# Patient Record
Sex: MALE | Race: WHITE | Employment: OTHER | URBAN - METROPOLITAN AREA
[De-identification: names, ages, dates, MRNs, and addresses within clinical notes are randomized per-mention and may not be internally consistent; named-entity substitution may affect disease eponyms.]

---

## 2017-03-31 ENCOUNTER — TRANSCRIBE ORDERS (OUTPATIENT)
Dept: LAB | Facility: CLINIC | Age: 77
End: 2017-03-31

## 2017-03-31 ENCOUNTER — ALLSCRIPTS OFFICE VISIT (OUTPATIENT)
Dept: OTHER | Facility: OTHER | Age: 77
End: 2017-03-31

## 2017-03-31 ENCOUNTER — APPOINTMENT (OUTPATIENT)
Dept: LAB | Facility: CLINIC | Age: 77
End: 2017-03-31
Payer: MEDICARE

## 2017-03-31 ENCOUNTER — GENERIC CONVERSION - ENCOUNTER (OUTPATIENT)
Dept: OTHER | Facility: OTHER | Age: 77
End: 2017-03-31

## 2017-03-31 DIAGNOSIS — I10 ESSENTIAL (PRIMARY) HYPERTENSION: ICD-10-CM

## 2017-03-31 DIAGNOSIS — E11.9 TYPE 2 DIABETES MELLITUS WITHOUT COMPLICATIONS (HCC): ICD-10-CM

## 2017-03-31 DIAGNOSIS — R91.8 OTHER NONSPECIFIC ABNORMAL FINDING OF LUNG FIELD: ICD-10-CM

## 2017-03-31 DIAGNOSIS — M25.562 PAIN IN LEFT KNEE: ICD-10-CM

## 2017-03-31 LAB
ALBUMIN SERPL BCP-MCNC: 3.7 G/DL (ref 3.5–5)
ALP SERPL-CCNC: 82 U/L (ref 46–116)
ALT SERPL W P-5'-P-CCNC: 38 U/L (ref 12–78)
ANION GAP SERPL CALCULATED.3IONS-SCNC: 5 MMOL/L (ref 4–13)
AST SERPL W P-5'-P-CCNC: 17 U/L (ref 5–45)
BASOPHILS # BLD AUTO: 0.01 THOUSANDS/ΜL (ref 0–0.1)
BASOPHILS NFR BLD AUTO: 0 % (ref 0–1)
BILIRUB SERPL-MCNC: 0.54 MG/DL (ref 0.2–1)
BUN SERPL-MCNC: 19 MG/DL (ref 5–25)
CALCIUM SERPL-MCNC: 8.8 MG/DL (ref 8.3–10.1)
CHLORIDE SERPL-SCNC: 104 MMOL/L (ref 100–108)
CHOLEST SERPL-MCNC: 156 MG/DL (ref 50–200)
CO2 SERPL-SCNC: 28 MMOL/L (ref 21–32)
CREAT SERPL-MCNC: 0.92 MG/DL (ref 0.6–1.3)
CREAT UR-MCNC: 131 MG/DL
EOSINOPHIL # BLD AUTO: 0.22 THOUSAND/ΜL (ref 0–0.61)
EOSINOPHIL NFR BLD AUTO: 5 % (ref 0–6)
ERYTHROCYTE [DISTWIDTH] IN BLOOD BY AUTOMATED COUNT: 13.8 % (ref 11.6–15.1)
EST. AVERAGE GLUCOSE BLD GHB EST-MCNC: 151 MG/DL
GFR SERPL CREATININE-BSD FRML MDRD: >60 ML/MIN/1.73SQ M
GLUCOSE P FAST SERPL-MCNC: 131 MG/DL (ref 65–99)
HBA1C MFR BLD: 6.9 % (ref 4.2–6.3)
HCT VFR BLD AUTO: 41.8 % (ref 36.5–49.3)
HDLC SERPL-MCNC: 54 MG/DL (ref 40–60)
HGB BLD-MCNC: 14.3 G/DL (ref 12–17)
LDLC SERPL CALC-MCNC: 90 MG/DL (ref 0–100)
LYMPHOCYTES # BLD AUTO: 1.42 THOUSANDS/ΜL (ref 0.6–4.47)
LYMPHOCYTES NFR BLD AUTO: 29 % (ref 14–44)
MCH RBC QN AUTO: 29.1 PG (ref 26.8–34.3)
MCHC RBC AUTO-ENTMCNC: 34.2 G/DL (ref 31.4–37.4)
MCV RBC AUTO: 85 FL (ref 82–98)
MICROALBUMIN UR-MCNC: 6.8 MG/L (ref 0–20)
MICROALBUMIN/CREAT 24H UR: 5 MG/G CREATININE (ref 0–30)
MONOCYTES # BLD AUTO: 0.35 THOUSAND/ΜL (ref 0.17–1.22)
MONOCYTES NFR BLD AUTO: 7 % (ref 4–12)
NEUTROPHILS # BLD AUTO: 2.91 THOUSANDS/ΜL (ref 1.85–7.62)
NEUTS SEG NFR BLD AUTO: 59 % (ref 43–75)
NRBC BLD AUTO-RTO: 0 /100 WBCS
PLATELET # BLD AUTO: 158 THOUSANDS/UL (ref 149–390)
PMV BLD AUTO: 11.5 FL (ref 8.9–12.7)
POTASSIUM SERPL-SCNC: 4.1 MMOL/L (ref 3.5–5.3)
PROT SERPL-MCNC: 7.3 G/DL (ref 6.4–8.2)
RBC # BLD AUTO: 4.92 MILLION/UL (ref 3.88–5.62)
SODIUM SERPL-SCNC: 137 MMOL/L (ref 136–145)
TRIGL SERPL-MCNC: 59 MG/DL
TSH SERPL DL<=0.05 MIU/L-ACNC: 2.21 UIU/ML (ref 0.36–3.74)
WBC # BLD AUTO: 4.92 THOUSAND/UL (ref 4.31–10.16)

## 2017-03-31 PROCEDURE — 80061 LIPID PANEL: CPT

## 2017-03-31 PROCEDURE — 82570 ASSAY OF URINE CREATININE: CPT

## 2017-03-31 PROCEDURE — 83036 HEMOGLOBIN GLYCOSYLATED A1C: CPT

## 2017-03-31 PROCEDURE — 80053 COMPREHEN METABOLIC PANEL: CPT

## 2017-03-31 PROCEDURE — 85025 COMPLETE CBC W/AUTO DIFF WBC: CPT

## 2017-03-31 PROCEDURE — 84443 ASSAY THYROID STIM HORMONE: CPT

## 2017-03-31 PROCEDURE — 36415 COLL VENOUS BLD VENIPUNCTURE: CPT

## 2017-03-31 PROCEDURE — 82043 UR ALBUMIN QUANTITATIVE: CPT

## 2017-04-01 ENCOUNTER — OFFICE VISIT (OUTPATIENT)
Dept: URGENT CARE | Facility: MEDICAL CENTER | Age: 77
End: 2017-04-01
Payer: MEDICARE

## 2017-04-01 PROCEDURE — 99212 OFFICE O/P EST SF 10 MIN: CPT

## 2017-04-01 PROCEDURE — G0463 HOSPITAL OUTPT CLINIC VISIT: HCPCS

## 2017-04-04 ENCOUNTER — GENERIC CONVERSION - ENCOUNTER (OUTPATIENT)
Dept: OTHER | Facility: OTHER | Age: 77
End: 2017-04-04

## 2017-04-04 ENCOUNTER — TRANSCRIBE ORDERS (OUTPATIENT)
Dept: ADMINISTRATIVE | Facility: HOSPITAL | Age: 77
End: 2017-04-04

## 2017-04-04 DIAGNOSIS — M25.562 ACUTE PAIN OF LEFT KNEE: Primary | ICD-10-CM

## 2017-04-06 ENCOUNTER — HOSPITAL ENCOUNTER (OUTPATIENT)
Dept: CT IMAGING | Facility: HOSPITAL | Age: 77
Discharge: HOME | End: 2017-04-06
Payer: MEDICARE

## 2017-04-06 DIAGNOSIS — R91.8 OTHER NONSPECIFIC ABNORMAL FINDING OF LUNG FIELD: ICD-10-CM

## 2017-04-06 PROCEDURE — 71250 CT THORAX DX C-: CPT

## 2017-04-11 ENCOUNTER — GENERIC CONVERSION - ENCOUNTER (OUTPATIENT)
Dept: OTHER | Facility: OTHER | Age: 77
End: 2017-04-11

## 2017-04-17 ENCOUNTER — HOSPITAL ENCOUNTER (OUTPATIENT)
Dept: MRI IMAGING | Facility: HOSPITAL | Age: 77
Discharge: HOME | End: 2017-04-17
Payer: MEDICARE

## 2017-04-17 DIAGNOSIS — M25.562 PAIN IN LEFT KNEE: ICD-10-CM

## 2017-04-17 PROCEDURE — 73721 MRI JNT OF LWR EXTRE W/O DYE: CPT

## 2017-04-18 ENCOUNTER — GENERIC CONVERSION - ENCOUNTER (OUTPATIENT)
Dept: OTHER | Facility: OTHER | Age: 77
End: 2017-04-18

## 2017-05-02 ENCOUNTER — HOSPITAL ENCOUNTER (OUTPATIENT)
Dept: RADIOLOGY | Facility: CLINIC | Age: 77
Discharge: HOME | End: 2017-05-02
Payer: MEDICARE

## 2017-05-02 ENCOUNTER — ALLSCRIPTS OFFICE VISIT (OUTPATIENT)
Dept: OTHER | Facility: OTHER | Age: 77
End: 2017-05-02

## 2017-05-02 DIAGNOSIS — M25.561 PAIN IN RIGHT KNEE: ICD-10-CM

## 2017-05-02 DIAGNOSIS — M25.562 PAIN IN LEFT KNEE: ICD-10-CM

## 2017-05-02 DIAGNOSIS — N39.0 URINARY TRACT INFECTION: ICD-10-CM

## 2017-05-02 DIAGNOSIS — M17.12 PRIMARY OSTEOARTHRITIS OF LEFT KNEE: ICD-10-CM

## 2017-05-02 DIAGNOSIS — S83.207A TEAR OF LEFT MENISCUS AS CURRENT INJURY: ICD-10-CM

## 2017-05-02 DIAGNOSIS — R31.9 HEMATURIA: ICD-10-CM

## 2017-05-02 PROCEDURE — 73564 X-RAY EXAM KNEE 4 OR MORE: CPT

## 2017-05-02 PROCEDURE — 73560 X-RAY EXAM OF KNEE 1 OR 2: CPT

## 2017-05-08 ENCOUNTER — APPOINTMENT (OUTPATIENT)
Dept: PHYSICAL THERAPY | Facility: MEDICAL CENTER | Age: 77
End: 2017-05-08
Payer: MEDICARE

## 2017-05-08 ENCOUNTER — GENERIC CONVERSION - ENCOUNTER (OUTPATIENT)
Dept: OTHER | Facility: OTHER | Age: 77
End: 2017-05-08

## 2017-05-08 PROCEDURE — G8991 OTHER PT/OT GOAL STATUS: HCPCS

## 2017-05-08 PROCEDURE — G8990 OTHER PT/OT CURRENT STATUS: HCPCS

## 2017-05-08 PROCEDURE — 97161 PT EVAL LOW COMPLEX 20 MIN: CPT

## 2017-05-12 ENCOUNTER — APPOINTMENT (OUTPATIENT)
Dept: PHYSICAL THERAPY | Facility: MEDICAL CENTER | Age: 77
End: 2017-05-12
Payer: MEDICARE

## 2017-05-12 PROCEDURE — 97110 THERAPEUTIC EXERCISES: CPT

## 2017-05-12 PROCEDURE — 97140 MANUAL THERAPY 1/> REGIONS: CPT

## 2017-05-15 ENCOUNTER — APPOINTMENT (OUTPATIENT)
Dept: PHYSICAL THERAPY | Facility: MEDICAL CENTER | Age: 77
End: 2017-05-15
Payer: MEDICARE

## 2017-05-15 PROCEDURE — 97110 THERAPEUTIC EXERCISES: CPT

## 2017-05-19 ENCOUNTER — APPOINTMENT (OUTPATIENT)
Dept: PHYSICAL THERAPY | Facility: MEDICAL CENTER | Age: 77
End: 2017-05-19
Payer: MEDICARE

## 2017-05-19 PROCEDURE — 97110 THERAPEUTIC EXERCISES: CPT

## 2017-05-22 ENCOUNTER — APPOINTMENT (OUTPATIENT)
Dept: PHYSICAL THERAPY | Facility: MEDICAL CENTER | Age: 77
End: 2017-05-22
Payer: MEDICARE

## 2017-05-22 PROCEDURE — 97110 THERAPEUTIC EXERCISES: CPT

## 2017-05-26 ENCOUNTER — APPOINTMENT (OUTPATIENT)
Dept: PHYSICAL THERAPY | Facility: MEDICAL CENTER | Age: 77
End: 2017-05-26
Payer: MEDICARE

## 2017-05-26 PROCEDURE — 97110 THERAPEUTIC EXERCISES: CPT

## 2017-05-30 ENCOUNTER — LAB REQUISITION (OUTPATIENT)
Dept: LAB | Facility: HOSPITAL | Age: 77
End: 2017-05-30
Payer: MEDICARE

## 2017-05-30 ENCOUNTER — ALLSCRIPTS OFFICE VISIT (OUTPATIENT)
Dept: OTHER | Facility: OTHER | Age: 77
End: 2017-05-30

## 2017-05-30 DIAGNOSIS — R31.9 HEMATURIA: ICD-10-CM

## 2017-05-30 DIAGNOSIS — N39.0 URINARY TRACT INFECTION: ICD-10-CM

## 2017-05-30 LAB
BILIRUB UR QL STRIP: NORMAL
CLARITY UR: NORMAL
COLOR UR: NORMAL
GLUCOSE (HISTORICAL): NEGATIVE
HGB UR QL STRIP.AUTO: NORMAL
KETONES UR STRIP-MCNC: NORMAL MG/DL
LEUKOCYTE ESTERASE UR QL STRIP: NORMAL
NITRITE UR QL STRIP: POSITIVE
PH UR STRIP.AUTO: 5 [PH]
PROT UR STRIP-MCNC: NORMAL MG/DL
SP GR UR STRIP.AUTO: 1.02
UROBILINOGEN UR QL STRIP.AUTO: 0.2

## 2017-05-30 PROCEDURE — 87086 URINE CULTURE/COLONY COUNT: CPT | Performed by: PHYSICIAN ASSISTANT

## 2017-05-30 PROCEDURE — 87077 CULTURE AEROBIC IDENTIFY: CPT | Performed by: PHYSICIAN ASSISTANT

## 2017-05-30 PROCEDURE — 87186 SC STD MICRODIL/AGAR DIL: CPT | Performed by: PHYSICIAN ASSISTANT

## 2017-06-01 ENCOUNTER — GENERIC CONVERSION - ENCOUNTER (OUTPATIENT)
Dept: OTHER | Facility: OTHER | Age: 77
End: 2017-06-01

## 2017-06-01 LAB
BACTERIA UR CULT: NORMAL
BACTERIA UR CULT: NORMAL

## 2017-06-02 ENCOUNTER — HOSPITAL ENCOUNTER (OUTPATIENT)
Dept: ULTRASOUND IMAGING | Facility: HOSPITAL | Age: 77
Discharge: HOME | End: 2017-06-02
Payer: MEDICARE

## 2017-06-02 DIAGNOSIS — N39.0 URINARY TRACT INFECTION: ICD-10-CM

## 2017-06-02 DIAGNOSIS — R31.9 HEMATURIA: ICD-10-CM

## 2017-06-02 PROCEDURE — 76770 US EXAM ABDO BACK WALL COMP: CPT

## 2017-06-05 ENCOUNTER — GENERIC CONVERSION - ENCOUNTER (OUTPATIENT)
Dept: OTHER | Facility: OTHER | Age: 77
End: 2017-06-05

## 2017-06-06 DIAGNOSIS — N28.89 OTHER SPECIFIED DISORDERS OF KIDNEY AND URETER: ICD-10-CM

## 2017-06-06 DIAGNOSIS — R31.9 HEMATURIA: ICD-10-CM

## 2017-06-06 DIAGNOSIS — N28.1 ACQUIRED CYST OF KIDNEY: ICD-10-CM

## 2017-06-06 DIAGNOSIS — N39.0 URINARY TRACT INFECTION: ICD-10-CM

## 2017-06-13 ENCOUNTER — ALLSCRIPTS OFFICE VISIT (OUTPATIENT)
Dept: OTHER | Facility: OTHER | Age: 77
End: 2017-06-13

## 2017-06-13 ENCOUNTER — APPOINTMENT (OUTPATIENT)
Dept: LAB | Facility: CLINIC | Age: 77
End: 2017-06-13
Payer: MEDICARE

## 2017-06-13 ENCOUNTER — TRANSCRIBE ORDERS (OUTPATIENT)
Dept: LAB | Facility: CLINIC | Age: 77
End: 2017-06-13

## 2017-06-13 ENCOUNTER — GENERIC CONVERSION - ENCOUNTER (OUTPATIENT)
Dept: OTHER | Facility: OTHER | Age: 77
End: 2017-06-13

## 2017-06-13 DIAGNOSIS — R31.9 HEMATURIA: ICD-10-CM

## 2017-06-13 DIAGNOSIS — N39.0 URINARY TRACT INFECTION: ICD-10-CM

## 2017-06-13 LAB
BILIRUB UR QL STRIP: NEGATIVE
CLARITY UR: CLEAR
COLOR UR: YELLOW
GLUCOSE UR STRIP-MCNC: NEGATIVE MG/DL
HGB UR QL STRIP.AUTO: NEGATIVE
KETONES UR STRIP-MCNC: NEGATIVE MG/DL
LEUKOCYTE ESTERASE UR QL STRIP: NEGATIVE
NITRITE UR QL STRIP: NEGATIVE
PH UR STRIP.AUTO: 5.5 [PH] (ref 4.5–8)
PROT UR STRIP-MCNC: NEGATIVE MG/DL
SP GR UR STRIP.AUTO: 1.02 (ref 1–1.03)
UROBILINOGEN UR QL STRIP.AUTO: 0.2 E.U./DL

## 2017-06-13 PROCEDURE — 81003 URINALYSIS AUTO W/O SCOPE: CPT

## 2017-06-15 ENCOUNTER — HOSPITAL ENCOUNTER (OUTPATIENT)
Dept: CT IMAGING | Facility: HOSPITAL | Age: 77
Discharge: HOME | End: 2017-06-15
Payer: MEDICARE

## 2017-06-15 DIAGNOSIS — N28.1 ACQUIRED CYST OF KIDNEY: ICD-10-CM

## 2017-06-15 DIAGNOSIS — R31.9 HEMATURIA: ICD-10-CM

## 2017-06-15 DIAGNOSIS — N39.0 URINARY TRACT INFECTION: ICD-10-CM

## 2017-06-15 PROCEDURE — 74178 CT ABD&PLV WO CNTR FLWD CNTR: CPT

## 2017-06-15 RX ADMIN — IOHEXOL 100 ML: 350 INJECTION, SOLUTION INTRAVENOUS at 17:53

## 2017-06-19 ENCOUNTER — GENERIC CONVERSION - ENCOUNTER (OUTPATIENT)
Dept: OTHER | Facility: OTHER | Age: 77
End: 2017-06-19

## 2017-06-22 ENCOUNTER — GENERIC CONVERSION - ENCOUNTER (OUTPATIENT)
Dept: OTHER | Facility: OTHER | Age: 77
End: 2017-06-22

## 2017-06-28 ENCOUNTER — TRANSCRIBE ORDERS (OUTPATIENT)
Dept: ADMINISTRATIVE | Facility: HOSPITAL | Age: 77
End: 2017-06-28

## 2017-06-28 ENCOUNTER — APPOINTMENT (OUTPATIENT)
Dept: LAB | Facility: CLINIC | Age: 77
End: 2017-06-28
Payer: MEDICARE

## 2017-06-28 ENCOUNTER — ALLSCRIPTS OFFICE VISIT (OUTPATIENT)
Dept: OTHER | Facility: OTHER | Age: 77
End: 2017-06-28

## 2017-06-28 DIAGNOSIS — N28.89 OTHER SPECIFIED DISORDERS OF KIDNEY AND URETER: ICD-10-CM

## 2017-06-28 DIAGNOSIS — N32.89 NONTRAUMATIC RUPTURE OF BLADDER: Primary | ICD-10-CM

## 2017-06-28 DIAGNOSIS — N28.89 VASCULAR DISORDERS OF KIDNEY: Primary | ICD-10-CM

## 2017-06-28 LAB
ANION GAP SERPL CALCULATED.3IONS-SCNC: 6 MMOL/L (ref 4–13)
BUN SERPL-MCNC: 19 MG/DL (ref 5–25)
CALCIUM SERPL-MCNC: 8.9 MG/DL (ref 8.3–10.1)
CHLORIDE SERPL-SCNC: 106 MMOL/L (ref 100–108)
CLARITY UR: NORMAL
CO2 SERPL-SCNC: 28 MMOL/L (ref 21–32)
COLOR UR: YELLOW
CREAT SERPL-MCNC: 0.93 MG/DL (ref 0.6–1.3)
GFR SERPL CREATININE-BSD FRML MDRD: >60 ML/MIN/1.73SQ M
GLUCOSE (HISTORICAL): 250
GLUCOSE P FAST SERPL-MCNC: 224 MG/DL (ref 65–99)
HGB UR QL STRIP.AUTO: NORMAL
KETONES UR STRIP-MCNC: NORMAL MG/DL
LEUKOCYTE ESTERASE UR QL STRIP: NORMAL
NITRITE UR QL STRIP: NORMAL
PH UR STRIP.AUTO: 5 [PH]
POTASSIUM SERPL-SCNC: 3.9 MMOL/L (ref 3.5–5.3)
PROT UR STRIP-MCNC: NORMAL MG/DL
SODIUM SERPL-SCNC: 140 MMOL/L (ref 136–145)
SP GR UR STRIP.AUTO: 1.03

## 2017-06-28 PROCEDURE — 88112 CYTOPATH CELL ENHANCE TECH: CPT | Performed by: UROLOGY

## 2017-06-28 PROCEDURE — 80048 BASIC METABOLIC PNL TOTAL CA: CPT

## 2017-06-28 PROCEDURE — 36415 COLL VENOUS BLD VENIPUNCTURE: CPT

## 2017-07-07 ENCOUNTER — HOSPITAL ENCOUNTER (OUTPATIENT)
Dept: MRI IMAGING | Facility: HOSPITAL | Age: 77
Discharge: HOME | End: 2017-07-07
Attending: UROLOGY
Payer: MEDICARE

## 2017-07-07 ENCOUNTER — HOSPITAL ENCOUNTER (OUTPATIENT)
Dept: ULTRASOUND IMAGING | Facility: HOSPITAL | Age: 77
Discharge: HOME | End: 2017-07-07
Attending: UROLOGY
Payer: MEDICARE

## 2017-07-07 DIAGNOSIS — N28.89 OTHER SPECIFIED DISORDERS OF KIDNEY AND URETER: ICD-10-CM

## 2017-07-07 DIAGNOSIS — N28.89 VASCULAR DISORDERS OF KIDNEY: ICD-10-CM

## 2017-07-07 DIAGNOSIS — R31.9 HEMATURIA: ICD-10-CM

## 2017-07-07 PROCEDURE — 76770 US EXAM ABDO BACK WALL COMP: CPT

## 2017-07-07 PROCEDURE — 74183 MRI ABD W/O CNTR FLWD CNTR: CPT

## 2017-07-07 PROCEDURE — A9585 GADOBUTROL INJECTION: HCPCS | Performed by: UROLOGY

## 2017-07-07 RX ADMIN — GADOBUTROL 10 ML: 604.72 INJECTION INTRAVENOUS at 20:10

## 2017-07-13 ENCOUNTER — GENERIC CONVERSION - ENCOUNTER (OUTPATIENT)
Dept: OTHER | Facility: OTHER | Age: 77
End: 2017-07-13

## 2017-07-21 ENCOUNTER — ALLSCRIPTS OFFICE VISIT (OUTPATIENT)
Dept: OTHER | Facility: OTHER | Age: 77
End: 2017-07-21

## 2017-07-21 LAB
BILIRUB UR QL STRIP: NORMAL
CLARITY UR: NORMAL
COLOR UR: YELLOW
GLUCOSE (HISTORICAL): NORMAL
HGB UR QL STRIP.AUTO: NORMAL
KETONES UR STRIP-MCNC: NORMAL MG/DL
LEUKOCYTE ESTERASE UR QL STRIP: NORMAL
NITRITE UR QL STRIP: NORMAL
PH UR STRIP.AUTO: 5 [PH]
PROT UR STRIP-MCNC: NORMAL MG/DL
SP GR UR STRIP.AUTO: 1.01
UROBILINOGEN UR QL STRIP.AUTO: NORMAL

## 2017-08-08 ENCOUNTER — ALLSCRIPTS OFFICE VISIT (OUTPATIENT)
Dept: OTHER | Facility: OTHER | Age: 77
End: 2017-08-08

## 2017-08-29 ENCOUNTER — ALLSCRIPTS OFFICE VISIT (OUTPATIENT)
Dept: OTHER | Facility: OTHER | Age: 77
End: 2017-08-29

## 2017-08-29 DIAGNOSIS — I10 ESSENTIAL (PRIMARY) HYPERTENSION: ICD-10-CM

## 2017-08-29 DIAGNOSIS — R94.31 ABNORMAL ELECTROCARDIOGRAM: ICD-10-CM

## 2017-08-31 ENCOUNTER — HOSPITAL ENCOUNTER (OUTPATIENT)
Dept: NON INVASIVE DIAGNOSTICS | Facility: CLINIC | Age: 77
Discharge: HOME | End: 2017-08-31
Payer: MEDICARE

## 2017-08-31 ENCOUNTER — GENERIC CONVERSION - ENCOUNTER (OUTPATIENT)
Dept: OTHER | Facility: OTHER | Age: 77
End: 2017-08-31

## 2017-08-31 ENCOUNTER — ANESTHESIA EVENT (OUTPATIENT)
Dept: PERIOP | Facility: HOSPITAL | Age: 77
DRG: 661 | End: 2017-08-31
Payer: MEDICARE

## 2017-08-31 DIAGNOSIS — I10 ESSENTIAL (PRIMARY) HYPERTENSION: ICD-10-CM

## 2017-08-31 DIAGNOSIS — R94.31 ABNORMAL ELECTROCARDIOGRAM: ICD-10-CM

## 2017-08-31 LAB
ARRHY DURING EX: NORMAL
CHEST PAIN STATEMENT: NORMAL
MAX DIASTOLIC BP: 60 MMHG
MAX HEART RATE: 97 BPM
MAX PREDICTED HEART RATE: 145 BPM
MAX. SYSTOLIC BP: 144 MMHG
PROTOCOL NAME: NORMAL
REASON FOR TERMINATION: NORMAL
TARGET HR FORMULA: NORMAL
TEST INDICATION: NORMAL
TIME IN EXERCISE PHASE: 180 S

## 2017-08-31 PROCEDURE — A9502 TC99M TETROFOSMIN: HCPCS

## 2017-08-31 PROCEDURE — 78452 HT MUSCLE IMAGE SPECT MULT: CPT

## 2017-08-31 PROCEDURE — 93017 CV STRESS TEST TRACING ONLY: CPT

## 2017-08-31 RX ORDER — SODIUM CHLORIDE 9 MG/ML
125 INJECTION, SOLUTION INTRAVENOUS CONTINUOUS
Status: CANCELLED | OUTPATIENT
Start: 2017-09-14

## 2017-08-31 RX ADMIN — REGADENOSON 0.4 MG: 0.08 INJECTION, SOLUTION INTRAVENOUS at 09:36

## 2017-09-01 ENCOUNTER — APPOINTMENT (OUTPATIENT)
Dept: LAB | Facility: HOSPITAL | Age: 77
End: 2017-09-01
Attending: UROLOGY
Payer: MEDICARE

## 2017-09-01 ENCOUNTER — TRANSCRIBE ORDERS (OUTPATIENT)
Dept: ADMINISTRATIVE | Facility: HOSPITAL | Age: 77
End: 2017-09-01

## 2017-09-01 ENCOUNTER — APPOINTMENT (OUTPATIENT)
Dept: PREADMISSION TESTING | Facility: HOSPITAL | Age: 77
End: 2017-09-01
Payer: MEDICARE

## 2017-09-01 ENCOUNTER — HOSPITAL ENCOUNTER (OUTPATIENT)
Dept: RADIOLOGY | Facility: HOSPITAL | Age: 77
Discharge: HOME | End: 2017-09-01
Attending: UROLOGY
Payer: MEDICARE

## 2017-09-01 ENCOUNTER — HOSPITAL ENCOUNTER (OUTPATIENT)
Dept: NON INVASIVE DIAGNOSTICS | Facility: HOSPITAL | Age: 77
Discharge: HOME | End: 2017-09-01
Attending: UROLOGY
Payer: MEDICARE

## 2017-09-01 VITALS
BODY MASS INDEX: 35.1 KG/M2 | RESPIRATION RATE: 18 BRPM | TEMPERATURE: 96.9 F | SYSTOLIC BLOOD PRESSURE: 131 MMHG | HEIGHT: 69 IN | DIASTOLIC BLOOD PRESSURE: 70 MMHG | HEART RATE: 57 BPM | WEIGHT: 237 LBS

## 2017-09-01 DIAGNOSIS — C64.1 MALIGNANT NEOPLASM OF RIGHT KIDNEY, EXCEPT RENAL PELVIS (HCC): ICD-10-CM

## 2017-09-01 DIAGNOSIS — Z01.818 PREOP EXAMINATION: ICD-10-CM

## 2017-09-01 DIAGNOSIS — Z01.818 PREOP EXAMINATION: Primary | ICD-10-CM

## 2017-09-01 LAB
ABO GROUP BLD: NORMAL
ANION GAP SERPL CALCULATED.3IONS-SCNC: 10 MMOL/L (ref 4–13)
APTT PPP: 20 SECONDS (ref 23–35)
BASOPHILS # BLD AUTO: 0.02 THOUSANDS/ΜL (ref 0–0.1)
BASOPHILS NFR BLD AUTO: 0 % (ref 0–1)
BLD GP AB SCN SERPL QL: NEGATIVE
BUN SERPL-MCNC: 18 MG/DL (ref 5–25)
CALCIUM SERPL-MCNC: 9.1 MG/DL (ref 8.3–10.1)
CHLORIDE SERPL-SCNC: 102 MMOL/L (ref 100–108)
CO2 SERPL-SCNC: 26 MMOL/L (ref 21–32)
CREAT SERPL-MCNC: 0.81 MG/DL (ref 0.6–1.3)
EOSINOPHIL # BLD AUTO: 0.16 THOUSAND/ΜL (ref 0–0.61)
EOSINOPHIL NFR BLD AUTO: 3 % (ref 0–6)
ERYTHROCYTE [DISTWIDTH] IN BLOOD BY AUTOMATED COUNT: 13.9 % (ref 11.6–15.1)
GFR SERPL CREATININE-BSD FRML MDRD: 87 ML/MIN/1.73SQ M
GLUCOSE SERPL-MCNC: 141 MG/DL (ref 65–140)
HCT VFR BLD AUTO: 40.1 % (ref 36.5–49.3)
HGB BLD-MCNC: 14.2 G/DL (ref 12–17)
INR PPP: 0.96 (ref 0.86–1.16)
LYMPHOCYTES # BLD AUTO: 1.34 THOUSANDS/ΜL (ref 0.6–4.47)
LYMPHOCYTES NFR BLD AUTO: 26 % (ref 14–44)
MCH RBC QN AUTO: 29.6 PG (ref 26.8–34.3)
MCHC RBC AUTO-ENTMCNC: 35.4 G/DL (ref 31.4–37.4)
MCV RBC AUTO: 84 FL (ref 82–98)
MONOCYTES # BLD AUTO: 0.39 THOUSAND/ΜL (ref 0.17–1.22)
MONOCYTES NFR BLD AUTO: 8 % (ref 4–12)
NEUTROPHILS # BLD AUTO: 3.16 THOUSANDS/ΜL (ref 1.85–7.62)
NEUTS SEG NFR BLD AUTO: 63 % (ref 43–75)
NRBC BLD AUTO-RTO: 0 /100 WBCS
PLATELET # BLD AUTO: 156 THOUSANDS/UL (ref 149–390)
PMV BLD AUTO: 11.2 FL (ref 8.9–12.7)
POTASSIUM SERPL-SCNC: 3.6 MMOL/L (ref 3.5–5.3)
PROTHROMBIN TIME: 12.8 SECONDS (ref 12.1–14.4)
RBC # BLD AUTO: 4.79 MILLION/UL (ref 3.88–5.62)
RH BLD: POSITIVE
SODIUM SERPL-SCNC: 138 MMOL/L (ref 136–145)
SPECIMEN EXPIRATION DATE: NORMAL
WBC # BLD AUTO: 5.07 THOUSAND/UL (ref 4.31–10.16)

## 2017-09-01 PROCEDURE — 85610 PROTHROMBIN TIME: CPT

## 2017-09-01 PROCEDURE — 85730 THROMBOPLASTIN TIME PARTIAL: CPT

## 2017-09-01 PROCEDURE — 80048 BASIC METABOLIC PNL TOTAL CA: CPT

## 2017-09-01 PROCEDURE — 71020 HB CHEST X-RAY 2VW FRONTAL&LATL: CPT

## 2017-09-01 PROCEDURE — 86901 BLOOD TYPING SEROLOGIC RH(D): CPT

## 2017-09-01 PROCEDURE — 36415 COLL VENOUS BLD VENIPUNCTURE: CPT

## 2017-09-01 PROCEDURE — 85025 COMPLETE CBC W/AUTO DIFF WBC: CPT

## 2017-09-01 PROCEDURE — 86900 BLOOD TYPING SEROLOGIC ABO: CPT

## 2017-09-01 PROCEDURE — 86850 RBC ANTIBODY SCREEN: CPT

## 2017-09-01 RX ORDER — VALSARTAN AND HYDROCHLOROTHIAZIDE 320; 25 MG/1; MG/1
1 TABLET, FILM COATED ORAL DAILY
COMMUNITY
End: 2018-05-08 | Stop reason: SDUPTHER

## 2017-09-01 RX ORDER — ATORVASTATIN CALCIUM 20 MG/1
20 TABLET, FILM COATED ORAL DAILY
Status: ON HOLD | COMMUNITY
End: 2017-09-14

## 2017-09-05 ENCOUNTER — ALLSCRIPTS OFFICE VISIT (OUTPATIENT)
Dept: OTHER | Facility: OTHER | Age: 77
End: 2017-09-05

## 2017-09-05 LAB — HBA1C MFR BLD HPLC: 6.9 %

## 2017-09-06 ENCOUNTER — ALLSCRIPTS OFFICE VISIT (OUTPATIENT)
Dept: OTHER | Facility: OTHER | Age: 77
End: 2017-09-06

## 2017-09-14 ENCOUNTER — ANESTHESIA (OUTPATIENT)
Dept: PERIOP | Facility: HOSPITAL | Age: 77
DRG: 661 | End: 2017-09-14
Payer: MEDICARE

## 2017-09-14 ENCOUNTER — HOSPITAL ENCOUNTER (INPATIENT)
Facility: HOSPITAL | Age: 77
LOS: 1 days | Discharge: HOME | DRG: 661 | End: 2017-09-15
Attending: UROLOGY | Admitting: UROLOGY
Payer: MEDICARE

## 2017-09-14 DIAGNOSIS — C64.1 MALIGNANT NEOPLASM OF RIGHT KIDNEY, EXCEPT RENAL PELVIS (HCC): ICD-10-CM

## 2017-09-14 PROBLEM — N28.89 RENAL MASS: Status: ACTIVE | Noted: 2017-09-14

## 2017-09-14 LAB
ANION GAP SERPL CALCULATED.3IONS-SCNC: 9 MMOL/L (ref 4–13)
BUN SERPL-MCNC: 17 MG/DL (ref 5–25)
CALCIUM SERPL-MCNC: 7.7 MG/DL (ref 8.3–10.1)
CHLORIDE SERPL-SCNC: 109 MMOL/L (ref 100–108)
CO2 SERPL-SCNC: 23 MMOL/L (ref 21–32)
CREAT SERPL-MCNC: 0.92 MG/DL (ref 0.6–1.3)
ERYTHROCYTE [DISTWIDTH] IN BLOOD BY AUTOMATED COUNT: 14 % (ref 11.6–15.1)
GFR SERPL CREATININE-BSD FRML MDRD: 81 ML/MIN/1.73SQ M
GLUCOSE SERPL-MCNC: 142 MG/DL (ref 65–140)
GLUCOSE SERPL-MCNC: 202 MG/DL (ref 65–140)
GLUCOSE SERPL-MCNC: 206 MG/DL (ref 65–140)
HCT VFR BLD AUTO: 37 % (ref 36.5–49.3)
HGB BLD-MCNC: 13 G/DL (ref 12–17)
MCH RBC QN AUTO: 29.5 PG (ref 26.8–34.3)
MCHC RBC AUTO-ENTMCNC: 35.1 G/DL (ref 31.4–37.4)
MCV RBC AUTO: 84 FL (ref 82–98)
PLATELET # BLD AUTO: 141 THOUSANDS/UL (ref 149–390)
PMV BLD AUTO: 10.3 FL (ref 8.9–12.7)
POTASSIUM SERPL-SCNC: 3.6 MMOL/L (ref 3.5–5.3)
RBC # BLD AUTO: 4.41 MILLION/UL (ref 3.88–5.62)
SODIUM SERPL-SCNC: 141 MMOL/L (ref 136–145)
WBC # BLD AUTO: 8.58 THOUSAND/UL (ref 4.31–10.16)

## 2017-09-14 PROCEDURE — 8E0W4CZ ROBOTIC ASSISTED PROCEDURE OF TRUNK REGION, PERCUTANEOUS ENDOSCOPIC APPROACH: ICD-10-PCS | Performed by: UROLOGY

## 2017-09-14 PROCEDURE — 88307 TISSUE EXAM BY PATHOLOGIST: CPT | Performed by: UROLOGY

## 2017-09-14 PROCEDURE — 85027 COMPLETE CBC AUTOMATED: CPT | Performed by: UROLOGY

## 2017-09-14 PROCEDURE — 82948 REAGENT STRIP/BLOOD GLUCOSE: CPT

## 2017-09-14 PROCEDURE — 86920 COMPATIBILITY TEST SPIN: CPT

## 2017-09-14 PROCEDURE — 0TB04ZZ EXCISION OF RIGHT KIDNEY, PERCUTANEOUS ENDOSCOPIC APPROACH: ICD-10-PCS | Performed by: UROLOGY

## 2017-09-14 PROCEDURE — 88341 IMHCHEM/IMCYTCHM EA ADD ANTB: CPT | Performed by: UROLOGY

## 2017-09-14 PROCEDURE — 88342 IMHCHEM/IMCYTCHM 1ST ANTB: CPT | Performed by: UROLOGY

## 2017-09-14 PROCEDURE — 80048 BASIC METABOLIC PNL TOTAL CA: CPT | Performed by: UROLOGY

## 2017-09-14 RX ORDER — SODIUM CHLORIDE 9 MG/ML
125 INJECTION, SOLUTION INTRAVENOUS CONTINUOUS
Status: DISCONTINUED | OUTPATIENT
Start: 2017-09-14 | End: 2017-09-15 | Stop reason: HOSPADM

## 2017-09-14 RX ORDER — ATORVASTATIN CALCIUM 20 MG/1
TABLET, FILM COATED ORAL
COMMUNITY
Start: 2011-12-05 | End: 2018-04-05 | Stop reason: SDUPTHER

## 2017-09-14 RX ORDER — GLYCOPYRROLATE 0.2 MG/ML
INJECTION INTRAMUSCULAR; INTRAVENOUS AS NEEDED
Status: DISCONTINUED | OUTPATIENT
Start: 2017-09-14 | End: 2017-09-14 | Stop reason: SURG

## 2017-09-14 RX ORDER — MIDAZOLAM HYDROCHLORIDE 1 MG/ML
INJECTION INTRAMUSCULAR; INTRAVENOUS AS NEEDED
Status: DISCONTINUED | OUTPATIENT
Start: 2017-09-14 | End: 2017-09-14 | Stop reason: SURG

## 2017-09-14 RX ORDER — MORPHINE SULFATE 4 MG/ML
3 INJECTION, SOLUTION INTRAMUSCULAR; INTRAVENOUS
Status: DISCONTINUED | OUTPATIENT
Start: 2017-09-14 | End: 2017-09-15 | Stop reason: HOSPADM

## 2017-09-14 RX ORDER — ONDANSETRON 2 MG/ML
4 INJECTION INTRAMUSCULAR; INTRAVENOUS EVERY 6 HOURS PRN
Status: DISCONTINUED | OUTPATIENT
Start: 2017-09-14 | End: 2017-09-15 | Stop reason: HOSPADM

## 2017-09-14 RX ORDER — HYDRALAZINE HYDROCHLORIDE 20 MG/ML
INJECTION INTRAMUSCULAR; INTRAVENOUS AS NEEDED
Status: DISCONTINUED | OUTPATIENT
Start: 2017-09-14 | End: 2017-09-14 | Stop reason: SURG

## 2017-09-14 RX ORDER — ONDANSETRON 2 MG/ML
4 INJECTION INTRAMUSCULAR; INTRAVENOUS EVERY 6 HOURS PRN
Status: DISCONTINUED | OUTPATIENT
Start: 2017-09-14 | End: 2017-09-14 | Stop reason: HOSPADM

## 2017-09-14 RX ORDER — DOCUSATE SODIUM 100 MG/1
100 CAPSULE, LIQUID FILLED ORAL 2 TIMES DAILY
Status: DISCONTINUED | OUTPATIENT
Start: 2017-09-14 | End: 2017-09-15 | Stop reason: HOSPADM

## 2017-09-14 RX ORDER — FENTANYL CITRATE 50 UG/ML
INJECTION, SOLUTION INTRAMUSCULAR; INTRAVENOUS AS NEEDED
Status: DISCONTINUED | OUTPATIENT
Start: 2017-09-14 | End: 2017-09-14 | Stop reason: SURG

## 2017-09-14 RX ORDER — DEXTROSE, SODIUM CHLORIDE, SODIUM LACTATE, POTASSIUM CHLORIDE, AND CALCIUM CHLORIDE 5; .6; .31; .03; .02 G/100ML; G/100ML; G/100ML; G/100ML; G/100ML
125 INJECTION, SOLUTION INTRAVENOUS CONTINUOUS
Status: DISCONTINUED | OUTPATIENT
Start: 2017-09-14 | End: 2017-09-15 | Stop reason: HOSPADM

## 2017-09-14 RX ORDER — MAGNESIUM HYDROXIDE 1200 MG/15ML
LIQUID ORAL AS NEEDED
Status: DISCONTINUED | OUTPATIENT
Start: 2017-09-14 | End: 2017-09-14 | Stop reason: HOSPADM

## 2017-09-14 RX ORDER — PROPOFOL 10 MG/ML
INJECTION, EMULSION INTRAVENOUS AS NEEDED
Status: DISCONTINUED | OUTPATIENT
Start: 2017-09-14 | End: 2017-09-14 | Stop reason: SURG

## 2017-09-14 RX ORDER — ROCURONIUM BROMIDE 10 MG/ML
INJECTION, SOLUTION INTRAVENOUS AS NEEDED
Status: DISCONTINUED | OUTPATIENT
Start: 2017-09-14 | End: 2017-09-14 | Stop reason: SURG

## 2017-09-14 RX ORDER — SODIUM CHLORIDE 9 MG/ML
INJECTION, SOLUTION INTRAVENOUS AS NEEDED
Status: DISCONTINUED | OUTPATIENT
Start: 2017-09-14 | End: 2017-09-14 | Stop reason: HOSPADM

## 2017-09-14 RX ORDER — HYDROCODONE BITARTRATE AND ACETAMINOPHEN 5; 325 MG/1; MG/1
2 TABLET ORAL EVERY 4 HOURS PRN
Status: DISCONTINUED | OUTPATIENT
Start: 2017-09-14 | End: 2017-09-15 | Stop reason: HOSPADM

## 2017-09-14 RX ORDER — FENTANYL CITRATE 50 UG/ML
25 INJECTION, SOLUTION INTRAMUSCULAR; INTRAVENOUS
Status: DISCONTINUED | OUTPATIENT
Start: 2017-09-14 | End: 2017-09-14 | Stop reason: HOSPADM

## 2017-09-14 RX ORDER — EPHEDRINE SULFATE 50 MG/ML
INJECTION, SOLUTION INTRAVENOUS AS NEEDED
Status: DISCONTINUED | OUTPATIENT
Start: 2017-09-14 | End: 2017-09-14 | Stop reason: SURG

## 2017-09-14 RX ORDER — SODIUM CHLORIDE 9 MG/ML
INJECTION, SOLUTION INTRAVENOUS CONTINUOUS PRN
Status: DISCONTINUED | OUTPATIENT
Start: 2017-09-14 | End: 2017-09-14 | Stop reason: SURG

## 2017-09-14 RX ORDER — METOCLOPRAMIDE HYDROCHLORIDE 5 MG/ML
10 INJECTION INTRAMUSCULAR; INTRAVENOUS EVERY 6 HOURS PRN
Status: DISCONTINUED | OUTPATIENT
Start: 2017-09-14 | End: 2017-09-15 | Stop reason: HOSPADM

## 2017-09-14 RX ORDER — ONDANSETRON 2 MG/ML
INJECTION INTRAMUSCULAR; INTRAVENOUS AS NEEDED
Status: DISCONTINUED | OUTPATIENT
Start: 2017-09-14 | End: 2017-09-14 | Stop reason: SURG

## 2017-09-14 RX ORDER — BACITRACIN, NEOMYCIN, POLYMYXIN B 400; 3.5; 5 [USP'U]/G; MG/G; [USP'U]/G
1 OINTMENT TOPICAL 2 TIMES DAILY
Status: DISCONTINUED | OUTPATIENT
Start: 2017-09-14 | End: 2017-09-15 | Stop reason: HOSPADM

## 2017-09-14 RX ORDER — MAGNESIUM HYDROXIDE/ALUMINUM HYDROXICE/SIMETHICONE 120; 1200; 1200 MG/30ML; MG/30ML; MG/30ML
30 SUSPENSION ORAL EVERY 6 HOURS PRN
Status: DISCONTINUED | OUTPATIENT
Start: 2017-09-14 | End: 2017-09-15 | Stop reason: HOSPADM

## 2017-09-14 RX ORDER — HYDROMORPHONE HYDROCHLORIDE 2 MG/ML
INJECTION, SOLUTION INTRAMUSCULAR; INTRAVENOUS; SUBCUTANEOUS AS NEEDED
Status: DISCONTINUED | OUTPATIENT
Start: 2017-09-14 | End: 2017-09-14 | Stop reason: SURG

## 2017-09-14 RX ADMIN — CEFAZOLIN SODIUM 1000 MG: 1 SOLUTION INTRAVENOUS at 15:33

## 2017-09-14 RX ADMIN — CEFAZOLIN SODIUM 2000 MG: 2 SOLUTION INTRAVENOUS at 07:39

## 2017-09-14 RX ADMIN — FENTANYL CITRATE 25 MCG: 50 INJECTION INTRAMUSCULAR; INTRAVENOUS at 12:43

## 2017-09-14 RX ADMIN — ENOXAPARIN SODIUM 40 MG: 40 INJECTION SUBCUTANEOUS at 17:45

## 2017-09-14 RX ADMIN — ROCURONIUM BROMIDE 20 MG: 10 INJECTION, SOLUTION INTRAVENOUS at 08:17

## 2017-09-14 RX ADMIN — DEXTROSE, SODIUM CHLORIDE, SODIUM LACTATE, POTASSIUM CHLORIDE, AND CALCIUM CHLORIDE 125 ML/HR: 5; .6; .31; .03; .02 INJECTION, SOLUTION INTRAVENOUS at 21:37

## 2017-09-14 RX ADMIN — SODIUM CHLORIDE: 0.9 INJECTION, SOLUTION INTRAVENOUS at 09:01

## 2017-09-14 RX ADMIN — HYDROMORPHONE HYDROCHLORIDE 0.2 MG: 2 INJECTION, SOLUTION INTRAMUSCULAR; INTRAVENOUS; SUBCUTANEOUS at 11:15

## 2017-09-14 RX ADMIN — HYDRALAZINE HYDROCHLORIDE 2 MG: 20 INJECTION INTRAMUSCULAR; INTRAVENOUS at 10:08

## 2017-09-14 RX ADMIN — FENTANYL CITRATE 100 MCG: 50 INJECTION, SOLUTION INTRAMUSCULAR; INTRAVENOUS at 08:37

## 2017-09-14 RX ADMIN — ONDANSETRON 4 MG: 2 INJECTION INTRAMUSCULAR; INTRAVENOUS at 17:41

## 2017-09-14 RX ADMIN — METOCLOPRAMIDE 10 MG: 5 INJECTION, SOLUTION INTRAMUSCULAR; INTRAVENOUS at 21:36

## 2017-09-14 RX ADMIN — MIDAZOLAM HYDROCHLORIDE 2 MG: 1 INJECTION, SOLUTION INTRAMUSCULAR; INTRAVENOUS at 07:35

## 2017-09-14 RX ADMIN — HYDRALAZINE HYDROCHLORIDE 4 MG: 20 INJECTION INTRAMUSCULAR; INTRAVENOUS at 08:40

## 2017-09-14 RX ADMIN — ROCURONIUM BROMIDE 30 MG: 10 INJECTION, SOLUTION INTRAVENOUS at 08:57

## 2017-09-14 RX ADMIN — ROCURONIUM BROMIDE 50 MG: 10 INJECTION, SOLUTION INTRAVENOUS at 07:50

## 2017-09-14 RX ADMIN — SODIUM CHLORIDE: 0.9 INJECTION, SOLUTION INTRAVENOUS at 07:57

## 2017-09-14 RX ADMIN — FENTANYL CITRATE 100 MCG: 50 INJECTION, SOLUTION INTRAMUSCULAR; INTRAVENOUS at 07:50

## 2017-09-14 RX ADMIN — HYDROCODONE BITARTRATE AND ACETAMINOPHEN 2 TABLET: 5; 325 TABLET ORAL at 15:33

## 2017-09-14 RX ADMIN — HYDROMORPHONE HYDROCHLORIDE 0.4 MG: 2 INJECTION, SOLUTION INTRAMUSCULAR; INTRAVENOUS; SUBCUTANEOUS at 10:10

## 2017-09-14 RX ADMIN — ONDANSETRON HYDROCHLORIDE 4 MG: 2 INJECTION, SOLUTION INTRAVENOUS at 10:56

## 2017-09-14 RX ADMIN — PROPOFOL 250 MG: 10 INJECTION, EMULSION INTRAVENOUS at 07:50

## 2017-09-14 RX ADMIN — FENTANYL CITRATE 25 MCG: 50 INJECTION INTRAMUSCULAR; INTRAVENOUS at 12:53

## 2017-09-14 RX ADMIN — SODIUM CHLORIDE: 0.9 INJECTION, SOLUTION INTRAVENOUS at 10:27

## 2017-09-14 RX ADMIN — EPHEDRINE SULFATE 10 MG: 50 INJECTION, SOLUTION INTRAMUSCULAR; INTRAVENOUS; SUBCUTANEOUS at 08:19

## 2017-09-14 RX ADMIN — DEXTROSE, SODIUM CHLORIDE, SODIUM LACTATE, POTASSIUM CHLORIDE, AND CALCIUM CHLORIDE 125 ML/HR: 5; .6; .31; .03; .02 INJECTION, SOLUTION INTRAVENOUS at 12:57

## 2017-09-14 RX ADMIN — HYDROMORPHONE HYDROCHLORIDE 0.4 MG: 2 INJECTION, SOLUTION INTRAMUSCULAR; INTRAVENOUS; SUBCUTANEOUS at 11:24

## 2017-09-14 RX ADMIN — BACITRACIN ZINC, NEOMYCIN SULFATE, POLYMYXIN B SULFATE 1 SMALL APPLICATION: 3.5; 5000; 4 OINTMENT TOPICAL at 17:45

## 2017-09-14 RX ADMIN — NEOSTIGMINE METHYLSULFATE 5 MG: 1 INJECTION, SOLUTION INTRAMUSCULAR; INTRAVENOUS; SUBCUTANEOUS at 11:03

## 2017-09-14 RX ADMIN — MORPHINE SULFATE 3 MG: 4 INJECTION INTRAVENOUS at 21:34

## 2017-09-14 RX ADMIN — CEFAZOLIN SODIUM 1000 MG: 1 SOLUTION INTRAVENOUS at 23:47

## 2017-09-14 RX ADMIN — SODIUM CHLORIDE 125 ML/HR: 0.9 INJECTION, SOLUTION INTRAVENOUS at 06:10

## 2017-09-14 RX ADMIN — LIDOCAINE HYDROCHLORIDE 100 MG: 20 INJECTION, SOLUTION INTRAVENOUS at 07:50

## 2017-09-14 RX ADMIN — GLYCOPYRROLATE 0.8 MG: 0.2 INJECTION, SOLUTION INTRAMUSCULAR; INTRAVENOUS at 11:03

## 2017-09-14 RX ADMIN — DOCUSATE SODIUM 100 MG: 100 CAPSULE, LIQUID FILLED ORAL at 17:45

## 2017-09-15 VITALS
DIASTOLIC BLOOD PRESSURE: 65 MMHG | TEMPERATURE: 97.7 F | HEIGHT: 70 IN | HEART RATE: 57 BPM | BODY MASS INDEX: 34.24 KG/M2 | RESPIRATION RATE: 20 BRPM | OXYGEN SATURATION: 94 % | WEIGHT: 239.2 LBS | SYSTOLIC BLOOD PRESSURE: 136 MMHG

## 2017-09-15 LAB
ANION GAP SERPL CALCULATED.3IONS-SCNC: 8 MMOL/L (ref 4–13)
BUN SERPL-MCNC: 13 MG/DL (ref 5–25)
CALCIUM SERPL-MCNC: 8 MG/DL (ref 8.3–10.1)
CHLORIDE SERPL-SCNC: 108 MMOL/L (ref 100–108)
CO2 SERPL-SCNC: 26 MMOL/L (ref 21–32)
CREAT FLD-MCNC: 0.87 MG/DL
CREAT SERPL-MCNC: 1.04 MG/DL (ref 0.6–1.3)
ERYTHROCYTE [DISTWIDTH] IN BLOOD BY AUTOMATED COUNT: 14.1 % (ref 11.6–15.1)
GFR SERPL CREATININE-BSD FRML MDRD: 70 ML/MIN/1.73SQ M
GLUCOSE SERPL-MCNC: 168 MG/DL (ref 65–140)
HCT VFR BLD AUTO: 35.7 % (ref 36.5–49.3)
HGB BLD-MCNC: 12.5 G/DL (ref 12–17)
MCH RBC QN AUTO: 29.6 PG (ref 26.8–34.3)
MCHC RBC AUTO-ENTMCNC: 35 G/DL (ref 31.4–37.4)
MCV RBC AUTO: 85 FL (ref 82–98)
PLATELET # BLD AUTO: 132 THOUSANDS/UL (ref 149–390)
PMV BLD AUTO: 11.2 FL (ref 8.9–12.7)
POTASSIUM SERPL-SCNC: 3.3 MMOL/L (ref 3.5–5.3)
RBC # BLD AUTO: 4.22 MILLION/UL (ref 3.88–5.62)
SODIUM SERPL-SCNC: 142 MMOL/L (ref 136–145)
WBC # BLD AUTO: 5.81 THOUSAND/UL (ref 4.31–10.16)

## 2017-09-15 PROCEDURE — 82570 ASSAY OF URINE CREATININE: CPT | Performed by: UROLOGY

## 2017-09-15 PROCEDURE — 80048 BASIC METABOLIC PNL TOTAL CA: CPT | Performed by: UROLOGY

## 2017-09-15 PROCEDURE — 85027 COMPLETE CBC AUTOMATED: CPT | Performed by: UROLOGY

## 2017-09-15 RX ORDER — DOCUSATE SODIUM 100 MG/1
100 CAPSULE, LIQUID FILLED ORAL 2 TIMES DAILY
Qty: 10 CAPSULE | Refills: 0 | Status: SHIPPED | OUTPATIENT
Start: 2017-09-15 | End: 2018-03-28 | Stop reason: ALTCHOICE

## 2017-09-15 RX ORDER — HYDROCODONE BITARTRATE AND ACETAMINOPHEN 5; 325 MG/1; MG/1
2 TABLET ORAL EVERY 6 HOURS PRN
Qty: 20 TABLET | Refills: 0 | Status: SHIPPED | OUTPATIENT
Start: 2017-09-15 | End: 2017-09-25

## 2017-09-15 RX ORDER — DEXTROSE, SODIUM CHLORIDE, SODIUM LACTATE, POTASSIUM CHLORIDE, AND CALCIUM CHLORIDE 5; .6; .31; .03; .02 G/100ML; G/100ML; G/100ML; G/100ML; G/100ML
125 INJECTION, SOLUTION INTRAVENOUS CONTINUOUS
Qty: 1000 ML | Refills: 0 | Status: SHIPPED | OUTPATIENT
Start: 2017-09-15 | End: 2017-09-15 | Stop reason: HOSPADM

## 2017-09-15 RX ORDER — MAGNESIUM HYDROXIDE/ALUMINUM HYDROXICE/SIMETHICONE 120; 1200; 1200 MG/30ML; MG/30ML; MG/30ML
30 SUSPENSION ORAL EVERY 6 HOURS PRN
Qty: 355 ML | Refills: 0 | Status: SHIPPED | OUTPATIENT
Start: 2017-09-15 | End: 2018-03-28 | Stop reason: ALTCHOICE

## 2017-09-15 RX ADMIN — DOCUSATE SODIUM 100 MG: 100 CAPSULE, LIQUID FILLED ORAL at 08:28

## 2017-09-15 RX ADMIN — ENOXAPARIN SODIUM 40 MG: 40 INJECTION SUBCUTANEOUS at 08:28

## 2017-09-15 RX ADMIN — DEXTROSE, SODIUM CHLORIDE, SODIUM LACTATE, POTASSIUM CHLORIDE, AND CALCIUM CHLORIDE 125 ML/HR: 5; .6; .31; .03; .02 INJECTION, SOLUTION INTRAVENOUS at 05:30

## 2017-09-15 RX ADMIN — MORPHINE SULFATE 3 MG: 4 INJECTION INTRAVENOUS at 05:38

## 2017-09-16 LAB
ABO GROUP BLD BPU: NORMAL
ABO GROUP BLD BPU: NORMAL
BPU ID: NORMAL
BPU ID: NORMAL
CROSSMATCH: NORMAL
CROSSMATCH: NORMAL
UNIT DISPENSE STATUS: NORMAL
UNIT DISPENSE STATUS: NORMAL
UNIT PRODUCT CODE: NORMAL
UNIT PRODUCT CODE: NORMAL
UNIT RH: NORMAL
UNIT RH: NORMAL

## 2017-10-06 ENCOUNTER — GENERIC CONVERSION - ENCOUNTER (OUTPATIENT)
Dept: OTHER | Facility: OTHER | Age: 77
End: 2017-10-06

## 2017-10-06 ENCOUNTER — TRANSCRIBE ORDERS (OUTPATIENT)
Dept: ADMINISTRATIVE | Facility: HOSPITAL | Age: 77
End: 2017-10-06

## 2017-10-06 DIAGNOSIS — N08 MYELOMA KIDNEY (HCC): Primary | ICD-10-CM

## 2017-10-06 DIAGNOSIS — C90.00 MYELOMA KIDNEY (HCC): Primary | ICD-10-CM

## 2017-11-17 ENCOUNTER — GENERIC CONVERSION - ENCOUNTER (OUTPATIENT)
Dept: OTHER | Facility: OTHER | Age: 77
End: 2017-11-17

## 2018-01-09 NOTE — RESULT NOTES
Verified Results  CT RENAL PROTOCOL [id number] 05:34PM Tanika SWEENEY Order Number: [id number]    - Patient Instructions: To schedule this appointment, please contact Central Scheduling at [phone]  Test Name Result Flag Reference   CT RENAL PROTOCOL (Report)     CT ABDOMEN AND PELVIS WITH AND WITHOUT IV CONTRAST     INDICATION: 44-year-old male, hematuria, UTI     COMPARISON: 6/2/2017 ultrasound     TECHNIQUE: CT of the kidneys was performed without intravenous contrast  Dynamic postcontrast CT evaluation of the abdomen and pelvis was performed in both nephrographic and delayed phases after the administration of intravenous contrast  Reformatted    images were created in axial, sagittal, and coronal planes  Radiation dose length product (DLP) for this visit: 2634 mGy-cm   This examination, like all CT scans performed in the [facility], was performed utilizing techniques to minimize radiation dose exposure, including the use of iterative    reconstruction and automated exposure control  IV Contrast: 100 mL of iohexol (OMNIPAQUE)      Enteric Contrast: Not utilized     FINDINGS:     ABDOMEN     RIGHT KIDNEY AND URETER:   Lobulated hypodensity is present at the lower pole of the right kidney, measuring a conglomerate 3 cm x 3 cm x 6 cm  In the central portion of the mass is an irregular region of enhancement which appears to roughly measure approximately 1 5 cm, although   precise dimensions are difficult to determine  This combination is consistent with a Bosniak 3 lesion  There is a high likelihood of renal cell malignancy  This corresponds with ultrasound  No detectable ureteral mass  No hydronephrosis or hydroureter  No urinary tract calculi  No perinephric collection  Small upper pole cyst, not visible by ultrasound     LEFT KIDNEY AND URETER:   No solid renal mass  No detectable ureteral mass  No hydronephrosis or hydroureter  No urinary tract calculi   No perinephric collection  2 5 cm midpole left renal cyst   Several adjacent cysts involve the lower pole, consistent with ultrasound     URINARY BLADDER:   No bladder wall mass  No calculi  Mild bladder wall thickening less conspicuous by ultrasound, possibly due to under distention     LUNG BASES: Unremarkable  LIVER/BILIARY TREE: Unremarkable  GALLBLADDER: No calcified gallstones  No pericholecystic inflammatory change  SPLEEN: Unremarkable  PANCREAS: Unremarkable  ADRENAL GLANDS: Unremarkable  STOMACH, BOWEL AND APPENDIX: Unremarkable  ABDOMINOPELVIC CAVITY: No ascites  No free intraperitoneal air  No lymphadenopathy  VESSELS: Unremarkable for patient's age  PELVIS     REPRODUCTIVE ORGANS: Unremarkable for patient's age  ABDOMINAL WALL/INGUINAL REGIONS: Unremarkable  OSSEOUS STRUCTURES: No acute fracture or destructive osseous lesion  Chronic Schmorl's defect superior endplate L4  Minimal grade 1 anterolisthesis L4-5  IMPRESSION:   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

## 2018-01-10 NOTE — RESULT NOTES
Verified Results  (1) URINE CULTURE [id number] 04:18PM Ginny Foote    Order Number: [id number]     Test Name Result Flag Reference   CLINICAL REPORT (Report)     Test:        Urine culture  Specimen Source:  Urine, Unspecified Source  Specimen Type:   Urine  Specimen Date:   5/30/2017 4:18 PM  Result Date:    6/1/2017 4:36 PM  Result Status:   Final result  Resulting Lab:   [place] [address]            Tel: [phone]      CULTURE                                       ------------------                                   70,000-79,000 cfu/ml Escherichia coli    70,000-79,000 cfu/ml Citrobacter koseri      SUSCEPTIBILITY                                   ------------------                                                       Escherichia coli  METHOD                 JEB  -------------------------------------  -------------------------  AMOXICILLIN + CLAVULANATE                 AMPICILLIN ($$)             <=8 00 ug/ml Susceptible  AMPICILLIN + SULBACTAM ($)                AZTREONAM ($$$)             <=8 ug/ml   Susceptible  CEFAZOLIN ($)              <=8 00 ug/ml Susceptible  CIPROFLOXACIN ($)            <=1 00 ug/ml Susceptible  ERTAPENEM ($$$)                      GENTAMICIN ($$)             <=4 ug/ml   Susceptible  IMIPENEM                         LEVOFLOXACIN ($)            <=2 00 ug/ml Susceptible  MEROPENEM ($$)                      NITROFURANTOIN             <=32 ug/ml  Susceptible  PIPERACILLIN + TAZOBACTAM ($$$)     <=16 ug/ml  Susceptible  TETRACYCLINE              <=4 ug/ml   Susceptible  TOBRAMYCIN ($)             <=4 ug/ml   Susceptible  TRIMETHOPRIM + SULFAMETHOXAZOLE ($$$)  <=2/38 ug/ml Susceptible                        Citrobacter koseri  METHOD                 JEB  -------------------------------------  -------------------------  AMOXICILLIN + CLAVULANATE        <=8/4 ug/ml  Susceptible  AMPICILLIN ($$) >16 00 ug/ml Resistant  AMPICILLIN + SULBACTAM ($)       <=8/4 ug/ml  Susceptible  AZTREONAM ($$$)             <=8 ug/ml   Susceptible  CEFAZOLIN ($)              <=8 00 ug/ml Susceptible  CIPROFLOXACIN ($)            <=1 00 ug/ml Susceptible  ERTAPENEM ($$$)             <=2 0 ug/ml  Susceptible  GENTAMICIN ($$)             <=4 ug/ml   Susceptible  IMIPENEM                <=4 ug/ml   Susceptible  LEVOFLOXACIN ($)            <=2 00 ug/ml Susceptible  MEROPENEM ($$)             <=4 00 ug/ml Susceptible  NITROFURANTOIN             <=32 ug/ml  Susceptible  PIPERACILLIN + TAZOBACTAM ($$$)     <=16 ug/ml  Susceptible  TETRACYCLINE              <=4 ug/ml   Susceptible  TOBRAMYCIN ($)             <=4 ug/ml   Susceptible  TRIMETHOPRIM + SULFAMETHOXAZOLE ($$$)  <=2/38 ug/ml Susceptible

## 2018-01-11 NOTE — RESULT NOTES
Verified Results  * CT CHEST WO CONTRAST [id number] 02:25PM Evie Dumont Order Number: [id number]    - Patient Instructions: To schedule this appointment, please contact Central Scheduling at [phone]  Test Name Result Flag Reference   CT CHEST WO CONTRAST (Report)     CT CHEST WITHOUT IV CONTRAST     INDICATION: Follow-up pulmonary nodule, thoracic aortic aneurysm  COMPARISON: CT chest 8/5/2015     TECHNIQUE: CT examination of the chest was performed without intravenous contrast  Reformatted images were created in axial, sagittal, and coronal planes  Radiation dose length product (DLP) for this visit: 259 41 mGy-cm   This examination, like all CT scans performed in the [facility], was performed utilizing techniques to minimize radiation dose exposure, including the use of iterative   reconstruction and automated exposure control  FINDINGS:     LUNGS: Stable 2 mm right upper lobe nodule (series 3 image 26)  Stable 4 mm right upper lobe nodule (series 3 image 28)  There are calcified granulomata present  No new suspicious nodule or pulmonary mass  [ There is no tracheal or endobronchial    lesion  PLEURA: Unremarkable  HEART/GREAT VESSELS: Stable enlargement of the ascending aorta measuring 4 3 cm  The heart is normal in size without pericardial mass or effusion  There are atherosclerotic changes of the coronary arteries  MEDIASTINUM AND KAVON: Unremarkable  CHEST WALL AND LOWER NECK: Unremarkable  VISUALIZED STRUCTURES IN THE UPPER ABDOMEN: Unremarkable  Stable 2 1 cm cyst at the upper pole left kidney  OSSEOUS STRUCTURES: No acute fracture  No destructive osseous lesion  IMPRESSION:   1  Stable pulmonary nodules measuring up to 4 mm unchanged from 2013 and presumably benign  2  Stable 4 3 cm ascending aortic aneurysm         Workstation performed: [id number]     Signed by:   Maureen Marshall MD   4/10/17

## 2018-01-11 NOTE — RESULT NOTES
Verified Results  (1) URINALYSIS w URINE C/S REFLEX (will reflex a microscopy if leukocytes, occult blood, or nitrites are not within normal limits) [id number] 07:49AM Flory Cost    Order Number: [id number]     Test Name Result Flag Reference   COLOR Yellow     CLARITY Clear     PH UA 5 5  4 5-8 0   LEUKOCYTE ESTERASE UA Negative  Negative   NITRITE UA Negative  Negative   PROTEIN UA Negative mg/dl  Negative   GLUCOSE UA Negative mg/dl  Negative   KETONES UA Negative mg/dl  Negative   UROBILINOGEN UA 0 2 E U /dl  0 2, 1 0 E U /dl   BILIRUBIN UA Negative  Negative   BLOOD UA Negative  Negative   SPECIFIC GRAVITY UA 1 025  1 003-1 030

## 2018-01-11 NOTE — RESULT NOTES
Verified Results  (1) HEMOGLOBIN A1C [id number] 07:52AM Scott Welch Order Number: [id number]      5 7-6 4% impaired fasting glucose  >=6 5% diagnosis of diabetes    Falsely low levels are seen in conditions linked to short RBC life span-  hemolytic anemia, and splenomegaly  Falsely elevated levels are seen in situations where there is an increased production of RBC- receipt of erythropoietin or blood transfusions  Adopted from ADA-Clinical Practice Recommendations     Test Name Result Flag Reference   HEMOGLOBIN A1C 6 9 % H 4 0-5 6   EST  AVG   GLUCOSE 151 mg/dl

## 2018-01-11 NOTE — CONSULTS
Chief Complaint  Pre-op clearance for right open partial nephrectomy on 09/14/2017 by Dr Kendell Plummer  History of Present Illness  Pre-Op Visit (Brief): The patient is being seen for a preoperative visit  The procedure is a(n) right open partial nephrectomy, possible radical nephrectomy, possible right flank approach scheduled for 9/14/17 with Dr Kendell Plummer  The indication for surgery is renal cell CA of the right kidney  Surgical Risk Assessment:   Prior Anesthesia: He had prior anesthesia and no prior adverse reaction to general anesthesia  Pertinent Past Medical History: diabetes, but no angina, no arrhythmia, no pulmonary embolism, no DVT, no thyroid disease, no seizure disorder and no CVA  Exercise Capacity: able to walk four blocks without symptoms and able to walk two flights of stairs without symptoms  Lifestyle Factors: denies alcohol use, denies tobacco use and denies illegal drug use  Symptoms: no easy bleeding, no easy bruising, no frequent nosebleeds, no chest pain, no cough, no dyspnea, no edema, no palpitations and no wheezing  Pertinent Family History: no family history of an adverse reaction to anesthesia, no aneurysm, no bleeding problems, no sudden early deaths, no ischemic heart disease and no stroke  Living Situation: home is secure and supportive and no post-op concerns with his living situation  Review of Systems    Constitutional: no fever and no chills  Cardiovascular: no chest pain and no palpitations  Respiratory: no shortness of breath, no cough and no wheezing  Gastrointestinal: no nausea, no vomiting and no diarrhea  Genitourinary: no dysuria  Integumentary: no rashes  Neurological: no headache, no dizziness and no fainting  Hematologic/Lymphatic: no tendency for easy bleeding and no tendency for easy bruising  Active Problems    1  Abnormal EKG (794 31) (R94 31)   2  Acute meniscal tear of left knee (836 2) (S83 207A)   3   Back pain, thoracic (724 1) (M54 6)   4  Bladder mass (596 89) (N32 89)   5  Chronic lumbar radiculopathy (724 4) (M54 16)   6  Chronic pain syndrome (338 4) (G89 4)   7  Diabetes mellitus, type 2 (250 00) (E11 9)   8  Encounter for preventive health examination (V70 0) (Z00 00)   9  Enlarged thoracic aorta (447 8) (I77 89)   10  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) (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 21)   11  Exposure to asbestos (V15 84) (Z77 090)   12  Flu vaccine need (V04 81) (Z23)   13  Glaucoma (365 9) (H40 9)   14  Hematuria (599 70) (R31 9)   15  Hypertension (401 9) (I10)   16  Knee osteoarthritis (715 36) (M17 10)   17  Left knee pain (719 46) (M25 562)   18  Lumbar canal stenosis (724 02) (M48 06)   19  Lumbar spondylosis (721 3) (M47 816)   20  Lung nodules (793 19) (R91 8)   21  Mass of right kidney (593 9) (N28 89)   22  Medicare annual wellness visit, subsequent (V70 0) (Z00 00)   23  Neck pain (723 1) (M54 2)   24  Osteopenia (733 90) (M85 80)   25  Primary localized osteoarthritis of left knee (715 16) (M17 12)   26  Renal cell carcinoma of right kidney (189 0) (C64 1)   27  Renal cyst (753 10) (N28 1)   28  Right cataract (366 9) (H26 9)   29  Right knee pain (719 46) (M25 561)   30  Special screening examination for neoplasm of prostate (V76 44) (Z12 5)   31   Torn meniscus (836 2) (K71 190Y)    Past Medical History    · History of Accidental fall (E888 9) (T20 MAUU)   · History of Acute upper respiratory infection (465 9) (J06 9)   · History of 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) (F52 21)   · History of cardiac aneurysm (V12 59) (Z86 79)   · History of diabetes mellitus (V12 29) (Z86 39)   · History of hypercholesterolemia (V12 29) (Z86 39)   · History of hyperlipidemia (V12 29) (Z86 39)   · History of sprain of ankle (V13 59) (S99 450)   · History of Influenza vaccination administered during current admission (V04 81) (Z23)   · History of Lateral epicondylitis of right elbow (726 32) (M77 11)   · History of Left knee pain (719 46) (M25 562)   · History of Limb pain (729 5) (M79 609)   · History of Low back pain (724 2) (M54 5)   · History of Myofascial muscle pain (729 1) (M79 1)   · History of Need for pneumococcal vaccination (V03 82) (Z23)   · History of Post-nasal drainage (473 9) (R09 82)   · History of Right knee pain (719 46) (M25 561)   · History of Throat irritation (478 29) (J39 2)   · History of Trouble swallowing (787 20) (R13 10)   · History of Urinary tract infection with hematuria (599 0,599 70) (N39 0,R31  9)    The active problems and past medical history were reviewed and updated today  Surgical History    · History of Cataract Surgery   · History of Denial Of Any Significant Medical History   · History of Diagnostic Cystoscopy   · History of Nerve Block Transforaminal Epidural Lumbar   · History of Oral Surgery Tooth Extraction [place] Tooth    The surgical history was reviewed and updated today  Family History    · Family history of Malaria    · Family history of Father  At Age 43    · Family history of Diabetes Mellitus (V18 0)   · Family history of Hypertension (V17 49)    · Family history of Mother  At Age 21    The family history was reviewed and updated today  Social History    · Former smoker (I73 43) (U85 216)   · quit in 1980s - prior had smoked 1 ppd for about 10 years   · Never Drank Alcohol  The social history was reviewed and updated today  Current Meds   1  Accu-Chek Crista Plus In Vitro Strip; USE TO TEST ONCE DAILY; Therapy: 79Gqh9730 to (Last Rx:46Icf4511)  Requested for: 2017 Ordered   2  Accu-Chek Crista Plus w/Device Kit; use as directed; Therapy: 66ZRB9791 to (Phillips Pleasure)  Requested for: 24Axo6648; Last   Rx:40Oof8468 Ordered   3  Accu-Chek FastClix Lancets Miscellaneous; use twice a day; Therapy: 45Eyn0549 to (Last Rx:55Fwf7890)  Requested for: 04Ggl6967 Ordered   4  Atorvastatin Calcium 20 MG Oral Tablet; Take 1 tablet daily;    Therapy: 00QZS3082 to (Last Rx:73Hpy5290)  Requested for: 30Ase3182 Ordered   5  Cialis 20 MG Oral Tablet; take as directed; Therapy: 95WIX9284 to (Last Rx:35Fjc2336)  Requested for: 68ENE3281 Ordered   6  Januvia 100 MG Oral Tablet; Take 1 tablet by mouth  daily; Therapy: 97FJJ7087 to (Last Rx:15Cgl6078)  Requested for: 36Djf4918 Ordered   7  MetFORMIN HCl - 500 MG Oral Tablet; TAKE 2 TABLETS IN THE  MORNING AND ONE IN   THE  EVENING; Therapy: 80DBZ2381 to (Last Kedar Jehovah's witness)  Requested for: 81Fze3984 Ordered   8  Valsartan-Hydrochlorothiazide 320-25 MG Oral Tablet; Take 1 tablet daily; Therapy: 94JZV2194 to (Last Rx:11Aug2017)  Requested for: 11Aug2017 Ordered    The medication list was reviewed and updated today  Allergies    1  No Known Drug Allergies    Vitals  Signs    Temperature: 97 1 F  Heart Rate: 68  Systolic: 375  Diastolic: 70  Height: 5 ft 9 in  Weight: 238 lb 4 oz  BMI Calculated: 35 18  BSA Calculated: 2 23    Physical Exam    Constitutional   General appearance: No acute distress, well appearing and well nourished  Head and Face   Head and face: Normal     Eyes   Conjunctiva and lids: No erythema, swelling or discharge  Pupils and irises: Equal, round, reactive to light  Ears, Nose, Mouth, and Throat   External inspection of ears and nose: Normal     Hearing: Normal     Nasal mucosa, septum, and turbinates: Normal without edema or erythema  Lips, teeth, and gums: Normal, good dentition  Oropharynx: Normal with no erythema, edema, exudate or lesions  Neck   Neck: Supple, symmetric, trachea midline, no masses  Thyroid: Normal, no thyromegaly  Pulmonary   Respiratory effort: No increased work of breathing or signs of respiratory distress  Auscultation of lungs: Clear to auscultation  Cardiovascular   Auscultation of heart: Normal rate and rhythm, normal S1 and S2, no murmurs  Peripheral vascular exam: Normal   Carotid: no bruit on the right and no bruit on the left  Radial: right 2+ and left 2+  Posterior tibialis: right 2+ and left 2+  Examination of extremities for edema and/or varicosities: Normal     Abdomen   Abdomen: Non-tender, no masses  Liver and spleen: No hepatomegaly or splenomegaly  Lymphatic   Palpation of lymph nodes in neck: No lymphadenopathy  Musculoskeletal   Gait and station: Normal     Muscle strength/tone: Normal   Motor Strength Findings: normal upper extremity strength and normal lower extremity strength  Neurologic   Sensation: No sensory loss  Sensory exam: intact to light touch  Psychiatric   Mood and affect: Normal        Results/Data  Hemoglobin A1c- POC 17Vze7185 04:04PM Aakash Pollard     Test Name Result Flag Reference   HEMOGLOBIN A1C 6 9       *VB - Foot Exam 34GPA4610 12:00AM Aakash Pollard     Test Name Result Flag Reference   FOOT EXAM [id number]       Summary / No summary entered :      No summary entered  Documents attached :      Gregoria Brizuela; Enc: [id number] - Image Encounter - Antonio Brizuela -      [facility]) (Result Document)    Assessment    1  Pre-op evaluation (V72 84) (Z01 818)   2  Renal cell carcinoma of right kidney (189 0) (C64 1)   3  Diabetes mellitus, type 2 (250 00) (E11 9)   4  Hypertension (401 9) (I10)   5  Former smoker (V15 82) (P52 971)   · quit in 1980s - prior had smoked 1 ppd for about 10 years    Plan  Diabetes mellitus, type 2    · *VB - Foot Exam; Status:Complete;   Done: 87XDF7558 12:00AM   · Hemoglobin A1c- POC; Status:Complete;   Done: 46BMQ0640 04:04PM    Discussion/Summary  Surgical Clearance: He is at a LOW risk from a cardiovascular standpoint at this time without any additional cardiac testing  Reevaluation needed, if he should present with symptoms prior to surgery/procedure  Comments:  This 30-year-old male appears to be at acceptable risk for his proposed procedure  He has well-controlled hypertension and diabetes   He recently was also cleared by cardiology for this surgery and had a normal nuclear stress test  His other preadmission testing was unremarkable as well (labs, chest x-ray)  Surgical clearance faxed to Dr Isabell Haque   End of Encounter Meds    1  Accu-Chek Crista Plus In Vitro Strip; USE TO TEST ONCE DAILY; Therapy: 64Jlv1465 to (Last Rx:11Uwc1368)  Requested for: 81Zgm0923 Ordered   2  Accu-Chek Crista Plus w/Device Kit; use as directed; Therapy: 22YOT9728 to (Shanika Jordan)  Requested for: 04Zta5526; Last   Rx:81Dgs2375 Ordered   3  Accu-Chek FastClix Lancets Miscellaneous; use twice a day; Therapy: 23Lor4195 to (Last Rx:96Ybf2055)  Requested for: 45Jht6880 Ordered   4  Januvia 100 MG Oral Tablet; Take 1 tablet by mouth  daily; Therapy: 50VHD0543 to (Last Rx:02Ump9912)  Requested for: 43Rnv4789 Ordered   5  MetFORMIN HCl - 500 MG Oral Tablet; TAKE 2 TABLETS IN THE  MORNING AND ONE IN   THE  EVENING; Therapy: 25TTQ5734 to (Last Elizabeth Goo)  Requested for: 72Zlg2426 Ordered    6  Cialis 20 MG Oral Tablet; take as directed; Therapy: 73ZTB6934 to (Last Rx:10Xci6084)  Requested for: 65DNV6937 Ordered    7  Atorvastatin Calcium 20 MG Oral Tablet (Lipitor); Take 1 tablet daily; Therapy: 67DKM7973 to (Last Rx:98Usg0831)  Requested for: 10Zeo7085 Ordered   8  Valsartan-Hydrochlorothiazide 320-25 MG Oral Tablet; Take 1 tablet daily;    Therapy: 14YPJ5223 to (Last Rx:23Kli3622)  Requested for: 70Odm2762 Ordered    Signatures   Electronically signed by : Lisa Massey [facility]; Sep  5 2017  7:04PM EST                       (Author)    Electronically signed by : RONEY Barber ; Sep  6 2017  3:12PM EST

## 2018-01-11 NOTE — RESULT NOTES
Verified Results  (1) HEMOGLOBIN A1C 13NQX5934 11:04AM Randa Morales Order Number: [id number]  TW Order Number: [id number]     Test Name Result Flag Reference   HEMOGLOBIN A1C 7 0 % H 4 2-6 3   EST  AVG   GLUCOSE 154 mg/dl

## 2018-01-12 VITALS
WEIGHT: 243 LBS | HEIGHT: 69 IN | HEART RATE: 56 BPM | BODY MASS INDEX: 35.99 KG/M2 | RESPIRATION RATE: 16 BRPM | SYSTOLIC BLOOD PRESSURE: 128 MMHG | DIASTOLIC BLOOD PRESSURE: 70 MMHG

## 2018-01-12 VITALS
BODY MASS INDEX: 36.18 KG/M2 | DIASTOLIC BLOOD PRESSURE: 64 MMHG | WEIGHT: 244.25 LBS | SYSTOLIC BLOOD PRESSURE: 105 MMHG | HEART RATE: 79 BPM | HEIGHT: 69 IN

## 2018-01-12 NOTE — MISCELLANEOUS
Message   Recorded as Task   Date: 02/05/2016 09:04 AM, Created By: [facility]   Task Name: Med Renewal Request   Assigned To: [id number] [address] clinical,Team   Regarding Patient: Chrissy Calderon, Status: Active   Comment:    Daisha Vogel - 05 Feb 2016 9:04 AM     TASK CREATED  Caller: Mrs Ramona Guzman; Renew Medication    Mrs Eden Saeed called, states pt is out of his Gabapentin and he cannot refill until 2/10/16  States he is taking 300 mg 2 caps TID  I advised that Dr Julisa Thompson has been prescribing  Advised that Dr Tracy Ramsey is not in the office today, advised her to call Dr Jean Little office, and if there is an issue, she should CB   **********        Active Problems    1  Ankle sprain (845 00) (S93 409A)   2  Back pain, thoracic (724 1) (M54 6)   3  Chronic lumbar radiculopathy (724 4) (M54 16)   4  Chronic pain syndrome (338 4) (G89 4)   5  Diabetes mellitus, type 2 (250 00) (E11 9)   6  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) (F52 21)   7  Exposure to asbestos (V15 84) (Z77 090)   8  Glaucoma (365 9) (H40 9)   9  Hypertension (401 9) (I10)   10  Knee osteoarthritis (715 36) (M17 9)   11  Limb pain (729 5) (M79 609)   12  Low back pain (724 2) (M54 5)   13  Lumbar canal stenosis (724 02) (M48 06)   14  Lumbar spondylosis (721 3) (M47 816)   15  Lung nodules (793 19) (R91 8)   16  Medicare annual wellness visit, subsequent (V70 0) (Z00 00)   17  Myofascial muscle pain (729 1) (M79 7)   18  Osteopenia (733 90) (M85 80)   19  Special screening examination for neoplasm of prostate (V76 44) (Z12 5)    Current Meds   1  Accu-Chek Crista Plus In Vitro Strip; TEST ONCE DAILY; Therapy: 91Xet6965 to (Evaluate:25Qwi8321)  Requested for: 77Isw2764; Last   Rx:98Ykq6338 Ordered   2  Accu-Chek Crista Plus w/Device Kit; use as directed; Therapy: 22UKE5707 to (Lexie Lowe)  Requested for: 26Aug2014; Last   Rx:26Aug2014 Ordered   3  Accu-Chek FastClix Lancets Miscellaneous; use once daily;    Therapy: 30XBB1163 to (Evaluate:13Xsj8836)  Requested for: 57Yxd3016; Last   Rx:01Ssc6359 Ordered   4  Aspirin 81 MG Oral Tablet; TAKE 1 TABLET DAILY; Therapy: 70ZVF1525 to (Last Rx:15Jun2012) Ordered   5  Atorvastatin Calcium 20 MG Oral Tablet (Lipitor); TAKE 1 TABLET DAILY; Therapy: 00JJO2992 to 21 216 )  Requested for: 14IUQ7513; Last   Rx:39Akk4698 Ordered   6  Cialis 20 MG Oral Tablet; TAKE AS DIRECTED; Therapy: 42THD3725 to (Evaluate:97Tqh3519)  Requested for: 72OAH9326; Last   Rx:94Nqj4281 Ordered   7  Gabapentin 300 MG Oral Capsule; TAKE 2 CAPSULES 3 TIMES DAILY; Therapy: 37JRJ6795 to (Evaluate:30Rhz0183)  Requested for: 18KZN4422; Last   Rx:44Inn4840 Ordered   8  Hydrocodone-Acetaminophen 7 5-325 MG Oral Tablet; TAKE 1 TABLET Twice daily PRN; Therapy: 25SOL4343 to (Last Rx:27Jan2016) Ordered   9  Januvia 100 MG Oral Tablet; TAKE 1 TABLET DAILY; Therapy: 85CKP4116 to (Evaluate:06Jan2017)  Requested for: 12Jan2016; Last   Rx:12Jan2016 Ordered   10  MetFORMIN HCl - 500 MG Oral Tablet; Take 2 tablets in the morning and one in the    evening; Therapy: 57CGU6101 to (Last Rx:76Rny7405)  Requested for: 73Kbb7862 Ordered   11  Valsartan-Hydrochlorothiazide 320-12 5 MG Oral Tablet (Diovan HCT); take 1 pill daily    every morning; Therapy: 42EWT2622 to 21 216 )  Requested for: 98DBK9119; Last    Rx:39Geb2802 Ordered   12  Vitamin D 1000 UNIT Oral Tablet; Therapy: (Recorded:20Jan2015) to Recorded   13  Xalatan 0 005 % Ophthalmic Solution (Latanoprost); Therapy: (Recorded:48Kzf8283) to Recorded    Allergies    1  No Known Drug Allergies    Signatures   Electronically signed by :  Amy Boswell, ; Feb 5 2016  2:29PM EST                       (Author)

## 2018-01-13 VITALS
BODY MASS INDEX: 36.03 KG/M2 | HEART RATE: 82 BPM | SYSTOLIC BLOOD PRESSURE: 142 MMHG | HEIGHT: 69 IN | WEIGHT: 243.25 LBS | DIASTOLIC BLOOD PRESSURE: 78 MMHG

## 2018-01-13 VITALS
HEART RATE: 72 BPM | DIASTOLIC BLOOD PRESSURE: 70 MMHG | HEIGHT: 69 IN | SYSTOLIC BLOOD PRESSURE: 118 MMHG | WEIGHT: 241 LBS | BODY MASS INDEX: 35.7 KG/M2

## 2018-01-13 NOTE — MISCELLANEOUS
Active Problems    1  Back pain, thoracic (724 1) (M54 6)   2  Chronic lumbar radiculopathy (724 4) (M54 16)   3  Chronic pain syndrome (338 4) (G89 4)   4  Diabetes mellitus, type 2 (250 00) (E11 9)   5  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) (F52 21)   6  Exposure to asbestos (V15 84) (Z77 090)   7  Glaucoma (365 9) (H40 9)   8  Hypertension (401 9) (I10)   9  Knee osteoarthritis (715 36) (M17 9)   10  Low back pain (724 2) (M54 5)   11  Lumbar canal stenosis (724 02) (M48 06)   12  Lumbar spondylosis (721 3) (M47 816)   13  Lung nodules (793 19) (R91 8)   14  Medicare annual wellness visit, subsequent (V70 0) (Z00 00)   15  Myofascial muscle pain (729 1) (M79 7)   16  Osteopenia (733 90) (M85 80)   17  Special screening examination for neoplasm of prostate (V76 44) (Z12 5)    Current Meds   1  Accu-Chek Crista Plus In Vitro Strip; TEST ONCE DAILY; Therapy: 88Pvs7647 to (Evaluate:80Vzj6365)  Requested for: 69Xnw6239; Last   Rx:86Fsm1349 Ordered   2  Accu-Chek Crista Plus w/Device Kit; use as directed; Therapy: 14LXZ7627 to (Mone Cruz)  Requested for: 18Aqf3163; Last   Rx:26Qqq0458 Ordered   3  Accu-Chek FastClix Lancets Miscellaneous; use once daily; Therapy: 16Itb1726 to (Evaluate:75Snk9668)  Requested for: 54Iud8371; Last   Rx:14Ddj6230 Ordered   4  Aspirin 81 MG Oral Tablet; TAKE 1 TABLET DAILY; Therapy: 67JHL6506 to (Last Rx:15Jun2012) Ordered   5  Atorvastatin Calcium 20 MG Oral Tablet (Lipitor); TAKE 1 TABLET DAILY; Therapy: 69GEM3856 to 61 23 68)  Requested for: 50QSY9753; Last   Rx:05Xsj2186 Ordered   6  Cialis 20 MG Oral Tablet; TAKE AS DIRECTED; Therapy: 82LCA7758 to (Evaluate:23Jul2016)  Requested for: 29ENB4377; Last   Rx:29Jul2015 Ordered   7  Gabapentin 300 MG Oral Capsule; Take 1 capsule by mouth 3  times daily; Therapy: 21UGM0638 to (Evaluate:23Jul2016)  Requested for: 88RGU4314; Last   Rx:29Jul2015 Ordered   8   Januvia 100 MG Oral Tablet; TAKE 1 TABLET DAILY; Therapy: 45JLL4392 to (Evaluate:26Jan2016)  Requested for: 50FZM9589; Last   Rx:07Vrs1596 Ordered   9  MetFORMIN HCl - 500 MG Oral Tablet; Take 2 tablets in the morning and one in the   evening; Therapy: 16DYY2591 to (Last Rx:23Cxq8577)  Requested for: 93Wvr2101 Ordered   10  Valsartan-Hydrochlorothiazide 320-12 5 MG Oral Tablet (Diovan HCT); take 1 pill daily    every morning; Therapy: 99OJS9723 to 21 )  Requested for: 36VAM6893; Last    Rx:87Bdm7031 Ordered   11  Vitamin D 1000 UNIT Oral Tablet; Therapy: (Recorded:20Jan2015) to Recorded   12  Xalatan 0 005 % Ophthalmic Solution (Latanoprost); Therapy: (Recorded:80Kjp5964) to Recorded    Allergies    1   No Known Drug Allergies    Signatures   Electronically signed by : Manny Hernandez, ; Jan 12 2016 11:59AM EST                       (Author)

## 2018-01-13 NOTE — RESULT NOTES
Verified Results  * MRI KNEE LEFT  WO CONTRAST 95Rrx2648 12:34PM Kristal Graham Order Number: [id number]    - Patient Instructions: To schedule this appointment, please contact Central Scheduling at [phone]  Test Name Result Flag Reference   MRI KNEE LEFT  WO CONTRAST (Report)     This is a summary report  The complete report is available in the patient's medical record  If you cannot access the medical record, please contact the sending organization for a detailed fax or copy  MRI LEFT KNEE     INDICATION: [id number]: Pain in left knee  History taken directly from the electronic ordering system  Generalized left knee pain  Instability  COMPARISON: None  TECHNIQUE:  MR sequences were obtained of the left knee including: Localizer, axial T2 fat sat, coronal T1/T2 fat sat, sagittal PD/T2 fat sat  Images were acquired on a 1 5 Fadumo unit  Gadolinium was not used  FINDINGS:     SUBCUTANEOUS TISSUES: Normal     JOINT EFFUSION: Small effusion  BAKER'S CYST: None  MENISCI: Tear identified through the posterior root lateral meniscus best seen on 4/11 without significant extrusion or flipped fragment  The medial meniscus remains intact  CRUCIATE LIGAMENTS: Intact  EXTENSOR APPARATUS: Intact  COLLATERAL LIGAMENTS: Intact  ARTICULAR SURFACES: Mild degenerative changes noted without evidence of full-thickness cartilage loss grade 2 cartilage fissuring at the weightbearing aspect medial femoral condyle noted  BONE MARROW: Normal      MUSCULATURE: Intact  IMPRESSION:   1  Tear through the posterior root lateral meniscus without extrusion or flipped fragment  2  Mild degenerative change without full-thickness cartilage loss as above  Associated small joint effusion         Workstation performed: [id number]     Signed by:   Asiya Slater MD   4/18/17

## 2018-01-13 NOTE — MISCELLANEOUS
Message   Recorded as Task   Date: 01/08/2016 03:52 PM, Created By: Rosemary Ramirez   Task Name: Care Coordination   Assigned To: Rosemary Ramirez   Regarding Patient: Morteza Pineda, Status: In Progress   Comment:    Maureen Marshall - 08 Jan 2016 3:52 PM     TASK CREATED  Pt to be an S[facility] [place] trial with Dr Ag Brito  Pt received DVD and brochure  Pt given script for thoracic MRI  Pt given psych eval and list of providers  Email sent to Robby Dhillon from [place] to educate  ***Pt is on Aspirin, a fax request to hold Aspirin has been faxed to Dr Quiana Burnett  ***  I received a request from Andre Morales to fax script for psych eval, demongraphics and clinical info on pt [facility]  All requested info has been faxed  Maureen Marshall - 08 Jan 2016 3:52 PM     TASK IN PROGRESS   Kevin Kohler - 11 Jan 2016 8:43 AM     TASK REPLIED TO: Previously Assigned To SPA stim,Team  Please contact patient having second thoughts about STIM  Patient scheduled for MRI TS this weekend  [phone]    Thanks! Maureen Marshall - 12 Jan 2016 11:58 AM     TASK EDITED  Per a seperate task pt is no longer interested in SCS trial         Active Problems    1  Back pain, thoracic (724 1) (M54 6)   2  Chronic lumbar radiculopathy (724 4) (M54 16)   3  Chronic pain syndrome (338 4) (G89 4)   4  Diabetes mellitus, type 2 (250 00) (E11 9)   5  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) (F52 21)   6  Exposure to asbestos (V15 84) (Z77 090)   7  Glaucoma (365 9) (H40 9)   8  Hypertension (401 9) (I10)   9  Knee osteoarthritis (715 36) (M17 9)   10  Low back pain (724 2) (M54 5)   11  Lumbar canal stenosis (724 02) (M48 06)   12  Lumbar spondylosis (721 3) (M47 816)   13  Lung nodules (793 19) (R91 8)   14  Medicare annual wellness visit, subsequent (V70 0) (Z00 00)   15  Myofascial muscle pain (729 1) (M79 7)   16   Osteopenia (733 90) (H21 95)   17  Special screening examination for neoplasm of prostate (V76 44) (Z12 5)    Current Meds   1  Accu-Chek Crista Plus In Vitro Strip; TEST ONCE DAILY; Therapy: 22Hfq7811 to (Evaluate:61Sbg5573)  Requested for: 60Onk1410; Last   Rx:72Ujk5556 Ordered   2  Accu-Chek Crista Plus w/Device Kit; use as directed; Therapy: 95PFZ0240 to (Kianna Blayne)  Requested for: 85Lew8005; Last   Rx:57Gmc5785 Ordered   3  Accu-Chek FastClix Lancets Miscellaneous; use once daily; Therapy: 38Yhf4642 to (Evaluate:14Tqt4187)  Requested for: 32Gja9938; Last   Rx:91Hok4635 Ordered   4  Aspirin 81 MG Oral Tablet; TAKE 1 TABLET DAILY; Therapy: 49MNR4108 to (Last Rx:15Jun2012) Ordered   5  Atorvastatin Calcium 20 MG Oral Tablet (Lipitor); TAKE 1 TABLET DAILY; Therapy: 52ZCF2763 to 61 23 68)  Requested for: 77KDD8562; Last   Rx:20Gum9304 Ordered   6  Cialis 20 MG Oral Tablet; TAKE AS DIRECTED; Therapy: 45CCL5822 to (Evaluate:71Huw3718)  Requested for: 47DUO6475; Last   Rx:07Fct6123 Ordered   7  Gabapentin 300 MG Oral Capsule; Take 1 capsule by mouth 3  times daily; Therapy: 82ZSV2701 to (Evaluate:02Oak1691)  Requested for: 38MMG6801; Last   Rx:32Idi4095 Ordered   8  Januvia 100 MG Oral Tablet; TAKE 1 TABLET DAILY; Therapy: 76AYY2183 to (Evaluate:26Jan2016)  Requested for: 78PSE4010; Last   Rx:35Rnq0452 Ordered   9  MetFORMIN HCl - 500 MG Oral Tablet; Take 2 tablets in the morning and one in the   evening; Therapy: 46PCC1441 to (Last Rx:35Nui6950)  Requested for: 09Bfx9331 Ordered   10  Valsartan-Hydrochlorothiazide 320-12 5 MG Oral Tablet (Diovan HCT); take 1 pill daily    every morning; Therapy: 05HQW0485 to 61 23 68)  Requested for: 23CIV4716; Last    Rx:04Sng0073 Ordered   11  Vitamin D 1000 UNIT Oral Tablet; Therapy: (Recorded:87Yga6787) to Recorded   12  Xalatan 0 005 % Ophthalmic Solution (Latanoprost); Therapy: (Recorded:83Prg3450) to Recorded    Allergies    1   No Known Drug Allergies    Signatures   Electronically signed by : Lorena Leblanc, ; Jan 12 2016 11:58AM EST                       (Author)

## 2018-01-14 VITALS
HEART RATE: 78 BPM | BODY MASS INDEX: 35.55 KG/M2 | DIASTOLIC BLOOD PRESSURE: 72 MMHG | SYSTOLIC BLOOD PRESSURE: 130 MMHG | WEIGHT: 240 LBS | HEIGHT: 69 IN

## 2018-01-14 VITALS
RESPIRATION RATE: 15 BRPM | BODY MASS INDEX: 35.31 KG/M2 | WEIGHT: 239.13 LBS | HEART RATE: 66 BPM | SYSTOLIC BLOOD PRESSURE: 126 MMHG | DIASTOLIC BLOOD PRESSURE: 70 MMHG

## 2018-01-14 VITALS
DIASTOLIC BLOOD PRESSURE: 74 MMHG | HEIGHT: 69 IN | WEIGHT: 241 LBS | BODY MASS INDEX: 35.7 KG/M2 | SYSTOLIC BLOOD PRESSURE: 120 MMHG

## 2018-01-14 VITALS
DIASTOLIC BLOOD PRESSURE: 70 MMHG | BODY MASS INDEX: 35.62 KG/M2 | WEIGHT: 240.5 LBS | HEART RATE: 96 BPM | TEMPERATURE: 98.6 F | HEIGHT: 69 IN | SYSTOLIC BLOOD PRESSURE: 132 MMHG

## 2018-01-14 VITALS
HEIGHT: 69 IN | BODY MASS INDEX: 21.98 KG/M2 | SYSTOLIC BLOOD PRESSURE: 126 MMHG | DIASTOLIC BLOOD PRESSURE: 74 MMHG | WEIGHT: 148.38 LBS | HEART RATE: 73 BPM

## 2018-01-14 VITALS
HEIGHT: 69 IN | HEART RATE: 68 BPM | WEIGHT: 238.25 LBS | TEMPERATURE: 97.1 F | DIASTOLIC BLOOD PRESSURE: 70 MMHG | BODY MASS INDEX: 35.29 KG/M2 | SYSTOLIC BLOOD PRESSURE: 128 MMHG

## 2018-01-14 NOTE — RESULT NOTES
Verified Results  US KIDNEY AND BLADDER 02Jun2017 09:13AM Luna Running   [employer] Order Number: [id number]    - Patient Instructions: To schedule this appointment, please contact Central Scheduling at [phone]  Test Name Result Flag Reference   US KIDNEY AND BLADDER (Report)     RENAL ULTRASOUND     INDICATION: Episode of gross hematuria for 2 days  COMPARISON: None  TECHNIQUE:  Ultrasound of the retroperitoneum was performed with a curvilinear transducer utilizing volumetric sweeps and still imaging techniques  FINDINGS:     KIDNEYS:   Symmetric and normal size  Right kidney: 13 6 x 6 3 cm  Normal echogenicity and contour  No suspicious masses detected  3 4 x 3 2 x 3 0 cm simple exophytic cyst in the lower pole  No hydronephrosis  No shadowing calculi  No perinephric fluid collections  Left kidney: 13 5 x 6 7 cm  Normal echogenicity and contour  No suspicious masses detected  2 8 x 2 4 x 2 5 cm simple cortical cyst in the midpole  2 9 x 2 3 x 2 5 cm complex cystic mass in the lower pole representing either a multiseptated cyst versus a cluster of multiple smaller cysts  No hydronephrosis  No shadowing calculi  No perinephric fluid collections  URETERS:   Nonvisualized  BLADDER:    Normally distended  No focal thickening or mass lesions  Bilateral ureteral jets detected  IMPRESSION:   Complex cystic lesion lower pole left kidney  Recommend follow-up CT scan or MRI of the kidneys utilizing renal mass protocol, given the history of hematuria         ##sigslh##sigslh         ##fuslh01##fuslh01       Workstation performed: [id number]     Signed by:   Linda Michaels DO   6/5/17

## 2018-01-15 NOTE — RESULT NOTES
Verified Results  (1) COMPREHENSIVE METABOLIC PANEL 25OZG6949 20:79BD [email or web address] Order Number: [id number]      National Kidney Disease Education Program recommendations are as follows:  GFR calculation is accurate only with a steady state creatinine  Chronic Kidney disease less than 60 ml/min/1 73 sq  meters  Kidney failure less than 15 ml/min/1 73 sq  meters  Test Name Result Flag Reference   GLUCOSE,RANDM 146 mg/dL H    SODIUM 138 mmol/L  136-145   POTASSIUM 4 2 mmol/L  3 5-5 3   CHLORIDE 104 mmol/L  100-108   CARBON DIOXIDE 27 mmol/L  21-32   ANION GAP (CALC) 7 mmol/L  4-13   BLOOD UREA NITROGEN 22 mg/dL  5-25   CREATININE 1 03 mg/dL  0 60-1 30   Standardized to IDMS reference method   CALCIUM 8 4 mg/dL  8 3-10 1   BILI, TOTAL 0 60 mg/dL  0 20-1 00   ALK PHOSPHATAS 85 U/L     ALT (SGPT) 33 U/L  12-78   AST(SGOT) 16 U/L  5-45   ALBUMIN 3 7 g/dL  3 5-5 0   TOTAL PROTEIN 6 5 g/dL  6 4-8 2   eGFR Non-African American      >60 0 ml/min/1 73sq m     (1) MICROALBUMIN CREATININE RATIO, RANDOM URINE 12Stf5984 07:52AM [email or web address] Order Number: [id number]     Test Name Result Flag Reference   MICROALBUMIN/ CREAT R 5 mg/g creatinine  0-30   MICROALBUMIN,URINE 5 4 mg/L  0 0-20 0   CREATININE URINE 119 0 mg/dL       (1) LIPID PANEL, FASTING 50DYM5684 07:52AM [email or web address] Order Number: [id number]      Triglyceride:         Normal              <150 mg/dl       Borderline High    150-199 mg/dl       High               200-499 mg/dl       Very High          >499 mg/dl  Cholesterol:         Desirable        <200 mg/dl      Borderline High  200-239 mg/dl      High             >239 mg/dl  HDL Cholesterol:        High    >59 mg/dL      Low     <41 mg/dL  LDL CALCULATED:    This screening LDL is a calculated result  It does not have the accuracy of the Direct Measured LDL in the monitoring of patients with hyperlipidemia and/or statin therapy     Direct Measure LDL (NJO518) must be ordered separately in these patients       Test Name Result Flag Reference   CHOLESTEROL 163 mg/dL     HDL,DIRECT 56 mg/dL  40-60   LDL CHOLESTEROL CALCULATED 91 mg/dL  0-100   TRIGLYCERIDES 80 mg/dL  <=150

## 2018-01-15 NOTE — RESULT NOTES
Verified Results  (1) COMPREHENSIVE METABOLIC PANEL 49DKT7215 91:96[facility] Number: [id number]     Test Name Result Flag Reference   SODIUM 137 mmol/L  136-145   POTASSIUM 4 1 mmol/L  3 5-5 3   CHLORIDE 104 mmol/L  100-108   CARBON DIOXIDE 28 mmol/L  21-32   ANION GAP (CALC) 5 mmol/L  4-13   BLOOD UREA NITROGEN 19 mg/dL  5-25   CREATININE 0 92 mg/dL  0 60-1 30   Standardized to IDMS reference method   CALCIUM 8 8 mg/dL  8 3-10 1   BILI, TOTAL 0 54 mg/dL  0 20-1 00   ALK PHOSPHATAS 82 U/L     ALT (SGPT) 38 U/L  12-78   AST(SGOT) 17 U/L  5-45   ALBUMIN 3 7 g/dL  3 5-5 0   TOTAL PROTEIN 7 3 g/dL  6 4-8 2   eGFR Non-African American      >60 0 ml/min/1 73sq m   - Patient Instructions: This is a fasting blood test  Water,black tea or black  coffee only after 9:00pm the night before test Drink 2 glasses of water the morning of test - Patient Instructions: This bloodwork is non-fasting  Please drink two glasses of   water morning of bloodwork  National Kidney Disease Education Program recommendations are as follows:  GFR calculation is accurate only with a steady state creatinine  Chronic Kidney disease less than 60 ml/min/1 73 sq  meters  Kidney failure less than 15 ml/min/1 73 sq  meters  GLUCOSE FASTING 131 mg/dL H 65-99     (1) MICROALBUMIN CREATININE RATIO, RANDOM URINE 31Mar2017 10:06AM Rodena Creeks Order Number: [id number]     Test Name Result Flag Reference   MICROALBUMIN/ CREAT R 5 mg/g creatinine  0-30   MICROALBUMIN,URINE 6 8 mg/L  0 0-20 0   CREATININE URINE 131 0 mg/dL       (1) HEMOGLOBIN A1C 65AKF2928 10:06AM Rodena Creeks Order Number: [id number]     Test Name Result Flag Reference   HEMOGLOBIN A1C 6 9 % H 4 2-6 3   EST  AVG   GLUCOSE 151 mg/dl       (1) TSH 03IBQ7597 10:06AM Rodena Creeks Order Number: [id number]     Test Name Result Flag Reference   TSH 2 210 uIU/mL  0 358-3 740   - Patient Instructions: This bloodwork is non-fasting  Please drink two glasses of water morning of bloodwork  - Patient Instructions: This is a fasting blood test  Water,black tea or black  coffee only after 9:00pm the night before test Drink 2 glasses of water the morning of test - Patient Instructions: This bloodwork is non-fasting  Please drink two glasses of   water morning of bloodwork  Patients undergoing fluorescein dye angiography may retain small amounts of fluorescein in the body for 48-72 hours post procedure  Samples containing fluorescein can produce falsely depressed TSH values  If the patient had this procedure,a specimen should be resubmitted post fluorescein clearance  (1) LIPID PANEL, FASTING 13CVP8046 10:06AM Jose Dallaslulu Order Number: [id number]     Test Name Result Flag Reference   CHOLESTEROL 156 mg/dL     HDL,DIRECT 54 mg/dL  40-60   Specimen collection should occur prior to Metamizole administration due to the potential for falsely depressed results  LDL CHOLESTEROL CALCULATED 90 mg/dL  0-100   - Patient Instructions: This is a fasting blood test  Water,black tea or black  coffee only after 9:00pm the night before test   Drink 2 glasses of water the morning of test     - Patient Instructions: This is a fasting blood test  Water,black tea or black  coffee only after 9:00pm the night before test Drink 2 glasses of water the morning of test - Patient Instructions: This bloodwork is non-fasting  Please drink two glasses of   water morning of bloodwork  Triglyceride:         Normal              <150 mg/dl       Borderline High    150-199 mg/dl       High               200-499 mg/dl       Very High          >499 mg/dl  Cholesterol:         Desirable        <200 mg/dl      Borderline High  200-239 mg/dl      High             >239 mg/dl  HDL Cholesterol:        High    >59 mg/dL      Low     <41 mg/dL  LDL CALCULATED:    This screening LDL is a calculated result    It does not have the accuracy of the Direct Measured LDL in the monitoring of patients with hyperlipidemia and/or statin therapy  Direct Measure LDL (MTK867) must be ordered separately in these patients  TRIGLYCERIDES 59 mg/dL  <=150   Specimen collection should occur prior to N-Acetylcysteine or Metamizole administration due to the potential for falsely depressed results  (1) CBC/PLT/DIFF 66GSR7930 10:06AM Micheal Bennett Order Number: [id number]     Test Name Result Flag Reference   WBC COUNT 4 92 Thousand/uL  4 31-10 16   RBC COUNT 4 92 Million/uL  3 88-5 62   HEMOGLOBIN 14 3 g/dL  12 0-17 0   HEMATOCRIT 41 8 %  36 5-49 3   MCV 85 fL  82-98   MCH 29 1 pg  26 8-34 3   MCHC 34 2 g/dL  31 4-37 4   RDW 13 8 %  11 6-15 1   MPV 11 5 fL  8 9-12 7   PLATELET COUNT 799 Thousands/uL  149-390   nRBC AUTOMATED 0 /100 WBCs     NEUTROPHILS RELATIVE PERCENT 59 %  43-75   LYMPHOCYTES RELATIVE PERCENT 29 %  14-44   MONOCYTES RELATIVE PERCENT 7 %  4-12   EOSINOPHILS RELATIVE PERCENT 5 %  0-6   BASOPHILS RELATIVE PERCENT 0 %  0-1   NEUTROPHILS ABSOLUTE COUNT 2 91 Thousands/? ??L  1 85-7 62   LYMPHOCYTES ABSOLUTE COUNT 1 42 Thousands/? ??L  0 60-4 47   MONOCYTES ABSOLUTE COUNT 0 35 Thousand/? ??L  0 17-1 22   EOSINOPHILS ABSOLUTE COUNT 0 22 Thousand/? ??L  0 00-0 61   BASOPHILS ABSOLUTE COUNT 0 01 Thousands/? ??L  0 00-0 10   - Patient Instructions: This bloodwork is non-fasting  Please drink two glasses of water morning of bloodwork  - Patient Instructions: This bloodwork is non-fasting  Please drink two glasses of water morning of bloodwork

## 2018-01-15 NOTE — RESULT NOTES
Verified Results  (1) HEMOGLOBIN A1C 15Zsv3910 09:10AM Scott Patterson Order Number: [id number]     Test Name Result Flag Reference   HEMOGLOBIN A1C 7 8 % H 4 2-6 3   EST  AVG  GLUCOSE 177 mg/dl       (1) BASIC METABOLIC PROFILE 54JTY4360 09:10AM Scott Patterson Order Number: [id number]     Test Name Result Flag Reference   GLUCOSE,RANDM 150 mg/dL H    If the patient is fasting, the ADA then defines impaired fasting glucose as > 100 mg/dL and diabetes as > or equal to 123 mg/dL  SODIUM 139 mmol/L  136-145   POTASSIUM 4 4 mmol/L  3 5-5 3   CHLORIDE 105 mmol/L  100-108   CARBON DIOXIDE 27 mmol/L  21-32   ANION GAP (CALC) 7 mmol/L  4-13   BLOOD UREA NITROGEN 19 mg/dL  5-25   CREATININE 1 00 mg/dL  0 60-1 30   Standardized to IDMS reference method   CALCIUM 8 8 mg/dL  8 3-10 1   eGFR Non-African American      >60 0 ml/min/1 73sq [facility] [employer] Disease Education Program recommendations are as follows:  GFR calculation is accurate only with a steady state creatinine  Chronic Kidney disease less than 60 ml/min/1 73 sq  meters  Kidney failure less than 15 ml/min/1 73 sq  meters

## 2018-01-15 NOTE — RESULT NOTES
Verified Results  CT RENAL PROTOCOL [id number] 05:34PM Demetra SWEENEY Order Number: [id number]    - Patient Instructions: To schedule this appointment, please contact Central Scheduling at [phone]  Test Name Result Flag Reference   CT RENAL PROTOCOL (Report)     CT ABDOMEN AND PELVIS WITH AND WITHOUT IV CONTRAST     INDICATION: 73-year-old male, hematuria, UTI     COMPARISON: 6/2/2017 ultrasound     TECHNIQUE: CT of the kidneys was performed without intravenous contrast  Dynamic postcontrast CT evaluation of the abdomen and pelvis was performed in both nephrographic and delayed phases after the administration of intravenous contrast  Reformatted    images were created in axial, sagittal, and coronal planes  Radiation dose length product (DLP) for this visit: 2634 mGy-cm   This examination, like all CT scans performed in the [facility], was performed utilizing techniques to minimize radiation dose exposure, including the use of iterative    reconstruction and automated exposure control  IV Contrast: 100 mL of iohexol (OMNIPAQUE)      Enteric Contrast: Not utilized     FINDINGS:     ABDOMEN     RIGHT KIDNEY AND URETER:   Lobulated hypodensity is present at the lower pole of the right kidney, measuring a conglomerate 3 cm x 3 cm x 6 cm  In the central portion of the mass is an irregular region of enhancement which appears to roughly measure approximately 1 5 cm, although   precise dimensions are difficult to determine  This combination is consistent with a Bosniak 3 lesion  There is a high likelihood of renal cell malignancy  This corresponds with ultrasound  No detectable ureteral mass  No hydronephrosis or hydroureter  No urinary tract calculi  No perinephric collection  Small upper pole cyst, not visible by ultrasound     LEFT KIDNEY AND URETER:   No solid renal mass  No detectable ureteral mass  No hydronephrosis or hydroureter  No urinary tract calculi   No perinephric collection  2 5 cm midpole left renal cyst   Several adjacent cysts involve the lower pole, consistent with ultrasound     URINARY BLADDER:   No bladder wall mass  No calculi  Mild bladder wall thickening less conspicuous by ultrasound, possibly due to under distention     LUNG BASES: Unremarkable  LIVER/BILIARY TREE: Unremarkable  GALLBLADDER: No calcified gallstones  No pericholecystic inflammatory change  SPLEEN: Unremarkable  PANCREAS: Unremarkable  ADRENAL GLANDS: Unremarkable  STOMACH, BOWEL AND APPENDIX: Unremarkable  ABDOMINOPELVIC CAVITY: No ascites  No free intraperitoneal air  No lymphadenopathy  VESSELS: Unremarkable for patient's age  PELVIS     REPRODUCTIVE ORGANS: Unremarkable for patient's age  ABDOMINAL WALL/INGUINAL REGIONS: Unremarkable  OSSEOUS STRUCTURES: No acute fracture or destructive osseous lesion  Chronic Schmorl's defect superior endplate L4  Minimal grade 1 anterolisthesis L4-5  IMPRESSION:   Bosniak 3 type lesion lower pole right kidney which has high likelihood of malignancy   Urologic consultation recommended     Simple bilateral renal cysts       ##sigslh##sigslh       Workstation performed: [id number]     Signed by:   Praveen Christina MD   6/19/17

## 2018-01-18 NOTE — RESULT NOTES
Verified Results  US KIDNEY AND BLADDER 02Jun2017 09:13AM Mack Perez   TW Order Number: [id number]    - Patient Instructions: To schedule this appointment, please contact Central Scheduling at [phone]  Test Name Result Flag Reference   US KIDNEY AND BLADDER (Report)     ADDENDUM     There is a voice recognition software related error in the body and impression of the report  A phrase which reads 2 9 x 2 3 x 2 5 cm complex cystic mass in the lower pole of the left kidney representing either a multiseptated cyst versus a cluster of multiple smaller cysts      should read, 2 9 x 2 3 x 2 5 cm complex cystic mass in the lower pole of the RIGHT kidney representing either a multiseptated cyst versus a cluster of multiple smaller cysts   An immediate reading was not called, given the patient has had a subsequent CT renal study, renal ultrasound and MRI of the kidneys although which described a complex cystic mass in the lower pole of the right kidney  RENAL ULTRASOUND     INDICATION: Episode of gross hematuria for 2 days  COMPARISON: None  TECHNIQUE:  Ultrasound of the retroperitoneum was performed with a curvilinear transducer utilizing volumetric sweeps and still imaging techniques  FINDINGS:     KIDNEYS:   Symmetric and normal size  Right kidney: 13 6 x 6 3 cm  Normal echogenicity and contour  No suspicious masses detected  3 4 x 3 2 x 3 0 cm simple exophytic cyst in the lower pole  No hydronephrosis  No shadowing calculi  No perinephric fluid collections  Left kidney: 13 5 x 6 7 cm  Normal echogenicity and contour  No suspicious masses detected  2 8 x 2 4 x 2 5 cm simple cortical cyst in the midpole  2 9 x 2 3 x 2 5 cm complex cystic mass in the lower pole representing either a multiseptated cyst versus a cluster of multiple smaller cysts  No hydronephrosis  No shadowing calculi  No perinephric fluid collections       URETERS: Nonvisualized  BLADDER:    Normally distended  No focal thickening or mass lesions  Bilateral ureteral jets detected  IMPRESSION:   Complex cystic lesion lower pole left kidney  Recommend follow-up CT scan or MRI of the kidneys utilizing renal mass protocol, given the history of hematuria         ##sigslh##sigslh         ##[id number]##[id number]       Workstation performed: [id number]     Signed by:   Medina Brandon,    6/5/17

## 2018-01-22 VITALS
WEIGHT: 240 LBS | DIASTOLIC BLOOD PRESSURE: 74 MMHG | BODY MASS INDEX: 35.55 KG/M2 | HEIGHT: 69 IN | HEART RATE: 65 BPM | SYSTOLIC BLOOD PRESSURE: 145 MMHG

## 2018-01-22 VITALS
HEART RATE: 72 BPM | BODY MASS INDEX: 35.4 KG/M2 | DIASTOLIC BLOOD PRESSURE: 82 MMHG | SYSTOLIC BLOOD PRESSURE: 130 MMHG | WEIGHT: 239 LBS | HEIGHT: 69 IN

## 2018-02-07 DIAGNOSIS — E11.9 TYPE 2 DIABETES MELLITUS WITHOUT COMPLICATION, WITHOUT LONG-TERM CURRENT USE OF INSULIN (HCC): Primary | ICD-10-CM

## 2018-02-07 RX ORDER — SITAGLIPTIN 100 MG/1
TABLET, FILM COATED ORAL
Qty: 90 TABLET | Refills: 3 | Status: SHIPPED | OUTPATIENT
Start: 2018-02-07 | End: 2019-02-04 | Stop reason: SDUPTHER

## 2018-03-05 RX ORDER — TADALAFIL 20 MG/1
TABLET ORAL
COMMUNITY
Start: 2012-06-15 | End: 2018-10-05

## 2018-03-05 RX ORDER — LANCETS
EACH MISCELLANEOUS 2 TIMES DAILY
COMMUNITY
Start: 2014-08-26 | End: 2018-10-02 | Stop reason: SDUPTHER

## 2018-03-05 RX ORDER — BLOOD-GLUCOSE METER
EACH MISCELLANEOUS
COMMUNITY
Start: 2013-10-02

## 2018-03-12 ENCOUNTER — OFFICE VISIT (OUTPATIENT)
Dept: CARDIOLOGY CLINIC | Facility: CLINIC | Age: 78
End: 2018-03-12
Payer: MEDICARE

## 2018-03-12 VITALS
BODY MASS INDEX: 35.55 KG/M2 | HEIGHT: 69 IN | WEIGHT: 240 LBS | DIASTOLIC BLOOD PRESSURE: 80 MMHG | HEART RATE: 60 BPM | SYSTOLIC BLOOD PRESSURE: 142 MMHG

## 2018-03-12 DIAGNOSIS — I10 HTN (HYPERTENSION), BENIGN: Primary | ICD-10-CM

## 2018-03-12 PROBLEM — I77.810 THORACIC AORTIC ECTASIA (HCC): Status: ACTIVE | Noted: 2018-03-12

## 2018-03-12 PROCEDURE — 99214 OFFICE O/P EST MOD 30 MIN: CPT | Performed by: INTERNAL MEDICINE

## 2018-03-12 NOTE — PROGRESS NOTES
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  # HTN- controlled- a little high today but did not take meds  # DM- metformin  # ED- Cialis prn    TODAY'S PLAN:  Keep track of BP- goal < 130/80 mmHg  Did not take meds today  Has labs coming up  HPI:   71 yo male who present for pre-op clearance for right nephrectomy for renal mass for RCC  No cardiac history  He had DM, hyperlipidemia, former smoker over 30 years ago  He had stress in past for screening not for CP  He does bowling but no true exercise  No chest pain or SOB  No edema  Interval History:   Stress test in August 2017 negative  Past Medical History:   Diagnosis Date    Arthritis     Cancer [facility])     right kidney    Cardiac aneurysm     Chronic pain disorder     Diabetes mellitus ([address] )     NIDDM    Erectile dysfunction of non-organic origin     Glaucoma     Hx of bleeding disorder     in urine    Hyperlipidemia     Hypertension     Meniscus tear     Left knee    Urinary tract infection with hematuria     last assessed 05/30/2017    Wears glasses     Wears partial dentures     upper partial       Review of Systems - Negative except no chest pain or sob  No Known Allergies        Current Outpatient Prescriptions:     ACCU-CHEK FASTCLIX LANCETS MISC, by Does not apply route 2 (two) times a day, Disp: , Rfl:     atorvastatin (LIPITOR) 20 mg tablet, Take by mouth, Disp: , Rfl:     Blood Glucose Monitoring Suppl (ACCU-CHEK OMERO PLUS) w/Device KIT, by Does not apply route, Disp: , Rfl:    Glucose Blood (ACCU-CHEK OMERO PLUS VI), by In Vitro route daily, Disp: , Rfl:     JANUVIA 100 MG tablet, TAKE 1 TABLET BY MOUTH  DAILY, Disp: 90 tablet, Rfl: 3    metFORMIN (GLUCOPHAGE) 1000 MG tablet, Take 1,000 mg by mouth daily with breakfast, Disp: , Rfl:     metFORMIN (GLUCOPHAGE) 500 mg tablet, Take 500 mg by mouth every evening, Disp: , Rfl:     tadalafil (CIALIS) 20 MG tablet, Take by mouth, Disp: , Rfl:     valsartan-hydrochlorothiazide (DIOVAN-HCT) 320-25 MG per tablet, Take 1 tablet by mouth daily, Disp: , Rfl:     aluminum-magnesium hydroxide-simethicone (MYLANTA) 200-200-20 mg/5 mL suspension, Take 30 mL by mouth every 6 (six) hours as needed for indigestion or heartburn, Disp: 355 mL, Rfl: 0    docusate sodium (COLACE) 100 mg capsule, Take 1 capsule by mouth 2 (two) times a day, Disp: 10 capsule, Rfl: 0    Social History     Social History    Marital status: /Civil Union     Spouse name: N/A    Number of children: N/A    Years of education: N/A     Occupational History    Not on file  Social History Main Topics    Smoking status: Former Smoker     Quit date: 9/1/1985    Smokeless tobacco: Never Used    Alcohol use Yes      Comment: rarely    Drug use: No    Sexual activity: Not on file     Other Topics Concern    Not on file     Social History Narrative    Never drank alcohol per [employer]       Family History   Problem Relation Age of Onset    Other Mother      malaria    Diabetes Sister     Hypertension Sister        Physical Exam:    Vitals: Blood pressure 142/80, pulse 60, height 5' 9" (1 753 m), weight 109 kg (240 lb)  , Body mass index is 35 44 kg/m² ,   Wt Readings from Last 3 Encounters:   03/12/18 109 kg (240 lb)   11/17/17 109 kg (240 lb)   10/06/17 108 kg (239 lb)         Physical Exam:    GEN: Jody Garcia appears well, alert and oriented x 3, pleasant and cooperative   HEENT: pupils equal, round, and reactive to light; extraocular muscles intact  NECK: supple, no carotid bruits   HEART: regular rhythm, normal S1 and S2, no murmurs, clicks, gallops or rubs, JVP is    LUNGS: clear to auscultation bilaterally; no wheezes, rales, or rhonchi   ABDOMEN: normal bowel sounds, soft, no tenderness, no distention  EXTREMITIES: peripheral pulses normal; no clubbing, cyanosis, or edema  NEURO: no focal findings   SKIN: normal without suspicious lesions on exposed skin    Labs & Results:    Lab Results   Component Value Date    GLUCOSE 168 (H) 09/15/2017    CALCIUM 8 0 (L) 09/15/2017     09/15/2017    K 3 3 (L) 09/15/2017    CO2 26 09/15/2017     09/15/2017    BUN 13 09/15/2017    CREATININE 1 04 09/15/2017     Lab Results   Component Value Date    WBC 5 81 09/15/2017    HGB 12 5 09/15/2017    HCT 35 7 (L) 09/15/2017    MCV 85 09/15/2017     (L) 09/15/2017     No results found for: BNP   Lab Results   Component Value Date    CHOL 156 03/31/2017    CHOL 163 02/17/2016    CHOL 173 01/20/2014     Lab Results   Component Value Date    HDL 54 03/31/2017    HDL 56 02/17/2016    HDL 51 01/20/2014     Lab Results   Component Value Date    LDLCALC 90 03/31/2017    LDLCALC 91 02/17/2016    LDLCALC 106 01/20/2014     Lab Results   Component Value Date    TRIG 59 03/31/2017    TRIG 80 02/17/2016    TRIG 79 01/20/2014     No components found for: CHOLHDL     EKG personally reviewed by Chloe Echols DO  Counseling / Coordination of Care  Total floor / unit time spent today 25 minutes  Greater than 50% of total time was spent with the patient and / or family counseling and / or coordination of care  A description of the counseling / coordination of care: 15  Thank you for the opportunity to participate in the care of this patient    WINSOME PRESTON [address]ine[address] Hernandez

## 2018-03-14 ENCOUNTER — TRANSCRIBE ORDERS (OUTPATIENT)
Dept: LAB | Facility: CLINIC | Age: 78
End: 2018-03-14

## 2018-03-28 ENCOUNTER — OFFICE VISIT (OUTPATIENT)
Dept: FAMILY MEDICINE CLINIC | Facility: CLINIC | Age: 78
End: 2018-03-28
Payer: MEDICARE

## 2018-03-28 VITALS
WEIGHT: 236 LBS | RESPIRATION RATE: 18 BRPM | HEART RATE: 60 BPM | HEIGHT: 69 IN | BODY MASS INDEX: 34.96 KG/M2 | OXYGEN SATURATION: 96 % | DIASTOLIC BLOOD PRESSURE: 70 MMHG | SYSTOLIC BLOOD PRESSURE: 138 MMHG

## 2018-03-28 DIAGNOSIS — Z11.59 NEED FOR HEPATITIS C SCREENING TEST: ICD-10-CM

## 2018-03-28 DIAGNOSIS — I10 ESSENTIAL HYPERTENSION: ICD-10-CM

## 2018-03-28 DIAGNOSIS — E11.8 TYPE 2 DIABETES MELLITUS WITH COMPLICATION, UNSPECIFIED LONG TERM INSULIN USE STATUS: Primary | ICD-10-CM

## 2018-03-28 DIAGNOSIS — E11.42 TYPE 2 DIABETES MELLITUS WITH DIABETIC POLYNEUROPATHY, WITHOUT LONG-TERM CURRENT USE OF INSULIN (HCC): ICD-10-CM

## 2018-03-28 DIAGNOSIS — I71.2 THORACIC AORTIC ANEURYSM WITHOUT RUPTURE (HCC): ICD-10-CM

## 2018-03-28 PROBLEM — I71.20 THORACIC AORTIC ANEURYSM WITHOUT RUPTURE: Status: ACTIVE | Noted: 2018-03-12

## 2018-03-28 PROBLEM — R94.31 ABNORMAL EKG: Status: ACTIVE | Noted: 2017-08-29

## 2018-03-28 PROBLEM — C64.1 RENAL CELL CARCINOMA OF RIGHT KIDNEY (HCC): Status: ACTIVE | Noted: 2017-07-21

## 2018-03-28 PROBLEM — M17.12 PRIMARY LOCALIZED OSTEOARTHRITIS OF LEFT KNEE: Status: ACTIVE | Noted: 2017-05-02

## 2018-03-28 PROBLEM — I77.89 ENLARGED THORACIC AORTA (HCC): Status: ACTIVE | Noted: 2018-03-12

## 2018-03-28 PROBLEM — S83.207A ACUTE MENISCAL TEAR OF LEFT KNEE: Status: ACTIVE | Noted: 2017-05-02

## 2018-03-28 PROBLEM — N32.89 BLADDER MASS: Status: ACTIVE | Noted: 2017-06-28

## 2018-03-28 LAB — SL AMB POCT HEMOGLOBIN AIC: NORMAL

## 2018-03-28 PROCEDURE — 83036 HEMOGLOBIN GLYCOSYLATED A1C: CPT | Performed by: FAMILY MEDICINE

## 2018-03-28 PROCEDURE — 99214 OFFICE O/P EST MOD 30 MIN: CPT | Performed by: FAMILY MEDICINE

## 2018-03-28 NOTE — ASSESSMENT & PLAN NOTE
Repeat CT chest   He will be having a CT abdomen and pelvis and I will see if we can do this all the same time

## 2018-03-28 NOTE — PROGRESS NOTES
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)  Continue close follow-up with Dr Olga Anguiano  Type 2 diabetes mellitus with diabetic polyneuropathy (HCC)  A1c is 6 8 today  Continue Januvia  Repeat labs prior to follow-up  He has some mild diabetic peripheral neuropathy which has been quite stable  Essential hypertension  Well controlled  Continue current meds  Thoracic aortic aneurysm without rupture ([facility] [address] )  Repeat CT chest   He will be having a CT abdomen and pelvis and I will see if we can do this all the same time  Diagnoses and all orders for this visit:    Type 2 diabetes mellitus with complication, unspecified long term insulin use status (HCC)  -     POCT hemoglobin A1c  -     Lipid Panel with Direct LDL reflex; Future    Essential hypertension  -     Lipid Panel with Direct LDL reflex; Future  -     CBC and differential; Future    Type 2 diabetes mellitus with diabetic polyneuropathy, without long-term current use of insulin (HCC)  -     Lipid Panel with Direct LDL reflex; Future  -     Microalbumin / creatinine urine ratio; Future    Thoracic aortic aneurysm without rupture ([facility] [address] )  -     CT chest wo contrast; Future  -     CBC and differential; Future    Need for hepatitis C screening test  -     Hepatitis C antibody; Future    Other orders  -     Polyethyl Glycol-Propyl Glycol (SYSTANE OP); Apply to eye          Subjective:      Patient ID: Beti Hermosillo is a 70 y o  male  HPI  The patient presents today for a hypertension follow-up  Patient remains compliant with medications and denies any chest pain, shortness of breath, palpitations, lightheadedness or diaphoresis  The patient presents today for follow-up for diabetes  Fortunately, the A1c is well controlled  There have been no significant episodes of hypo or hyperglycemia  The patient is not experiencing any blurry vision, polyuria, polydipsia, but he does have some mild peripheral neuropathy symptoms    Patient remains compliant with medications and routine follow-up  He has a recent history of renal cell cancer status post robotic partial nephrectomy  He is having no persistent hematuria or flank pain  He has a history of a thoracic aortic aneurysm and denies any current chest pain or upper back pain    The following portions of the patient's history were reviewed and updated as appropriate: allergies, current medications, past family history, past medical history, past social history, past surgical history and problem list     Review of Systems   Constitutional: Negative for appetite change, chills, fatigue, fever and unexpected weight change  HENT: Negative for trouble swallowing  Eyes: Negative for visual disturbance  Respiratory: Negative for cough, chest tightness, shortness of breath and wheezing  Cardiovascular: Negative for chest pain  Gastrointestinal: Negative for abdominal distention, abdominal pain, blood in stool, constipation and diarrhea  Endocrine: Negative for polyuria  Genitourinary: Negative for difficulty urinating and flank pain  Musculoskeletal: Positive for arthralgias (Left knee pain on occasion)  Negative for myalgias  Skin: Negative for rash  Neurological: Negative for dizziness and light-headedness  Hematological: Negative for adenopathy  Does not bruise/bleed easily  Psychiatric/Behavioral: Negative for sleep disturbance  Objective:      /70   Pulse 60   Resp 18   Ht 5' 9" (1 753 m)   Wt 107 kg (236 lb)   SpO2 96%   BMI 34 85 kg/m²          Physical Exam   Constitutional: He is oriented to person, place, and time  He appears well-developed and well-nourished  No distress  HENT:   Head: Normocephalic  Eyes: Pupils are equal, round, and reactive to light  Neck: No tracheal deviation present  No thyromegaly present  Cardiovascular: Normal rate, regular rhythm and normal heart sounds  No murmur heard    Pulmonary/Chest: Effort normal  No respiratory distress  He has no wheezes  He has no rales  Abdominal: Soft  He exhibits no distension  There is no tenderness  Musculoskeletal: Normal range of motion  Neurological: He is alert and oriented to person, place, and time  No cranial nerve deficit  Skin: Skin is warm  He is not diaphoretic  Psychiatric: He has a normal mood and affect   Judgment and thought content normal

## 2018-03-28 NOTE — ASSESSMENT & PLAN NOTE
A1c is 6 8 today  Continue Januvia  Repeat labs prior to follow-up  He has some mild diabetic peripheral neuropathy which has been quite stable

## 2018-04-02 ENCOUNTER — HOSPITAL ENCOUNTER (OUTPATIENT)
Dept: CT IMAGING | Facility: HOSPITAL | Age: 78
Discharge: HOME | End: 2018-04-02
Attending: UROLOGY
Payer: MEDICARE

## 2018-04-02 ENCOUNTER — APPOINTMENT (OUTPATIENT)
Dept: LAB | Facility: HOSPITAL | Age: 78
End: 2018-04-02
Attending: UROLOGY
Payer: MEDICARE

## 2018-04-02 DIAGNOSIS — Z11.59 NEED FOR HEPATITIS C SCREENING TEST: ICD-10-CM

## 2018-04-02 DIAGNOSIS — E11.42 TYPE 2 DIABETES MELLITUS WITH DIABETIC POLYNEUROPATHY, WITHOUT LONG-TERM CURRENT USE OF INSULIN (HCC): ICD-10-CM

## 2018-04-02 DIAGNOSIS — N08 MYELOMA KIDNEY (HCC): ICD-10-CM

## 2018-04-02 DIAGNOSIS — C64.1 MALIGNANT NEOPLASM OF RIGHT KIDNEY, EXCEPT RENAL PELVIS (HCC): ICD-10-CM

## 2018-04-02 DIAGNOSIS — E11.8 TYPE 2 DIABETES MELLITUS WITH COMPLICATION, UNSPECIFIED LONG TERM INSULIN USE STATUS: ICD-10-CM

## 2018-04-02 DIAGNOSIS — N28.89 OTHER SPECIFIED DISORDERS OF KIDNEY AND URETER: ICD-10-CM

## 2018-04-02 DIAGNOSIS — C90.00 MYELOMA KIDNEY (HCC): ICD-10-CM

## 2018-04-02 DIAGNOSIS — I71.2 THORACIC AORTIC ANEURYSM WITHOUT RUPTURE (HCC): ICD-10-CM

## 2018-04-02 DIAGNOSIS — I10 ESSENTIAL HYPERTENSION: ICD-10-CM

## 2018-04-02 LAB
ANION GAP SERPL CALCULATED.3IONS-SCNC: 13 MMOL/L (ref 4–13)
BASOPHILS # BLD AUTO: 0.02 THOUSANDS/ΜL (ref 0–0.1)
BASOPHILS NFR BLD AUTO: 0 % (ref 0–1)
BUN SERPL-MCNC: 18 MG/DL (ref 5–25)
CALCIUM SERPL-MCNC: 9 MG/DL (ref 8.3–10.1)
CHLORIDE SERPL-SCNC: 105 MMOL/L (ref 100–108)
CHOLEST SERPL-MCNC: 178 MG/DL (ref 50–200)
CO2 SERPL-SCNC: 25 MMOL/L (ref 21–32)
CREAT SERPL-MCNC: 1.13 MG/DL (ref 0.6–1.3)
CREAT UR-MCNC: 113 MG/DL
EOSINOPHIL # BLD AUTO: 0.13 THOUSAND/ΜL (ref 0–0.61)
EOSINOPHIL NFR BLD AUTO: 3 % (ref 0–6)
ERYTHROCYTE [DISTWIDTH] IN BLOOD BY AUTOMATED COUNT: 13.9 % (ref 11.6–15.1)
GFR SERPL CREATININE-BSD FRML MDRD: 63 ML/MIN/1.73SQ M
GLUCOSE P FAST SERPL-MCNC: 173 MG/DL (ref 65–99)
HCT VFR BLD AUTO: 41.2 % (ref 36.5–49.3)
HDLC SERPL-MCNC: 56 MG/DL (ref 40–60)
HGB BLD-MCNC: 14.5 G/DL (ref 12–17)
LDLC SERPL CALC-MCNC: 110 MG/DL (ref 0–100)
LYMPHOCYTES # BLD AUTO: 1.36 THOUSANDS/ΜL (ref 0.6–4.47)
LYMPHOCYTES NFR BLD AUTO: 27 % (ref 14–44)
MCH RBC QN AUTO: 29.6 PG (ref 26.8–34.3)
MCHC RBC AUTO-ENTMCNC: 35.2 G/DL (ref 31.4–37.4)
MCV RBC AUTO: 84 FL (ref 82–98)
MICROALBUMIN UR-MCNC: 9.8 MG/L (ref 0–20)
MICROALBUMIN/CREAT 24H UR: 9 MG/G CREATININE (ref 0–30)
MONOCYTES # BLD AUTO: 0.33 THOUSAND/ΜL (ref 0.17–1.22)
MONOCYTES NFR BLD AUTO: 6 % (ref 4–12)
NEUTROPHILS # BLD AUTO: 3.28 THOUSANDS/ΜL (ref 1.85–7.62)
NEUTS SEG NFR BLD AUTO: 64 % (ref 43–75)
NRBC BLD AUTO-RTO: 0 /100 WBCS
PLATELET # BLD AUTO: 141 THOUSANDS/UL (ref 149–390)
PMV BLD AUTO: 10.9 FL (ref 8.9–12.7)
POTASSIUM SERPL-SCNC: 4.1 MMOL/L (ref 3.5–5.3)
RBC # BLD AUTO: 4.9 MILLION/UL (ref 3.88–5.62)
SODIUM SERPL-SCNC: 143 MMOL/L (ref 136–145)
TRIGL SERPL-MCNC: 61 MG/DL
WBC # BLD AUTO: 5.12 THOUSAND/UL (ref 4.31–10.16)

## 2018-04-02 PROCEDURE — 80061 LIPID PANEL: CPT

## 2018-04-02 PROCEDURE — 82043 UR ALBUMIN QUANTITATIVE: CPT

## 2018-04-02 PROCEDURE — 85025 COMPLETE CBC W/AUTO DIFF WBC: CPT

## 2018-04-02 PROCEDURE — 71250 CT THORAX DX C-: CPT

## 2018-04-02 PROCEDURE — 36415 COLL VENOUS BLD VENIPUNCTURE: CPT

## 2018-04-02 PROCEDURE — 74178 CT ABD&PLV WO CNTR FLWD CNTR: CPT

## 2018-04-02 PROCEDURE — 80048 BASIC METABOLIC PNL TOTAL CA: CPT

## 2018-04-02 PROCEDURE — 82570 ASSAY OF URINE CREATININE: CPT

## 2018-04-02 PROCEDURE — 86803 HEPATITIS C AB TEST: CPT

## 2018-04-02 RX ADMIN — IOHEXOL 100 ML: 350 INJECTION, SOLUTION INTRAVENOUS at 09:04

## 2018-04-03 LAB — HCV AB SER QL: NORMAL

## 2018-04-05 DIAGNOSIS — E78.00 PURE HYPERCHOLESTEROLEMIA: Primary | ICD-10-CM

## 2018-04-05 RX ORDER — ATORVASTATIN CALCIUM 20 MG/1
TABLET, FILM COATED ORAL
Qty: 90 TABLET | Refills: 3 | Status: SHIPPED | OUTPATIENT
Start: 2018-04-05 | End: 2019-04-01 | Stop reason: SDUPTHER

## 2018-04-06 ENCOUNTER — HOSPITAL ENCOUNTER (OUTPATIENT)
Dept: RADIOLOGY | Facility: HOSPITAL | Age: 78
Discharge: HOME | End: 2018-04-06
Attending: UROLOGY
Payer: MEDICARE

## 2018-04-06 DIAGNOSIS — N28.89 OTHER SPECIFIED DISORDERS OF KIDNEY AND URETER: ICD-10-CM

## 2018-04-06 DIAGNOSIS — C64.1 MALIGNANT NEOPLASM OF RIGHT KIDNEY, EXCEPT RENAL PELVIS (HCC): ICD-10-CM

## 2018-04-06 PROCEDURE — 71046 X-RAY EXAM CHEST 2 VIEWS: CPT

## 2018-04-10 DIAGNOSIS — I71.9 ANEURYSM OF DESCENDING AORTA (HCC): Primary | ICD-10-CM

## 2018-04-26 NOTE — PROGRESS NOTES
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  - CT chest/abdomen/pelvis and chest xray obtained and stable with no  concern, reviewed all no  findings as well  - will FU in 6 months with another CT abdomen and pelvis, BMP, and chest x-ray    2  Thoracic aortic aneurysm without rupture   - managed actively by PCP and pending vascular appointment      History of Present Illness  Dania Hernandez is a 70 y o  male here for follow up evaluation of a right renal mass s/p partial nephrectomy on (9/14/2017)  Pathology reveals a 7 cm clear cell papillary renal cell carcinoma  All surgical margins negative  Final T stage T1b  CT chest/abdomen/pelvis and chest xray obtained and stable with no  concern  Does show an increase in the patients aortic aneurysm from 43 to 45mm and stable pulmonary nodules and calcified granulomas  Cr 1 13  Review of Systems   Constitutional: Negative for activity change, chills and fever  Gastrointestinal: Negative for abdominal distention and abdominal pain  Musculoskeletal: Negative for back pain and gait problem  Psychiatric/Behavioral: Negative for behavioral problems and confusion  Urinary Incontinence Screening      Most Recent Value   Urinary Incontinence   Urinary Incontinence? No   Incomplete emptying? No   Urinary frequency? No   Urinary urgency? No   Urinary hesitancy? No   Dysuria (painful difficult urination)? No   Nocturia (waking up to use the bathroom)? No   Straining (having to push to go)? No   Weak stream?  No   Intermittent stream?  No   Post void dribbling?   No          Past Medical History  Past Medical History:   Diagnosis Date    Arthritis     Cancer [facility])     right kidney    Cardiac aneurysm     Chronic pain disorder     Diabetes mellitus (HCC)     NIDDM    Erectile dysfunction of non-organic origin     Glaucoma     Hx of bleeding disorder     in urine    Hyperlipidemia     Hypertension     Meniscus tear     Left knee    Urinary tract infection with hematuria     last assessed 05/30/2017    Wears glasses     Wears partial dentures     upper partial       Past Social History  Past Surgical History:   Procedure Laterality Date    CATARACT EXTRACTION      COLONOSCOPY      CYSTOSCOPY      onset 07/21/2017    DENTAL SURGERY      EYE SURGERY Bilateral     stents    EYE SURGERY Bilateral     stents in both eyes     NEPHRECTOMY LAPAROSCOPIC Right 9/14/2017    Procedure: NEPHRECTOMY PARTIAL LAPAROSCOPIC W ROBOTICS ,;  Surgeon: Flavio Tee MD;  Location: [facility];  Service: Urology    NERVE BLOCK      transforaminal epidural lumbar    WISDOM TOOTH EXTRACTION      as well as broken neighboring tooth     History   Smoking Status    Former Smoker    Quit date: 9/1/1985   Smokeless Tobacco    Never Used       Past Family History  Family History   Problem Relation Age of Onset    Other Mother      malaria    Diabetes Sister     Hypertension Sister        Past Social history  Social History     Social History    Marital status: /Civil Union     Spouse name: N/A    Number of children: N/A    Years of education: N/A     Occupational History     in [employer]      Social History Main Topics    Smoking status: Former Smoker     Quit date: 9/1/1985    Smokeless tobacco: Never Used    Alcohol use Yes      Comment: rarely    Drug use: No    Sexual activity: Not on file     Other Topics Concern    Not on file     Social History Narrative    Never drank alcohol per [employer]       Current Medications  Current Outpatient Prescriptions   Medication Sig Dispense Refill    ACCU-CHEK FASTCLIX LANCETS MISC by Does not apply route 2 (two) times a day      atorvastatin (LIPITOR) 20 mg tablet TAKE 1 TABLET DAILY 90 tablet 3    Blood Glucose Monitoring Suppl (ACCU-CHEK OMERO PLUS) w/Device KIT by Does not apply route      Glucose Blood (ACCU-CHEK OMERO PLUS VI) by In Vitro route daily      JANUVIA 100 MG tablet TAKE 1 TABLET BY MOUTH  DAILY 90 tablet 3    metFORMIN (GLUCOPHAGE) 500 mg tablet Take 500 mg by mouth 3 (three) times a day        Polyethyl Glycol-Propyl Glycol (SYSTANE OP) Apply to eye      valsartan-hydrochlorothiazide (DIOVAN-HCT) 320-25 MG per tablet Take 1 tablet by mouth daily      tadalafil (CIALIS) 20 MG tablet Take by mouth       No current facility-administered medications for this visit  Allergies  No Known Allergies      The following portions of the patient's history were reviewed and updated as appropriate: allergies, current medications, past medical history, past social history, past surgical history and problem list       Vitals  Vitals:    04/27/18 0919   BP: 138/70   Pulse: 68   Weight: 108 kg (239 lb)   Height: 5' 10" (1 778 m)         Physical Exam    Constitutional   General appearance: Patient is seated and in no acute distress, well appearing and well nourished  Head and Face   Head and face: Normal     Eyes   Conjunctiva and lids: No erythema, swelling or discharge  Ears, Nose, Mouth, and Throat   Hearing: Normal     Pulmonary   Respiratory effort: No increased work of breathing or signs of respiratory distress  Cardiovascular   Examination of extremities for edema and/or varicosities: Normal     Abdomen   Abdomen: Non-tender, no masses  Musculoskeletal   Gait and station: Normal     Skin   Skin and subcutaneous tissue: Warm, dry, and intact  No visible lesions or rashes    Psychiatric   Judgment and insight: Normal  Recent and remote memory:  Normal  Mood and affect: Normal    Results  No results found for this or any previous visit (from the past 1 hour(s)) ]  Lab Results   Component Value Date    PSA 0 4 04/27/2015    PSA 0 4 01/20/2014     Lab Results   Component Value Date    GLUCOSE 168 (H) 09/15/2017 CALCIUM 9 0 04/02/2018     04/02/2018    K 4 1 04/02/2018    CO2 25 04/02/2018     04/02/2018    BUN 18 04/02/2018    CREATININE 1 13 04/02/2018     Lab Results   Component Value Date    WBC 5 12 04/02/2018    HGB 14 5 04/02/2018    HCT 41 2 04/02/2018    MCV 84 04/02/2018     (L) 04/02/2018       CT abdomen/pelvis with without contrast  IMPRESSION:  1  Normal postoperative CT status post partial right nephrectomy     CT chest without contrast  IMPRESSION:  1  45 mm fusiform ascending aortic aneurysm increased from the previous study (43 mm)  2  Stable pulmonary nodules and calcified granulomas  Chest x-ray  IMPRESSION:  1  No acute cardiopulmonary disease  Orders  No orders of the defined types were placed in this encounter

## 2018-04-27 ENCOUNTER — OFFICE VISIT (OUTPATIENT)
Dept: UROLOGY | Facility: CLINIC | Age: 78
End: 2018-04-27
Payer: MEDICARE

## 2018-04-27 VITALS
HEIGHT: 70 IN | DIASTOLIC BLOOD PRESSURE: 70 MMHG | WEIGHT: 239 LBS | BODY MASS INDEX: 34.22 KG/M2 | HEART RATE: 68 BPM | SYSTOLIC BLOOD PRESSURE: 138 MMHG

## 2018-04-27 DIAGNOSIS — C64.1 RENAL CELL CARCINOMA OF RIGHT KIDNEY (HCC): Primary | ICD-10-CM

## 2018-04-27 PROCEDURE — 99213 OFFICE O/P EST LOW 20 MIN: CPT | Performed by: PHYSICIAN ASSISTANT

## 2018-05-08 ENCOUNTER — HOSPITAL ENCOUNTER (OUTPATIENT)
Dept: NON INVASIVE DIAGNOSTICS | Facility: CLINIC | Age: 78
Discharge: HOME | End: 2018-05-08
Payer: MEDICARE

## 2018-05-08 DIAGNOSIS — I10 ESSENTIAL HYPERTENSION: Primary | ICD-10-CM

## 2018-05-08 DIAGNOSIS — I72.9 ANEURYSM (HCC): ICD-10-CM

## 2018-05-08 PROCEDURE — 93306 TTE W/DOPPLER COMPLETE: CPT

## 2018-05-08 PROCEDURE — 93306 TTE W/DOPPLER COMPLETE: CPT | Performed by: INTERNAL MEDICINE

## 2018-05-08 RX ORDER — VALSARTAN AND HYDROCHLOROTHIAZIDE 320; 25 MG/1; MG/1
TABLET, FILM COATED ORAL
Qty: 90 TABLET | Refills: 2 | Status: SHIPPED | OUTPATIENT
Start: 2018-05-08 | End: 2018-07-23

## 2018-05-25 ENCOUNTER — OFFICE VISIT (OUTPATIENT)
Dept: CARDIAC SURGERY | Facility: CLINIC | Age: 78
End: 2018-05-25
Payer: MEDICARE

## 2018-05-25 VITALS
RESPIRATION RATE: 14 BRPM | SYSTOLIC BLOOD PRESSURE: 122 MMHG | HEART RATE: 70 BPM | BODY MASS INDEX: 33.64 KG/M2 | DIASTOLIC BLOOD PRESSURE: 56 MMHG | HEIGHT: 70 IN | TEMPERATURE: 98.1 F | OXYGEN SATURATION: 97 % | WEIGHT: 235 LBS

## 2018-05-25 DIAGNOSIS — I71.9 ANEURYSM OF DESCENDING AORTA (HCC): ICD-10-CM

## 2018-05-25 DIAGNOSIS — I71.2 THORACIC AORTIC ANEURYSM WITHOUT RUPTURE (HCC): Primary | ICD-10-CM

## 2018-05-25 PROCEDURE — 99205 OFFICE O/P NEW HI 60 MIN: CPT | Performed by: THORACIC SURGERY (CARDIOTHORACIC VASCULAR SURGERY)

## 2018-05-25 NOTE — PROGRESS NOTES
Consultation - Cardiothoracic Surgery   Luz Albarran 70 y o  male MRN: [id number]    Physician Requesting Consult: No att  providers found    Reason for Consult / Principal Problem: Aortic aneurysm    History of Present Illness: Luz Albarran is a 70y o  year old male who presents today for surgical consultation for ascending aortic aneurysm  The patient was initially found to have an aortic ascending aneurysm in 2015 with screening CT scan secondary to history of asbestos exposure  At that time ascending aorta was measured at 43 mm at its greatest diameter  He has been followed by his primary care physician, Dr Jp Velasquez with follow-up CT scans in 2017 in 2018  His most recent study 2 months ago demonstrated interval enlargement to 45 mm  In light of this finding, the patient was referred to Dr Aiden Chavez for surgical consultation  During interview today the patient denies any prior cardiovascular past medical history  He denies any symptoms of exertional angina or dyspnea  He also denies a family history of cardiac or aneurysmal disease      Past Medical History:  Past Medical History:   Diagnosis Date    Arthritis     [facility] )     right kidney    Cardiac aneurysm     Chronic pain disorder     Diabetes mellitus [facility]     NIDDM    Erectile dysfunction of non-organic origin     Glaucoma     Hx of bleeding disorder     in urine    Hyperlipidemia     Hypertension     Meniscus tear     Left knee    Urinary tract infection with hematuria     last assessed 05/30/2017    Wears glasses     Wears partial dentures     upper partial       Past Surgical History:   Past Surgical History:   Procedure Laterality Date    CATARACT EXTRACTION      COLONOSCOPY      CYSTOSCOPY      onset 07/21/2017    DENTAL SURGERY      EYE SURGERY Bilateral     stents    EYE SURGERY Bilateral     stents in both eyes     NEPHRECTOMY LAPAROSCOPIC Right 9/14/2017    Procedure: NEPHRECTOMY PARTIAL LAPAROSCOPIC W ROBOTICS ,;  Surgeon: Tashia Owens MD;  Location: [facility];  Service: Urology    NERVE BLOCK      transforaminal epidural lumbar    WISDOM TOOTH EXTRACTION      as well as broken neighboring tooth       Family History:  Family History   Problem Relation Age of Onset    Other Mother      malaria    Diabetes Sister     Hypertension Sister        Social History:  Patient is  with his wife  He is retired  He does have history of this past this exposure  His remote smoking history, quitting 40 years ago he denies significant daily alcohol consumption    History   Alcohol Use    Yes     Comment: rarely     History   Drug Use No     History   Smoking Status    Former Smoker    Quit date: 9/1/1985   Smokeless Tobacco    Never Used     Home Medications:   Prior to Admission medications    Medication Sig Start Date End Date Taking?  Authorizing Provider   ACCU-CHEK FASTCLIX LANCETS MISC by Does not apply route 2 (two) times a day 8/26/14  Yes Historical Provider, MD   atorvastatin (LIPITOR) 20 mg tablet TAKE 1 TABLET DAILY 4/5/18  Yes Maryruth Lesches , MD   Blood Glucose Monitoring Suppl (ACCU-CHEK OMERO PLUS) w/Device KIT by Does not apply route 10/2/13  Yes Historical Provider, MD   Glucose Blood (ACCU-CHEK OMERO PLUS VI) by In Vitro route daily 8/26/14  Yes Historical Provider, MD   JANUVIA 100 MG tablet TAKE 1 TABLET BY MOUTH  DAILY 2/7/18  Yes Maryruth Lesches , MD   metFORMIN (GLUCOPHAGE) 500 mg tablet Take 500 mg by mouth 3 (three) times a day     Yes Historical Provider, MD   Polyethyl Glycol-Propyl Glycol (SYSTANE OP) Apply to eye   Yes Historical Provider, MD   tadalafil (CIALIS) 20 MG tablet Take by mouth 6/15/12  Yes Historical Provider, MD   valsartan-hydrochlorothiazide (DIOVAN-HCT) 320-25 MG per tablet TAKE 1 TABLET DAILY 5/8/18  Yes Maryruth Lesches , MD       Allergies:  No Known Allergies    Review of Systems:  Review of Systems - History obtained from the patient  General ROS: negative  Psychological ROS: negative  Ophthalmic ROS: negative  ENT ROS: negative  Allergy and Immunology ROS: negative  Hematological and Lymphatic ROS: negative  Endocrine ROS: negative  Breast ROS: negative for breast lumps  Respiratory ROS: no cough, shortness of breath, or wheezing  Cardiovascular ROS: no chest pain or dyspnea on exertion  Gastrointestinal ROS: no abdominal pain, change in bowel habits, or black or bloody stools  Genito-Urinary ROS: no dysuria, trouble voiding, or hematuria  Musculoskeletal ROS: negative  Neurological ROS: no TIA or stroke symptoms  Dermatological ROS: negative    Vital Signs:     Vitals:    05/25/18 0900 05/25/18 1001   BP: 122/62 122/56   BP Location: Left arm Right arm   Cuff Size: Adult    Pulse: 70    Resp: 14    Temp: 98 1 °F (36 7 °C)    TempSrc: Oral    SpO2: 97%    Weight: 107 kg (235 lb)    Height: 5' 10" (1 778 m)        Physical Exam:    HEENT/NECK:  PERRLA  No jugular venous distention  Cardiac:Regular rate and rhythm  Pulmonary:  Breath sounds clear bilaterally  Abdomen:  Non-tender, Non-distended  Positive bowel sounds  Lower extremities: Extremities warm/dry  Radial/PT/DP pulses 2+ bilaterally  1+ edema B/L  Neuro: Alert and oriented X 3  Sensation is grossly intact  No focal deficits  Skin: Warm/Dry, without rashes or lesions  Lab Results:                 Lab Results   Component Value Date    HGBA1C 6 8% 03/28/2018     No results found for: CKTOTAL, CKMB, CKMBINDEX, TROPONINI    Imaging Studies:     CT Chest:     45 mm fusiform ascending aortic aneurysm increased from the previous study (43 mm)      Stable pulmonary nodules and calcified granulomas  Echocardiogram:     LEFT VENTRICLE:  Size was at the upper limits of normal   Systolic function was normal  Ejection fraction was estimated to be 60 %  There were no regional wall motion abnormalities    Wall thickness was mildly increased      LEFT ATRIUM:  The atrium was mildly dilated      MITRAL VALVE:  There was mild regurgitation      AORTIC VALVE:  There was mild regurgitation      TRICUSPID VALVE:  There was mild regurgitation      PULMONIC VALVE:  There was mild regurgitation  I have personally reviewed pertinent reports  Assessment:  Ascending aortic aneurysm; Ongoing ascending aortic replacement workup    Plan:    Dania Hernandez has an ascending aorta measuring 45 mm in size at its greatest diameter  As they are asymptomatic, with no family history follow-up monitoring is the treatment plan  Arrangements have been made for future surveillance to be completed with CT chest, without contrast in 1 Years  Dania Hernandez was comfortable with our recommendations, and their questions were answered to their satisfaction  Thank you for allowing us to participate in the care of this patient  Aortic Aneurysm Instructions were provided to the patient as follows:    1  No lifting more than 50 pounds  2  Maintain a controlled blood pressure with a goal of 120/80  3  Follow up in Aortic Clinic as recommended with radiology follow up as instructed  4  Report to the ER or call 911 immediately with the following signs / symptoms: sudden onset of back pain, chest pain or shortness of breath  5  Tobacco cessation discussed      SIGNATURE: Nhung Bautista PA-C  DATE: May 25, 2018  TIME: 10:07 AM

## 2018-05-25 NOTE — LETTER
May 25, 2018     Maryruth Lesches , MD  [id number]  [address] [facility] [place], [place]    Patient: Madeline Finney   YOB: 1940   Date of Visit: 5/25/2018       Dear Dr Emilee Castillo: Thank you for referring Madeline Finney to me for evaluation  Below are my notes for this consultation  If you have questions, please do not hesitate to call me  I look forward to following your patient along with you  Sincerely,        Jason Marshall MD        CC: No Recipients  Yamileth [place]  5/25/2018 10:22 AM  Attested  Consultation - Cardiothoracic Surgery   Madeline Finney 70 y o  male MRN: [id number]    Physician Requesting Consult: No att  providers found    Reason for Consult / Principal Problem: Aortic aneurysm    History of Present Illness: Madeline Finney is a 70y o  year old male who presents today for surgical consultation for ascending aortic aneurysm  The patient was initially found to have an aortic ascending aneurysm in 2015 with screening CT scan secondary to history of asbestos exposure  At that time ascending aorta was measured at 43 mm at its greatest diameter  He has been followed by his primary care physician, Dr Emilee Castillo with follow-up CT scans in 2017 in 2018  His most recent study 2 months ago demonstrated interval enlargement to 45 mm  In light of this finding, the patient was referred to Dr Mariama Jacob for surgical consultation  During interview today the patient denies any prior cardiovascular past medical history  He denies any symptoms of exertional angina or dyspnea  He also denies a family history of cardiac or aneurysmal disease      Past Medical History:  Past Medical History:   Diagnosis Date    Arthritis     Cancer [facility])     right kidney    Cardiac aneurysm     Chronic pain disorder     Diabetes mellitus (HCC)     NIDDM    Erectile dysfunction of non-organic origin     Glaucoma     Hx of bleeding disorder     in urine    Hyperlipidemia     Hypertension     Meniscus tear     Left knee    Urinary tract infection with hematuria     last assessed 05/30/2017    Wears glasses     Wears partial dentures     upper partial       Past Surgical History:   Past Surgical History:   Procedure Laterality Date    CATARACT EXTRACTION      COLONOSCOPY      CYSTOSCOPY      onset 07/21/2017    DENTAL SURGERY      EYE SURGERY Bilateral     stents    EYE SURGERY Bilateral     stents in both eyes     NEPHRECTOMY LAPAROSCOPIC Right 9/14/2017    Procedure: NEPHRECTOMY PARTIAL LAPAROSCOPIC W ROBOTICS ,;  Surgeon: Mary Lopez MD;  [facility];  Service: Urology    NERVE BLOCK      transforaminal epidural lumbar    WISDOM TOOTH EXTRACTION      as well as broken neighboring tooth       Family History:  Family History   Problem Relation Age of Onset    Other Mother      malaria    Diabetes Sister     Hypertension Sister        Social History:  Patient is  with his wife  He is retired  He does have history of this past this exposure  His remote smoking history, quitting 40 years ago he denies significant daily alcohol consumption    History   Alcohol Use    Yes     Comment: rarely     History   Drug Use No     History   Smoking Status    Former Smoker    Quit date: 9/1/1985   Smokeless Tobacco    Never Used     Home Medications:   Prior to Admission medications    Medication Sig Start Date End Date Taking?  Authorizing Provider   ACCU-CHEK FASTCLIX LANCETS MISC by Does not apply route 2 (two) times a day 8/26/14  Yes Historical Provider, MD   atorvastatin (LIPITOR) 20 mg tablet TAKE 1 TABLET DAILY 4/5/18  Yes Gail Vera MD   Blood Glucose Monitoring Suppl (ACCU-CHEK OMERO PLUS) w/Device KIT by Does not apply route 10/2/13  Yes Historical Provider, MD   Glucose Blood (ACCU-CHEK OMERO PLUS VI) by In Vitro route daily 8/26/14  Yes Historical Provider, MD   JANUVIA 100 MG tablet TAKE 1 TABLET BY MOUTH  DAILY 2/7/18  Yes Tiana Osler Faith Larry MD   metFORMIN (GLUCOPHAGE) 500 mg tablet Take 500 mg by mouth 3 (three) times a day     Yes Historical Provider, MD   Polyethyl Glycol-Propyl Glycol (SYSTANE OP) Apply to eye   Yes Historical Provider, MD   tadalafil (CIALIS) 20 MG tablet Take by mouth 6/15/12  Yes Historical Provider, MD   valsartan-hydrochlorothiazide (DIOVAN-HCT) 320-25 MG per tablet TAKE 1 TABLET DAILY 5/8/18  Yes Pedrito Salgado MD       Allergies:  No Known Allergies    Review of Systems:  Review of Systems - History obtained from the patient  General ROS: negative  Psychological ROS: negative  Ophthalmic ROS: negative  ENT ROS: negative  Allergy and Immunology ROS: negative  Hematological and Lymphatic ROS: negative  Endocrine ROS: negative  Breast ROS: negative for breast lumps  Respiratory ROS: no cough, shortness of breath, or wheezing  Cardiovascular ROS: no chest pain or dyspnea on exertion  Gastrointestinal ROS: no abdominal pain, change in bowel habits, or black or bloody stools  Genito-Urinary ROS: no dysuria, trouble voiding, or hematuria  Musculoskeletal ROS: negative  Neurological ROS: no TIA or stroke symptoms  Dermatological ROS: negative    Vital Signs:     Vitals:    05/25/18 0900 05/25/18 1001   BP: 122/62 122/56   BP Location: Left arm Right arm   Cuff Size: Adult    Pulse: 70    Resp: 14    Temp: 98 1 °F (36 7 °C)    TempSrc: Oral    SpO2: 97%    Weight: 107 kg (235 lb)    Height: 5' 10" (1 778 m)        Physical Exam:    HEENT/NECK:  PERRLA  No jugular venous distention  Cardiac:Regular rate and rhythm  Pulmonary:  Breath sounds clear bilaterally  Abdomen:  Non-tender, Non-distended  Positive bowel sounds  Lower extremities: Extremities warm/dry  Radial/PT/DP pulses 2+ bilaterally  1+ edema B/L  Neuro: Alert and oriented X 3  Sensation is grossly intact  No focal deficits  Skin: Warm/Dry, without rashes or lesions      Lab Results:                 Lab Results   Component Value Date    HGBA1C 6 8% 03/28/2018     No results found for: CKTOTAL, CKMB, CKMBINDEX, TROPONINI    Imaging Studies:     CT Chest:     45 mm fusiform ascending aortic aneurysm increased from the previous study (43 mm)      Stable pulmonary nodules and calcified granulomas  Echocardiogram:     LEFT VENTRICLE:  Size was at the upper limits of normal   Systolic function was normal  Ejection fraction was estimated to be 60 %  There were no regional wall motion abnormalities  Wall thickness was mildly increased      LEFT ATRIUM:  The atrium was mildly dilated      MITRAL VALVE:  There was mild regurgitation      AORTIC VALVE:  There was mild regurgitation      TRICUSPID VALVE:  There was mild regurgitation      PULMONIC VALVE:  There was mild regurgitation  I have personally reviewed pertinent reports  Assessment:  Ascending aortic aneurysm; Ongoing ascending aortic replacement workup    Plan:    Crystal Eric has an ascending aorta measuring 45 mm in size at its greatest diameter  As they are asymptomatic, with no family history follow-up monitoring is the treatment plan  Arrangements have been made for future surveillance to be completed with CT chest, without contrast in 1 Years  Crystal Eric was comfortable with our recommendations, and their questions were answered to their satisfaction  Thank you for allowing us to participate in the care of this patient  Aortic Aneurysm Instructions were provided to the patient as follows:    1  No lifting more than 50 pounds  2  Maintain a controlled blood pressure with a goal of 120/80  3  Follow up in Aortic Clinic as recommended with radiology follow up as instructed  4  Report to the ER or call 911 immediately with the following signs / symptoms: sudden onset of back pain, chest pain or shortness of breath  5  Tobacco cessation discussed      SIGNATURE: Rosalinda Clarke PA-C  DATE: May 25, 2018  TIME: 10:07 AM  Attestation signed by Radha Gama MD at 5/25/2018 10:55 AM:  Pt seen and examined with PA  I agree with the above assessment and plan  It was my pleasure to evaluate Viktoriya Resendiz today for aortic aneurysm  He is referred for consideration of aortic aneurysm repair  I reviewed all of the available testing and I had a lengthy discussion with the patient and wife and have recommended serial imaging surveillance  At 4 3-4 5 cm he has a very low risk of complications and therefore does not need surgical correction  I've scheduled him to return with a CT scan in 1 year        Aditya Alcocer MD  DATE: May 25, 2018  TIME: 10:53 AM

## 2018-05-25 NOTE — PATIENT INSTRUCTIONS
Education was provided in regards to the followin )  Maintaining good blood pressure control and taking antihypertensive medications as prescribed  2 )  No strenuous activity involving lifting more than 50lbs  3 )  Awareness of the warning symptoms of aortic dissection such as chest pain, back pain, shortness of breath, lightheadedness or dizziness and to seek immediate medical attention at the nearest ER   4 )  The importance of continued surveillance with visits in the Aortic Disease clinic and obtaining CT Scans as recommended  5 ) Importance of avoiding tobacco products    Print

## 2018-07-23 DIAGNOSIS — I10 ESSENTIAL HYPERTENSION: Primary | ICD-10-CM

## 2018-07-24 RX ORDER — LOSARTAN POTASSIUM AND HYDROCHLOROTHIAZIDE 25; 100 MG/1; MG/1
1 TABLET ORAL DAILY
Qty: 90 TABLET | Refills: 3 | Status: SHIPPED | OUTPATIENT
Start: 2018-07-24 | End: 2018-07-30 | Stop reason: SDUPTHER

## 2018-07-30 DIAGNOSIS — I10 ESSENTIAL HYPERTENSION: ICD-10-CM

## 2018-07-31 RX ORDER — LOSARTAN POTASSIUM AND HYDROCHLOROTHIAZIDE 25; 100 MG/1; MG/1
1 TABLET ORAL DAILY
Qty: 90 TABLET | Refills: 3 | Status: SHIPPED | OUTPATIENT
Start: 2018-07-31 | End: 2019-06-03 | Stop reason: SDUPTHER

## 2018-10-02 DIAGNOSIS — E11.9 TYPE 2 DIABETES MELLITUS WITHOUT COMPLICATION, WITHOUT LONG-TERM CURRENT USE OF INSULIN (HCC): Primary | ICD-10-CM

## 2018-10-02 RX ORDER — LANCETS
EACH MISCELLANEOUS
Qty: 204 EACH | Refills: 2 | Status: SHIPPED | OUTPATIENT
Start: 2018-10-02 | End: 2019-06-03 | Stop reason: SDUPTHER

## 2018-10-02 RX ORDER — BLOOD SUGAR DIAGNOSTIC
STRIP MISCELLANEOUS
Qty: 100 EACH | Refills: 2 | Status: SHIPPED | OUTPATIENT
Start: 2018-10-02 | End: 2019-06-03 | Stop reason: SDUPTHER

## 2018-10-05 ENCOUNTER — OFFICE VISIT (OUTPATIENT)
Dept: FAMILY MEDICINE CLINIC | Facility: CLINIC | Age: 78
End: 2018-10-05
Payer: MEDICARE

## 2018-10-05 VITALS
SYSTOLIC BLOOD PRESSURE: 116 MMHG | BODY MASS INDEX: 35.96 KG/M2 | HEART RATE: 64 BPM | DIASTOLIC BLOOD PRESSURE: 74 MMHG | WEIGHT: 237.25 LBS | HEIGHT: 68 IN

## 2018-10-05 DIAGNOSIS — E11.8 TYPE 2 DIABETES MELLITUS WITH COMPLICATION, WITHOUT LONG-TERM CURRENT USE OF INSULIN (HCC): ICD-10-CM

## 2018-10-05 DIAGNOSIS — K63.5 POLYP OF COLON, UNSPECIFIED PART OF COLON, UNSPECIFIED TYPE: ICD-10-CM

## 2018-10-05 DIAGNOSIS — S83.207D ACUTE MENISCAL TEAR OF LEFT KNEE, SUBSEQUENT ENCOUNTER: ICD-10-CM

## 2018-10-05 DIAGNOSIS — R91.8 LUNG NODULES: ICD-10-CM

## 2018-10-05 DIAGNOSIS — C64.1 RENAL CELL CARCINOMA OF RIGHT KIDNEY (HCC): ICD-10-CM

## 2018-10-05 DIAGNOSIS — Z23 FLU VACCINE NEED: ICD-10-CM

## 2018-10-05 DIAGNOSIS — D69.6 THROMBOCYTOPENIA (HCC): ICD-10-CM

## 2018-10-05 DIAGNOSIS — I71.2 THORACIC AORTIC ANEURYSM WITHOUT RUPTURE (HCC): ICD-10-CM

## 2018-10-05 DIAGNOSIS — Z00.00 MEDICARE ANNUAL WELLNESS VISIT, SUBSEQUENT: Primary | ICD-10-CM

## 2018-10-05 DIAGNOSIS — H91.93 BILATERAL HEARING LOSS, UNSPECIFIED HEARING LOSS TYPE: ICD-10-CM

## 2018-10-05 DIAGNOSIS — I10 ESSENTIAL HYPERTENSION: ICD-10-CM

## 2018-10-05 DIAGNOSIS — E11.42 TYPE 2 DIABETES MELLITUS WITH DIABETIC POLYNEUROPATHY, WITHOUT LONG-TERM CURRENT USE OF INSULIN (HCC): ICD-10-CM

## 2018-10-05 PROBLEM — N28.89 RENAL MASS: Status: RESOLVED | Noted: 2017-09-14 | Resolved: 2018-10-05

## 2018-10-05 LAB — SL AMB POCT HEMOGLOBIN AIC: 7.3

## 2018-10-05 PROCEDURE — 90662 IIV NO PRSV INCREASED AG IM: CPT | Performed by: FAMILY MEDICINE

## 2018-10-05 PROCEDURE — 99214 OFFICE O/P EST MOD 30 MIN: CPT | Performed by: FAMILY MEDICINE

## 2018-10-05 PROCEDURE — G0008 ADMIN INFLUENZA VIRUS VAC: HCPCS | Performed by: FAMILY MEDICINE

## 2018-10-05 PROCEDURE — G0439 PPPS, SUBSEQ VISIT: HCPCS | Performed by: FAMILY MEDICINE

## 2018-10-05 PROCEDURE — 83036 HEMOGLOBIN GLYCOSYLATED A1C: CPT | Performed by: FAMILY MEDICINE

## 2018-10-05 NOTE — PROGRESS NOTES
Assessment/Plan:    Colon polyp  Refer to GI  Renal cell carcinoma of right kidney [facility])  Continue with nephrology follow-up    Thoracic aortic aneurysm without rupture ([facility] )  Continue with CT surgery follow-up    Type 2 diabetes mellitus with diabetic polyneuropathy ([facility] )  Lab Results   Component Value Date    HGBA1C 7 3% 10/05/2018     A1c has bumped up a bit to 7 3  I discussed the importance of increasing his exercise a little bit and watching his carbohydrates  Repeat A1c at follow-up    Essential hypertension  Well controlled  Continue current medications  Thrombocytopenia (HCC)  Check CBC    Lung nodules  Stable on last CT  Acute meniscal tear of left knee  He never did undergo surgery  He still has some occasional pain in the knee but remains active and would like to avoid surgery at this time  Contact me if the knee pain gets any worse  He did undergo physical therapy  Diagnoses and all orders for this visit:    Medicare annual wellness visit, subsequent    Flu vaccine need  -     influenza vaccine, [id number], high-dose, PF 0 5 mL, for patients 65 yr+ (FLUZONE HIGH-DOSE)    Type 2 diabetes mellitus with complication, without long-term current use of insulin (HCC)  -     POCT hemoglobin A1c  -     LDL cholesterol, direct; Future  -     Comprehensive metabolic panel; Future    Type 2 diabetes mellitus with diabetic polyneuropathy, without long-term current use of insulin (HCC)    Thoracic aortic aneurysm without rupture [facility])    Essential hypertension  -     Comprehensive metabolic panel; Future    Polyp of colon, unspecified part of colon, unspecified type  -     Ambulatory referral to Gastroenterology;  Future    Renal cell carcinoma of right kidney (HCC)    Thrombocytopenia (HCC)  -     CBC and differential; Future    Lung nodules    Acute meniscal tear of left knee, subsequent encounter    Bilateral hearing loss, unspecified hearing loss type  -     Ambulatory Referral to Otolaryngology; Future          Subjective:      Patient ID: Bipin Jurado is a 70 y o  male  HPI  Patient presents today for follow-up for his chronic health issues in addition to his Medicare wellness visit  The patient presents today for a hypertension follow-up  Patient remains compliant with medications and denies any chest pain, shortness of breath, palpitations, lightheadedness or diaphoresis  He has type 2 diabetes and had an A1c done today at 7 3  This is up a bit from last time  He notes his diet has been a bit poor lately  He has a history of renal cell cancer and follows routinely with Nephrology  He is having no problems with hematuria or flank pain  He has history of a meniscal tear of his left knee and does continue to have some intermittent pain but no instability  He denies excessive swelling  He has history of thrombocytopenia and denies any excessive bruising or bleeding  He has a history of a thoracic aortic aneurysm which is now being monitor closely by CT surgery  He has had no chest pain or upper back pain  Recent imaging in April revealed slight growth of the aneurysm and he will be seen again in April  The following portions of the patient's history were reviewed and updated as appropriate: allergies, current medications, past family history, past medical history, past social history, past surgical history and problem list     Review of Systems   Constitutional: Negative for appetite change, chills, fatigue, fever and unexpected weight change  HENT: Negative for trouble swallowing  Eyes: Negative for visual disturbance  Respiratory: Negative for cough, chest tightness, shortness of breath and wheezing  Cardiovascular: Negative for chest pain  Gastrointestinal: Negative for abdominal distention, abdominal pain, blood in stool, constipation and diarrhea  Endocrine: Negative for polyuria  Genitourinary: Negative for difficulty urinating and flank pain  Musculoskeletal: Negative for arthralgias and myalgias  Skin: Negative for rash  Neurological: Negative for dizziness and light-headedness  Hematological: Negative for adenopathy  Does not bruise/bleed easily  Psychiatric/Behavioral: Negative for sleep disturbance  Objective:      /74 (BP Location: Left arm, Patient Position: Sitting, Cuff Size: Large)   Pulse 64   Ht 5' 7 8" (1 722 m)   Wt 108 kg (237 lb 4 oz)   BMI 36 29 kg/m²          Physical Exam   Constitutional: He is oriented to person, place, and time  He appears well-developed and well-nourished  No distress  obese   HENT:   Head: Normocephalic  Eyes: Pupils are equal, round, and reactive to light  Neck: No tracheal deviation present  No thyromegaly present  Cardiovascular: Normal rate, regular rhythm and normal heart sounds  No murmur heard  Pulmonary/Chest: Effort normal  No respiratory distress  He has no wheezes  He has no rales  Abdominal: Soft  He exhibits no distension  There is no tenderness  Musculoskeletal: Normal range of motion  Neurological: He is alert and oriented to person, place, and time  No cranial nerve deficit  Skin: Skin is warm  He is not diaphoretic  Psychiatric: He has a normal mood and affect   Judgment and thought content normal

## 2018-10-05 NOTE — PROGRESS NOTES
Assessment and Plan:    Problem List Items Addressed This Visit     None      Visit Diagnoses     Medicare annual wellness visit, subsequent    -  Primary    Flu vaccine need        Relevant Orders    influenza vaccine, [id number], high-dose, PF 0 5 mL, for patients 65 yr+ (FLUZONE HIGH-DOSE)    Type 2 diabetes mellitus with complication, without long-term current use of insulin ([facility] [address] )        Relevant Orders    POCT hemoglobin A1c (Completed)        Health Maintenance Due   Topic Date Due    Medicare Annual Wellness Visit (AWV)  1940    DXA SCAN  1940    Fall Risk  01/17/2007    DM Eye Exam  03/23/2017    INFLUENZA VACCINE  09/01/2018    HEMOGLOBIN A1C  09/28/2018     Patient presents today for Medicare wellness visit  Overall, he is doing well  He has gained a little bit of weight and his A1c bumped up to 7 3  He continues to remain active and is having no issues with cognitive decline, falls, depression or anxiety  He has no incontinence issues  Vaccines are up-to-date except for his Shingrix vaccine and he declines that today  He will have a flu shot today  He feels safe at home  He is having no difficulty completing his activities of daily life  He is followed routinely by Urology for his renal cell cancer  He sees CT surgery for his history of aneurysm  He is up-to-date on colonoscopy, but does have a history of polyp so I am going to send GI  HPI:  Eloise Garcia is a 70 y o  male here for his Subsequent Wellness Visit      Patient Active Problem List   Diagnosis    Renal mass    Essential hypertension    Thoracic aortic aneurysm without rupture (HCC)    Abnormal EKG    Acute meniscal tear of left knee    Back pain, thoracic    Bladder mass    Chronic lumbar radiculopathy    Chronic pain disorder    Type 2 diabetes mellitus with diabetic polyneuropathy (HCC)    Erectile dysfunction of non-organic origin    Glaucoma    Knee osteoarthritis    Lumbar canal stenosis    Lung nodules    Osteopenia    Primary localized osteoarthritis of left knee    Renal cell carcinoma of right kidney (HCC)    Colon polyp     Past Medical History:   Diagnosis Date    Arthritis     Cancer ([facility] [address] )     right kidney    Cardiac aneurysm     Chronic pain disorder     Diabetes mellitus ([facility] [address] )     NIDDM    Erectile dysfunction of non-organic origin     Glaucoma     Hx of bleeding disorder     in urine    Hyperlipidemia     Hypertension     Meniscus tear     Left knee    Urinary tract infection with hematuria     last assessed 05/30/2017    Wears glasses     Wears partial dentures     upper partial     Past Surgical History:   Procedure Laterality Date    CATARACT EXTRACTION      COLONOSCOPY      CYSTOSCOPY      onset 07/21/2017    DENTAL SURGERY      EYE SURGERY Bilateral     stents    EYE SURGERY Bilateral     stents in both eyes     NEPHRECTOMY LAPAROSCOPIC Right 9/14/2017    Procedure: NEPHRECTOMY PARTIAL LAPAROSCOPIC W ROBOTICS ,;  Surgeon: Chandni Means MD;  [facility]: [facility];  Service: Urology    NERVE BLOCK      transforaminal epidural lumbar    WISDOM TOOTH EXTRACTION      as well as broken neighboring tooth     Family History   Problem Relation Age of Onset    Other Mother         malaria    Diabetes Sister     Hypertension Sister      History   Smoking Status    Former Smoker    Quit date: 9/1/1985   Smokeless Tobacco    Never Used     History   Alcohol Use    Yes     Comment: rarely      History   Drug Use No       Current Outpatient Prescriptions   Medication Sig Dispense Refill    ACCU-CHEK OMERO PLUS test strip USE TO TEST ONCE DAILY 100 each 2    ACCU-CHEK FASTCLIX LANCETS MISC USE TWICE A  each 2    atorvastatin (LIPITOR) 20 mg tablet TAKE 1 TABLET DAILY 90 tablet 3    Blood Glucose Monitoring Suppl (ACCU-CHEK OMERO PLUS) w/Device KIT by Does not apply route      JANUVIA 100 MG tablet TAKE 1 TABLET BY MOUTH  DAILY 90 tablet 3    losartan-hydrochlorothiazide (HYZAAR) 100-25 MG per tablet Take 1 tablet by mouth daily 90 tablet 3    metFORMIN (GLUCOPHAGE) 500 mg tablet Take 500 mg by mouth 3 (three) times a day        Polyethyl Glycol-Propyl Glycol (SYSTANE OP) Apply to eye 1 drop each eye twice a day        No current facility-administered medications for this visit  No Known Allergies  Immunization History   Administered Date(s) Administered    H1N1, All Formulations 02/04/2010    Influenza 10/09/2015, 08/31/2016, 10/17/2017    Influenza Split High Dose Preservative Free IM 11/14/2012, 10/02/2013, 10/23/2014, 10/09/2015, 08/31/2016, 10/17/2017    Influenza TIV (IM) 10/28/2011    Pneumococcal Conjugate 13-Valent 01/20/2015    Pneumococcal Polysaccharide PPV23 02/06/2008    Tdap 07/24/2012       Patient Care Team:  Georges Su MD as PCP - General  MD Nila Vegas, MD Francisco Maldonado, DO Marcy Canela, DO Marjorie Winchester, [address] Medicare Screening Tests and Risk Assessments:  Bob Cunningham is here for his Subsequent Wellness visit  Health Risk Assessment:  Patient rates overall health as very good  Patient feels that their physical health rating is Same  Eyesight was rated as Same  Hearing was rated as Same  Patient feels that their emotional and mental health rating is Same  Pain experienced by patient in the last 7 days has been None  Emotional/Mental Health:  Patient has been feeling nervous/anxious  PHQ-9 Depression Screening:    Frequency of the following problems over the past two weeks:      1  Little interest or pleasure in doing things: 0 - not at all      2  Feeling down, depressed, or hopeless: 0 - not at all  PHQ-2 Score: 0          Broken Bones/Falls: Fall Risk Assessment:    In the past year, patient has experienced: No history of falling in past year          Bladder/Bowel:  Patient has not leaked urine accidently in the last six months    Patient reports no loss of bowel control  Immunizations:  Patient has had a flu vaccination within the last year  Patient has received a pneumonia shot  Patient has not received a shingles shot  Home Safety:  Patient does not have trouble with stairs inside or outside of their home  Patient currently reports that there are no safety hazards present in home, working smoke alarms, working carbon monoxide detectors  Preventative Screenings:   colon cancer screen completed, cholesterol screen completed, glaucoma eye exam completed,     Nutrition:  Current diet: Regular, Diabetic, No Added Salt and Limited junk food with servings of the following:    Medications:  Patient is currently taking over-the-counter supplements  Patient is able to manage medications  Lifestyle Choices:  Patient reports no tobacco use  Patient has smoked or used tobacco in the past   Patient has stopped his tobacco use  Tobacco use quit date: 01/01/1978  Patient reports no alcohol use  Patient drives a vehicle  Patient wears seat belt  Current level of exercise of physical activity described by patient as: bowling and walking  Activities of Daily Living:  Can get out of bed by his or her self, able to dress self, able to make own meals, able to do own shopping, able to bathe self, can do own laundry/housekeeping, can manage own money, pay bills and track expenses    Previous Hospitalizations:  Hospitalization or ED visit in past 12 months        Advanced Directives:  Patient has decided on a power of   Patient has spoken to designated power of   Patient has not completed advanced directive          Preventative Screening/Counseling:      Cardiovascular:      General: Screening Current          Diabetes:      General: Screening Current      Counseling: Healthy Diet, Healthy Weight and Improve Physical Activity          Colorectal Cancer:      General: Screening Current          Prostate Cancer:      General: Screening Current      Comments: He sees urology        Osteoporosis:      General: Screening Not Indicated          AAA:      General: Screening Current          Glaucoma:      General: Screening Current          HIV:      General: Screening Not Indicated          Hepatitis C:      General: Screening Current        Advanced Directives:   Patient has no living will for healthcare, has durable POA for healthcare, patient does not have an advanced directive  Information on ACP and/or AD provided  5 wishes given

## 2018-10-05 NOTE — ASSESSMENT & PLAN NOTE
He never did undergo surgery  He still has some occasional pain in the knee but remains active and would like to avoid surgery at this time  Contact me if the knee pain gets any worse  He did undergo physical therapy

## 2018-10-05 NOTE — ASSESSMENT & PLAN NOTE
Lab Results   Component Value Date    HGBA1C 7 3% 10/05/2018     A1c has bumped up a bit to 7 3  I discussed the importance of increasing his exercise a little bit and watching his carbohydrates    Repeat A1c at follow-up

## 2018-10-19 ENCOUNTER — APPOINTMENT (OUTPATIENT)
Dept: LAB | Facility: CLINIC | Age: 78
End: 2018-10-19
Payer: MEDICARE

## 2018-10-19 DIAGNOSIS — C64.1 RENAL CELL CARCINOMA OF RIGHT KIDNEY (HCC): ICD-10-CM

## 2018-10-19 DIAGNOSIS — I10 ESSENTIAL HYPERTENSION: ICD-10-CM

## 2018-10-19 DIAGNOSIS — E11.8 TYPE 2 DIABETES MELLITUS WITH COMPLICATION, WITHOUT LONG-TERM CURRENT USE OF INSULIN (HCC): ICD-10-CM

## 2018-10-19 DIAGNOSIS — D69.6 THROMBOCYTOPENIA (HCC): ICD-10-CM

## 2018-10-19 LAB
ALBUMIN SERPL BCP-MCNC: 3.7 G/DL (ref 3.5–5)
ALP SERPL-CCNC: 81 U/L (ref 46–116)
ALT SERPL W P-5'-P-CCNC: 38 U/L (ref 12–78)
ANION GAP SERPL CALCULATED.3IONS-SCNC: 7 MMOL/L (ref 4–13)
AST SERPL W P-5'-P-CCNC: 19 U/L (ref 5–45)
BASOPHILS # BLD AUTO: 0.03 THOUSANDS/ΜL (ref 0–0.1)
BASOPHILS NFR BLD AUTO: 1 % (ref 0–1)
BILIRUB SERPL-MCNC: 0.83 MG/DL (ref 0.2–1)
BUN SERPL-MCNC: 25 MG/DL (ref 5–25)
CALCIUM SERPL-MCNC: 8.9 MG/DL (ref 8.3–10.1)
CHLORIDE SERPL-SCNC: 102 MMOL/L (ref 100–108)
CO2 SERPL-SCNC: 27 MMOL/L (ref 21–32)
CREAT SERPL-MCNC: 1.17 MG/DL (ref 0.6–1.3)
EOSINOPHIL # BLD AUTO: 0.24 THOUSAND/ΜL (ref 0–0.61)
EOSINOPHIL NFR BLD AUTO: 4 % (ref 0–6)
ERYTHROCYTE [DISTWIDTH] IN BLOOD BY AUTOMATED COUNT: 13.3 % (ref 11.6–15.1)
GFR SERPL CREATININE-BSD FRML MDRD: 60 ML/MIN/1.73SQ M
GLUCOSE P FAST SERPL-MCNC: 146 MG/DL (ref 65–99)
HCT VFR BLD AUTO: 43.3 % (ref 36.5–49.3)
HGB BLD-MCNC: 14.4 G/DL (ref 12–17)
IMM GRANULOCYTES # BLD AUTO: 0.02 THOUSAND/UL (ref 0–0.2)
IMM GRANULOCYTES NFR BLD AUTO: 0 % (ref 0–2)
LDLC SERPL DIRECT ASSAY-MCNC: 99 MG/DL (ref 0–100)
LYMPHOCYTES # BLD AUTO: 1.56 THOUSANDS/ΜL (ref 0.6–4.47)
LYMPHOCYTES NFR BLD AUTO: 27 % (ref 14–44)
MCH RBC QN AUTO: 28.9 PG (ref 26.8–34.3)
MCHC RBC AUTO-ENTMCNC: 33.3 G/DL (ref 31.4–37.4)
MCV RBC AUTO: 87 FL (ref 82–98)
MONOCYTES # BLD AUTO: 0.46 THOUSAND/ΜL (ref 0.17–1.22)
MONOCYTES NFR BLD AUTO: 8 % (ref 4–12)
NEUTROPHILS # BLD AUTO: 3.38 THOUSANDS/ΜL (ref 1.85–7.62)
NEUTS SEG NFR BLD AUTO: 60 % (ref 43–75)
NRBC BLD AUTO-RTO: 0 /100 WBCS
PLATELET # BLD AUTO: 177 THOUSANDS/UL (ref 149–390)
PMV BLD AUTO: 11.4 FL (ref 8.9–12.7)
POTASSIUM SERPL-SCNC: 4.3 MMOL/L (ref 3.5–5.3)
PROT SERPL-MCNC: 7.2 G/DL (ref 6.4–8.2)
RBC # BLD AUTO: 4.99 MILLION/UL (ref 3.88–5.62)
SODIUM SERPL-SCNC: 136 MMOL/L (ref 136–145)
WBC # BLD AUTO: 5.69 THOUSAND/UL (ref 4.31–10.16)

## 2018-10-19 PROCEDURE — 83721 ASSAY OF BLOOD LIPOPROTEIN: CPT

## 2018-10-19 PROCEDURE — 36415 COLL VENOUS BLD VENIPUNCTURE: CPT

## 2018-10-19 PROCEDURE — 85025 COMPLETE CBC W/AUTO DIFF WBC: CPT

## 2018-10-19 PROCEDURE — 80053 COMPREHEN METABOLIC PANEL: CPT

## 2018-10-27 ENCOUNTER — HOSPITAL ENCOUNTER (OUTPATIENT)
Dept: CT IMAGING | Facility: HOSPITAL | Age: 78
Discharge: HOME | End: 2018-10-27
Payer: MEDICARE

## 2018-10-27 ENCOUNTER — HOSPITAL ENCOUNTER (OUTPATIENT)
Dept: RADIOLOGY | Facility: HOSPITAL | Age: 78
Discharge: HOME | End: 2018-10-27
Payer: MEDICARE

## 2018-10-27 DIAGNOSIS — C64.1 RENAL CELL CARCINOMA OF RIGHT KIDNEY (HCC): ICD-10-CM

## 2018-10-27 PROCEDURE — 74170 CT ABD WO CNTRST FLWD CNTRST: CPT

## 2018-10-27 PROCEDURE — 71046 X-RAY EXAM CHEST 2 VIEWS: CPT

## 2018-10-27 RX ADMIN — IOHEXOL 100 ML: 350 INJECTION, SOLUTION INTRAVENOUS at 08:14

## 2018-10-28 DIAGNOSIS — E11.9 TYPE 2 DIABETES MELLITUS WITHOUT COMPLICATION, WITHOUT LONG-TERM CURRENT USE OF INSULIN (HCC): Primary | ICD-10-CM

## 2018-11-01 NOTE — PROGRESS NOTES
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  - CT chest/abdomen/pelvis reveals no evidence of local tumor recurrence status post partial right nephrectomy but an interval enlargement of 8 mm right common iliac lymph node, this is nonspecific particularly in light of stable subcentimeter retroperitoneal nodes elsewhere    - chest x-ray with no concerns   - BMP with a Cr of 1 17     2  Thoracic aortic aneurysm without rupture   - managed by cardiothoracic surgery    I have personally seen and examined the patient in the office today  I am concerned about the retroperitoneal adenopathy as well as the 8 mm right common iliac lymph node which does appear enlarged  I recommend obtaining a CT PET scan  The patient will return after the CT PET scan has been completed to discuss the results  History of Present Illness  Tho Chan is a 70 y o  male here for follow up evaluation of 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  CT chest/abdomen/pelvis reveals no evidence of local tumor recurrence status post partial right nephrectomy but an interval enlargement of 8 mm right common iliac lymph node, this is nonspecific particularly in light of stable subcentimeter retroperitoneal nodes elsewhere  Chest x-ray with no concerns  Cr 1 17  No LUTs or urinary complaints today  Review of Systems   Constitutional: Negative for activity change, chills and fever  Gastrointestinal: Negative for abdominal distention and abdominal pain  Musculoskeletal: Negative for back pain and gait problem  Psychiatric/Behavioral: Negative for behavioral problems and confusion  Urinary Incontinence Screening      Most Recent Value   Urinary Incontinence   Urinary Incontinence? No   Incomplete emptying? No   Urinary frequency? No   Urinary urgency? No   Urinary hesitancy? No   Dysuria (painful difficult urination)? No   Nocturia (waking up to use the bathroom)? No   Straining (having to push to go)? No   Weak stream?  No   Intermittent stream?  No        AUA SYMPTOM SCORE      Most Recent Value   AUA SYMPTOM SCORE   How often have you had a sensation of not emptying your bladder completely after you finished urinating? 0   How often have you had to urinate again less than two hours after you finished urinating? 0   How often have you found you stopped and started again several times when you urinate?  0   How often have you found it difficult to postpone urination? 0   How often have you had a weak urinary stream?  0   How often have you had to push or strain to begin urination?   0   How many times did you most typically get up to urinate from the time you went to bed at night until the time you got up in the morning?  0   Quality of Life: If you were to spend the rest of your life with your urinary condition just the way it is now, how would you feel about that?  0   AUA SYMPTOM SCORE  0          Past Medical History  Past Medical History:   Diagnosis Date    Arthritis     [facility]     right kidney    Cardiac aneurysm     Chronic pain disorder     Diabetes mellitus ([facility] [address] )     NIDDM    Erectile dysfunction of non-organic origin     Glaucoma     Hx of bleeding disorder     in urine    Hyperlipidemia     Hypertension     Meniscus tear     Left knee    Urinary tract infection with hematuria     last assessed 05/30/2017    Wears glasses     Wears partial dentures     upper partial       Past Social History  Past Surgical History:   Procedure Laterality Date    CATARACT EXTRACTION      COLONOSCOPY      CYSTOSCOPY      onset 07/21/2017    DENTAL SURGERY      EYE SURGERY Bilateral     stents    EYE SURGERY Bilateral     stents in both eyes     NEPHRECTOMY LAPAROSCOPIC Right 9/14/2017    Procedure: NEPHRECTOMY PARTIAL LAPAROSCOPIC W ROBOTICS ,;  Surgeon: Joel Grey MD;  [facility];  Service: Urology    NERVE BLOCK      transforaminal epidural lumbar    WISDOM TOOTH EXTRACTION      as well as broken neighboring tooth     History   Smoking Status    Former Smoker    Quit date: 9/1/1985   Smokeless Tobacco    Never Used       Past Family History  Family History   Problem Relation Age of Onset    Other Mother         malaria    Diabetes Sister     Hypertension Sister        Past Social history  Social History     Social History    Marital status: /Civil Union     Spouse name: N/A    Number of children: N/A    Years of education: N/A     Occupational History     in [employer]      Social History Main Topics    Smoking status: Former Smoker     Quit date: 9/1/1985    Smokeless tobacco: Never Used    Alcohol use Yes      Comment: rarely    Drug use: No    Sexual activity: Not on file     Other Topics Concern    Not on file     Social History Narrative    Never drank alcohol per [employer]       Current Medications  Current Outpatient Prescriptions   Medication Sig Dispense Refill    ACCU-CHEK OMERO PLUS test strip USE TO TEST ONCE DAILY 100 each 2    ACCU-CHEK FASTCLIX LANCETS MISC USE TWICE A  each 2    atorvastatin (LIPITOR) 20 mg tablet TAKE 1 TABLET DAILY 90 tablet 3    Blood Glucose Monitoring Suppl (ACCU-CHEK OMERO PLUS) w/Device KIT by Does not apply route      JANUVIA 100 MG tablet TAKE 1 TABLET BY MOUTH  DAILY 90 tablet 3    losartan-hydrochlorothiazide (HYZAAR) 100-25 MG per tablet Take 1 tablet by mouth daily 90 tablet 3    metFORMIN (GLUCOPHAGE) 500 mg tablet TAKE 2 TABLETS IN THE  MORNING AND ONE IN THE  EVENING 270 tablet 3    Polyethyl Glycol-Propyl Glycol (SYSTANE OP) Apply to eye 1 drop each eye twice a day        No current facility-administered medications for this visit          Allergies  No Known Allergies      The following portions of the patient's history were reviewed and updated as appropriate: allergies, current medications, past medical history, past social history, past surgical history and problem list       Vitals  Vitals:    11/05/18 0926   BP: 130/80   BP Location: Right arm   Patient Position: Sitting   Cuff Size: Adult   Pulse: 78   Weight: 109 kg (240 lb 12 8 oz)   Height: 5' 9" (1 753 m)       Physical Exam  Constitutional   General appearance: Patient is seated and in no acute distress, well appearing and well nourished  Head and Face   Head and face: Normal     Eyes   Conjunctiva and lids: No erythema, swelling or discharge  Ears, Nose, Mouth, and Throat   Hearing: Normal     Pulmonary   Respiratory effort: No increased work of breathing or signs of respiratory distress  Cardiovascular   Examination of extremities for edema and/or varicosities: Normal     Abdomen   Abdomen: Non-tender, no masses  Well healed surgical incisions  Musculoskeletal   Gait and station: Normal     Skin   Skin and subcutaneous tissue: Warm, dry, and intact  No visible lesions or rashes  Psychiatric   Judgment and insight: Normal  Recent and remote memory:  Normal  Mood and affect: Normal      Results  No results found for this or any previous visit (from the past 1 hour(s)) ]  Lab Results   Component Value Date    PSA 0 4 04/27/2015    PSA 0 4 01/20/2014     Lab Results   Component Value Date    GLUCOSE 276 (H) 07/29/2015    CALCIUM 8 9 10/19/2018     07/29/2015    K 4 3 10/19/2018    CO2 27 10/19/2018     10/19/2018    BUN 25 10/19/2018    CREATININE 1 17 10/19/2018     Lab Results   Component Value Date    WBC 5 69 10/19/2018    HGB 14 4 10/19/2018    HCT 43 3 10/19/2018    MCV 87 10/19/2018     10/19/2018       Orders  No orders of the defined types were placed in this encounter

## 2018-11-05 ENCOUNTER — OFFICE VISIT (OUTPATIENT)
Dept: UROLOGY | Facility: CLINIC | Age: 78
End: 2018-11-05
Payer: MEDICARE

## 2018-11-05 ENCOUNTER — TELEPHONE (OUTPATIENT)
Dept: UROLOGY | Facility: CLINIC | Age: 78
End: 2018-11-05

## 2018-11-05 VITALS
DIASTOLIC BLOOD PRESSURE: 80 MMHG | WEIGHT: 240.8 LBS | BODY MASS INDEX: 35.66 KG/M2 | HEART RATE: 78 BPM | HEIGHT: 69 IN | SYSTOLIC BLOOD PRESSURE: 130 MMHG

## 2018-11-05 DIAGNOSIS — C64.1 RENAL CELL CARCINOMA OF RIGHT KIDNEY (HCC): Primary | ICD-10-CM

## 2018-11-05 PROCEDURE — 99214 OFFICE O/P EST MOD 30 MIN: CPT | Performed by: UROLOGY

## 2018-11-05 NOTE — TELEPHONE ENCOUNTER
Patient was seen in office today  Dr Ashvin Lovelace originally ordered a full body bone scan for patient, he since has changed order for patient to have a Pet CT scan  I called and left message for patient to call back for more information and scheduling

## 2018-11-06 NOTE — TELEPHONE ENCOUNTER
Patient is scheduled for Pet CT at [facility] on 12/10/18 at 11:00am   Patient requesting to  order for testing at the [facility]

## 2018-11-20 ENCOUNTER — TELEPHONE (OUTPATIENT)
Dept: FAMILY MEDICINE CLINIC | Facility: CLINIC | Age: 78
End: 2018-11-20

## 2018-11-20 NOTE — TELEPHONE ENCOUNTER
PT IS ON LOSARTAN AND THERE IS A RECALL ON THE MED PT WANTS TO KNOW WHAT SHOULD HE BE TAKING INSTEAD OF THIS MED

## 2018-11-21 NOTE — TELEPHONE ENCOUNTER
He should check with his pharmacist and see if the losartan he is on his being recalled  Head if it is, he can switch to olmesartan/hydrochlorothiazide 40/25 mg daily

## 2018-12-10 ENCOUNTER — HOSPITAL ENCOUNTER (OUTPATIENT)
Dept: RADIOLOGY | Age: 78
Discharge: HOME | End: 2018-12-10
Attending: UROLOGY
Payer: MEDICARE

## 2018-12-10 DIAGNOSIS — C64.1 RENAL CELL CARCINOMA OF RIGHT KIDNEY (HCC): ICD-10-CM

## 2018-12-10 LAB — GLUCOSE SERPL-MCNC: 113 MG/DL (ref 65–140)

## 2018-12-10 PROCEDURE — 82948 REAGENT STRIP/BLOOD GLUCOSE: CPT

## 2018-12-10 PROCEDURE — 78815 PET IMAGE W/CT SKULL-THIGH: CPT

## 2018-12-10 PROCEDURE — A9552 F18 FDG: HCPCS

## 2018-12-11 ENCOUNTER — TELEPHONE (OUTPATIENT)
Dept: UROLOGY | Facility: AMBULATORY SURGERY CENTER | Age: 78
End: 2018-12-11

## 2018-12-11 NOTE — TELEPHONE ENCOUNTER
Spoke with patient, patient said he is returning your call from last night  Please call patient back  He also wants copies of results  Thank you

## 2018-12-21 ENCOUNTER — OFFICE VISIT (OUTPATIENT)
Dept: UROLOGY | Facility: CLINIC | Age: 78
End: 2018-12-21
Payer: MEDICARE

## 2018-12-21 VITALS
WEIGHT: 240 LBS | HEART RATE: 63 BPM | HEIGHT: 69 IN | BODY MASS INDEX: 35.55 KG/M2 | SYSTOLIC BLOOD PRESSURE: 130 MMHG | DIASTOLIC BLOOD PRESSURE: 80 MMHG

## 2018-12-21 DIAGNOSIS — C64.1 MALIGNANT NEOPLASM OF RIGHT KIDNEY (HCC): Primary | ICD-10-CM

## 2018-12-21 PROCEDURE — 99213 OFFICE O/P EST LOW 20 MIN: CPT | Performed by: UROLOGY

## 2018-12-21 NOTE — PROGRESS NOTES
12/21/2018    Salas Mendosa  1940  [id number]        Assessment  T1b right renal cell carcinoma status post robot assisted laparoscopic partial nephrectomy (September 2017), 8 mm lymph node identified on CT scan, no evidence of hypermetabolic activity on CT PET scan      Discussion  Provided the patient and his wife with reassurance that the lymph node identified on the CT scan was not metabolically hyperactive on CT PET scan  I recommend continued surveillance  Follow-up will be in 6 months with a basic metabolic panel, chest x-ray, and CT scan of the abdomen and pelvis with, without, and delayed IV contrast imaging  History of Present Illness  70 y o  male with a history of a right 7 cm renal cell carcinoma  He underwent robot assisted laparoscopic right partial nephrectomy performed in September 2017  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  All surgical margins were negative  Final pathology T1 B  On a recent CT scan for surveillance he was found to have an 8 mm right iliac lymph node  This prompted a CT PET scan  He returns to the office today in follow-up  Fortunately the CT PET scan shows no evidence of hypermetabolic activity or metastatic disease  The previously identified 8 mm iliac lymph node now measures 3 mm and may very well have been a reactive lymph node  I had a discussion with the patient and his wife regarding the results of the CT PET scan today  AUA Symptom Score      Review of Systems  Review of Systems   Constitutional: Negative  HENT: Negative  Eyes: Negative  Respiratory: Negative  Cardiovascular: Negative  Gastrointestinal: Negative  Endocrine: Negative  Genitourinary: Negative  Musculoskeletal: Negative  Skin: Negative  Allergic/Immunologic: Negative  Neurological: Negative  Hematological: Negative  Psychiatric/Behavioral: Negative            Past Medical History  Past Medical History: Diagnosis Date    Arthritis     Cancer [facility])     right kidney    Cardiac aneurysm     Chronic pain disorder     Diabetes mellitus ([facility] [address] )     NIDDM    Erectile dysfunction of non-organic origin     Glaucoma     Hx of bleeding disorder     in urine    Hyperlipidemia     Hypertension     Meniscus tear     Left knee    Urinary tract infection with hematuria     last assessed 05/30/2017    Wears glasses     Wears partial dentures     upper partial       Past Social History  Past Surgical History:   Procedure Laterality Date    CATARACT EXTRACTION      COLONOSCOPY      CYSTOSCOPY      onset 07/21/2017    DENTAL SURGERY      EYE SURGERY Bilateral     stents    EYE SURGERY Bilateral     stents in both eyes     NEPHRECTOMY LAPAROSCOPIC Right 9/14/2017    Procedure: NEPHRECTOMY PARTIAL LAPAROSCOPIC W ROBOTICS ,;  Surgeon: Yazmin Frank MD;  [facility]: [facility];  Service: Urology    NERVE BLOCK      transforaminal epidural lumbar    WISDOM TOOTH EXTRACTION      as well as broken neighboring tooth       Past Family History  Family History   Problem Relation Age of Onset    Other Mother         malaria    Diabetes Sister     Hypertension Sister        Past Social history  Social History     Social History    Marital status: /Civil Union     Spouse name: N/A    Number of children: N/A    Years of education: N/A     Occupational History     in [employer]      Social History Main Topics    Smoking status: Former Smoker     Quit date: 9/1/1985    Smokeless tobacco: Never Used    Alcohol use Yes      Comment: rarely    Drug use: No    Sexual activity: Not on file     Other Topics Concern    Not on file     Social History Narrative    Never drank alcohol per [employer]       Current Medications  Current Outpatient Prescriptions   Medication Sig Dispense Refill    ACCU-CHEK OMERO PLUS test strip USE TO TEST ONCE DAILY 100 each 2    ACCU-CHEK FASTCLIX LANCETS MISC USE TWICE A  each 2    atorvastatin (LIPITOR) 20 mg tablet TAKE 1 TABLET DAILY 90 tablet 3    Blood Glucose Monitoring Suppl (ACCU-CHEK OMERO PLUS) w/Device KIT by Does not apply route      JANUVIA 100 MG tablet TAKE 1 TABLET BY MOUTH  DAILY 90 tablet 3    losartan-hydrochlorothiazide (HYZAAR) 100-25 MG per tablet Take 1 tablet by mouth daily 90 tablet 3    metFORMIN (GLUCOPHAGE) 500 mg tablet TAKE 2 TABLETS IN THE  MORNING AND ONE IN THE  EVENING 270 tablet 3    Polyethyl Glycol-Propyl Glycol (SYSTANE OP) Apply to eye 1 drop each eye twice a day        No current facility-administered medications for this visit  Allergies  No Known Allergies    Past Medical History, Social History, Family History, medications and allergies were reviewed  Vitals  Vitals:    12/21/18 1512   BP: 130/80   BP Location: Left arm   Patient Position: Sitting   Cuff Size: Standard   Pulse: 63   Weight: 109 kg (240 lb)   Height: 5' 9" (1 753 m)       Physical Exam  Physical Exam  On examination he is in no acute distress  Gait normal   Affect normal   Incisions well healed without evidence of herniation  Skin is warm  Extremities without edema    Neurologic is grossly intact and nonfocal        Results  Lab Results   Component Value Date    PSA 0 4 04/27/2015    PSA 0 4 01/20/2014     Lab Results   Component Value Date    GLUCOSE 276 (H) 07/29/2015    CALCIUM 8 9 10/19/2018     07/29/2015    K 4 3 10/19/2018    CO2 27 10/19/2018     10/19/2018    BUN 25 10/19/2018    CREATININE 1 17 10/19/2018     Lab Results   Component Value Date    WBC 5 69 10/19/2018    HGB 14 4 10/19/2018    HCT 43 3 10/19/2018    MCV 87 10/19/2018     10/19/2018         Office Urine Dip  No results found for this or any previous visit (from the past 1 hour(s)) ]

## 2019-02-04 DIAGNOSIS — E11.9 TYPE 2 DIABETES MELLITUS WITHOUT COMPLICATION, WITHOUT LONG-TERM CURRENT USE OF INSULIN (HCC): ICD-10-CM

## 2019-02-04 RX ORDER — SITAGLIPTIN 100 MG/1
TABLET, FILM COATED ORAL
Qty: 90 TABLET | Refills: 3 | Status: SHIPPED | OUTPATIENT
Start: 2019-02-04 | End: 2019-06-03 | Stop reason: SDUPTHER

## 2019-03-27 NOTE — PROGRESS NOTES
Cardiology Outpatient Progress Note - Josiah Whiteside 70 y o  male MRN: [id number]    @ Encounter: [id number]      Patient Active Problem List    Diagnosis Date Noted    Colon polyp 10/05/2018    Bilateral hearing loss 10/05/2018    Thrombocytopenia ([facility] ) 10/05/2018    Essential hypertension 03/12/2018    Thoracic aortic aneurysm without rupture ([facility] ) 03/12/2018    Abnormal EKG 08/29/2017    Renal cell carcinoma of right kidney (HCC) 07/21/2017    Bladder mass 06/28/2017    Acute meniscal tear of left knee 05/02/2017    Primary localized osteoarthritis of left knee 05/02/2017    Chronic pain disorder 01/04/2016    Back pain, thoracic 08/20/2015    Lung nodules 07/29/2015    Knee osteoarthritis 09/09/2014    Chronic lumbar radiculopathy 06/17/2014    Lumbar canal stenosis 07/30/2013    Osteopenia 07/09/2012    Erectile dysfunction of non-organic origin 06/15/2012    Type 2 diabetes mellitus with diabetic polyneuropathy ([facility] [address] ) 05/16/2012    Glaucoma 05/16/2012       Assessment:  1  Cardiac risk assessment  Stress test 8/31/17: normal  EKG today: SR, first degree AV block  #  Stable 4 3 cm ascending aorta last CT april 2017- has CT ordered April 2018  # 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  # HTN- BP at goal with SBP < 130 mmHg  Echo 5/8/18:   EF: 60%; aorta 4 3 cm  # DM- metformin, Januvia  # ED- Cialis prn    TODAY'S PLAN:  No change in medical therapy  Labs in April    HPI:   71 yo male who had initially presented for pre-op clearance for right nephrectomy for renal mass for RCC  No cardiac history  He had DM, hyperlipidemia, former smoker over 30 years ago  He had stress in past for screening not for CP  He does bowling but no true exercise  No chest pain or SOB  No edema       Interval History:   No new complaints    ROS: no new complaints    Past Medical History:   Diagnosis Date    Arthritis     Cancer ([facility] [address] )     right kidney    Cardiac aneurysm     Chronic pain disorder  Diabetes mellitus ([facility] [address] )     NIDDM    Erectile dysfunction of non-organic origin     Glaucoma     Hx of bleeding disorder     in urine    Hyperlipidemia     Hypertension     Meniscus tear     Left knee    Urinary tract infection with hematuria     last assessed 2017    Wears glasses     Wears partial dentures     upper partial       No Known Allergies        Current Outpatient Medications:     ACCU-CHEK OMERO PLUS test strip, USE TO TEST ONCE DAILY, Disp: 100 each, Rfl: 2    ACCU-CHEK FASTCLIX LANCETS MISC, USE TWICE A DAY, Disp: 204 each, Rfl: 2    atorvastatin (LIPITOR) 20 mg tablet, TAKE 1 TABLET DAILY, Disp: 90 tablet, Rfl: 3    Blood Glucose Monitoring Suppl (ACCU-CHEK OMERO PLUS) w/Device KIT, by Does not apply route, Disp: , Rfl:     JANUVIA 100 MG tablet, TAKE 1 TABLET DAILY, Disp: 90 tablet, Rfl: 3    losartan-hydrochlorothiazide (HYZAAR) 100-25 MG per tablet, Take 1 tablet by mouth daily, Disp: 90 tablet, Rfl: 3    metFORMIN (GLUCOPHAGE) 500 mg tablet, TAKE 2 TABLETS IN THE  MORNING AND ONE IN THE  EVENING, Disp: 270 tablet, Rfl: 3    Polyethyl Glycol-Propyl Glycol (SYSTANE OP), Apply to eye 1 drop each eye twice a day , Disp: , Rfl:     Social History     Socioeconomic History    Marital status: /Civil Union     Spouse name: Not on file    Number of children: Not on file    Years of education: Not on file    Highest education level: Not on file   Occupational History    Occupation:  in [employer] resource strain: Not on file    Food insecurity:     Worry: Not on file     Inability: Not on file    Transportation needs:     Medical: Not on file     Non-medical: Not on file   Tobacco Use    Smoking status: Former Smoker     Last attempt to quit: 1985     Years since quittin 5    Smokeless tobacco: Never Used   Substance and Sexual Activity    Alcohol use: Yes     Comment: rarely    Drug use: No    Sexual activity: Not on file   Lifestyle    Physical activity:     Days per week: Not on file     Minutes per session: Not on file    Stress: Not on file   Relationships    Social connections:     Talks on phone: Not on file     Gets together: Not on file     Attends [employer] service: Not on file     Active member of club or organization: Not on file     Attends meetings of clubs or organizations: Not on file     Relationship status: Not on file    Intimate partner violence:     Fear of current or ex partner: Not on file     Emotionally abused: Not on file     Physically abused: Not on file     Forced sexual activity: Not on file   Other Topics Concern    Not on file   Social History Narrative    Never drank alcohol per [employer]       Family History   Problem Relation Age of Onset    Other Mother         malaria    Diabetes Sister     Hypertension Sister        Physical Exam:    Vitals: Blood pressure 124/78, pulse 60, weight 109 kg (239 lb 9 6 oz)  , Body mass index is 35 38 kg/m² ,   Wt Readings from Last 3 Encounters:   03/28/19 109 kg (239 lb 9 6 oz)   12/21/18 109 kg (240 lb)   11/05/18 109 kg (240 lb 12 8 oz)         Physical Exam:    GEN: Jesica Stroud appears well, alert and oriented x 3, pleasant and cooperative   HEENT: pupils equal, round, and reactive to light; extraocular muscles intact  NECK: supple, no carotid bruits   HEART: regular rhythm, normal S1 and S2, no murmurs, clicks, gallops or rubs, JVP is    LUNGS: clear to auscultation bilaterally; no wheezes, rales, or rhonchi   ABDOMEN: normal bowel sounds, soft, no tenderness, no distention  EXTREMITIES: peripheral pulses normal; no clubbing, cyanosis, or edema  NEURO: no focal findings   SKIN: normal without suspicious lesions on exposed skin    Labs & Results:    Lab Results   Component Value Date    GLUCOSE 276 (H) 07/29/2015    CALCIUM 8 9 10/19/2018     07/29/2015    K 4 3 10/19/2018    CO2 27 10/19/2018     10/19/2018    BUN 25 10/19/2018 CREATININE 1 17 10/19/2018     Lab Results   Component Value Date    WBC 5 69 10/19/2018    HGB 14 4 10/19/2018    HCT 43 3 10/19/2018    MCV 87 10/19/2018     10/19/2018     No results found for: BNP   Lab Results   Component Value Date    CHOL 173 01/20/2014     Lab Results   Component Value Date    HDL 56 04/02/2018    HDL 54 03/31/2017    HDL 56 02/17/2016     Lab Results   Component Value Date    LDLCALC 110 (H) 04/02/2018    LDLCALC 90 03/31/2017    LDLCALC 91 02/17/2016     Lab Results   Component Value Date    TRIG 61 04/02/2018    TRIG 59 03/31/2017    TRIG 80 02/17/2016     No results found for: CHOLHDL     EKG personally reviewed by Meng Hicks, DO  Counseling / Coordination of Care  Total floor / unit time spent today 25 minutes  Greater than 50% of total time was spent with the patient and / or family counseling and / or coordination of care  A description of the counseling / coordination of care: 15  Thank you for the opportunity to participate in the care of this patient    WINSOME PRESTON [address]

## 2019-03-28 ENCOUNTER — OFFICE VISIT (OUTPATIENT)
Dept: CARDIOLOGY CLINIC | Facility: CLINIC | Age: 79
End: 2019-03-28
Payer: MEDICARE

## 2019-03-28 VITALS
WEIGHT: 239.6 LBS | HEART RATE: 60 BPM | SYSTOLIC BLOOD PRESSURE: 124 MMHG | DIASTOLIC BLOOD PRESSURE: 78 MMHG | BODY MASS INDEX: 35.38 KG/M2

## 2019-03-28 DIAGNOSIS — I77.810 ASCENDING AORTA DILATION (HCC): ICD-10-CM

## 2019-03-28 DIAGNOSIS — I10 HTN (HYPERTENSION), BENIGN: Primary | ICD-10-CM

## 2019-03-28 PROCEDURE — 93000 ELECTROCARDIOGRAM COMPLETE: CPT | Performed by: INTERNAL MEDICINE

## 2019-03-28 PROCEDURE — 99214 OFFICE O/P EST MOD 30 MIN: CPT | Performed by: INTERNAL MEDICINE

## 2019-04-01 DIAGNOSIS — E78.00 PURE HYPERCHOLESTEROLEMIA: ICD-10-CM

## 2019-04-01 RX ORDER — ATORVASTATIN CALCIUM 20 MG/1
TABLET, FILM COATED ORAL
Qty: 90 TABLET | Refills: 3 | Status: SHIPPED | OUTPATIENT
Start: 2019-04-01 | End: 2019-06-03 | Stop reason: SDUPTHER

## 2019-04-24 ENCOUNTER — OFFICE VISIT (OUTPATIENT)
Dept: FAMILY MEDICINE CLINIC | Facility: CLINIC | Age: 79
End: 2019-04-24
Payer: MEDICARE

## 2019-04-24 VITALS
WEIGHT: 238 LBS | RESPIRATION RATE: 18 BRPM | BODY MASS INDEX: 35.25 KG/M2 | HEIGHT: 69 IN | DIASTOLIC BLOOD PRESSURE: 80 MMHG | OXYGEN SATURATION: 94 % | HEART RATE: 68 BPM | SYSTOLIC BLOOD PRESSURE: 138 MMHG

## 2019-04-24 DIAGNOSIS — E11.42 TYPE 2 DIABETES MELLITUS WITH DIABETIC POLYNEUROPATHY, WITHOUT LONG-TERM CURRENT USE OF INSULIN (HCC): Primary | ICD-10-CM

## 2019-04-24 DIAGNOSIS — I71.2 THORACIC AORTIC ANEURYSM WITHOUT RUPTURE (HCC): ICD-10-CM

## 2019-04-24 DIAGNOSIS — D69.6 THROMBOCYTOPENIA (HCC): ICD-10-CM

## 2019-04-24 DIAGNOSIS — C64.1 RENAL CELL CARCINOMA OF RIGHT KIDNEY (HCC): ICD-10-CM

## 2019-04-24 DIAGNOSIS — G60.3 IDIOPATHIC PROGRESSIVE NEUROPATHY: ICD-10-CM

## 2019-04-24 DIAGNOSIS — I10 ESSENTIAL HYPERTENSION: ICD-10-CM

## 2019-04-24 DIAGNOSIS — K63.5 POLYP OF COLON, UNSPECIFIED PART OF COLON, UNSPECIFIED TYPE: ICD-10-CM

## 2019-04-24 DIAGNOSIS — S83.207D ACUTE MENISCAL TEAR OF LEFT KNEE, SUBSEQUENT ENCOUNTER: ICD-10-CM

## 2019-04-24 LAB — SL AMB POCT HEMOGLOBIN AIC: 6.9 (ref ?–6.5)

## 2019-04-24 PROCEDURE — 83036 HEMOGLOBIN GLYCOSYLATED A1C: CPT | Performed by: FAMILY MEDICINE

## 2019-04-24 PROCEDURE — 99214 OFFICE O/P EST MOD 30 MIN: CPT | Performed by: FAMILY MEDICINE

## 2019-06-03 DIAGNOSIS — E11.9 TYPE 2 DIABETES MELLITUS WITHOUT COMPLICATION, WITHOUT LONG-TERM CURRENT USE OF INSULIN (HCC): ICD-10-CM

## 2019-06-03 DIAGNOSIS — E78.00 PURE HYPERCHOLESTEROLEMIA: ICD-10-CM

## 2019-06-03 DIAGNOSIS — I10 ESSENTIAL HYPERTENSION: ICD-10-CM

## 2019-06-03 RX ORDER — LOSARTAN POTASSIUM AND HYDROCHLOROTHIAZIDE 25; 100 MG/1; MG/1
1 TABLET ORAL DAILY
Qty: 90 TABLET | Refills: 3 | Status: SHIPPED | OUTPATIENT
Start: 2019-06-03 | End: 2020-05-22

## 2019-06-03 RX ORDER — ATORVASTATIN CALCIUM 20 MG/1
20 TABLET, FILM COATED ORAL DAILY
Qty: 90 TABLET | Refills: 3 | Status: SHIPPED | OUTPATIENT
Start: 2019-06-03 | End: 2019-11-01 | Stop reason: SDUPTHER

## 2019-06-03 RX ORDER — LANCETS
EACH MISCELLANEOUS
Qty: 204 EACH | Refills: 3 | Status: SHIPPED | OUTPATIENT
Start: 2019-06-03 | End: 2020-05-22

## 2019-06-15 ENCOUNTER — HOSPITAL ENCOUNTER (OUTPATIENT)
Dept: CT IMAGING | Facility: HOSPITAL | Age: 79
Discharge: HOME | End: 2019-06-15
Payer: MEDICARE

## 2019-06-15 DIAGNOSIS — I71.2 THORACIC AORTIC ANEURYSM WITHOUT RUPTURE (HCC): ICD-10-CM

## 2019-06-15 PROCEDURE — 71250 CT THORAX DX C-: CPT

## 2019-06-20 ENCOUNTER — APPOINTMENT (OUTPATIENT)
Dept: LAB | Facility: CLINIC | Age: 79
End: 2019-06-20
Payer: MEDICARE

## 2019-06-20 DIAGNOSIS — C64.1 MALIGNANT NEOPLASM OF RIGHT KIDNEY (HCC): ICD-10-CM

## 2019-06-20 LAB
ANION GAP SERPL CALCULATED.3IONS-SCNC: 3 MMOL/L (ref 4–13)
BUN SERPL-MCNC: 24 MG/DL (ref 5–25)
CALCIUM SERPL-MCNC: 8.8 MG/DL (ref 8.3–10.1)
CHLORIDE SERPL-SCNC: 106 MMOL/L (ref 100–108)
CO2 SERPL-SCNC: 27 MMOL/L (ref 21–32)
CREAT SERPL-MCNC: 0.98 MG/DL (ref 0.6–1.3)
GFR SERPL CREATININE-BSD FRML MDRD: 74 ML/MIN/1.73SQ M
GLUCOSE P FAST SERPL-MCNC: 135 MG/DL (ref 65–99)
POTASSIUM SERPL-SCNC: 3.8 MMOL/L (ref 3.5–5.3)
SODIUM SERPL-SCNC: 136 MMOL/L (ref 136–145)

## 2019-06-20 PROCEDURE — 80048 BASIC METABOLIC PNL TOTAL CA: CPT

## 2019-06-20 PROCEDURE — 36415 COLL VENOUS BLD VENIPUNCTURE: CPT

## 2019-06-21 ENCOUNTER — OFFICE VISIT (OUTPATIENT)
Dept: CARDIAC SURGERY | Facility: CLINIC | Age: 79
End: 2019-06-21
Payer: MEDICARE

## 2019-06-21 VITALS
DIASTOLIC BLOOD PRESSURE: 72 MMHG | SYSTOLIC BLOOD PRESSURE: 134 MMHG | WEIGHT: 234 LBS | RESPIRATION RATE: 12 BRPM | HEIGHT: 69 IN | TEMPERATURE: 97.2 F | OXYGEN SATURATION: 99 % | BODY MASS INDEX: 34.66 KG/M2 | HEART RATE: 53 BPM

## 2019-06-21 DIAGNOSIS — I71.2 THORACIC AORTIC ANEURYSM WITHOUT RUPTURE (HCC): Primary | ICD-10-CM

## 2019-06-21 PROCEDURE — 99213 OFFICE O/P EST LOW 20 MIN: CPT | Performed by: NURSE PRACTITIONER

## 2019-06-25 ENCOUNTER — HOSPITAL ENCOUNTER (OUTPATIENT)
Dept: CT IMAGING | Facility: HOSPITAL | Age: 79
Discharge: HOME | End: 2019-06-25
Attending: UROLOGY
Payer: MEDICARE

## 2019-06-25 ENCOUNTER — HOSPITAL ENCOUNTER (OUTPATIENT)
Dept: RADIOLOGY | Facility: HOSPITAL | Age: 79
Discharge: HOME | End: 2019-06-25
Attending: UROLOGY
Payer: MEDICARE

## 2019-06-25 DIAGNOSIS — C64.1 MALIGNANT NEOPLASM OF RIGHT KIDNEY (HCC): ICD-10-CM

## 2019-06-25 PROCEDURE — 74178 CT ABD&PLV WO CNTR FLWD CNTR: CPT

## 2019-06-25 PROCEDURE — 71046 X-RAY EXAM CHEST 2 VIEWS: CPT

## 2019-06-25 RX ADMIN — IODIXANOL 95 ML: 320 INJECTION, SOLUTION INTRAVASCULAR at 09:00

## 2019-06-30 DIAGNOSIS — E11.9 TYPE 2 DIABETES MELLITUS WITHOUT COMPLICATION, WITHOUT LONG-TERM CURRENT USE OF INSULIN (HCC): ICD-10-CM

## 2019-06-30 RX ORDER — LANCETS
EACH MISCELLANEOUS
Qty: 204 EACH | Refills: 2 | Status: SHIPPED | OUTPATIENT
Start: 2019-06-30 | End: 2019-09-19 | Stop reason: SDUPTHER

## 2019-06-30 RX ORDER — BLOOD SUGAR DIAGNOSTIC
STRIP MISCELLANEOUS
Qty: 100 EACH | Refills: 2 | Status: SHIPPED | OUTPATIENT
Start: 2019-06-30 | End: 2021-09-15 | Stop reason: SDUPTHER

## 2019-07-03 ENCOUNTER — OFFICE VISIT (OUTPATIENT)
Dept: GASTROENTEROLOGY | Facility: MEDICAL CENTER | Age: 79
End: 2019-07-03
Payer: MEDICARE

## 2019-07-03 VITALS
HEIGHT: 69 IN | TEMPERATURE: 97 F | HEART RATE: 60 BPM | WEIGHT: 234 LBS | SYSTOLIC BLOOD PRESSURE: 124 MMHG | DIASTOLIC BLOOD PRESSURE: 70 MMHG | BODY MASS INDEX: 34.66 KG/M2

## 2019-07-03 DIAGNOSIS — K63.5 POLYP OF COLON, UNSPECIFIED PART OF COLON, UNSPECIFIED TYPE: Primary | ICD-10-CM

## 2019-07-03 PROCEDURE — 99203 OFFICE O/P NEW LOW 30 MIN: CPT | Performed by: INTERNAL MEDICINE

## 2019-07-03 NOTE — PROGRESS NOTES
[facility] - Outpatient Consultation  Isamar Stephens 70 y o  male MRN: [id number]  Encounter: [id number]          ASSESSMENT AND PLAN:      1  Polyp of colon, unspecified part of colon, unspecified type    His last colonoscopy was 6 years ago with colonic polyps  He is due for colonoscopy based on this  We discussed risk and benefit of the procedure since he is over the age of 70 and he would like to schedule colonoscopy evaluation he is worried about colonic polyps  He is overall healthy without any acute distress at this time   - Na Sulfate-K Sulfate-Mg Sulf (SUPREP BOWEL PREP KIT) 17 5-3 13-1 6 GM/177ML SOLN; Take 177 mL by mouth once for 1 dose  Dispense: 2 Bottle; Refill: 0  - Colonoscopy; Future    ______________________________________________________________________    HPI:      He is a 59-year-old male presents here for colonoscopy as his last colonoscopy was approximately 6 years ago  Denies any acute distress at this time  No family history of colorectal cancer  Denies any pulmonary or cardiovascular issues  REVIEW OF SYSTEMS:    CONSTITUTIONAL: Denies any fever, chills, rigors, and weight loss  HEENT: No earache or tinnitus  Denies hearing loss or visual disturbances  CARDIOVASCULAR: No chest pain or palpitations  RESPIRATORY: Denies any cough, hemoptysis, shortness of breath or dyspnea on exertion  GASTROINTESTINAL: As noted in the History of Present Illness  GENITOURINARY: No problems with urination  Denies any hematuria or dysuria  NEUROLOGIC: No dizziness or vertigo, denies headaches  MUSCULOSKELETAL: Denies any muscle or joint pain  SKIN: Denies skin rashes or itching  ENDOCRINE: Denies excessive thirst  Denies intolerance to heat or cold  PSYCHOSOCIAL: Denies depression or anxiety  Denies any recent memory loss         Historical Information   Past Medical History:   Diagnosis Date    Arthritis     Cancer [facility])     right kidney    Cardiac aneurysm     Chronic pain disorder     Diabetes mellitus ([facility] [address] )     NIDDM    Erectile dysfunction of non-organic origin     Glaucoma     Hx of bleeding disorder     in urine    Hyperlipidemia     Hypertension     Meniscus tear     Left knee    Urinary tract infection with hematuria     last assessed 2017    Wears glasses     Wears partial dentures     upper partial     Past Surgical History:   Procedure Laterality Date    CATARACT EXTRACTION      COLONOSCOPY      CYSTOSCOPY      onset 2017    DENTAL SURGERY      EYE SURGERY Bilateral     stents    EYE SURGERY Bilateral     stents in both eyes     NEPHRECTOMY LAPAROSCOPIC Right 2017    Procedure: NEPHRECTOMY PARTIAL LAPAROSCOPIC W ROBOTICS ,;  Surgeon: Kamini Chaudhary MD;  Location: [facility];  Service: Urology    NERVE BLOCK      transforaminal epidural lumbar    WISDOM TOOTH EXTRACTION      as well as broken neighboring tooth     Social History   Social History     Substance and Sexual Activity   Alcohol Use Yes    Comment: rarely     Social History     Substance and Sexual Activity   Drug Use No     Social History     Tobacco Use   Smoking Status Former Smoker    Last attempt to quit: 1985    Years since quittin 8   Smokeless Tobacco Never Used     Family History   Problem Relation Age of Onset    Other Mother         malaria    Diabetes Sister     Hypertension Sister        Meds/Allergies       Current Outpatient Medications:     ACCU-CHEK OMERO PLUS test strip    ACCU-CHEK FASTCLIX LANCETS MISC    ACCU-CHEK FASTCLIX LANCETS MISC    atorvastatin (LIPITOR) 20 mg tablet    Blood Glucose Monitoring Suppl (ACCU-CHEK OMERO PLUS) w/Device KIT    glucose blood (ACCU-CHEK OMERO PLUS) test strip    losartan-hydrochlorothiazide (HYZAAR) 100-25 MG per tablet    metFORMIN (GLUCOPHAGE) 500 mg tablet    Na Sulfate-K Sulfate-Mg Sulf (SUPREP BOWEL PREP KIT) 17 5-3 13-1 6 GM/177ML SOLN    Polyethyl Glycol-Propyl Glycol (SYSTANE OP)    sitaGLIPtin (JANUVIA) 100 mg tablet    No Known Allergies        Objective     Blood pressure 124/70, pulse 60, temperature (!) 97 °F (36 1 °C), height 5' 9" (1 753 m), weight 106 kg (234 lb)  Body mass index is 34 56 kg/m²  PHYSICAL EXAM:      General Appearance:   Alert, cooperative, no distress   HEENT:   Normocephalic, atraumatic, anicteric      Neck:  Supple, symmetrical, trachea midline   Lungs:   Clear to auscultation bilaterally; no rales, rhonchi or wheezing; respirations unlabored    Heart[de-identified]   Regular rate and rhythm; no murmur, rub, or gallop  Abdomen:   Soft, non-tender, non-distended; normal bowel sounds; no masses, no organomegaly    Genitalia:   Deferred    Rectal:   Deferred    Extremities:  No cyanosis, clubbing or edema    Pulses:  2+ and symmetric    Skin:  No jaundice, rashes, or lesions    Lymph nodes:  No palpable cervical lymphadenopathy        Lab Results:   No visits with results within 1 Day(s) from this visit  Latest known visit with results is:   Appointment on 06/20/2019   Component Date Value    Sodium 06/20/2019 136     Potassium 06/20/2019 3 8     Chloride 06/20/2019 106     CO2 06/20/2019 27     ANION GAP 06/20/2019 3*    BUN 06/20/2019 24     Creatinine 06/20/2019 0 98     Glucose, Fasting 06/20/2019 135*    Calcium 06/20/2019 8 8     eGFR 06/20/2019 74          Radiology Results:   Ct Abdomen Pelvis W Wo Contrast    Result Date: 6/28/2019  Narrative: CT ABDOMEN AND PELVIS WITH AND WITHOUT IV CONTRAST INDICATION:   C64 1: Malignant neoplasm of right kidney, except renal pelvis   COMPARISON:  October 27, 2018 TECHNIQUE: Initial CT of the abdomen and pelvis was performed without intravenous contrast   Subsequent dynamic CT evaluation of the abdomen and pelvis was performed after the administration of intravenous contrast in both nephrographic and delayed phases after the administration of intravenous contrast   Axial, sagittal, and coronal 2D reformatted images were created from the source data and submitted for interpretation  Radiation dose length product (DLP) for this visit:  2983 mGy-cm   This examination, like all CT scans performed in the [facility], was performed utilizing techniques to minimize radiation dose exposure, including the use of iterative reconstruction and automated exposure control  IV Contrast:  95 mL of iodixanol (VISIPAQUE) Enteric Contrast:  Enteric contrast was not administered  FINDINGS: ABDOMEN RIGHT KIDNEY AND URETER: Again noted is postsurgical change with associated scarring at the anterior lower pole the right kidney related to partial fracture may  There is no evidence of associated residual or recurrent tumor mass in this location  No new or suspicious solid right renal mass  Cortical cyst at the anterior upper pole the right kidney, 12 mm in size, is unchanged  No right-sided urinary tract calculi  Normal enhancement of the right ureter and renal collecting system  LEFT KIDNEY AND URETER: No solid renal mass  Cortical and parapelvic renal cysts including largest measuring 3 1 cm at the lateral interpolar left kidney, unchanged  No detectable urothelial mass  No hydronephrosis or hydroureter  No urinary tract calculi  No perinephric collection  URINARY BLADDER: No bladder wall mass  No calculi  LOWER CHEST:  No clinically significant abnormality identified in the visualized lower chest  LIVER/BILIARY TREE:  Unremarkable  GALLBLADDER:  No calcified gallstones  No pericholecystic inflammatory change  SPLEEN:  Unremarkable  PANCREAS:  Fatty pancreatic atrophy noted  No pancreatic mass or pancreatic ductal enlargement  ADRENAL GLANDS:  Unremarkable  STOMACH AND BOWEL:  Unremarkable  ABDOMINOPELVIC CAVITY:  No ascites  No free intraperitoneal air  No lymphadenopathy    Subcentimeter retroperitoneal and iliac chain lymph nodes are not significantly enlarged by CT criteria and an 8 mm right common iliac node described on examination of  October 27, 2018 is not significantly less conspicuous measuring 3 mm in greatest dimension on image 109 of series 5  VESSELS:  Atherosclerotic, ectatic abdominal aorta without evidence of aneurysm  PELVIS REPRODUCTIVE ORGANS:  Unremarkable for patient's age  APPENDIX: No findings to suggest appendicitis  ABDOMINAL WALL/INGUINAL REGIONS:  Unremarkable  OSSEOUS STRUCTURES: Lumbar degenerative changes including degenerative grade 1 anterolisthesis of L4 relative to L5 and L4-L5 central canal stenosis, similar from prior exam   Prominent vertebral body hemangioma is noted, largest at T10, also unchanged  No acute fracture  No suspicious destructive osseous lesion  Impression: No CT evidence of recurrent or metastatic tumor in the abdomen or pelvis  Workstation performed: [id number]     Xr Chest Pa & Lateral    Result Date: 6/30/2019  Narrative: CHEST INDICATION:   C64 1: Malignant neoplasm of right kidney, except renal pelvis  COMPARISON:  October 27, 2018 EXAM PERFORMED/VIEWS:  XR CHEST PA & LATERAL FINDINGS: Cardiomediastinal silhouette appears unremarkable  The lungs are clear  No pneumothorax or pleural effusion  Osseous structures appear within normal limits for patient age  Impression: No acute cardiopulmonary disease  Workstation performed: [id number]     Ct Chest Wo Contrast    Result Date: 6/19/2019  Narrative: CT CHEST WITHOUT IV CONTRAST INDICATION:   I71 2: Thoracic aortic aneurysm, without rupture  COMPARISON:  4/2/2018 TECHNIQUE: CT examination of the chest was performed without intravenous contrast   Axial, sagittal, and coronal 2D reformatted images were created from the source data and submitted for interpretation  Radiation dose length product (DLP) for this visit:  480 mGy-cm     This examination, like all CT scans performed in the [facility], was performed utilizing techniques to minimize radiation dose exposure, including the use of iterative reconstruction and automated exposure control  FINDINGS: LUNGS:  Small calcified granulomas  No pneumonia  PLEURA:  Unremarkable  HEART/GREAT VESSELS:  Ascending aorta measures 4 5 cm in transverse diameter, unchanged from prior examination  Mild atherosclerotic calcification of the aorta  Coronary vascular calcifications are noted  The heart is not enlarged  MEDIASTINUM AND KAVON:  Unremarkable  CHEST WALL AND LOWER NECK:   Unremarkable  VISUALIZED STRUCTURES IN THE UPPER ABDOMEN:  Unremarkable  OSSEOUS STRUCTURES:  Numerous hemangiomas scattered within the thoracic spine as well as the manubrium, unchanged  Impression: Stable thoracic aortic aneurysm compared to the most recent study, measuring 4 5 cm in transverse diameter   Workstation performed: [id number]

## 2019-07-03 NOTE — PATIENT INSTRUCTIONS
We discussed the risk and benefits of getting a screening colonoscopy  Last colonoscopy was probably 6 years ago with colonic polyps after extensive discussion he is interested in getting a colonoscopy is will schedule him  He will have to be on clear liquid diet the day before the colonoscopy  He will need a ride for the colonoscopy  I have prescribed him the preparation for the colonoscopy at his pharmacy and will give him a printout on how to prepare for this  Please reach out to her insurance company regarding any co payments    Colonoscopy scheduled on 8/23/2019 with Dr Barbara Cedillo at [address] instructions given to patient  PT is diabetic

## 2019-07-08 DIAGNOSIS — I10 ESSENTIAL HYPERTENSION: ICD-10-CM

## 2019-07-08 RX ORDER — LOSARTAN POTASSIUM AND HYDROCHLOROTHIAZIDE 25; 100 MG/1; MG/1
TABLET ORAL
Qty: 90 TABLET | Refills: 3 | Status: SHIPPED | OUTPATIENT
Start: 2019-07-08 | End: 2019-09-19 | Stop reason: SDUPTHER

## 2019-07-09 ENCOUNTER — TELEPHONE (OUTPATIENT)
Dept: GASTROENTEROLOGY | Facility: MEDICAL CENTER | Age: 79
End: 2019-07-09

## 2019-07-09 NOTE — TELEPHONE ENCOUNTER
Called pt to reschedule to a hospital setting, per anesthesia  He is scheduled at [facility] lab on 9/12/19 with dr Gretchen Preston

## 2019-07-10 NOTE — PROGRESS NOTES
7/11/2019      Chief Complaint   Patient presents with    renal cell carcinoma     right       Assessment and Plan    70 y o  male managed by Dr Wells Shall    1  T1b right renal cell carcinoma status post robot assisted laparoscopic partial nephrectomy (September 2017), 8 mm lymph node identified on CT scan, no evidence of hypermetabolic activity on CT PET scan  - CT reveals no CT evidence of recurrent or metastatic tumor in the abdomen or pelvis and lymph nodes stable  - chest x ray negative  - Cr normal    FU 1 year with repeat imaging, will review with MD and if 6 months is preferred with notify the patient       History of Present Illness  Viktoriya Resendiz is a 70 y o  male here for follow up evaluation of history of a right 7 cm renal cell carcinoma  He underwent robot assisted laparoscopic right partial nephrectomy performed in September 2017  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  All surgical margins were negative  Final pathology T1 B  On a recent CT scan for surveillance he was found to have an 8 mm right iliac lymph node  This prompted a CT PET scan  The lymph node identified on the CT scan was not metabolically hyperactive on CT PET scan  He resumed surveillance and presents today with his CT without contrast, chest x-ray, BMP  All testing reveals no concerns genitourinary wise and his lymph nodes are stable  Presents today doing very well from a urinary standpoint  Review of Systems   Constitutional: Negative for activity change, chills and fever  Gastrointestinal: Negative for abdominal distention and abdominal pain  Musculoskeletal: Negative for back pain and gait problem  Psychiatric/Behavioral: Negative for behavioral problems and confusion  Urinary Incontinence Screening      Most Recent Value   Urinary Incontinence   Urinary Incontinence? No   Incomplete emptying? Yes   Urinary frequency? No   Urinary urgency? No   Urinary hesitancy?   No Dysuria (painful difficult urination)? No   Nocturia (waking up to use the bathroom)? No   Straining (having to push to go)? No   Weak stream?  No   Intermittent stream?  No   Post void dribbling? No          AUA SYMPTOM SCORE      Most Recent Value   AUA SYMPTOM SCORE   How often have you had a sensation of not emptying your bladder completely after you finished urinating? 1   How often have you had to urinate again less than two hours after you finished urinating? 1   How often have you found you stopped and started again several times when you urinate? 1   How often have you found it difficult to postpone urination? 1   How often have you had a weak urinary stream?  1   How often have you had to push or strain to begin urination?   0   How many times did you most typically get up to urinate from the time you went to bed at night until the time you got up in the morning?  0   Quality of Life: If you were to spend the rest of your life with your urinary condition just the way it is now, how would you feel about that?  0   AUA SYMPTOM SCORE  5          Past Medical History  Past Medical History:   Diagnosis Date    Arthritis     Cancer ([address] )     right kidney    Cardiac aneurysm     Chronic pain disorder     Diabetes mellitus ([address] )     NIDDM    Erectile dysfunction of non-organic origin     Glaucoma     Hx of bleeding disorder     in urine    Hyperlipidemia     Hypertension     Meniscus tear     Left knee    Urinary tract infection with hematuria     last assessed 05/30/2017    Wears glasses     Wears partial dentures     upper partial       Past Social History  Past Surgical History:   Procedure Laterality Date    CATARACT EXTRACTION      COLONOSCOPY      CYSTOSCOPY      onset 07/21/2017    DENTAL SURGERY      EYE SURGERY Bilateral     stents    EYE SURGERY Bilateral     stents in both eyes     NEPHRECTOMY LAPAROSCOPIC Right 9/14/2017    Procedure: NEPHRECTOMY PARTIAL LAPAROSCOPIC W ROBOTICS ,;  Surgeon: Stas Chua MD;  Location: [facility];  Service: Urology    NERVE BLOCK      transforaminal epidural lumbar    WISDOM TOOTH EXTRACTION      as well as broken neighboring tooth     Social History     Tobacco Use   Smoking Status Former Smoker    Last attempt to quit: 1985    Years since quittin 8   Smokeless Tobacco Never Used       Past Family History  Family History   Problem Relation Age of Onset    Other Mother         malaria    Diabetes Sister     Hypertension Sister        Past Social history  Social History     Socioeconomic History    Marital status: /Civil Union     Spouse name: Not on file    Number of children: Not on file    Years of education: Not on file    Highest education level: Not on file   Occupational History    Occupation:  in [employer] [address] [employer] resource strain: Not on file    Food insecurity:     Worry: Not on file     Inability: Not on file    Transportation needs:     Medical: Not on file     Non-medical: Not on file   Tobacco Use    Smoking status: Former Smoker     Last attempt to quit: 1985     Years since quittin 8    Smokeless tobacco: Never Used   Substance and Sexual Activity    Alcohol use: Yes     Comment: rarely    Drug use: No    Sexual activity: Not on file   Lifestyle    Physical activity:     Days per week: Not on file     Minutes per session: Not on file    Stress: Not on file   Relationships    Social connections:     Talks on phone: Not on file     Gets together: Not on file     Attends Synagogue service: Not on file     Active member of club or organization: Not on file     Attends meetings of clubs or organizations: Not on file     Relationship status: Not on file    Intimate partner violence:     Fear of current or ex partner: Not on file     Emotionally abused: Not on file     Physically abused: Not on file     Forced sexual activity: Not on file   Other Topics Concern    Not on file   Social History Narrative    Never drank alcohol per [employer]       Current Medications  Current Outpatient Medications   Medication Sig Dispense Refill    ACCU-CHEK OMERO PLUS test strip USE TO TEST ONCE DAILY 100 each 2    ACCU-CHEK FASTCLIX LANCETS MISC Test twice daily 204 each 3    ACCU-CHEK FASTCLIX LANCETS MISC USE TWICE A  each 2    atorvastatin (LIPITOR) 20 mg tablet Take 1 tablet (20 mg total) by mouth daily 90 tablet 3    Blood Glucose Monitoring Suppl (ACCU-CHEK OMERO PLUS) w/Device KIT by Does not apply route      glucose blood (ACCU-CHEK OMERO PLUS) test strip Test twice daily 200 each 3    losartan-hydrochlorothiazide (HYZAAR) 100-25 MG per tablet Take 1 tablet by mouth daily 90 tablet 3    metFORMIN (GLUCOPHAGE) 500 mg tablet Take 2 tablets in the morning and 1 tablet in the evening 270 tablet 3    sitaGLIPtin (JANUVIA) 100 mg tablet Take 1 tablet (100 mg total) by mouth daily 90 tablet 3    losartan-hydrochlorothiazide (HYZAAR) 100-25 MG per tablet TAKE 1 TABLET DAILY (Patient not taking: Reported on 7/11/2019) 90 tablet 3    Na Sulfate-K Sulfate-Mg Sulf (SUPREP BOWEL PREP KIT) 17 5-3 13-1 6 GM/177ML SOLN Take 177 mL by mouth once for 1 dose 2 Bottle 0    Polyethyl Glycol-Propyl Glycol (SYSTANE OP) Apply to eye 1 drop each eye twice a day        No current facility-administered medications for this visit  Allergies  No Known Allergies      The following portions of the patient's history were reviewed and updated as appropriate: allergies, current medications, past medical history, past social history, past surgical history and problem list       Vitals  Vitals:    07/11/19 1014   BP: 140/80   Pulse: 62   Weight: 107 kg (235 lb)   Height: 5' 9" (1 753 m)       Physical Exam  Constitutional   General appearance: Patient is seated and in no acute distress, well appearing and well nourished     Head and Face   Head and face: Normal     Eyes Conjunctiva and lids: No erythema, swelling or discharge  Ears, Nose, Mouth, and Throat   Hearing: Normal     Pulmonary   Respiratory effort: No increased work of breathing or signs of respiratory distress  Cardiovascular   Examination of extremities for edema and/or varicosities: Normal     Abdomen   Abdomen: Non-tender, no masses  Musculoskeletal   Gait and station: Normal    Skin   Skin and subcutaneous tissue: Warm, dry, and intact  No visible lesions or rashes  Psychiatric   Judgment and insight: Normal  Recent and remote memory:  Normal  Mood and affect: Normal      Results  No results found for this or any previous visit (from the past 1 hour(s)) ]  Lab Results   Component Value Date    PSA 0 4 04/27/2015    PSA 0 4 01/20/2014     Lab Results   Component Value Date    GLUCOSE 276 (H) 07/29/2015    CALCIUM 8 8 06/20/2019     07/29/2015    K 3 8 06/20/2019    CO2 27 06/20/2019     06/20/2019    BUN 24 06/20/2019    CREATININE 0 98 06/20/2019     Lab Results   Component Value Date    WBC 5 69 10/19/2018    HGB 14 4 10/19/2018    HCT 43 3 10/19/2018    MCV 87 10/19/2018     10/19/2018       Orders  No orders of the defined types were placed in this encounter

## 2019-07-11 ENCOUNTER — OFFICE VISIT (OUTPATIENT)
Dept: UROLOGY | Facility: CLINIC | Age: 79
End: 2019-07-11
Payer: MEDICARE

## 2019-07-11 VITALS
SYSTOLIC BLOOD PRESSURE: 140 MMHG | HEART RATE: 62 BPM | WEIGHT: 235 LBS | HEIGHT: 69 IN | DIASTOLIC BLOOD PRESSURE: 80 MMHG | BODY MASS INDEX: 34.8 KG/M2

## 2019-07-11 DIAGNOSIS — C64.1 RENAL CELL CARCINOMA OF RIGHT KIDNEY (HCC): Primary | ICD-10-CM

## 2019-07-11 PROCEDURE — 99213 OFFICE O/P EST LOW 20 MIN: CPT | Performed by: PHYSICIAN ASSISTANT

## 2019-07-11 NOTE — LETTER
July 11, 2019     Willard Belcher MD  [address] [address]    Patient: Dax Herring   YOB: 1940   Date of Visit: 7/11/2019       Dear Dr Ally Diop:    Thank you for referring Dax Herring to me for evaluation  Below are the relevant portions of my assessment and plan of care  If you have questions, please do not hesitate to call me  I look forward to following Laroy Jeans along with you           Sincerely,        Lydia Castañeda PA-C        CC: No Recipients

## 2019-09-06 ENCOUNTER — TELEPHONE (OUTPATIENT)
Dept: UROLOGY | Facility: MEDICAL CENTER | Age: 79
End: 2019-09-06

## 2019-09-06 ENCOUNTER — TELEPHONE (OUTPATIENT)
Dept: GASTROENTEROLOGY | Facility: CLINIC | Age: 79
End: 2019-09-06

## 2019-09-06 NOTE — TELEPHONE ENCOUNTER
Sydnie Torres pt    Please advise if the pt is ok to have his colonoscopy on 9/12 if he had part of his kidney removed   [phone]

## 2019-09-06 NOTE — TELEPHONE ENCOUNTER
Patient Dr Luis E Gan seen in the [facility] office  Patient would like to know if it is safe for him to have a colonoscopy done do to his kidney history  Please advise

## 2019-09-06 NOTE — TELEPHONE ENCOUNTER
Called and spoke to patient informed him that I spoke to MD and the MD stated that "Patient is fine for colonoscopy from Urology standpoint"    Asked if patient had any other question he said no

## 2019-09-11 ENCOUNTER — TELEPHONE (OUTPATIENT)
Dept: FAMILY MEDICINE CLINIC | Facility: CLINIC | Age: 79
End: 2019-09-11

## 2019-09-11 ENCOUNTER — ANESTHESIA EVENT (OUTPATIENT)
Dept: GASTROENTEROLOGY | Facility: HOSPITAL | Age: 79
End: 2019-09-11

## 2019-09-11 NOTE — TELEPHONE ENCOUNTER
Pt wife called stating that pt has colonoscopy tomorrow and she would like to know if he should still take his metformin and [facility] as usual since he is only on liquids today  She is concerned about his blood sugar today and tomorrow  Please advise

## 2019-09-12 ENCOUNTER — ANESTHESIA (OUTPATIENT)
Dept: GASTROENTEROLOGY | Facility: HOSPITAL | Age: 79
End: 2019-09-12

## 2019-09-12 ENCOUNTER — HOSPITAL ENCOUNTER (OUTPATIENT)
Dept: GASTROENTEROLOGY | Facility: HOSPITAL | Age: 79
Setting detail: OUTPATIENT SURGERY
Discharge: HOME | End: 2019-09-12
Attending: INTERNAL MEDICINE | Admitting: INTERNAL MEDICINE
Payer: MEDICARE

## 2019-09-12 VITALS
TEMPERATURE: 97.3 F | DIASTOLIC BLOOD PRESSURE: 78 MMHG | SYSTOLIC BLOOD PRESSURE: 138 MMHG | RESPIRATION RATE: 16 BRPM | WEIGHT: 227 LBS | HEIGHT: 69 IN | HEART RATE: 53 BPM | OXYGEN SATURATION: 98 % | BODY MASS INDEX: 33.62 KG/M2

## 2019-09-12 DIAGNOSIS — K63.5 POLYP OF COLON, UNSPECIFIED PART OF COLON, UNSPECIFIED TYPE: ICD-10-CM

## 2019-09-12 LAB — GLUCOSE SERPL-MCNC: 147 MG/DL (ref 65–140)

## 2019-09-12 PROCEDURE — 88305 TISSUE EXAM BY PATHOLOGIST: CPT | Performed by: PATHOLOGY

## 2019-09-12 PROCEDURE — 82948 REAGENT STRIP/BLOOD GLUCOSE: CPT

## 2019-09-12 PROCEDURE — 45385 COLONOSCOPY W/LESION REMOVAL: CPT | Performed by: INTERNAL MEDICINE

## 2019-09-12 PROCEDURE — 45380 COLONOSCOPY AND BIOPSY: CPT | Performed by: INTERNAL MEDICINE

## 2019-09-12 RX ORDER — PROPOFOL 10 MG/ML
INJECTION, EMULSION INTRAVENOUS AS NEEDED
Status: DISCONTINUED | OUTPATIENT
Start: 2019-09-12 | End: 2019-09-12 | Stop reason: SURG

## 2019-09-12 RX ORDER — SODIUM CHLORIDE 9 MG/ML
125 INJECTION, SOLUTION INTRAVENOUS CONTINUOUS
Status: DISCONTINUED | OUTPATIENT
Start: 2019-09-12 | End: 2019-09-16 | Stop reason: HOSPADM

## 2019-09-12 RX ADMIN — PROPOFOL 20 MG: 10 INJECTION, EMULSION INTRAVENOUS at 08:28

## 2019-09-12 RX ADMIN — PROPOFOL 60 MG: 10 INJECTION, EMULSION INTRAVENOUS at 08:16

## 2019-09-12 RX ADMIN — PROPOFOL 30 MG: 10 INJECTION, EMULSION INTRAVENOUS at 08:23

## 2019-09-12 RX ADMIN — SODIUM CHLORIDE 125 ML/HR: 0.9 INJECTION, SOLUTION INTRAVENOUS at 07:53

## 2019-09-12 RX ADMIN — PROPOFOL 20 MG: 10 INJECTION, EMULSION INTRAVENOUS at 08:34

## 2019-09-12 RX ADMIN — PROPOFOL 30 MG: 10 INJECTION, EMULSION INTRAVENOUS at 08:20

## 2019-09-12 NOTE — ANESTHESIA POSTPROCEDURE EVALUATION
Post-Op Assessment Note    CV Status:  Stable  Pain Score: 1    Pain management: adequate     Mental Status:  Alert and awake   Hydration Status:  Euvolemic   PONV Controlled:  Controlled   Airway Patency:  Patent   Post Op Vitals Reviewed: Yes      Staff: Anesthesiologist           /71 (09/12/19 0848)    Temp     Pulse 55 (09/12/19 0848)   Resp 16 (09/12/19 0848)    SpO2 96 % (09/12/19 0848)

## 2019-09-12 NOTE — DISCHARGE INSTRUCTIONS
Colorectal Polyps   WHAT YOU NEED TO KNOW:   Colorectal polyps are small growths of tissue in the lining of the colon and rectum  Most polyps are hyperplastic polyps and are usually benign (noncancerous)  Certain types of polyps, called adenomatous polyps, may turn into cancer  DISCHARGE INSTRUCTIONS:   Follow up with your healthcare provider or gastroenterologist as directed: You may need to return for more tests, such as another colonoscopy  Write down your questions so you remember to ask them during your visits  Reduce your risk for colorectal polyps:   · Eat a variety of healthy foods:  Healthy foods include fruit, vegetables, whole-grain breads, low-fat dairy products, beans, lean meat, and fish  Ask if you need to be on a special diet  · Maintain a healthy weight:  Ask your healthcare provider if you need to lose weight and how much you need to lose  Ask for help with a weight loss program     · Exercise:  Begin to exercise slowly and do more as you get stronger  Talk with your healthcare provider before you start an exercise program      · Limit alcohol:  Your risk for polyps increases the more you drink  · Do not smoke: If you smoke, it is never too late to quit  Ask for information about how to stop  For support and more information:   · Uli Coon ([facility])  [address], [place] [address]  Phone: 2- 392 - [phone]  Web Address: www digestive  niddk nih gov  Contact your healthcare provider or gastroenterologist if:   · You have a fever  · You have chills, a cough, or feel weak and achy  · You have abdominal pain that does not go away or gets worse after you take medicine  · Your abdomen is swollen  · You are losing weight without trying  · You have questions or concerns about your condition or care  Seek care immediately or call 911 if:   · You have sudden shortness of breath       · You have a fast heart rate, fast breathing, or are too dizzy to stand up  · You have severe abdominal pain  · You see blood in your bowel movement  © 2017 [facility] Information is for End User's use only and may not be sold, redistributed or otherwise used for commercial purposes  All illustrations and images included in CareNotes® are the copyrighted property of A D A M , Inc  or Abilio Mackay  The above information is an  only  It is not intended as medical advice for individual conditions or treatments  Talk to your doctor, nurse or pharmacist before following any medical regimen to see if it is safe and effective for you

## 2019-09-12 NOTE — H&P
History and Physical -  [facility]  Sondra Cline 70 y o  male MRN: [id number]                  HPI: Sondra Cline is a 70y o  year old male who presents for colonoscopy evaluation due to history of colonic polyps  Last colonoscopy 6 years ago  REVIEW OF SYSTEMS: Per the HPI, and otherwise unremarkable      Historical Information   Past Medical History:   Diagnosis Date    Arthritis     Cancer [facility])     right kidney    Cardiac aneurysm     Chronic pain disorder     Diabetes mellitus ([address] )     NIDDM    Erectile dysfunction of non-organic origin     Glaucoma     Hx of bleeding disorder     in urine    Hyperlipidemia     Hypertension     Meniscus tear     Left knee    Urinary tract infection with hematuria     last assessed 2017    Wears glasses     Wears partial dentures     upper partial     Past Surgical History:   Procedure Laterality Date    CATARACT EXTRACTION      COLONOSCOPY      CYSTOSCOPY      onset 2017    DENTAL SURGERY      EYE SURGERY Bilateral     stents    EYE SURGERY Bilateral     stents in both eyes     NEPHRECTOMY LAPAROSCOPIC Right 2017    Procedure: NEPHRECTOMY PARTIAL LAPAROSCOPIC W ROBOTICS ,;  Surgeon: Rakesh Mcdaniels MD;  Location: [facility];  Service: Urology    NERVE BLOCK      transforaminal epidural lumbar    WISDOM TOOTH EXTRACTION      as well as broken neighboring tooth     Social History   Social History     Substance and Sexual Activity   Alcohol Use Yes    Comment: rarely     Social History     Substance and Sexual Activity   Drug Use No     Social History     Tobacco Use   Smoking Status Former Smoker    Last attempt to quit: 1985    Years since quittin 0   Smokeless Tobacco Never Used     Family History   Problem Relation Age of Onset    Other Mother         malaria    Diabetes Sister     Hypertension Sister        Meds/Allergies       (Not in a hospital admission)    No Known Allergies    Objective /69   Pulse 66   Temp (!) 97 3 °F (36 3 °C) (Tympanic)   Resp 16   Ht 5' 9" (1 753 m)   Wt 103 kg (227 lb)   SpO2 98%   BMI 33 52 kg/m²       PHYSICAL EXAM    Gen: NAD  CV: RRR  CHEST: Clear  ABD: soft, NT/ND  EXT: no edema      ASSESSMENT/PLAN:  This is a 70y o  year old male here for colonoscopy evaluation, and he is stable and optimized for his procedure

## 2019-09-12 NOTE — ANESTHESIA PREPROCEDURE EVALUATION
Review of Systems/Medical History  Patient summary reviewed  Chart reviewed      Cardiovascular  Exercise tolerance (METS): <4,  Hyperlipidemia, Hypertension controlled,    Pulmonary  Negative pulmonary ROS        GI/Hepatic  Negative GI/hepatic ROS               Endo/Other  Diabetes well controlled type 2 Oral agent,      GYN       Hematology   Musculoskeletal    Arthritis     Neurology  Negative neurology ROS      Psychology   Negative psychology ROS              Physical Exam    Airway    Mallampati score: II  TM Distance: <3 FB  Neck ROM: full     Dental       Cardiovascular  Rhythm: regular, Rate: normal,     Pulmonary  Breath sounds clear to auscultation,     Other Findings        Anesthesia Plan  ASA Score- 2     Anesthesia Type- general with ASA Monitors  Additional Monitors:   Airway Plan:         Plan Factors-Patient not instructed to abstain from smoking on day of procedure  Patient did not smoke on day of surgery  Induction- intravenous  Postoperative Plan-     Informed Consent- Anesthetic plan and risks discussed with patient

## 2019-09-18 NOTE — PROGRESS NOTES
Cardiology Outpatient Progress Note - Leslie Flanagan 70 y o  male MRN: [id number]    @ Encounter: [id number]      Patient Active Problem List    Diagnosis Date Noted    Colon polyp 10/05/2018    Bilateral hearing loss 10/05/2018    Thrombocytopenia ([facility] ) 10/05/2018    Essential hypertension 03/12/2018    Thoracic aortic aneurysm without rupture ([facility] ) 03/12/2018    Abnormal EKG 08/29/2017    Renal cell carcinoma of right kidney ([facility] ) 07/21/2017    Bladder mass 06/28/2017    Acute meniscal tear of left knee 05/02/2017    Primary localized osteoarthritis of left knee 05/02/2017    Chronic pain disorder 01/04/2016    Back pain, thoracic 08/20/2015    Lung nodules 07/29/2015    Knee osteoarthritis 09/09/2014    Chronic lumbar radiculopathy 06/17/2014    Lumbar canal stenosis 07/30/2013    Osteopenia 07/09/2012    Erectile dysfunction of non-organic origin 06/15/2012    Type 2 diabetes mellitus with diabetic polyneuropathy ([facility] [facility] ) 05/16/2012    Glaucoma 05/16/2012       Assessment:  1  Cardiac risk assessment  Stress test 8/31/17: normal  EKG today: SR, first degree AV block  #  Stable 4 5 cm ascending aorta last CT Chest 6/15/19  # 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  # HTN- BP at goal with SBP < 130 mmHg  Losartan/ hctz 100/25 mg daily  Echo 5/8/18:   EF: 60%; aorta 4 3 cm  # DM- metformin, Januvia  HgA1C 4/24: 6 9%  # ED- Cialis prn    TODAY'S PLAN:  Start amlodipine 2 5 mg for goal SBP < 130 near 120 mmHg due to his mild aortic root dilation    HPI:   69 yo male who had initially presented for pre-op clearance for right nephrectomy for renal mass for RCC  No cardiac history  He had DM, hyperlipidemia, former smoker over 30 years ago  He had stress in past for screening not for CP  He does bowling but no true exercise  No chest pain or SOB  No edema       Interval History:   No new complaints  #  Stable 4 5 cm ascending aorta last CT Chest 6/15/19    Review of Systems Constitutional: Negative for activity change, appetite change, fatigue and unexpected weight change  HENT: Negative for congestion and nosebleeds  Eyes: Negative  Respiratory: Negative for cough, chest tightness and shortness of breath  Cardiovascular: Negative for chest pain, palpitations and leg swelling  Gastrointestinal: Negative for abdominal distention  Endocrine: Negative  Genitourinary: Negative  Musculoskeletal: Negative  Skin: Negative  Neurological: Negative for dizziness, syncope and weakness  Hematological: Negative  Psychiatric/Behavioral: Negative  Past Medical History:   Diagnosis Date    Arthritis     Cancer [facility])     right kidney    Cardiac aneurysm     Chronic pain disorder     Diabetes mellitus ([facility] [address] )     NIDDM    Erectile dysfunction of non-organic origin     Glaucoma     Hx of bleeding disorder     in urine    Hyperlipidemia     Hypertension     Meniscus tear     Left knee    Urinary tract infection with hematuria     last assessed 05/30/2017    Wears glasses     Wears partial dentures     upper partial       No Known Allergies        Current Outpatient Medications:     ACCU-CHEK OMERO PLUS test strip, USE TO TEST ONCE DAILY, Disp: 100 each, Rfl: 2    ACCU-CHEK FASTCLIX LANCETS MISC, Test twice daily, Disp: 204 each, Rfl: 3    atorvastatin (LIPITOR) 20 mg tablet, Take 1 tablet (20 mg total) by mouth daily, Disp: 90 tablet, Rfl: 3    Blood Glucose Monitoring Suppl (ACCU-CHEK OMERO PLUS) w/Device KIT, by Does not apply route, Disp: , Rfl:     glucose blood (ACCU-CHEK OMERO PLUS) test strip, Test twice daily, Disp: 200 each, Rfl: 3    losartan-hydrochlorothiazide (HYZAAR) 100-25 MG per tablet, Take 1 tablet by mouth daily, Disp: 90 tablet, Rfl: 3    metFORMIN (GLUCOPHAGE) 500 mg tablet, Take 2 tablets in the morning and 1 tablet in the evening, Disp: 270 tablet, Rfl: 3    sitaGLIPtin (JANUVIA) 100 mg tablet, Take 1 tablet (100 mg total) by mouth daily, Disp: 90 tablet, Rfl: 3    Polyethyl Glycol-Propyl Glycol (SYSTANE OP), Apply to eye 1 drop each eye twice a day , Disp: , Rfl:     Social History     Socioeconomic History    Marital status: /Civil Union     Spouse name: Not on file    Number of children: Not on file    Years of education: Not on file    Highest education level: Not on file   Occupational History    Occupation:  in  [employer] resource strain: Not on file    Food insecurity:     Worry: Not on file     Inability: Not on file    Transportation needs:     Medical: Not on file     Non-medical: Not on file   Tobacco Use    Smoking status: Former Smoker     Last attempt to quit: 1985     Years since quittin 0    Smokeless tobacco: Never Used   Substance and Sexual Activity    Alcohol use: Yes     Comment: rarely    Drug use: No    Sexual activity: Not on file   Lifestyle    Physical activity:     Days per week: Not on file     Minutes per session: Not on file    Stress: Not on file   Relationships    Social connections:     Talks on phone: Not on file     Gets together: Not on file     Attends Mandaen service: Not on file     Active member of club or organization: Not on file     Attends meetings of clubs or organizations: Not on file     Relationship status: Not on file    Intimate partner violence:     Fear of current or ex partner: Not on file     Emotionally abused: Not on file     Physically abused: Not on file     Forced sexual activity: Not on file   Other Topics Concern    Not on file   Social History Narrative    Never drank alcohol per [employer]       Family History   Problem Relation Age of Onset    Other Mother         malaria    Diabetes Sister     Hypertension Sister        Physical Exam:    Vitals: Blood pressure 134/70, pulse 62, height 5' 9" (1 753 m), weight 108 kg (239 lb), SpO2 96 %  , Body mass index is 35 29 kg/m² ,   Wt Readings from Last 3 Encounters:   09/19/19 108 kg (239 lb)   09/12/19 103 kg (227 lb)   07/11/19 107 kg (235 lb)         Physical Exam:    Physical Exam   Constitutional: He is oriented to person, place, and time  He appears well-developed and well-nourished  HENT:   Head: Normocephalic and atraumatic  Eyes: Pupils are equal, round, and reactive to light  EOM are normal    Neck: Normal range of motion  No JVD present  Cardiovascular: Normal rate, regular rhythm and normal heart sounds  No murmur heard  Pulmonary/Chest: Effort normal and breath sounds normal  He has no rales  Abdominal: Soft  Bowel sounds are normal  He exhibits no distension  Musculoskeletal: Normal range of motion  He exhibits no edema  Neurological: He is alert and oriented to person, place, and time  Skin: Skin is warm and dry  He is not diaphoretic  Psychiatric: He has a normal mood and affect  Labs & Results:    Lab Results   Component Value Date    GLUCOSE 276 (H) 07/29/2015    CALCIUM 8 8 06/20/2019     07/29/2015    K 3 8 06/20/2019    CO2 27 06/20/2019     06/20/2019    BUN 24 06/20/2019    CREATININE 0 98 06/20/2019     Lab Results   Component Value Date    WBC 5 69 10/19/2018    HGB 14 4 10/19/2018    HCT 43 3 10/19/2018    MCV 87 10/19/2018     10/19/2018     No results found for: BNP   Lab Results   Component Value Date    CHOL 173 01/20/2014     Lab Results   Component Value Date    HDL 56 04/02/2018    HDL 54 03/31/2017    HDL 56 02/17/2016     Lab Results   Component Value Date    LDLCALC 110 (H) 04/02/2018    LDLCALC 90 03/31/2017    LDLCALC 91 02/17/2016     Lab Results   Component Value Date    TRIG 61 04/02/2018    TRIG 59 03/31/2017    TRIG 80 02/17/2016     No results found for: CHOLHDL     EKG personally reviewed by Ladonna Donaldson DO  Counseling / Coordination of Care  Total floor / unit time spent today 25 minutes    Greater than 50% of total time was spent with the patient and / or family counseling and / or coordination of care  A description of the counseling / coordination of care: 15  Thank you for the opportunity to participate in the care of this patient    WINSOME PRESTON [address]

## 2019-09-19 ENCOUNTER — OFFICE VISIT (OUTPATIENT)
Dept: CARDIOLOGY CLINIC | Facility: CLINIC | Age: 79
End: 2019-09-19
Payer: MEDICARE

## 2019-09-19 VITALS
OXYGEN SATURATION: 96 % | SYSTOLIC BLOOD PRESSURE: 134 MMHG | DIASTOLIC BLOOD PRESSURE: 70 MMHG | WEIGHT: 239 LBS | HEART RATE: 62 BPM | HEIGHT: 69 IN | BODY MASS INDEX: 35.4 KG/M2

## 2019-09-19 DIAGNOSIS — I10 HTN (HYPERTENSION), BENIGN: Primary | ICD-10-CM

## 2019-09-19 DIAGNOSIS — I10 HTN (HYPERTENSION), BENIGN: ICD-10-CM

## 2019-09-19 DIAGNOSIS — N52.1 ERECTILE DYSFUNCTION DUE TO DISEASES CLASSIFIED ELSEWHERE: ICD-10-CM

## 2019-09-19 PROCEDURE — 99214 OFFICE O/P EST MOD 30 MIN: CPT | Performed by: INTERNAL MEDICINE

## 2019-09-19 RX ORDER — AMLODIPINE BESYLATE 2.5 MG/1
2.5 TABLET ORAL DAILY
Qty: 30 TABLET | Refills: 3 | Status: SHIPPED | OUTPATIENT
Start: 2019-09-19 | End: 2019-09-19 | Stop reason: SDUPTHER

## 2019-09-19 RX ORDER — AMLODIPINE BESYLATE 2.5 MG/1
2.5 TABLET ORAL DAILY
Qty: 180 TABLET | Refills: 3 | Status: SHIPPED | OUTPATIENT
Start: 2019-09-19 | End: 2019-09-19 | Stop reason: SDUPTHER

## 2019-09-19 RX ORDER — AMLODIPINE BESYLATE 2.5 MG/1
2.5 TABLET ORAL DAILY
Qty: 180 TABLET | Refills: 3 | Status: SHIPPED | OUTPATIENT
Start: 2019-09-19 | End: 2019-12-30 | Stop reason: SDUPTHER

## 2019-09-23 DIAGNOSIS — E11.9 TYPE 2 DIABETES MELLITUS WITHOUT COMPLICATION, WITHOUT LONG-TERM CURRENT USE OF INSULIN (HCC): ICD-10-CM

## 2019-10-16 ENCOUNTER — IMMUNIZATIONS (OUTPATIENT)
Dept: FAMILY MEDICINE CLINIC | Facility: CLINIC | Age: 79
End: 2019-10-16
Payer: MEDICARE

## 2019-10-16 DIAGNOSIS — Z23 FLU VACCINE NEED: Primary | ICD-10-CM

## 2019-10-16 PROCEDURE — G0008 ADMIN INFLUENZA VIRUS VAC: HCPCS

## 2019-10-16 PROCEDURE — 90662 IIV NO PRSV INCREASED AG IM: CPT

## 2019-10-22 ENCOUNTER — APPOINTMENT (OUTPATIENT)
Dept: LAB | Facility: CLINIC | Age: 79
End: 2019-10-22
Payer: MEDICARE

## 2019-10-22 DIAGNOSIS — I10 ESSENTIAL HYPERTENSION: ICD-10-CM

## 2019-10-22 DIAGNOSIS — E11.42 TYPE 2 DIABETES MELLITUS WITH DIABETIC POLYNEUROPATHY, WITHOUT LONG-TERM CURRENT USE OF INSULIN (HCC): ICD-10-CM

## 2019-10-22 DIAGNOSIS — D69.6 THROMBOCYTOPENIA (HCC): ICD-10-CM

## 2019-10-22 DIAGNOSIS — C64.1 RENAL CELL CARCINOMA OF RIGHT KIDNEY (HCC): ICD-10-CM

## 2019-10-22 DIAGNOSIS — G60.3 IDIOPATHIC PROGRESSIVE NEUROPATHY: ICD-10-CM

## 2019-10-22 LAB
ALBUMIN SERPL BCP-MCNC: 3.5 G/DL (ref 3.5–5)
ALP SERPL-CCNC: 83 U/L (ref 46–116)
ALT SERPL W P-5'-P-CCNC: 35 U/L (ref 12–78)
ANION GAP SERPL CALCULATED.3IONS-SCNC: 5 MMOL/L (ref 4–13)
AST SERPL W P-5'-P-CCNC: 17 U/L (ref 5–45)
BASOPHILS # BLD AUTO: 0.03 THOUSANDS/ΜL (ref 0–0.1)
BASOPHILS NFR BLD AUTO: 1 % (ref 0–1)
BILIRUB SERPL-MCNC: 0.58 MG/DL (ref 0.2–1)
BUN SERPL-MCNC: 18 MG/DL (ref 5–25)
CALCIUM SERPL-MCNC: 8.9 MG/DL (ref 8.3–10.1)
CHLORIDE SERPL-SCNC: 106 MMOL/L (ref 100–108)
CHOLEST SERPL-MCNC: 163 MG/DL (ref 50–200)
CO2 SERPL-SCNC: 27 MMOL/L (ref 21–32)
CREAT SERPL-MCNC: 0.93 MG/DL (ref 0.6–1.3)
CREAT UR-MCNC: 95.6 MG/DL
EOSINOPHIL # BLD AUTO: 0.23 THOUSAND/ΜL (ref 0–0.61)
EOSINOPHIL NFR BLD AUTO: 5 % (ref 0–6)
ERYTHROCYTE [DISTWIDTH] IN BLOOD BY AUTOMATED COUNT: 13.5 % (ref 11.6–15.1)
EST. AVERAGE GLUCOSE BLD GHB EST-MCNC: 154 MG/DL
GFR SERPL CREATININE-BSD FRML MDRD: 79 ML/MIN/1.73SQ M
GLUCOSE P FAST SERPL-MCNC: 149 MG/DL (ref 65–99)
HBA1C MFR BLD: 7 % (ref 4.2–6.3)
HCT VFR BLD AUTO: 39.5 % (ref 36.5–49.3)
HDLC SERPL-MCNC: 52 MG/DL
HGB BLD-MCNC: 13.2 G/DL (ref 12–17)
IMM GRANULOCYTES # BLD AUTO: 0.03 THOUSAND/UL (ref 0–0.2)
IMM GRANULOCYTES NFR BLD AUTO: 1 % (ref 0–2)
LDLC SERPL CALC-MCNC: 100 MG/DL (ref 0–100)
LYMPHOCYTES # BLD AUTO: 1.21 THOUSANDS/ΜL (ref 0.6–4.47)
LYMPHOCYTES NFR BLD AUTO: 25 % (ref 14–44)
MCH RBC QN AUTO: 29 PG (ref 26.8–34.3)
MCHC RBC AUTO-ENTMCNC: 33.4 G/DL (ref 31.4–37.4)
MCV RBC AUTO: 87 FL (ref 82–98)
MICROALBUMIN UR-MCNC: <5 MG/L (ref 0–20)
MICROALBUMIN/CREAT 24H UR: <5 MG/G CREATININE (ref 0–30)
MONOCYTES # BLD AUTO: 0.37 THOUSAND/ΜL (ref 0.17–1.22)
MONOCYTES NFR BLD AUTO: 8 % (ref 4–12)
NEUTROPHILS # BLD AUTO: 2.89 THOUSANDS/ΜL (ref 1.85–7.62)
NEUTS SEG NFR BLD AUTO: 60 % (ref 43–75)
NRBC BLD AUTO-RTO: 0 /100 WBCS
PLATELET # BLD AUTO: 149 THOUSANDS/UL (ref 149–390)
PMV BLD AUTO: 11.3 FL (ref 8.9–12.7)
POTASSIUM SERPL-SCNC: 3.6 MMOL/L (ref 3.5–5.3)
PROT SERPL-MCNC: 6.9 G/DL (ref 6.4–8.2)
RBC # BLD AUTO: 4.55 MILLION/UL (ref 3.88–5.62)
SODIUM SERPL-SCNC: 138 MMOL/L (ref 136–145)
TRIGL SERPL-MCNC: 54 MG/DL
VIT B12 SERPL-MCNC: 1004 PG/ML (ref 100–900)
WBC # BLD AUTO: 4.76 THOUSAND/UL (ref 4.31–10.16)

## 2019-10-22 PROCEDURE — 80061 LIPID PANEL: CPT

## 2019-10-22 PROCEDURE — 83036 HEMOGLOBIN GLYCOSYLATED A1C: CPT

## 2019-10-22 PROCEDURE — 80053 COMPREHEN METABOLIC PANEL: CPT

## 2019-10-22 PROCEDURE — 82043 UR ALBUMIN QUANTITATIVE: CPT

## 2019-10-22 PROCEDURE — 82570 ASSAY OF URINE CREATININE: CPT

## 2019-10-22 PROCEDURE — 82607 VITAMIN B-12: CPT

## 2019-10-22 PROCEDURE — 36415 COLL VENOUS BLD VENIPUNCTURE: CPT

## 2019-10-22 PROCEDURE — 85025 COMPLETE CBC W/AUTO DIFF WBC: CPT

## 2019-11-01 ENCOUNTER — OFFICE VISIT (OUTPATIENT)
Dept: FAMILY MEDICINE CLINIC | Facility: CLINIC | Age: 79
End: 2019-11-01
Payer: MEDICARE

## 2019-11-01 VITALS
HEART RATE: 62 BPM | HEIGHT: 68 IN | TEMPERATURE: 96.7 F | OXYGEN SATURATION: 95 % | WEIGHT: 234 LBS | DIASTOLIC BLOOD PRESSURE: 80 MMHG | BODY MASS INDEX: 35.46 KG/M2 | SYSTOLIC BLOOD PRESSURE: 136 MMHG

## 2019-11-01 DIAGNOSIS — E11.9 TYPE 2 DIABETES MELLITUS WITHOUT COMPLICATION, WITHOUT LONG-TERM CURRENT USE OF INSULIN (HCC): ICD-10-CM

## 2019-11-01 DIAGNOSIS — E11.42 TYPE 2 DIABETES MELLITUS WITH DIABETIC POLYNEUROPATHY, WITHOUT LONG-TERM CURRENT USE OF INSULIN (HCC): ICD-10-CM

## 2019-11-01 DIAGNOSIS — I10 ESSENTIAL HYPERTENSION: ICD-10-CM

## 2019-11-01 DIAGNOSIS — E78.00 PURE HYPERCHOLESTEROLEMIA: ICD-10-CM

## 2019-11-01 DIAGNOSIS — C64.1 RENAL CELL CARCINOMA OF RIGHT KIDNEY (HCC): ICD-10-CM

## 2019-11-01 DIAGNOSIS — E66.9 OBESITY, CLASS II, BMI 35-39.9: ICD-10-CM

## 2019-11-01 DIAGNOSIS — Z00.00 MEDICARE ANNUAL WELLNESS VISIT, SUBSEQUENT: Primary | ICD-10-CM

## 2019-11-01 DIAGNOSIS — I71.2 THORACIC AORTIC ANEURYSM WITHOUT RUPTURE (HCC): ICD-10-CM

## 2019-11-01 DIAGNOSIS — D69.6 THROMBOCYTOPENIA (HCC): ICD-10-CM

## 2019-11-01 PROCEDURE — 99214 OFFICE O/P EST MOD 30 MIN: CPT | Performed by: FAMILY MEDICINE

## 2019-11-01 PROCEDURE — G0439 PPPS, SUBSEQ VISIT: HCPCS | Performed by: FAMILY MEDICINE

## 2019-11-01 RX ORDER — ATORVASTATIN CALCIUM 40 MG/1
40 TABLET, FILM COATED ORAL DAILY
Qty: 90 TABLET | Refills: 3 | Status: SHIPPED | OUTPATIENT
Start: 2019-11-01 | End: 2020-09-16 | Stop reason: SDUPTHER

## 2019-11-01 RX ORDER — ST. JOHN'S WORT 300 MG
CAPSULE ORAL
Start: 2019-11-01

## 2019-11-01 NOTE — ASSESSMENT & PLAN NOTE
Cholesterol remains good but not perfect  In light of his type 2 diabetes as well as history of aneurysm, I am going to bump up his atorvastatin to 40 mg daily  Notify me if he is having any side effects, specifically increased muscle achiness

## 2019-11-01 NOTE — PROGRESS NOTES
Assessment and Plan:     Problem List Items Addressed This Visit     None      Visit Diagnoses     Medicare annual wellness visit, subsequent    -  Primary    Obesity, Class II, BMI 35-39 9        Type 2 diabetes mellitus without complication, without long-term current use of insulin ([facility] )               Preventive health issues were discussed with patient, and age appropriate screening tests were ordered as noted in patient's After Visit Summary  Personalized health advice and appropriate referrals for health education or preventive services given if needed, as noted in patient's After Visit Summary       History of Present Illness:     Patient presents for Medicare Annual Wellness visit    Patient Care Team:  Armin Ruiz MD as PCP - General  MD Maria M Jiménez, MD Amparo Blevins DO Eliodoro Smoker, DO Roger Moore MD (Ophthalmology)     Problem List:     Patient Active Problem List   Diagnosis    Essential hypertension    Thoracic aortic aneurysm without rupture ([facility] [address] )    Abnormal EKG    Acute meniscal tear of left knee    Back pain, thoracic    Bladder mass    Chronic lumbar radiculopathy    Chronic pain disorder    Type 2 diabetes mellitus with diabetic polyneuropathy ([facility] [address] )    Erectile dysfunction of non-organic origin    Glaucoma    Knee osteoarthritis    Lumbar canal stenosis    Lung nodules    Osteopenia    Primary localized osteoarthritis of left knee    Renal cell carcinoma of right kidney ([facility] [address] )    Colon polyp    Bilateral hearing loss    Thrombocytopenia ([facility] [address] )    Pure hypercholesterolemia    Type 2 or unspecified type diabetes mellitus      Past Medical and Surgical History:     Past Medical History:   Diagnosis Date    Arthritis     Cancer ([facility] [address] )     right kidney    Cardiac aneurysm     Chronic pain disorder     Diabetes mellitus ([facility] [address] )     NIDDM    Erectile dysfunction of non-organic origin     Glaucoma     Hx of bleeding disorder     in urine    Hyperlipidemia     Hypertension     Meniscus tear     Left knee    Urinary tract infection with hematuria     last assessed 2017    Wears glasses     Wears partial dentures     upper partial     Past Surgical History:   Procedure Laterality Date    CATARACT EXTRACTION      COLONOSCOPY      CYSTOSCOPY      onset 2017    DENTAL SURGERY      EYE SURGERY Bilateral     stents    EYE SURGERY Bilateral     stents in both eyes     NEPHRECTOMY LAPAROSCOPIC Right 2017    Procedure: NEPHRECTOMY PARTIAL LAPAROSCOPIC W ROBOTICS ,;  Surgeon: Charlene Reese MD;  Location: [facility];  Service: Urology    NERVE BLOCK      transforaminal epidural lumbar    WISDOM TOOTH EXTRACTION      as well as broken neighboring tooth      Family History:     Family History   Problem Relation Age of Onset    Other Mother         malaria    Diabetes Sister     Hypertension Sister       Social History:     Social History     Socioeconomic History    Marital status: /Civil Union     Spouse name: None    Number of children: None    Years of education: None    Highest education level: None   Occupational History    Occupation:  in [address] [place] [address] Financial resource strain: None    Food insecurity:     Worry: None     Inability: None    Transportation needs:     Medical: None     Non-medical: None   Tobacco Use    Smoking status: Former Smoker     Last attempt to quit: 1985     Years since quittin 1    Smokeless tobacco: Never Used   Substance and Sexual Activity    Alcohol use: Not Currently     Comment: rarely    Drug use: No    Sexual activity: None   Lifestyle    Physical activity:     Days per week: None     Minutes per session: None    Stress: None   Relationships    Social connections:     Talks on phone: None     Gets together: None     Attends Alevism service: None     Active member of club or organization: None Attends meetings of clubs or organizations: None     Relationship status: None    Intimate partner violence:     Fear of current or ex partner: None     Emotionally abused: None     Physically abused: None     Forced sexual activity: None   Other Topics Concern    None   Social History Narrative    Never drank alcohol per [employer]       Medications and Allergies:     Current Outpatient Medications   Medication Sig Dispense Refill    ACCU-CHEK OMERO PLUS test strip USE TO TEST ONCE DAILY 100 each 2    ACCU-CHEK FASTCLIX LANCETS MISC Test twice daily 204 each 3    amLODIPine (NORVASC) 2 5 mg tablet Take 1 tablet (2 5 mg total) by mouth daily 180 tablet 3    atorvastatin (LIPITOR) 20 mg tablet Take 1 tablet (20 mg total) by mouth daily 90 tablet 3    Blood Glucose Monitoring Suppl (ACCU-CHEK OMERO PLUS) w/Device KIT by Does not apply route      glucose blood (ACCU-CHEK OMERO PLUS) test strip Test twice daily 200 each 3    losartan-hydrochlorothiazide (HYZAAR) 100-25 MG per tablet Take 1 tablet by mouth daily 90 tablet 3    metFORMIN (GLUCOPHAGE) 500 mg tablet Take 2 tablets in the morning and 1 tablet in the evening 270 tablet 3    Polyethyl Glycol-Propyl Glycol (SYSTANE OP) Apply to eye 1 drop each eye twice a day       sitaGLIPtin (JANUVIA) 100 mg tablet Take 1 tablet (100 mg total) by mouth daily 90 tablet 3     No current facility-administered medications for this visit        No Known Allergies   Immunizations:     Immunization History   Administered Date(s) Administered    H1N1, All Formulations 02/04/2010    INFLUENZA 10/09/2015, 08/31/2016, 10/17/2017    Influenza Split High Dose Preservative Free IM 11/14/2012, 10/02/2013, 10/23/2014, 10/09/2015, 08/31/2016, 10/17/2017    Influenza TIV (IM) 12/17/2008, 10/28/2011    Influenza, high dose seasonal 0 5 mL 10/05/2018, 10/16/2019    Pneumococcal Conjugate 13-Valent 01/20/2015    Pneumococcal Polysaccharide PPV23 02/06/2008    Tdap 07/24/2012      Health Maintenance:         Topic Date Due    CRC Screening: Colonoscopy  09/12/2022    Hepatitis C Screening  Completed         Topic Date Due    HEPATITIS B VACCINES (1 of 3 - Risk 3-dose series) 01/17/1959      Medicare Health Risk Assessment:     /80 (BP Location: Left arm, Patient Position: Sitting, Cuff Size: Standard)   Pulse 62   Temp (!) 96 7 °F (35 9 °C) (Tympanic)   Ht 5' 7 9" (1 725 m)   Wt 106 kg (234 lb)   SpO2 95%   BMI 35 68 kg/m²      Viktoriya Reyes is here for his Subsequent Wellness visit  Health Risk Assessment:   Patient rates overall health as very good  Patient feels that their physical health rating is same  Eyesight was rated as same  Hearing was rated as same  Patient feels that their emotional and mental health rating is same  Pain experienced in the last 7 days has been none  Patient states that he has experienced no weight loss or gain in last 6 months  Fall Risk Screening: In the past year, patient has experienced: no history of falling in past year      Home Safety:  Patient does not have trouble with stairs inside or outside of their home  Patient has working smoke alarms and has working carbon monoxide detector  Home safety hazards include: none  Nutrition:   Current diet is Diabetic, Low Carb, Limited junk food and No Added Salt  Medications:   Patient is not currently taking any over-the-counter supplements  Patient is able to manage medications  Activities of Daily Living (ADLs)/Instrumental Activities of Daily Living (IADLs):   Walk and transfer into and out of bed and chair?: Yes  Dress and groom yourself?: Yes    Bathe or shower yourself?: Yes    Feed yourself?  Yes  Do your laundry/housekeeping?: Yes  Manage your money, pay your bills and track your expenses?: Yes  Make your own meals?: Yes    Do your own shopping?: Yes    Previous Hospitalizations:   Any hospitalizations or ED visits within the last 12 months?: No      Advance Care Planning:   Living will: Yes    Advanced directive: Yes    Five wishes given: Yes    End of Life Decisions reviewed with patient: Yes      Comments: The patient will complete 5 wishes and get us a copy of that at some point    Cognitive Screening:   Provider or family/friend/caregiver concerned regarding cognition?: No    PREVENTIVE SCREENINGS      Cardiovascular Screening:    General: Screening Not Indicated and History Lipid Disorder      Diabetes Screening:     General: Screening Not Indicated and History Diabetes      Colorectal Cancer Screening:     General: Screening Current      Prostate Cancer Screening:    General: Screening Not Indicated      Osteoporosis Screening:    General: Screening Not Indicated      Abdominal Aortic Aneurysm (AAA) Screening:    Risk factors include: tobacco use        General: Screening Not Indicated      Lung Cancer Screening:     General: Screening Current      Hepatitis C Screening:    General: Screening Current      Preventive Screening Comments: Patient presents today for Medicare wellness visit  He is a former smoker but quit smoking over 30 years ago  Bone density is not indicated  He is up-to-date on colon cancer screening as history of polyp so he should have a repeat colonoscopy in 3 years  He no longer requires prostate cancer screening  He is having some polyuria but no excessive voiding issues  He is up-to-date with vaccines except for Shingrix and he is going to hold off on that for the time being        Inis Leventhal, MD

## 2019-11-01 NOTE — ASSESSMENT & PLAN NOTE
Blood pressure was good upon arrival but did go up a little bit throughout the visit  Continue with home blood pressure monitoring  His goal would be to be [place] 130/80 at all times  Cardiology recently started him on low-dose amlodipine  Monitor for lower extremity swelling, especially in the ankles  Follow-up in 6 months for repeat blood pressure, but notify me if he is having any blood pressure issues in the meantime  He will bring his cuff and next time

## 2019-11-01 NOTE — PATIENT INSTRUCTIONS
Type 2 diabetes mellitus with diabetic polyneuropathy ([facility] [address] )    Lab Results   Component Value Date    HGBA1C 7 0 (H) 10/22/2019    Diabetic control is good but not perfect  Work on weight loss  Hold on changing medications at this time  Follow up within 6 months with A1c prior    Essential hypertension  Blood pressure was good upon arrival but did go up a little bit throughout the visit  Continue with home blood pressure monitoring  His goal would be to be [place] 130/80 at all times  Cardiology recently started him on low-dose amlodipine  Monitor for lower extremity swelling, especially in the ankles  Follow-up in 6 months for repeat blood pressure, but notify me if he is having any blood pressure issues in the meantime  He will bring his cuff and next time  Thoracic aortic aneurysm without rupture [facility])  He is following with CT surgery  Recent CT was stable  Renal cell carcinoma of right kidney [facility])  Continue with close neurology follow-up  Pure hypercholesterolemia  Cholesterol remains good but not perfect  In light of his type 2 diabetes as well as history of aneurysm, I am going to bump up his atorvastatin to 40 mg daily  Notify me if he is having any side effects, specifically increased muscle achiness  Thrombocytopenia ([facility] )  Check CBC with next labs  Medicare Preventive Visit Patient Instructions  Thank you for completing your Welcome to Medicare Visit or Medicare Annual Wellness Visit today  Your next wellness visit will be due in one year (11/1/2020)  The screening/preventive services that you may require over the next 5-10 years are detailed below  Some tests may not apply to you based off risk factors and/or age  Screening tests ordered at today's visit but not completed yet may show as past due  Also, please note that scanned in results may not display below    Preventive Screenings:  Service Recommendations Previous Testing/Comments   Colorectal Cancer Screening  · Colonoscopy    · Fecal Occult Blood Test (FOBT)/Fecal Immunochemical Test (FIT)  · Fecal DNA/Cologuard Test  · Flexible Sigmoidoscopy Age: 54-65 years old   Colonoscopy: every 10 years (May be performed more frequently if at higher risk)  OR  FOBT/FIT: every 1 year  OR  Cologuard: every 3 years  OR  Sigmoidoscopy: every 5 years  Screening may be recommended earlier than age 48 if at higher risk for colorectal cancer  Also, an individualized decision between you and your healthcare provider will decide whether screening between the ages of 74-80 would be appropriate  Colonoscopy: 09/12/2019  FOBT/FIT: Not on file  Cologuard: Not on file  Sigmoidoscopy: Not on file    Screening Current     Prostate Cancer Screening Individualized decision between patient and health care provider in men between ages of 53-78   Medicare will cover every 12 months beginning on the day after your 50th birthday PSA: 0 4 ng/mL     Screening Not Indicated     Hepatitis C Screening Once for adults born between 1945 and 1965  More frequently in patients at high risk for Hepatitis C Hep C Antibody: 04/02/2018    Screening Current   Diabetes Screening 1-2 times per year if you're at risk for diabetes or have pre-diabetes Fasting glucose: 149 mg/dL   A1C: 7 0 %    Screening Not Indicated  History Diabetes   Cholesterol Screening Once every 5 years if you don't have a lipid disorder  May order more often based on risk factors  Lipid panel: 10/22/2019    Screening Not Indicated  History Lipid Disorder      Other Preventive Screenings Covered by Medicare:  1  Abdominal Aortic Aneurysm (AAA) Screening: covered once if your at risk  You're considered to be at risk if you have a family history of AAA or a male between the age of 73-68 who smoking at least 100 cigarettes in your lifetime    2  Lung Cancer Screening: covers low dose CT scan once per year if you meet all of the following conditions: (1) Age 50-69; (2) No signs or symptoms of lung cancer; (3) Current smoker or have quit smoking within the last 15 years; (4) You have a tobacco smoking history of at least 30 pack years (packs per day x number of years you smoked); (5) You get a written order from a healthcare provider  3  Glaucoma Screening: covered annually if you're considered high risk: (1) You have diabetes OR (2) Family history of glaucoma OR (3)  aged 48 and older OR (3)  American aged 72 and older  3  Osteoporosis Screening: covered every 2 years if you meet one of the following conditions: (1) Have a vertebral abnormality; (2) On glucocorticoid therapy for more than 3 months; (3) Have primary hyperparathyroidism; (4) On osteoporosis medications and need to assess response to drug therapy  5  HIV Screening: covered annually if you're between the age of 12-76  Also covered annually if you are younger than 13 and older than 72 with risk factors for HIV infection  For pregnant patients, it is covered up to 3 times per pregnancy  Immunizations:  Immunization Recommendations   Influenza Vaccine Annual influenza vaccination during flu season is recommended for all persons aged >= 6 months who do not have contraindications   Pneumococcal Vaccine (Prevnar and Pneumovax)  * Prevnar = PCV13  * Pneumovax = PPSV23 Adults 25-60 years old: 1-3 doses may be recommended based on certain risk factors  Adults 72 years old: Prevnar (PCV13) vaccine recommended followed by Pneumovax (PPSV23) vaccine  If already received PPSV23 since turning 65, then PCV13 recommended at least one year after PPSV23 dose  Hepatitis B Vaccine 3 dose series if at intermediate or high risk (ex: diabetes, end stage renal disease, liver disease)   Tetanus (Td) Vaccine - COST NOT COVERED BY MEDICARE PART B Following completion of primary series, a booster dose should be given every 10 years to maintain immunity against tetanus  Td may also be given as tetanus wound prophylaxis     Tdap Vaccine - COST NOT COVERED BY MEDICARE PART B Recommended at least once for all adults  For pregnant patients, recommended with each pregnancy  Shingles Vaccine (Shingrix) - COST NOT COVERED BY MEDICARE PART B  2 shot series recommended in those aged 48 and above     Health Maintenance Due:      Topic Date Due    CRC Screening: Colonoscopy  09/12/2022    Hepatitis C Screening  Completed     Immunizations Due:      Topic Date Due    HEPATITIS B VACCINES (1 of 3 - Risk 3-dose series) 01/17/1959     Advance Directives   What are advance directives? Advance directives are legal documents that state your wishes and plans for medical care  These plans are made ahead of time in case you lose your ability to make decisions for yourself  Advance directives can apply to any medical decision, such as the treatments you want, and if you want to donate organs  What are the types of advance directives? There are many types of advance directives, and each state has rules about how to use them  You may choose a combination of any of the following:  · Living will: This is a written record of the treatment you want  You can also choose which treatments you do not want, which to limit, and which to stop at a certain time  This includes surgery, medicine, IV fluid, and tube feedings  · Durable power of  for healthcare [place] [facility]): This is a written record that states who you want to make healthcare choices for you when you are unable to make them for yourself  This person, called a proxy, is usually a family member or a friend  You may choose more than 1 proxy  · Do not resuscitate (DNR) order:  A DNR order is used in case your heart stops beating or you stop breathing  It is a request not to have certain forms of treatment, such as CPR  A DNR order may be included in other types of advance directives  · Medical directive:   This covers the care that you want if you are in a coma, near death, or unable to make decisions for yourself  You can list the treatments you want for each condition  Treatment may include pain medicine, surgery, blood transfusions, dialysis, IV or tube feedings, and a ventilator (breathing machine)  · Values history: This document has questions about your views, beliefs, and how you feel and think about life  This information can help others choose the care that you would choose  Why are advance directives important? An advance directive helps you control your care  Although spoken wishes may be used, it is better to have your wishes written down  Spoken wishes can be misunderstood, or not followed  Treatments may be given even if you do not want them  An advance directive may make it easier for your family to make difficult choices about your care  Weight Management   Why it is important to manage your weight:  Being overweight increases your risk of health conditions such as heart disease, high blood pressure, type 2 diabetes, and certain types of cancer  It can also increase your risk for osteoarthritis, sleep apnea, and other respiratory problems  Aim for a slow, steady weight loss  Even a small amount of weight loss can lower your risk of health problems  How to lose weight safely:  A safe and healthy way to lose weight is to eat fewer calories and get regular exercise  You can lose up about 1 pound a week by decreasing the number of calories you eat by 500 calories each day  Healthy meal plan for weight management:  A healthy meal plan includes a variety of foods, contains fewer calories, and helps you stay healthy  A healthy meal plan includes the following:  · Eat whole-grain foods more often  A healthy meal plan should contain fiber  Fiber is the part of grains, fruits, and vegetables that is not broken down by your body  Whole-grain foods are healthy and provide extra fiber in your diet   Some examples of whole-grain foods are whole-wheat breads and pastas, oatmeal, brown rice, and bulgur  · Eat a variety of vegetables every day  Include dark, leafy greens such as spinach, kale, marcin greens, and mustard greens  Eat yellow and orange vegetables such as carrots, sweet potatoes, and winter squash  · Eat a variety of fruits every day  Choose fresh or canned fruit (canned in its own juice or light syrup) instead of juice  Fruit juice has very little or no fiber  · Eat low-fat dairy foods  Drink fat-free (skim) milk or 1% milk  Eat fat-free yogurt and low-fat cottage cheese  Try low-fat cheeses such as mozzarella and other reduced-fat cheeses  · Choose meat and other protein foods that are low in fat  Choose beans or other legumes such as split peas or lentils  Choose fish, skinless poultry (chicken or turkey), or lean cuts of red meat (beef or pork)  Before you cook meat or poultry, cut off any visible fat  · Use less fat and oil  Try baking foods instead of frying them  Add less fat, such as margarine, sour cream, regular salad dressing and mayonnaise to foods  Eat fewer high-fat foods  Some examples of high-fat foods include french fries, doughnuts, ice cream, and cakes  · Eat fewer sweets  Limit foods and drinks that are high in sugar  This includes candy, cookies, regular soda, and sweetened drinks  Exercise:  Exercise at least 30 minutes per day on most days of the week  Some examples of exercise include walking, biking, dancing, and swimming  You can also fit in more physical activity by taking the stairs instead of the elevator or parking farther away from stores  Ask your healthcare provider about the best exercise plan for you  © Copyright [employer] 2018 Information is for End User's use only and may not be sold, redistributed or otherwise used for commercial purposes   All illustrations and images included in CareNotes® are the copyrighted property of [employer]  or [address] [place] [address] Particle

## 2019-11-01 NOTE — ASSESSMENT & PLAN NOTE
Lab Results   Component Value Date    HGBA1C 7 0 (H) 10/22/2019    Diabetic control is good but not perfect  Work on weight loss  Hold on changing medications at this time    Follow up within 6 months with A1c prior

## 2019-11-01 NOTE — PROGRESS NOTES
Assessment/Plan:       Problem List Items Addressed This Visit        Endocrine    Type 2 diabetes mellitus with diabetic polyneuropathy ([facility] )       Lab Results   Component Value Date    HGBA1C 7 0 (H) 10/22/2019    Diabetic control is good but not perfect  Work on weight loss  Hold on changing medications at this time  Follow up within 6 months with A1c prior         Relevant Medications    Alpha Lipoic Acid 200 MG CAPS       Cardiovascular and Mediastinum    Essential hypertension     Blood pressure was good upon arrival but did go up a little bit throughout the visit  Continue with home blood pressure monitoring  His goal would be to be [place] 130/80 at all times  Cardiology recently started him on low-dose amlodipine  Monitor for lower extremity swelling, especially in the ankles  Follow-up in 6 months for repeat blood pressure, but notify me if he is having any blood pressure issues in the meantime  He will bring his cuff and next time  Relevant Orders    CBC and differential    Comprehensive metabolic panel    Thoracic aortic aneurysm without rupture [facility])     He is following with CT surgery  Recent CT was stable  Genitourinary    Renal cell carcinoma of right kidney ([facility] )     Continue with close neurology follow-up  Other    Thrombocytopenia ([facility] )     Check CBC with next labs  Relevant Orders    CBC and differential    Pure hypercholesterolemia     Cholesterol remains good but not perfect  In light of his type 2 diabetes as well as history of aneurysm, I am going to bump up his atorvastatin to 40 mg daily  Notify me if he is having any side effects, specifically increased muscle achiness           Relevant Medications    atorvastatin (LIPITOR) 40 mg tablet    Other Relevant Orders    Lipid Panel with Direct LDL reflex      Other Visit Diagnoses     Medicare annual wellness visit, subsequent    -  Primary    Obesity, Class II, BMI 35-39 9        Type 2 diabetes mellitus without complication, without long-term current use of insulin (HCC)        Relevant Orders    Hemoglobin A1C          BMI Counseling: Body mass index is 35 68 kg/m²  The BMI is above normal  Nutrition recommendations include decreasing portion sizes and consuming healthier snacks  Exercise recommendations include exercising 3-5 times per week  Subjective:      Patient ID: Jatinder Hutchison is a 70 y o  male  HPI    The following portions of the patient's history were reviewed and updated as appropriate: allergies, current medications, past family history, past medical history, past social history, past surgical history and problem list       Current Outpatient Medications:     ACCU-CHEK OMERO PLUS test strip, USE TO TEST ONCE DAILY, Disp: 100 each, Rfl: 2    ACCU-CHEK FASTCLIX LANCETS MISC, Test twice daily, Disp: 204 each, Rfl: 3    amLODIPine (NORVASC) 2 5 mg tablet, Take 1 tablet (2 5 mg total) by mouth daily, Disp: 180 tablet, Rfl: 3    atorvastatin (LIPITOR) 40 mg tablet, Take 1 tablet (40 mg total) by mouth daily, Disp: 90 tablet, Rfl: 3    Blood Glucose Monitoring Suppl (ACCU-CHEK OMERO PLUS) w/Device KIT, by Does not apply route, Disp: , Rfl:     glucose blood (ACCU-CHEK OMERO PLUS) test strip, Test twice daily, Disp: 200 each, Rfl: 3    losartan-hydrochlorothiazide (HYZAAR) 100-25 MG per tablet, Take 1 tablet by mouth daily, Disp: 90 tablet, Rfl: 3    metFORMIN (GLUCOPHAGE) 500 mg tablet, Take 2 tablets in the morning and 1 tablet in the evening, Disp: 270 tablet, Rfl: 3    Polyethyl Glycol-Propyl Glycol (SYSTANE OP), Apply to eye 1 drop each eye twice a day , Disp: , Rfl:     sitaGLIPtin (JANUVIA) 100 mg tablet, Take 1 tablet (100 mg total) by mouth daily, Disp: 90 tablet, Rfl: 3    Alpha Lipoic Acid 200 MG CAPS, Take 1 daily as directed by Podiatry  , Disp: , Rfl:      Review of Systems   Constitutional: Negative for appetite change, chills, fatigue, fever and unexpected weight change  HENT: Negative for trouble swallowing  Eyes: Negative for visual disturbance  Respiratory: Negative for cough, chest tightness, shortness of breath and wheezing  Cardiovascular: Negative for chest pain  Gastrointestinal: Negative for abdominal distention, abdominal pain, blood in stool, constipation and diarrhea  Endocrine: Negative for polyuria  Genitourinary: Negative for difficulty urinating and flank pain  Musculoskeletal: Negative for arthralgias and myalgias  Skin: Negative for rash  Neurological: Negative for dizziness and light-headedness  Hematological: Negative for adenopathy  Does not bruise/bleed easily  Psychiatric/Behavioral: Negative for sleep disturbance  Objective:      /80 (BP Location: Left arm, Patient Position: Sitting, Cuff Size: Standard)   Pulse 62   Temp (!) 96 7 °F (35 9 °C) (Tympanic)   Ht 5' 7 9" (1 725 m)   Wt 106 kg (234 lb)   SpO2 95%   BMI 35 68 kg/m²          Physical Exam   Constitutional: He is oriented to person, place, and time  He appears well-developed and well-nourished  No distress  HENT:   Head: Normocephalic  Eyes: Pupils are equal, round, and reactive to light  Right eye exhibits no discharge  Left eye exhibits no discharge  Neck: No tracheal deviation present  No thyromegaly present  Cardiovascular: Normal rate, regular rhythm and normal heart sounds  No murmur heard  Pulmonary/Chest: Effort normal  No respiratory distress  He has no wheezes  He has no rales  Abdominal: Soft  He exhibits no distension  There is no tenderness  Musculoskeletal: Normal range of motion  He exhibits no edema  Lymphadenopathy:     He has no cervical adenopathy  Neurological: He is alert and oriented to person, place, and time  No cranial nerve deficit  Skin: Skin is warm  He is not diaphoretic  No erythema  Psychiatric: He has a normal mood and affect   Judgment and thought content normal          DediServe Livings, MD

## 2019-12-30 DIAGNOSIS — I10 HTN (HYPERTENSION), BENIGN: ICD-10-CM

## 2019-12-30 RX ORDER — AMLODIPINE BESYLATE 2.5 MG/1
TABLET ORAL
Qty: 30 TABLET | Refills: 5 | Status: SHIPPED | OUTPATIENT
Start: 2019-12-30 | End: 2020-03-12 | Stop reason: SDUPTHER

## 2020-03-11 NOTE — PROGRESS NOTES
Cardiology Outpatient Progress Note - Bernadette Lund 68 y o  male MRN: [id number]    @ Encounter: [id number]      Patient Active Problem List    Diagnosis Date Noted    Colon polyp 10/05/2018    Bilateral hearing loss 10/05/2018    Thrombocytopenia ([facility] ) 10/05/2018    Thoracic aortic aneurysm without rupture ([facility] ) 03/12/2018    Abnormal EKG 08/29/2017    Renal cell carcinoma of right kidney ([facility] ) 07/21/2017    Bladder mass 06/28/2017    Acute meniscal tear of left knee 05/02/2017    Primary localized osteoarthritis of left knee 05/02/2017    Chronic pain disorder 01/04/2016    Back pain, thoracic 08/20/2015    Lung nodules 07/29/2015    Knee osteoarthritis 09/09/2014    Chronic lumbar radiculopathy 06/17/2014    Lumbar canal stenosis 07/30/2013    Osteopenia 07/09/2012    Erectile dysfunction of non-organic origin 06/15/2012    Type 2 diabetes mellitus with diabetic polyneuropathy ([facility] ) 05/16/2012    Glaucoma 12/17/2008    Essential hypertension 02/06/2008    Pure hypercholesterolemia 02/06/2008       Assessment:  #  Stable 4 5 cm ascending aorta last CT Chest 6/15/19    # HTN- BP at goal with SBP < 130 mmHg  Losartan/ hctz 100/25 mg daily, amlodipine 2 5 mg     Echo 5/8/18:   EF: 60%; aorta 4 3 cm    # Coronary eval  Stress test 8/31/17: normal  EKG today: SR, first degree AV block    # 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    # DM- metformin, Januvia  HgA1C 10/22/19: 7 0%    # ED- Cialis prn    TODAY'S PLAN:  Increase amlodipine to 5 mg daily- not at goal    HPI:   67 yo male who had initially presented for pre-op clearance for right nephrectomy for renal mass for RCC  No cardiac history  He had DM, hyperlipidemia, former smoker over 30 years ago  He had stress in past for screening not for CP  He does bowling but no true exercise  No chest pain or SOB  No edema       Interval History:   Started amlodipine 2 5 mg for goal SBP < 130 near 120 mmHg due to his mild aortic root dilation    Review of Systems   Constitutional: Negative for activity change, appetite change, fatigue and unexpected weight change  HENT: Negative for congestion and nosebleeds  Eyes: Negative  Respiratory: Negative for cough, chest tightness and shortness of breath  Cardiovascular: Negative for chest pain, palpitations and leg swelling  Gastrointestinal: Negative for abdominal distention  Endocrine: Negative  Genitourinary: Negative  Musculoskeletal: Negative  Skin: Negative  Neurological: Negative for dizziness, syncope and weakness  Hematological: Negative  Psychiatric/Behavioral: Negative  Past Medical History:   Diagnosis Date    Arthritis     Cancer [facility])     right kidney    Cardiac aneurysm     Chronic pain disorder     Diabetes mellitus ([facility] )     NIDDM    Erectile dysfunction of non-organic origin     Glaucoma     Hx of bleeding disorder     in urine    Hyperlipidemia     Hypertension     Meniscus tear     Left knee    Urinary tract infection with hematuria     last assessed 05/30/2017    Wears glasses     Wears partial dentures     upper partial       No Known Allergies    Current Outpatient Medications:     Alpha Lipoic Acid 200 MG CAPS, Take 1 daily as directed by Podiatry  , Disp: , Rfl:     amLODIPine (NORVASC) 2 5 mg tablet, TAKE 1 TABLET DAILY, Disp: 30 tablet, Rfl: 5    atorvastatin (LIPITOR) 40 mg tablet, Take 1 tablet (40 mg total) by mouth daily, Disp: 90 tablet, Rfl: 3    losartan-hydrochlorothiazide (HYZAAR) 100-25 MG per tablet, Take 1 tablet by mouth daily, Disp: 90 tablet, Rfl: 3    metFORMIN (GLUCOPHAGE) 500 mg tablet, Take 2 tablets in the morning and 1 tablet in the evening, Disp: 270 tablet, Rfl: 3    Polyethyl Glycol-Propyl Glycol (SYSTANE OP), Apply to eye 1 drop each eye twice a day , Disp: , Rfl:     sitaGLIPtin (JANUVIA) 100 mg tablet, Take 1 tablet (100 mg total) by mouth daily, Disp: 90 tablet, Rfl: 3    ACCU-CHEK OMERO PLUS test strip, USE TO TEST ONCE DAILY, Disp: 100 each, Rfl: 2    ACCU-CHEK FASTCLIX LANCETS MISC, Test twice daily, Disp: 204 each, Rfl: 3    Blood Glucose Monitoring Suppl (ACCU-CHEK OMERO PLUS) w/Device KIT, by Does not apply route, Disp: , Rfl:     glucose blood (ACCU-CHEK OMERO PLUS) test strip, Test twice daily, Disp: 200 each, Rfl: 3    Social History     Socioeconomic History    Marital status: /Civil Union     Spouse name: Not on file    Number of children: Not on file    Years of education: Not on file    Highest education level: Not on file   Occupational History    Occupation:  in [address] [employer] resource strain: Not on file    Food insecurity:     Worry: Not on file     Inability: Not on file    Transportation needs:     Medical: Not on file     Non-medical: Not on file   Tobacco Use    Smoking status: Former Smoker     Last attempt to quit: 1985     Years since quittin 5    Smokeless tobacco: Never Used   Substance and Sexual Activity    Alcohol use: Not Currently     Comment: rarely    Drug use: No    Sexual activity: Not on file   Lifestyle    Physical activity:     Days per week: Not on file     Minutes per session: Not on file    Stress: Not on file   Relationships    Social connections:     Talks on phone: Not on file     Gets together: Not on file     Attends Taoism service: Not on file     Active member of club or organization: Not on file     Attends meetings of clubs or organizations: Not on file     Relationship status: Not on file    Intimate partner violence:     Fear of current or ex partner: Not on file     Emotionally abused: Not on file     Physically abused: Not on file     Forced sexual activity: Not on file   Other Topics Concern    Not on file   Social History Narrative    Does not consume caffeine        Never drank alcohol per [employer]       Family History   Problem Relation Age of Onset    Other Mother         malaria    Diabetes Sister     Hypertension Sister        Physical Exam:    Vitals: Blood pressure 148/80, pulse 60, height 5' 7" (1 702 m), weight 107 kg (234 lb 12 8 oz)  , Body mass index is 36 77 kg/m² ,   Wt Readings from Last 3 Encounters:   03/12/20 107 kg (234 lb 12 8 oz)   11/12/19 109 kg (240 lb)   11/01/19 106 kg (234 lb)         Physical Exam:    Physical Exam   Constitutional: He is oriented to person, place, and time  He appears well-developed and well-nourished  HENT:   Head: Normocephalic and atraumatic  Eyes: Pupils are equal, round, and reactive to light  EOM are normal    Neck: Normal range of motion  No JVD present  Cardiovascular: Normal rate, regular rhythm and normal heart sounds  No murmur heard  Pulmonary/Chest: Effort normal and breath sounds normal  He has no rales  Abdominal: Soft  Bowel sounds are normal  He exhibits no distension  Musculoskeletal: Normal range of motion  He exhibits no edema  Neurological: He is alert and oriented to person, place, and time  Skin: Skin is warm and dry  He is not diaphoretic  Psychiatric: He has a normal mood and affect         Labs & Results:    Lab Results   Component Value Date    GLUCOSE 276 (H) 07/29/2015    CALCIUM 8 9 10/22/2019     07/29/2015    K 3 6 10/22/2019    CO2 27 10/22/2019     10/22/2019    BUN 18 10/22/2019    CREATININE 0 93 10/22/2019     Lab Results   Component Value Date    WBC 4 76 10/22/2019    HGB 13 2 10/22/2019    HCT 39 5 10/22/2019    MCV 87 10/22/2019     10/22/2019     No results found for: BNP   Lab Results   Component Value Date    CHOL 173 01/20/2014     Lab Results   Component Value Date    HDL 52 10/22/2019    HDL 56 04/02/2018    HDL 54 03/31/2017     Lab Results   Component Value Date    LDLCALC 100 10/22/2019    LDLCALC 110 (H) 04/02/2018    LDLCALC 90 03/31/2017     Lab Results   Component Value Date    TRIG 54 10/22/2019    TRIG 61 04/02/2018    TRIG 59 03/31/2017     No results found for: [place], [place]     EKG personally reviewed by Darion Luna DO  Counseling / Coordination of Care  Total floor / unit time spent today 25 minutes  Greater than 50% of total time was spent with the patient and / or family counseling and / or coordination of care  A description of the counseling / coordination of care: 15  Thank you for the opportunity to participate in the care of this patient    WINSOME PRESTON [address]

## 2020-03-12 ENCOUNTER — OFFICE VISIT (OUTPATIENT)
Dept: CARDIOLOGY CLINIC | Facility: CLINIC | Age: 80
End: 2020-03-12
Payer: MEDICARE

## 2020-03-12 VITALS
WEIGHT: 234.8 LBS | HEART RATE: 60 BPM | BODY MASS INDEX: 36.85 KG/M2 | DIASTOLIC BLOOD PRESSURE: 80 MMHG | SYSTOLIC BLOOD PRESSURE: 148 MMHG | HEIGHT: 67 IN

## 2020-03-12 DIAGNOSIS — I10 HTN (HYPERTENSION), BENIGN: ICD-10-CM

## 2020-03-12 DIAGNOSIS — E78.00 PURE HYPERCHOLESTEROLEMIA: Primary | ICD-10-CM

## 2020-03-12 DIAGNOSIS — I71.2 ASCENDING AORTIC ANEURYSM (HCC): ICD-10-CM

## 2020-03-12 PROCEDURE — 4010F ACE/ARB THERAPY RXD/TAKEN: CPT | Performed by: INTERNAL MEDICINE

## 2020-03-12 PROCEDURE — 3077F SYST BP >= 140 MM HG: CPT | Performed by: INTERNAL MEDICINE

## 2020-03-12 PROCEDURE — 1160F RVW MEDS BY RX/DR IN RCRD: CPT | Performed by: INTERNAL MEDICINE

## 2020-03-12 PROCEDURE — 4040F PNEUMOC VAC/ADMIN/RCVD: CPT | Performed by: INTERNAL MEDICINE

## 2020-03-12 PROCEDURE — 3008F BODY MASS INDEX DOCD: CPT | Performed by: INTERNAL MEDICINE

## 2020-03-12 PROCEDURE — 3079F DIAST BP 80-89 MM HG: CPT | Performed by: INTERNAL MEDICINE

## 2020-03-12 PROCEDURE — 1036F TOBACCO NON-USER: CPT | Performed by: INTERNAL MEDICINE

## 2020-03-12 PROCEDURE — 99214 OFFICE O/P EST MOD 30 MIN: CPT | Performed by: INTERNAL MEDICINE

## 2020-03-12 RX ORDER — AMLODIPINE BESYLATE 5 MG/1
5 TABLET ORAL DAILY
Qty: 90 TABLET | Refills: 3 | Status: SHIPPED | OUTPATIENT
Start: 2020-03-12 | End: 2021-01-11

## 2020-03-12 NOTE — PATIENT INSTRUCTIONS
Increase amlodipine to 5 mg daily, Blood pressure needs to be better controlled with your enlarged aorta  Goal systolic BP < 976 mmHg

## 2020-05-22 DIAGNOSIS — E11.9 TYPE 2 DIABETES MELLITUS WITHOUT COMPLICATION, WITHOUT LONG-TERM CURRENT USE OF INSULIN (HCC): ICD-10-CM

## 2020-05-22 DIAGNOSIS — I10 ESSENTIAL HYPERTENSION: ICD-10-CM

## 2020-05-22 DIAGNOSIS — E78.00 PURE HYPERCHOLESTEROLEMIA: ICD-10-CM

## 2020-05-22 RX ORDER — LOSARTAN POTASSIUM AND HYDROCHLOROTHIAZIDE 25; 100 MG/1; MG/1
TABLET ORAL
Qty: 90 TABLET | Refills: 3 | Status: SHIPPED | OUTPATIENT
Start: 2020-05-22 | End: 2021-04-30 | Stop reason: SDUPTHER

## 2020-05-22 RX ORDER — ATORVASTATIN CALCIUM 20 MG/1
TABLET, FILM COATED ORAL
Qty: 90 TABLET | Refills: 3 | Status: SHIPPED | OUTPATIENT
Start: 2020-05-22 | End: 2020-08-05 | Stop reason: DRUGHIGH

## 2020-05-22 RX ORDER — SITAGLIPTIN 100 MG/1
TABLET, FILM COATED ORAL
Qty: 90 TABLET | Refills: 3 | Status: SHIPPED | OUTPATIENT
Start: 2020-05-22 | End: 2021-04-30 | Stop reason: SDUPTHER

## 2020-05-22 RX ORDER — LANCETS
EACH MISCELLANEOUS
Qty: 204 EACH | Refills: 3 | Status: SHIPPED | OUTPATIENT
Start: 2020-05-22 | End: 2020-05-28 | Stop reason: SDUPTHER

## 2020-05-28 RX ORDER — LANCETS
EACH MISCELLANEOUS
Qty: 200 EACH | Refills: 3 | Status: SHIPPED | OUTPATIENT
Start: 2020-05-28 | End: 2021-05-10

## 2020-06-22 ENCOUNTER — APPOINTMENT (OUTPATIENT)
Dept: LAB | Facility: CLINIC | Age: 80
End: 2020-06-22
Payer: MEDICARE

## 2020-06-22 DIAGNOSIS — E78.00 PURE HYPERCHOLESTEROLEMIA: ICD-10-CM

## 2020-06-22 DIAGNOSIS — C64.1 RENAL CELL CARCINOMA OF RIGHT KIDNEY (HCC): ICD-10-CM

## 2020-06-22 DIAGNOSIS — D69.6 THROMBOCYTOPENIA (HCC): ICD-10-CM

## 2020-06-22 DIAGNOSIS — E11.9 TYPE 2 DIABETES MELLITUS WITHOUT COMPLICATION, WITHOUT LONG-TERM CURRENT USE OF INSULIN (HCC): ICD-10-CM

## 2020-06-22 DIAGNOSIS — I10 ESSENTIAL HYPERTENSION: ICD-10-CM

## 2020-06-22 LAB
ALBUMIN SERPL BCP-MCNC: 3.9 G/DL (ref 3.5–5)
ALP SERPL-CCNC: 93 U/L (ref 46–116)
ALT SERPL W P-5'-P-CCNC: 38 U/L (ref 12–78)
ANION GAP SERPL CALCULATED.3IONS-SCNC: 8 MMOL/L (ref 4–13)
AST SERPL W P-5'-P-CCNC: 17 U/L (ref 5–45)
BASOPHILS # BLD AUTO: 0.04 THOUSANDS/ΜL (ref 0–0.1)
BASOPHILS NFR BLD AUTO: 1 % (ref 0–1)
BILIRUB SERPL-MCNC: 0.65 MG/DL (ref 0.2–1)
BUN SERPL-MCNC: 21 MG/DL (ref 5–25)
CALCIUM SERPL-MCNC: 9.1 MG/DL (ref 8.3–10.1)
CHLORIDE SERPL-SCNC: 107 MMOL/L (ref 100–108)
CHOLEST SERPL-MCNC: 179 MG/DL (ref 50–200)
CO2 SERPL-SCNC: 25 MMOL/L (ref 21–32)
CREAT SERPL-MCNC: 0.94 MG/DL (ref 0.6–1.3)
EOSINOPHIL # BLD AUTO: 0.35 THOUSAND/ΜL (ref 0–0.61)
EOSINOPHIL NFR BLD AUTO: 6 % (ref 0–6)
ERYTHROCYTE [DISTWIDTH] IN BLOOD BY AUTOMATED COUNT: 13.5 % (ref 11.6–15.1)
EST. AVERAGE GLUCOSE BLD GHB EST-MCNC: 154 MG/DL
GFR SERPL CREATININE-BSD FRML MDRD: 77 ML/MIN/1.73SQ M
GLUCOSE P FAST SERPL-MCNC: 145 MG/DL (ref 65–99)
HBA1C MFR BLD: 7 %
HCT VFR BLD AUTO: 42.1 % (ref 36.5–49.3)
HDLC SERPL-MCNC: 59 MG/DL
HGB BLD-MCNC: 13.7 G/DL (ref 12–17)
IMM GRANULOCYTES # BLD AUTO: 0.02 THOUSAND/UL (ref 0–0.2)
IMM GRANULOCYTES NFR BLD AUTO: 0 % (ref 0–2)
LDLC SERPL CALC-MCNC: 106 MG/DL (ref 0–100)
LYMPHOCYTES # BLD AUTO: 1.55 THOUSANDS/ΜL (ref 0.6–4.47)
LYMPHOCYTES NFR BLD AUTO: 28 % (ref 14–44)
MCH RBC QN AUTO: 29 PG (ref 26.8–34.3)
MCHC RBC AUTO-ENTMCNC: 32.5 G/DL (ref 31.4–37.4)
MCV RBC AUTO: 89 FL (ref 82–98)
MONOCYTES # BLD AUTO: 0.46 THOUSAND/ΜL (ref 0.17–1.22)
MONOCYTES NFR BLD AUTO: 8 % (ref 4–12)
NEUTROPHILS # BLD AUTO: 3.15 THOUSANDS/ΜL (ref 1.85–7.62)
NEUTS SEG NFR BLD AUTO: 57 % (ref 43–75)
NRBC BLD AUTO-RTO: 0 /100 WBCS
PLATELET # BLD AUTO: 168 THOUSANDS/UL (ref 149–390)
PMV BLD AUTO: 11.4 FL (ref 8.9–12.7)
POTASSIUM SERPL-SCNC: 4.1 MMOL/L (ref 3.5–5.3)
PROT SERPL-MCNC: 6.7 G/DL (ref 6.4–8.2)
RBC # BLD AUTO: 4.73 MILLION/UL (ref 3.88–5.62)
SODIUM SERPL-SCNC: 140 MMOL/L (ref 136–145)
TRIGL SERPL-MCNC: 69 MG/DL
WBC # BLD AUTO: 5.57 THOUSAND/UL (ref 4.31–10.16)

## 2020-06-22 PROCEDURE — 36415 COLL VENOUS BLD VENIPUNCTURE: CPT

## 2020-06-22 PROCEDURE — 85025 COMPLETE CBC W/AUTO DIFF WBC: CPT

## 2020-06-22 PROCEDURE — 80061 LIPID PANEL: CPT

## 2020-06-22 PROCEDURE — 83036 HEMOGLOBIN GLYCOSYLATED A1C: CPT

## 2020-06-22 PROCEDURE — 80053 COMPREHEN METABOLIC PANEL: CPT

## 2020-07-02 ENCOUNTER — HOSPITAL ENCOUNTER (OUTPATIENT)
Dept: CT IMAGING | Facility: HOSPITAL | Age: 80
Discharge: HOME | End: 2020-07-02
Payer: MEDICARE

## 2020-07-02 ENCOUNTER — HOSPITAL ENCOUNTER (OUTPATIENT)
Dept: RADIOLOGY | Facility: HOSPITAL | Age: 80
Discharge: HOME | End: 2020-07-02
Payer: MEDICARE

## 2020-07-02 DIAGNOSIS — C64.1 RENAL CELL CARCINOMA OF RIGHT KIDNEY (HCC): ICD-10-CM

## 2020-07-02 PROCEDURE — 74178 CT ABD&PLV WO CNTR FLWD CNTR: CPT

## 2020-07-02 PROCEDURE — 71046 X-RAY EXAM CHEST 2 VIEWS: CPT

## 2020-07-02 RX ADMIN — IOHEXOL 100 ML: 350 INJECTION, SOLUTION INTRAVENOUS at 09:11

## 2020-07-17 ENCOUNTER — OFFICE VISIT (OUTPATIENT)
Dept: UROLOGY | Facility: CLINIC | Age: 80
End: 2020-07-17
Payer: MEDICARE

## 2020-07-17 VITALS
SYSTOLIC BLOOD PRESSURE: 122 MMHG | BODY MASS INDEX: 37.59 KG/M2 | HEART RATE: 66 BPM | DIASTOLIC BLOOD PRESSURE: 78 MMHG | WEIGHT: 240 LBS | TEMPERATURE: 97.3 F

## 2020-07-17 DIAGNOSIS — C64.1 RENAL CELL CARCINOMA OF RIGHT KIDNEY (HCC): Primary | ICD-10-CM

## 2020-07-17 PROCEDURE — 4040F PNEUMOC VAC/ADMIN/RCVD: CPT | Performed by: PHYSICIAN ASSISTANT

## 2020-07-17 PROCEDURE — 3078F DIAST BP <80 MM HG: CPT | Performed by: PHYSICIAN ASSISTANT

## 2020-07-17 PROCEDURE — 99213 OFFICE O/P EST LOW 20 MIN: CPT | Performed by: PHYSICIAN ASSISTANT

## 2020-07-17 PROCEDURE — 3051F HG A1C>EQUAL 7.0%<8.0%: CPT | Performed by: PHYSICIAN ASSISTANT

## 2020-07-17 PROCEDURE — 1160F RVW MEDS BY RX/DR IN RCRD: CPT | Performed by: PHYSICIAN ASSISTANT

## 2020-07-17 PROCEDURE — 1036F TOBACCO NON-USER: CPT | Performed by: PHYSICIAN ASSISTANT

## 2020-07-17 PROCEDURE — 3074F SYST BP LT 130 MM HG: CPT | Performed by: PHYSICIAN ASSISTANT

## 2020-07-17 NOTE — PROGRESS NOTES
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  - s/p robotic assisted laparoscopic partial nephrectomy September 2017  - surgical margins negative  - 8mm right iliac lymph node on CT, with no hypermetabolic activity on CT PET  - current CT reveals no evidence of recurrent or metastatic disease, iliac lymph node no longer visualized  - chest xray unremarkable  - creatinine 0 9 with gfr 68    He's now nearly 3 years out from his partial nephrectomy for T1b RCC  I will order 1 year surveillance CT and CXR with BMP  Will discuss with MD timeframe for transition to CXR with renal US moving forward and endpoint for his surveillance in the coming years  History of Present Illness  Eloise Garcia is a 68 y o  male here for evaluation of annual followup right RCC s/p partial nephrectomy 9/2017  Presents today with no urinary complaints, no flank pain, hematuria, and no concern for hernia at any of his surgical incisions  He's overall doing quite well  Had surveillance CT a/p w/wo/delays, a CXR, and a BMP in preparation for today's visit  Routine prostate cancer screening through age 78 with last PSA 0 5 and subsequently screening discontinued  Review of Systems   Constitutional: Negative for activity change, appetite change, chills, fever and unexpected weight change  HENT: Negative  Respiratory: Negative  Negative for shortness of breath  Cardiovascular: Negative  Negative for chest pain  Gastrointestinal: Negative for abdominal pain, diarrhea, nausea and vomiting  Endocrine: Negative  Genitourinary: Negative for decreased urine volume, difficulty urinating, dysuria, flank pain, frequency, hematuria, testicular pain and urgency  Musculoskeletal: Negative for back pain and gait problem  Skin: Negative  Allergic/Immunologic: Negative  Neurological: Negative  Hematological: Negative for adenopathy  Does not bruise/bleed easily                  Past Medical History  Past Medical History:   Diagnosis Date    Arthritis     Cancer [facility])     right kidney    Cardiac aneurysm     Chronic pain disorder     Diabetes mellitus ([facility] )     NIDDM    Erectile dysfunction of non-organic origin     Glaucoma     Hx of bleeding disorder     in urine    Hyperlipidemia     Hypertension     Meniscus tear     Left knee    Urinary tract infection with hematuria     last assessed 2017    Wears glasses     Wears partial dentures     upper partial     Past Social History  Past Surgical History:   Procedure Laterality Date    CATARACT EXTRACTION      COLONOSCOPY      CYSTOSCOPY      onset 2017    DENTAL SURGERY      EYE SURGERY Bilateral     stents    EYE SURGERY Bilateral     stents in both eyes     NEPHRECTOMY LAPAROSCOPIC Right 2017    Procedure: NEPHRECTOMY PARTIAL LAPAROSCOPIC W ROBOTICS ,;  Surgeon: Erin Higgins MD;  Location: [facility];  Service: Urology    NERVE BLOCK      transforaminal epidural lumbar    WISDOM TOOTH EXTRACTION      as well as broken neighboring tooth     Social History     Tobacco Use   Smoking Status Former Smoker    Last attempt to quit: 1985    Years since quittin 8   Smokeless Tobacco Never Used     Past Family History  Family History   Problem Relation Age of Onset    Other Mother         malaria    Diabetes Sister     Hypertension Sister      Past Social history  Social History     Socioeconomic History    Marital status: /Civil Union     Spouse name: Not on file    Number of children: Not on file    Years of education: Not on file    Highest education level: Not on file   Occupational History    Occupation:  in [address] [place] [address] Financial resource strain: Not on file    Food insecurity:     Worry: Not on file Inability: Not on file    Transportation needs:     Medical: Not on file     Non-medical: Not on file   Tobacco Use    Smoking status: Former Smoker     Last attempt to quit: 1985     Years since quittin 8    Smokeless tobacco: Never Used   Substance and Sexual Activity    Alcohol use: Not Currently     Comment: rarely    Drug use: No    Sexual activity: Not on file   Lifestyle    Physical activity:     Days per week: Not on file     Minutes per session: Not on file    Stress: Not on file   Relationships    Social connections:     Talks on phone: Not on file     Gets together: Not on file     Attends Evangelical service: Not on file     Active member of club or organization: Not on file     Attends meetings of clubs or organizations: Not on file     Relationship status: Not on file    Intimate partner violence:     Fear of current or ex partner: Not on file     Emotionally abused: Not on file     Physically abused: Not on file     Forced sexual activity: Not on file   Other Topics Concern    Not on file   Social History Narrative    Does not consume caffeine        Never drank alcohol per [employer]     Current Medications  Current Outpatient Medications   Medication Sig Dispense Refill    ACCU-CHEK OMERO PLUS test strip USE TO TEST ONCE DAILY 100 each 2    Accu-Chek FastClix Lancets MISC TEST TWO TIMES A  each 3    Alpha Lipoic Acid 200 MG CAPS Take 1 daily as directed by Podiatry        amLODIPine (NORVASC) 5 mg tablet Take 1 tablet (5 mg total) by mouth daily 90 tablet 3    atorvastatin (LIPITOR) 20 mg tablet TAKE 1 TABLET DAILY 90 tablet 3    Blood Glucose Monitoring Suppl (ACCU-CHEK OMERO PLUS) w/Device KIT by Does not apply route      glucose blood (ACCU-CHEK OMERO PLUS) test strip Test twice daily 200 each 3    JANUVIA 100 MG tablet TAKE 1 TABLET DAILY 90 tablet 3    losartan-hydrochlorothiazide (HYZAAR) 100-25 MG per tablet TAKE 1 TABLET DAILY 90 tablet 3    metFORMIN (GLUCOPHAGE) 500 mg tablet Take 2 tablets in the morning and 1 tablet in the evening 270 tablet 3    Polyethyl Glycol-Propyl Glycol (SYSTANE OP) Apply to eye 1 drop each eye twice a day       atorvastatin (LIPITOR) 40 mg tablet Take 1 tablet (40 mg total) by mouth daily (Patient not taking: Reported on 6/19/2020) 90 tablet 3     No current facility-administered medications for this visit  Allergies  No Known Allergies      The following portions of the patient's history were reviewed and updated as appropriate: allergies, current medications, past medical history, past social history, past surgical history and problem list       Vitals  Vitals:    07/17/20 0950   BP: 122/78   Pulse: 66   Temp: (!) 97 3 °F (36 3 °C)   Weight: 109 kg (240 lb)       Physical Exam   Constitutional: He is oriented to person, place, and time  He appears well-developed and well-nourished  No distress  HENT:   Head: Normocephalic and atraumatic  Pulmonary/Chest: Effort normal    Abdominal: Soft  He exhibits no distension  There is no tenderness  Musculoskeletal: He exhibits no edema  Neurological: He is alert and oriented to person, place, and time  Gait normal    Skin: Skin is warm and dry  He is not diaphoretic  Psychiatric: He has a normal mood and affect  His speech is normal and behavior is normal    Nursing note and vitals reviewed          Results  No results found for this or any previous visit (from the past 1 hour(s)) ]  Lab Results   Component Value Date    PSA 0 4 04/27/2015    PSA 0 4 01/20/2014     Lab Results   Component Value Date    GLUCOSE 276 (H) 07/29/2015    CALCIUM 9 1 06/22/2020     07/29/2015    K 4 1 06/22/2020    CO2 25 06/22/2020     06/22/2020    BUN 21 06/22/2020    CREATININE 0 94 06/22/2020     Lab Results   Component Value Date    WBC 5 57 06/22/2020    HGB 13 7 06/22/2020    HCT 42 1 06/22/2020    MCV 89 06/22/2020     06/22/2020         Orders  Orders Placed This Encounter Procedures    CT renal protocol     Standing Status:   Future     Standing Expiration Date:   1/17/2022     Scheduling Instructions:      Nothing to eat 3 hours prior to your test   Clear liquids are also permitted up until the time of the scan  Clear liquids include water, black coffee or tea, apple juice or clear broth  If possible wear clothing without any metal in the abdomen area  Sweat suit, shorts, sports bra or bra without       underwire may eliminate the need to change  Please bring your insurance cards, a form of photo ID and a list of your medications with you  Arrive 15 minutes prior to your appointment time in order to register  On the day of your test, please bring any prior CT or MRI studies of this area with you       that were not performed at a [facility]  To schedule this appointment, please contact Central Scheduling at [phone]  Order Specific Question:   What is the patient's sedation requirement? Answer:   No Sedation    XR chest pa & lateral     Standing Status:   Future     Standing Expiration Date:   7/17/2024     Scheduling Instructions:      Bring along any outside films relating to this procedure   Basic metabolic panel     This is a patient instruction: Patient fasting for 8 hours or longer recommended       Standing Status:   Future     Standing Expiration Date:   1/17/2022

## 2020-07-20 ENCOUNTER — TELEPHONE (OUTPATIENT)
Dept: FAMILY MEDICINE CLINIC | Facility: CLINIC | Age: 80
End: 2020-07-20

## 2020-07-20 NOTE — TELEPHONE ENCOUNTER
Karen Alicia called today requesting a letter that they can send to the foot doctor  She said they are requesting a letter stating that his recent lab tests were okay and that he is a patient here and when his last appointment was   Please mail to patient's house when completed

## 2020-07-28 NOTE — TELEPHONE ENCOUNTER
I s/w Dami Post at Dr Jackie Cortez office  He did confirm that [facility] needs to be seen by his PCP Q6 months in order for Medicare to pay for his foot care every 9 weeks  I s/w [facility] and informed him  He has an apt with Dr Conner Faulkner in August and will reschedule his apt with Dr Vincent Salgado for after his visit here

## 2020-08-05 ENCOUNTER — OFFICE VISIT (OUTPATIENT)
Dept: FAMILY MEDICINE CLINIC | Facility: CLINIC | Age: 80
End: 2020-08-05
Payer: MEDICARE

## 2020-08-05 VITALS
WEIGHT: 236 LBS | HEART RATE: 76 BPM | DIASTOLIC BLOOD PRESSURE: 62 MMHG | HEIGHT: 67 IN | RESPIRATION RATE: 18 BRPM | TEMPERATURE: 98.4 F | OXYGEN SATURATION: 98 % | SYSTOLIC BLOOD PRESSURE: 140 MMHG | BODY MASS INDEX: 37.04 KG/M2

## 2020-08-05 DIAGNOSIS — E11.42 TYPE 2 DIABETES MELLITUS WITH DIABETIC POLYNEUROPATHY, WITHOUT LONG-TERM CURRENT USE OF INSULIN (HCC): Primary | ICD-10-CM

## 2020-08-05 DIAGNOSIS — Z13.0 SCREENING FOR DEFICIENCY ANEMIA: ICD-10-CM

## 2020-08-05 PROCEDURE — 99214 OFFICE O/P EST MOD 30 MIN: CPT | Performed by: FAMILY MEDICINE

## 2020-08-05 PROCEDURE — 3051F HG A1C>EQUAL 7.0%<8.0%: CPT | Performed by: FAMILY MEDICINE

## 2020-08-05 PROCEDURE — 3008F BODY MASS INDEX DOCD: CPT | Performed by: FAMILY MEDICINE

## 2020-08-05 PROCEDURE — 1036F TOBACCO NON-USER: CPT | Performed by: FAMILY MEDICINE

## 2020-08-05 PROCEDURE — 1160F RVW MEDS BY RX/DR IN RCRD: CPT | Performed by: FAMILY MEDICINE

## 2020-08-05 PROCEDURE — 3078F DIAST BP <80 MM HG: CPT | Performed by: FAMILY MEDICINE

## 2020-08-05 PROCEDURE — 3077F SYST BP >= 140 MM HG: CPT | Performed by: FAMILY MEDICINE

## 2020-08-05 PROCEDURE — 4040F PNEUMOC VAC/ADMIN/RCVD: CPT | Performed by: FAMILY MEDICINE

## 2020-08-05 NOTE — ASSESSMENT & PLAN NOTE
Lab Results   Component Value Date    HGBA1C 7 0 (H) 06/22/2020     Diabetes is stable and well controlled on metformin and Januvia  Encouraged him to keep up the good work

## 2020-08-05 NOTE — ASSESSMENT & PLAN NOTE
He is status post right partial nephrectomy September 2017  He will continue follow-up with urology

## 2020-08-05 NOTE — PROGRESS NOTES
Assessment/Plan:    Type 2 diabetes mellitus with diabetic polyneuropathy ([facility])    Lab Results   Component Value Date    HGBA1C 7 0 (H) 06/22/2020     Diabetes is stable and well controlled on metformin and Januvia  Encouraged him to keep up the good work  Essential hypertension  Blood pressure is well controlled on losartan-HCTZ and amlodipine  He will continue current regimen  Renal cell carcinoma of right kidney [facility])  He is status post right partial nephrectomy September 2017  He will continue follow-up with urology  Diagnoses and all orders for this visit:    Type 2 diabetes mellitus with diabetic polyneuropathy, without long-term current use of insulin ([facility])    Screening for deficiency anemia    Other orders  -     Aspirin Buf,CaCarb-MgCarb-MgO, 81 MG TABS  -     Cancel: Ambulatory referral to Ophthalmology; Future  -     Cancel: Lipid Panel with Direct LDL reflex; Future  -     Cancel: Comprehensive metabolic panel; Future  -     Cancel: CBC and differential; Future          Subjective:      Patient ID: Perfecto Dandy is a 68 y o  male  He is here with his wife for follow-up today  He has been feeling well and denies any recent illnesses  He and his wife are staying safe at home  The diabetes has been fairly stable  Hemoglobin A1c in June was 7 0  He has history of partial nephrectomy for right renal cell carcinoma in fall 2017  He had visit with Urology a in July and everything is stable  He has bilateral knee pain  Arthritis on both sides and history of torn meniscus on the left  The following portions of the patient's history were reviewed and updated as appropriate: allergies, current medications, past family history, past medical history, past social history, past surgical history and problem list     Review of Systems   Constitutional: Positive for fatigue  Negative for activity change, chills and fever     HENT: Negative for congestion, ear pain, sinus pressure and sore throat  Eyes: Negative for pain and visual disturbance  Respiratory: Negative for cough, chest tightness, shortness of breath and wheezing  Cardiovascular: Negative for chest pain, palpitations and leg swelling  Gastrointestinal: Negative for abdominal pain, blood in stool, constipation, diarrhea, nausea and vomiting  Endocrine: Negative for polydipsia and polyuria  Genitourinary: Negative for difficulty urinating, dysuria, frequency and urgency  Musculoskeletal: Positive for arthralgias  Negative for joint swelling and myalgias  Skin: Negative for rash  Neurological: Negative for dizziness, weakness, numbness and headaches  Hematological: Negative for adenopathy  Does not bruise/bleed easily  Psychiatric/Behavioral: Negative for dysphoric mood  The patient is not nervous/anxious  Objective:      /62 (BP Location: Left arm, Patient Position: Sitting, Cuff Size: Large)   Pulse 76   Temp 98 4 °F (36 9 °C) (Temporal)   Resp 18   Ht 5' 7" (1 702 m)   Wt 107 kg (236 lb)   SpO2 98%   BMI 36 96 kg/m²          Physical Exam   Constitutional: He is oriented to person, place, and time  HENT:   Head: Normocephalic and atraumatic  Right Ear: Tympanic membrane normal    Left Ear: Tympanic membrane normal    Mouth/Throat: Mucous membranes are moist    Eyes: Pupils are equal, round, and reactive to light  Neck: Normal range of motion  Neck supple  Carotid bruit is not present  Cardiovascular: Normal rate, regular rhythm, normal heart sounds and normal pulses  Pulmonary/Chest: Effort normal and breath sounds normal    Musculoskeletal:         General: No swelling or tenderness  Lymphadenopathy:     He has no cervical adenopathy  Neurological: He is alert and oriented to person, place, and time  Psychiatric: His behavior is normal  Mood normal    Nursing note and vitals reviewed

## 2020-08-05 NOTE — ASSESSMENT & PLAN NOTE
Blood pressure is well controlled on losartan-HCTZ and amlodipine  He will continue current regimen

## 2020-08-26 LAB
LEFT EYE DIABETIC RETINOPATHY: NORMAL
RIGHT EYE DIABETIC RETINOPATHY: NORMAL

## 2020-09-09 PROBLEM — R93.0 ABNORMAL FINDINGS ON DIAGNOSTIC IMAGING OF SKULL AND HEAD, NOT ELSEWHERE CLASSIFIED: Status: ACTIVE | Noted: 2020-09-09

## 2020-09-09 NOTE — PROGRESS NOTES
Note sent that patient has an abnormal transcranial Doppler done by Ophthalmology  Suggestion was made to control his risk factors which we work on consistently  This will be reviewed at his upcoming visit

## 2020-09-16 DIAGNOSIS — E78.00 PURE HYPERCHOLESTEROLEMIA: ICD-10-CM

## 2020-09-16 RX ORDER — ATORVASTATIN CALCIUM 40 MG/1
40 TABLET, FILM COATED ORAL DAILY
Qty: 10 TABLET | Refills: 0 | Status: SHIPPED | OUTPATIENT
Start: 2020-09-16 | End: 2021-03-16 | Stop reason: SDUPTHER

## 2020-09-16 NOTE — TELEPHONE ENCOUNTER
Pt's wife call today requesting only 10 tablets of Atorvastatin rx, Please sent this to [facility] in [place], Pt's is visiting his daughter

## 2020-09-22 NOTE — PROGRESS NOTES
Cardiology Outpatient Progress Note - Jose Ambrocio 68 y o  male MRN: [id number]    @ Encounter: [id number]      Patient Active Problem List    Diagnosis Date Noted    Abnormal findings on diagnostic imaging of skull and head, not elsewhere classified 09/09/2020    Colon polyp 10/05/2018    Bilateral hearing loss 10/05/2018    Thrombocytopenia ([facility] ) 10/05/2018    Thoracic aortic aneurysm without rupture ([facility] ) 03/12/2018    Abnormal EKG 08/29/2017    Renal cell carcinoma of right kidney ([facility] ) 07/21/2017    Bladder mass 06/28/2017    Acute meniscal tear of left knee 05/02/2017    Primary localized osteoarthritis of left knee 05/02/2017    Chronic pain disorder 01/04/2016    Back pain, thoracic 08/20/2015    Lung nodules 07/29/2015    Knee osteoarthritis 09/09/2014    Chronic lumbar radiculopathy 06/17/2014    Lumbar canal stenosis 07/30/2013    Osteopenia 07/09/2012    Erectile dysfunction of non-organic origin 06/15/2012    Type 2 diabetes mellitus with diabetic polyneuropathy ([facility] ) 05/16/2012    Glaucoma 12/17/2008    Essential hypertension 02/06/2008    Pure hypercholesterolemia 02/06/2008       Assessment:  #  Stable 4 5 cm ascending aorta last CT Chest 6/15/19    # HTN- BP at goal with SBP < 130 mmHg  Losartan/ hctz 100/25 mg daily, amlodipine 5 mg     Echo 5/8/18:   EF: 60%; aorta 4 3 cm    # Coronary eval  Stress test 8/31/17: normal  EKG today: SR, first degree AV block    # 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    # DM- metformin, Januvia  HgA1C 6/22/20: 7 0%    # dyslipidemia- atorvastatin 40 mg   6/22/20: , HDL 59    # ED- Cialis prn    TODAY'S PLAN:  BP not really at goal yet with current meds- but states he did not take am meds yet, goal < 130/80 mmHg  Will need f/u CT scan to check on aorta- has been stable at 45 mm  Continue atorvastatin 40 mg daily  Discussed exercise- walking to increase physical activity  HgA1C at about goal on therapy    HPI:   68 yo male who had initially presented for pre-op clearance for right nephrectomy for renal mass for RCC  No cardiac history  He had DM, hyperlipidemia, former smoker over 30 years ago  He had stress in past for screening not for CP  He does bowling but no true exercise  No chest pain or SOB  No edema  Interval History:   Increased amlodipine to 5 mg daily- was not at goal  Does not check BP much at home  No new symptoms      Review of Systems   Constitutional: Negative for activity change, appetite change, fatigue and unexpected weight change  HENT: Negative for congestion and nosebleeds  Eyes: Negative  Respiratory: Negative for cough, chest tightness and shortness of breath  Cardiovascular: Negative for chest pain, palpitations and leg swelling  Gastrointestinal: Negative for abdominal distention  Endocrine: Negative  Genitourinary: Negative  Musculoskeletal: Negative  Skin: Negative  Neurological: Negative for dizziness, syncope and weakness  Hematological: Negative  Psychiatric/Behavioral: Negative  Past Medical History:   Diagnosis Date    Arthritis     Cancer [facility])     right kidney    Cardiac aneurysm     Chronic pain disorder     Diabetes mellitus ([facility] )     NIDDM    Erectile dysfunction of non-organic origin     Glaucoma     Hx of bleeding disorder     in urine    Hyperlipidemia     Hypertension     Meniscus tear     Left knee    Urinary tract infection with hematuria     last assessed 05/30/2017    Wears glasses     Wears partial dentures     upper partial       No Known Allergies    Current Outpatient Medications:     ACCU-CHEK OMERO PLUS test strip, USE TO TEST ONCE DAILY, Disp: 100 each, Rfl: 2    Accu-Chek FastClix Lancets MISC, TEST TWO TIMES A DAY, Disp: 200 each, Rfl: 3    Alpha Lipoic Acid 200 MG CAPS, Take 1 daily as directed by Podiatry  , Disp: , Rfl:     amLODIPine (NORVASC) 5 mg tablet, Take 1 tablet (5 mg total) by mouth daily, Disp: 90 tablet, Rfl: 3    Aspirin Buf,CaCarb-MgCarb-MgO, 81 MG TABS, , Disp: , Rfl:     atorvastatin (LIPITOR) 40 mg tablet, Take 1 tablet (40 mg total) by mouth daily, Disp: 10 tablet, Rfl: 0    Blood Glucose Monitoring Suppl (ACCU-CHEK OMERO PLUS) w/Device KIT, by Does not apply route, Disp: , Rfl:     glucose blood (ACCU-CHEK OMERO PLUS) test strip, Test twice daily, Disp: 200 each, Rfl: 3    JANUVIA 100 MG tablet, TAKE 1 TABLET DAILY, Disp: 90 tablet, Rfl: 3    losartan-hydrochlorothiazide (HYZAAR) 100-25 MG per tablet, TAKE 1 TABLET DAILY, Disp: 90 tablet, Rfl: 3    metFORMIN (GLUCOPHAGE) 500 mg tablet, Take 2 tablets in the morning and 1 tablet in the evening, Disp: 270 tablet, Rfl: 3    Polyethyl Glycol-Propyl Glycol (SYSTANE OP), Apply to eye 1 drop each eye twice a day , Disp: , Rfl:     Social History     Socioeconomic History    Marital status: /Civil Union     Spouse name: Not on file    Number of children: Not on file    Years of education: Not on file    Highest education level: Not on file   Occupational History    Occupation:  in [employer] resource strain: Not on file    Food insecurity     Worry: Not on file     Inability: Not on file    Transportation needs     Medical: Not on file     Non-medical: Not on file   Tobacco Use    Smoking status: Former Smoker     Last attempt to quit: 1985     Years since quittin 0    Smokeless tobacco: Never Used   Substance and Sexual Activity    Alcohol use: Not Currently     Comment: rarely    Drug use: No    Sexual activity: Not on file   Lifestyle    Physical activity     Days per week: Not on file     Minutes per session: Not on file    Stress: Not on file   Relationships    Social connections     Talks on phone: Not on file     Gets together: Not on file     Attends Denominational service: Not on file     Active member of club or organization: Not on file     Attends meetings of clubs or organizations: Not on file     Relationship status: Not on file    Intimate partner violence     Fear of current or ex partner: Not on file     Emotionally abused: Not on file     Physically abused: Not on file     Forced sexual activity: Not on file   Other Topics Concern    Not on file   Social History Narrative    Does not consume caffeine        Never drank alcohol per [employer]       Family History   Problem Relation Age of Onset    Other Mother         malaria    Diabetes Sister     Hypertension Sister        Physical Exam:    Vitals: There were no vitals taken for this visit  , There is no height or weight on file to calculate BMI ,   Wt Readings from Last 3 Encounters:   08/05/20 107 kg (236 lb)   07/17/20 109 kg (240 lb)   06/19/20 109 kg (241 lb)         Physical Exam:    Physical Exam   Constitutional: He is oriented to person, place, and time  He appears well-developed and well-nourished  HENT:   Head: Normocephalic and atraumatic  Eyes: Pupils are equal, round, and reactive to light  EOM are normal    Neck: Normal range of motion  No JVD present  Cardiovascular: Normal rate, regular rhythm and normal heart sounds  No murmur heard  Pulmonary/Chest: Effort normal and breath sounds normal  He has no rales  Abdominal: Soft  Bowel sounds are normal  He exhibits no distension  Musculoskeletal: Normal range of motion  General: No edema  Neurological: He is alert and oriented to person, place, and time  Skin: Skin is warm and dry  He is not diaphoretic  Psychiatric: He has a normal mood and affect         Labs & Results:    Lab Results   Component Value Date    GLUCOSE 276 (H) 07/29/2015    CALCIUM 9 1 06/22/2020     07/29/2015    K 4 1 06/22/2020    CO2 25 06/22/2020     06/22/2020    BUN 21 06/22/2020    CREATININE 0 94 06/22/2020     Lab Results   Component Value Date    WBC 5 57 06/22/2020    HGB 13 7 06/22/2020    HCT 42 1 06/22/2020    MCV 89 06/22/2020    PLT 168 06/22/2020     No results found for: BNP   Lab Results   Component Value Date    CHOL 173 01/20/2014     Lab Results   Component Value Date    HDL 59 06/22/2020    HDL 52 10/22/2019    HDL 56 04/02/2018     Lab Results   Component Value Date    LDLCALC 106 (H) 06/22/2020    LDLCALC 100 10/22/2019    LDLCALC 110 (H) 04/02/2018     Lab Results   Component Value Date    TRIG 69 06/22/2020    TRIG 54 10/22/2019    TRIG 61 04/02/2018     No results found for: CHOLHDL     EKG personally reviewed by Kinsey Bright DO  Counseling / Coordination of Care  Time spent today 25 minutes  Greater than 50% of total time was spent with the patient and / or family counseling and / or coordination of care  We discussed diagnoses, most recent studies, tests and any changes in treatment plan  Thank you for the opportunity to participate in the care of this patient  Thank you for the opportunity to participate in the care of this patient    WINSOME PRESTON [address]

## 2020-09-24 ENCOUNTER — OFFICE VISIT (OUTPATIENT)
Dept: CARDIOLOGY CLINIC | Facility: CLINIC | Age: 80
End: 2020-09-24
Payer: MEDICARE

## 2020-09-24 VITALS
OXYGEN SATURATION: 95 % | TEMPERATURE: 97.3 F | HEART RATE: 60 BPM | HEIGHT: 67 IN | BODY MASS INDEX: 36.88 KG/M2 | WEIGHT: 235 LBS | DIASTOLIC BLOOD PRESSURE: 62 MMHG | SYSTOLIC BLOOD PRESSURE: 140 MMHG

## 2020-09-24 DIAGNOSIS — I71.2 THORACIC ASCENDING AORTIC ANEURYSM (HCC): ICD-10-CM

## 2020-09-24 DIAGNOSIS — E78.00 PURE HYPERCHOLESTEROLEMIA: Primary | ICD-10-CM

## 2020-09-24 DIAGNOSIS — I10 HTN (HYPERTENSION), BENIGN: ICD-10-CM

## 2020-09-24 PROCEDURE — 99214 OFFICE O/P EST MOD 30 MIN: CPT | Performed by: INTERNAL MEDICINE

## 2020-10-26 ENCOUNTER — IMMUNIZATIONS (OUTPATIENT)
Dept: FAMILY MEDICINE CLINIC | Facility: CLINIC | Age: 80
End: 2020-10-26
Payer: MEDICARE

## 2020-10-26 VITALS — TEMPERATURE: 98.2 F

## 2020-10-26 DIAGNOSIS — Z23 NEED FOR INFLUENZA VACCINATION: Primary | ICD-10-CM

## 2020-10-26 PROCEDURE — 90662 IIV NO PRSV INCREASED AG IM: CPT

## 2020-10-26 PROCEDURE — G0008 ADMIN INFLUENZA VIRUS VAC: HCPCS

## 2020-11-30 ENCOUNTER — APPOINTMENT (OUTPATIENT)
Dept: LAB | Facility: CLINIC | Age: 80
End: 2020-11-30
Payer: MEDICARE

## 2020-11-30 ENCOUNTER — OFFICE VISIT (OUTPATIENT)
Dept: FAMILY MEDICINE CLINIC | Facility: CLINIC | Age: 80
End: 2020-11-30
Payer: MEDICARE

## 2020-11-30 VITALS
DIASTOLIC BLOOD PRESSURE: 70 MMHG | TEMPERATURE: 97.9 F | SYSTOLIC BLOOD PRESSURE: 132 MMHG | OXYGEN SATURATION: 95 % | BODY MASS INDEX: 36.73 KG/M2 | HEIGHT: 67 IN | WEIGHT: 234 LBS | HEART RATE: 64 BPM

## 2020-11-30 DIAGNOSIS — E66.01 OBESITY, MORBID (HCC): ICD-10-CM

## 2020-11-30 DIAGNOSIS — M54.16 CHRONIC LUMBAR RADICULOPATHY: ICD-10-CM

## 2020-11-30 DIAGNOSIS — C64.1 RENAL CELL CARCINOMA OF RIGHT KIDNEY (HCC): ICD-10-CM

## 2020-11-30 DIAGNOSIS — I10 ESSENTIAL HYPERTENSION: ICD-10-CM

## 2020-11-30 DIAGNOSIS — Z00.00 MEDICARE ANNUAL WELLNESS VISIT, SUBSEQUENT: Primary | ICD-10-CM

## 2020-11-30 DIAGNOSIS — I71.2 THORACIC AORTIC ANEURYSM WITHOUT RUPTURE (HCC): ICD-10-CM

## 2020-11-30 DIAGNOSIS — E11.42 TYPE 2 DIABETES MELLITUS WITH DIABETIC POLYNEUROPATHY, WITHOUT LONG-TERM CURRENT USE OF INSULIN (HCC): ICD-10-CM

## 2020-11-30 DIAGNOSIS — D69.6 THROMBOCYTOPENIA (HCC): ICD-10-CM

## 2020-11-30 DIAGNOSIS — E78.00 PURE HYPERCHOLESTEROLEMIA: ICD-10-CM

## 2020-11-30 PROBLEM — N32.89 BLADDER MASS: Status: RESOLVED | Noted: 2017-06-28 | Resolved: 2020-11-30

## 2020-11-30 LAB
ALBUMIN SERPL BCP-MCNC: 4.1 G/DL (ref 3.5–5)
ALP SERPL-CCNC: 96 U/L (ref 46–116)
ALT SERPL W P-5'-P-CCNC: 43 U/L (ref 12–78)
ANION GAP SERPL CALCULATED.3IONS-SCNC: 8 MMOL/L (ref 4–13)
AST SERPL W P-5'-P-CCNC: 21 U/L (ref 5–45)
BASOPHILS # BLD AUTO: 0.04 THOUSANDS/ΜL (ref 0–0.1)
BASOPHILS NFR BLD AUTO: 1 % (ref 0–1)
BILIRUB SERPL-MCNC: 0.66 MG/DL (ref 0.2–1)
BUN SERPL-MCNC: 21 MG/DL (ref 5–25)
CALCIUM SERPL-MCNC: 9.5 MG/DL (ref 8.3–10.1)
CHLORIDE SERPL-SCNC: 104 MMOL/L (ref 100–108)
CO2 SERPL-SCNC: 25 MMOL/L (ref 21–32)
CREAT SERPL-MCNC: 0.98 MG/DL (ref 0.6–1.3)
EOSINOPHIL # BLD AUTO: 0.29 THOUSAND/ΜL (ref 0–0.61)
EOSINOPHIL NFR BLD AUTO: 4 % (ref 0–6)
ERYTHROCYTE [DISTWIDTH] IN BLOOD BY AUTOMATED COUNT: 13.5 % (ref 11.6–15.1)
EST. AVERAGE GLUCOSE BLD GHB EST-MCNC: 154 MG/DL
GFR SERPL CREATININE-BSD FRML MDRD: 74 ML/MIN/1.73SQ M
GLUCOSE SERPL-MCNC: 119 MG/DL (ref 65–140)
HBA1C MFR BLD: 7 %
HCT VFR BLD AUTO: 43.5 % (ref 36.5–49.3)
HGB BLD-MCNC: 14.6 G/DL (ref 12–17)
IMM GRANULOCYTES # BLD AUTO: 0.03 THOUSAND/UL (ref 0–0.2)
IMM GRANULOCYTES NFR BLD AUTO: 0 % (ref 0–2)
LYMPHOCYTES # BLD AUTO: 1.88 THOUSANDS/ΜL (ref 0.6–4.47)
LYMPHOCYTES NFR BLD AUTO: 26 % (ref 14–44)
MCH RBC QN AUTO: 29 PG (ref 26.8–34.3)
MCHC RBC AUTO-ENTMCNC: 33.6 G/DL (ref 31.4–37.4)
MCV RBC AUTO: 87 FL (ref 82–98)
MONOCYTES # BLD AUTO: 0.52 THOUSAND/ΜL (ref 0.17–1.22)
MONOCYTES NFR BLD AUTO: 7 % (ref 4–12)
NEUTROPHILS # BLD AUTO: 4.5 THOUSANDS/ΜL (ref 1.85–7.62)
NEUTS SEG NFR BLD AUTO: 62 % (ref 43–75)
NRBC BLD AUTO-RTO: 0 /100 WBCS
PLATELET # BLD AUTO: 196 THOUSANDS/UL (ref 149–390)
PMV BLD AUTO: 11.1 FL (ref 8.9–12.7)
POTASSIUM SERPL-SCNC: 3.6 MMOL/L (ref 3.5–5.3)
PROT SERPL-MCNC: 7.8 G/DL (ref 6.4–8.2)
RBC # BLD AUTO: 5.03 MILLION/UL (ref 3.88–5.62)
SODIUM SERPL-SCNC: 137 MMOL/L (ref 136–145)
WBC # BLD AUTO: 7.26 THOUSAND/UL (ref 4.31–10.16)

## 2020-11-30 PROCEDURE — 36415 COLL VENOUS BLD VENIPUNCTURE: CPT

## 2020-11-30 PROCEDURE — 83036 HEMOGLOBIN GLYCOSYLATED A1C: CPT

## 2020-11-30 PROCEDURE — 99214 OFFICE O/P EST MOD 30 MIN: CPT | Performed by: FAMILY MEDICINE

## 2020-11-30 PROCEDURE — 1123F ACP DISCUSS/DSCN MKR DOCD: CPT | Performed by: FAMILY MEDICINE

## 2020-11-30 PROCEDURE — 85025 COMPLETE CBC W/AUTO DIFF WBC: CPT

## 2020-11-30 PROCEDURE — G0439 PPPS, SUBSEQ VISIT: HCPCS | Performed by: FAMILY MEDICINE

## 2020-11-30 PROCEDURE — 80053 COMPREHEN METABOLIC PANEL: CPT

## 2020-11-30 RX ORDER — GABAPENTIN 100 MG/1
CAPSULE ORAL
Qty: 90 CAPSULE | Refills: 3 | Status: SHIPPED | OUTPATIENT
Start: 2020-11-30 | End: 2021-03-26

## 2020-12-01 ENCOUNTER — TELEPHONE (OUTPATIENT)
Dept: ADMINISTRATIVE | Facility: OTHER | Age: 80
End: 2020-12-01

## 2020-12-01 DIAGNOSIS — E11.9 TYPE 2 DIABETES MELLITUS WITHOUT COMPLICATION, WITHOUT LONG-TERM CURRENT USE OF INSULIN (HCC): ICD-10-CM

## 2021-01-11 DIAGNOSIS — I10 HTN (HYPERTENSION), BENIGN: ICD-10-CM

## 2021-01-11 RX ORDER — AMLODIPINE BESYLATE 5 MG/1
TABLET ORAL
Qty: 90 TABLET | Refills: 3 | Status: SHIPPED | OUTPATIENT
Start: 2021-01-11 | End: 2021-08-14

## 2021-02-03 ENCOUNTER — IMMUNIZATIONS (OUTPATIENT)
Dept: FAMILY MEDICINE CLINIC | Facility: HOSPITAL | Age: 81
End: 2021-02-03

## 2021-02-03 DIAGNOSIS — Z23 ENCOUNTER FOR IMMUNIZATION: Primary | ICD-10-CM

## 2021-02-03 PROCEDURE — 91301 SARS-COV-2 / COVID-19 MRNA VACCINE (MODERNA) 100 MCG: CPT

## 2021-02-03 PROCEDURE — 0011A SARS-COV-2 / COVID-19 MRNA VACCINE (MODERNA) 100 MCG: CPT

## 2021-02-23 ENCOUNTER — HOSPITAL ENCOUNTER (OUTPATIENT)
Dept: CT IMAGING | Facility: HOSPITAL | Age: 81
Discharge: HOME | End: 2021-02-23
Attending: INTERNAL MEDICINE
Payer: MEDICARE

## 2021-02-23 DIAGNOSIS — I71.2 THORACIC ASCENDING AORTIC ANEURYSM (HCC): ICD-10-CM

## 2021-02-23 PROCEDURE — 71250 CT THORAX DX C-: CPT

## 2021-02-23 PROCEDURE — G1004 CDSM NDSC: HCPCS

## 2021-03-01 ENCOUNTER — TELEPHONE (OUTPATIENT)
Dept: CARDIOLOGY CLINIC | Facility: CLINIC | Age: 81
End: 2021-03-01

## 2021-03-01 NOTE — PROGRESS NOTES
Cardiology Outpatient Progress Note - Sylvain Palomares 80 y o  male MRN: [id number]    @ Encounter: [id number]      Patient Active Problem List    Diagnosis Date Noted    Obesity, morbid ([place] [address] ) 11/30/2020    Abnormal findings on diagnostic imaging of skull and head, not elsewhere classified 09/09/2020    Colon polyp 10/05/2018    Bilateral hearing loss 10/05/2018    [facility] ) 10/05/2018    Thoracic aortic aneurysm without rupture ([place] [address] ) 03/12/2018    Abnormal EKG 08/29/2017    Renal cell carcinoma of right kidney ([place] [address] ) 07/21/2017    Acute meniscal tear of left knee 05/02/2017    Primary localized osteoarthritis of left knee 05/02/2017    Chronic pain disorder 01/04/2016    Back pain, thoracic 08/20/2015    Lung nodules 07/29/2015    Knee osteoarthritis 09/09/2014    Chronic lumbar radiculopathy 06/17/2014    Lumbar canal stenosis 07/30/2013    Osteopenia 07/09/2012    Erectile dysfunction of non-organic origin 06/15/2012    Type 2 diabetes mellitus with diabetic polyneuropathy ([place] [address] ) 05/16/2012    Glaucoma 12/17/2008    Essential hypertension 02/06/2008    Pure hypercholesterolemia 02/06/2008       Assessment:  #  Dilated ascending thoracic aorta  CT Chest 2/23/21: dilated ascending thoracic aorta 4 4 cm, no change  CT Chest 6/15/19: ascending aorta 4 5 cm    # HTN- BP at goal with SBP < 130 mmHg  Losartan/ hctz 100/25 mg daily, amlodipine 5 mg     Echo 5/8/18:   EF: 60%; aorta 4 3 cm    # Coronary eval  Stress test 8/31/17: normal  EKG today: SR, first degree AV block    # 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    # DM- metformin, Januvia  HgA1C 11/30/20: 7 0%    # dyslipidemia- atorvastatin 40 mg   6/22/20: , HDL 59    # ED- Cialis prn    TODAY'S PLAN:  BP pretty much at goal for dilated aorta  Aorta size, no change on follow up CT 2/23  Continue atorvastatin 40 mg daily  Discussed exercise- walking to increase physical activity  HgA1C at about goal on therapy    HPI:   79 yo male who had initially presented for pre-op clearance for right nephrectomy for renal mass for RCC  No cardiac history  He had DM, hyperlipidemia, former smoker over 30 years ago  He had stress in past for screening not for CP  He does bowling but no true exercise  No chest pain or SOB  No edema  Interval History:   BP was high last time but had not taken meds yet  CT Chest 2/23/21: dilated ascending thoracic aorta 4 4 cm, no change  No new concerns or symptoms  Got his COVID 19 shots    Review of Systems   Constitutional: Negative for activity change, appetite change, fatigue and unexpected weight change  HENT: Negative for congestion and nosebleeds  Eyes: Negative  Respiratory: Negative for cough, chest tightness and shortness of breath  Cardiovascular: Negative for chest pain, palpitations and leg swelling  Gastrointestinal: Negative for abdominal distention  Endocrine: Negative  Genitourinary: Negative  Musculoskeletal: Negative  Skin: Negative  Neurological: Negative for dizziness, syncope and weakness  Hematological: Negative  Psychiatric/Behavioral: Negative  Past Medical History:   Diagnosis Date    Arthritis     Bladder mass 6/28/2017    Follows with Urology    Cancer [facility])     right kidney    Cardiac aneurysm     Chronic pain disorder     Diabetes mellitus ([facility] )     NIDDM    Erectile dysfunction of non-organic origin     Glaucoma     Hx of bleeding disorder     in urine    Hyperlipidemia     Hypertension     Meniscus tear     Left knee    Urinary tract infection with hematuria     last assessed 05/30/2017    Wears glasses     Wears partial dentures     upper partial       No Known Allergies        Current Outpatient Medications:     ACCU-CHEK OMERO PLUS test strip, USE TO TEST ONCE DAILY, Disp: 100 each, Rfl: 2    Accu-Chek FastClix Lancets MISC, TEST TWO TIMES A DAY, Disp: 200 each, Rfl: 3    Alpha Lipoic Acid 200 MG CAPS, Take 1 daily as directed by Podiatry  , Disp: , Rfl:     amLODIPine (NORVASC) 5 mg tablet, TAKE 1 TABLET DAILY, Disp: 90 tablet, Rfl: 3    atorvastatin (LIPITOR) 40 mg tablet, Take 1 tablet (40 mg total) by mouth daily, Disp: 10 tablet, Rfl: 0    Blood Glucose Monitoring Suppl (ACCU-CHEK OMERO PLUS) w/Device KIT, by Does not apply route, Disp: , Rfl:     gabapentin (NEURONTIN) 100 mg capsule, Take 1 tab at bedtime  Increase up to 3 tabs nightly as needed for neuropathy  , Disp: 90 capsule, Rfl: 3    glucose blood (ACCU-CHEK OMERO PLUS) test strip, Test twice daily, Disp: 200 each, Rfl: 3    JANUVIA 100 MG tablet, TAKE 1 TABLET DAILY, Disp: 90 tablet, Rfl: 3    losartan-hydrochlorothiazide (HYZAAR) 100-25 MG per tablet, TAKE 1 TABLET DAILY, Disp: 90 tablet, Rfl: 3    metFORMIN (GLUCOPHAGE) 500 mg tablet, TAKE 2 TABLETS IN THE      MORNING AND 1 TABLET IN THEEVENING, Disp: 270 tablet, Rfl: 3    Polyethyl Glycol-Propyl Glycol (SYSTANE OP), Apply to eye 1 drop each eye twice a day , Disp: , Rfl:     Social History     Socioeconomic History    Marital status: /Civil Union     Spouse name: Not on file    Number of children: Not on file    Years of education: Not on file    Highest education level: Not on file   Occupational History    Occupation:  in [employer] resource strain: Not on file    Food insecurity     Worry: Not on file     Inability: Not on file    Transportation needs     Medical: Not on file     Non-medical: Not on file   Tobacco Use    Smoking status: Former Smoker     Quit date: 1985     Years since quittin 5    Smokeless tobacco: Never Used   Substance and Sexual Activity    Alcohol use: Not Currently     Comment: rarely    Drug use: No    Sexual activity: Not on file   Lifestyle    Physical activity     Days per week: Not on file     Minutes per session: Not on file    Stress: Not on file   Relationships    Social connections Talks on phone: Not on file     Gets together: Not on file     Attends Ep[employer]op[employer] service: Not on file     Active member of club or organization: Not on file     Attends meetings of clubs or organizations: Not on file     Relationship status: Not on file    Intimate partner violence     Fear of current or ex partner: Not on file     Emotionally abused: Not on file     Physically abused: Not on file     Forced sexual activity: Not on file   Other Topics Concern    Not on file   Social History Narrative    Does not consume caffeine        Never drank alcohol per [employer]       Family History   Problem Relation Age of Onset    Other Mother         malaria    Diabetes Sister     Hypertension Sister        Physical Exam:    Vitals: There were no vitals taken for this visit  , There is no height or weight on file to calculate BMI ,   Wt Readings from Last 3 Encounters:   12/18/20 108 kg (238 lb)   11/30/20 106 kg (234 lb)   09/24/20 107 kg (235 lb)         Physical Exam:    Physical Exam   Constitutional: He is oriented to person, place, and time  He appears well-developed and well-nourished  HENT:   Head: Normocephalic and atraumatic  Eyes: Pupils are equal, round, and reactive to light  EOM are normal    Neck: Normal range of motion  No JVD present  Cardiovascular: Normal rate, regular rhythm and normal heart sounds  No murmur heard  Pulmonary/Chest: Effort normal and breath sounds normal  He has no rales  Abdominal: Soft  Bowel sounds are normal  He exhibits no distension  Musculoskeletal: Normal range of motion  General: No edema  Neurological: He is alert and oriented to person, place, and time  Skin: Skin is warm and dry  He is not diaphoretic  Psychiatric: He has a normal mood and affect         Labs & Results:    Lab Results   Component Value Date    GLUCOSE 276 (H) 07/29/2015    CALCIUM 9 5 11/30/2020     07/29/2015    K 3 6 11/30/2020    CO2 25 11/30/2020     11/30/2020    BUN 21 11/30/2020    CREATININE 0 98 11/30/2020     Lab Results   Component Value Date    WBC 7 26 11/30/2020    HGB 14 6 11/30/2020    HCT 43 5 11/30/2020    MCV 87 11/30/2020     11/30/2020     No results found for: BNP   Lab Results   Component Value Date    CHOL 173 01/20/2014     Lab Results   Component Value Date    HDL 59 06/22/2020    HDL 52 10/22/2019    HDL 56 04/02/2018     Lab Results   Component Value Date    LDLCALC 106 (H) 06/22/2020    LDLCALC 100 10/22/2019    LDLCALC 110 (H) 04/02/2018     Lab Results   Component Value Date    TRIG 69 06/22/2020    TRIG 54 10/22/2019    TRIG 61 04/02/2018     No results found for: CHOLHDL     EKG personally reviewed by Valarie Yeh, DO  Counseling / Coordination of Care  Time spent today 25 minutes  Greater than 50% of total time was spent with the patient and / or family counseling and / or coordination of care  We discussed diagnoses, most recent studies, tests and any changes in treatment plan  Thank you for the opportunity to participate in the care of this patient      [address] [facility] [place] [facility] [place] [facility]  MEDICAL DIRECTOR OF [place]

## 2021-03-01 NOTE — TELEPHONE ENCOUNTER
Spoke with Maude Gallegos,    Provided the results of his CT chest scan   Pt verbally understood   ===View-only below this line===  ----- Message -----  From: Adriana Reyes DO  Sent: 3/1/2021   7:59 AM EST  To: Gregg Rossi MA    There is no significant change from prior study- ascending aorta is stable in size

## 2021-03-02 ENCOUNTER — IMMUNIZATIONS (OUTPATIENT)
Dept: FAMILY MEDICINE CLINIC | Facility: HOSPITAL | Age: 81
End: 2021-03-02

## 2021-03-02 DIAGNOSIS — Z23 ENCOUNTER FOR IMMUNIZATION: Primary | ICD-10-CM

## 2021-03-02 PROCEDURE — 91301 SARS-COV-2 / COVID-19 MRNA VACCINE (MODERNA) 100 MCG: CPT

## 2021-03-02 PROCEDURE — 0012A SARS-COV-2 / COVID-19 MRNA VACCINE (MODERNA) 100 MCG: CPT

## 2021-03-03 ENCOUNTER — OFFICE VISIT (OUTPATIENT)
Dept: CARDIOLOGY CLINIC | Facility: CLINIC | Age: 81
End: 2021-03-03
Payer: MEDICARE

## 2021-03-03 VITALS
BODY MASS INDEX: 37.39 KG/M2 | SYSTOLIC BLOOD PRESSURE: 132 MMHG | OXYGEN SATURATION: 96 % | WEIGHT: 238.2 LBS | HEIGHT: 67 IN | DIASTOLIC BLOOD PRESSURE: 56 MMHG | HEART RATE: 67 BPM

## 2021-03-03 DIAGNOSIS — E78.00 PURE HYPERCHOLESTEROLEMIA: Primary | ICD-10-CM

## 2021-03-03 DIAGNOSIS — I10 ESSENTIAL HYPERTENSION: ICD-10-CM

## 2021-03-03 DIAGNOSIS — I71.2 THORACIC AORTIC ANEURYSM WITHOUT RUPTURE (HCC): ICD-10-CM

## 2021-03-03 PROCEDURE — 99214 OFFICE O/P EST MOD 30 MIN: CPT | Performed by: INTERNAL MEDICINE

## 2021-03-16 DIAGNOSIS — E78.00 PURE HYPERCHOLESTEROLEMIA: ICD-10-CM

## 2021-03-16 RX ORDER — ATORVASTATIN CALCIUM 40 MG/1
40 TABLET, FILM COATED ORAL DAILY
Qty: 90 TABLET | Refills: 1 | Status: SHIPPED | OUTPATIENT
Start: 2021-03-16 | End: 2021-05-14 | Stop reason: SDUPTHER

## 2021-03-26 DIAGNOSIS — E11.42 TYPE 2 DIABETES MELLITUS WITH DIABETIC POLYNEUROPATHY, WITHOUT LONG-TERM CURRENT USE OF INSULIN (HCC): ICD-10-CM

## 2021-03-26 RX ORDER — GABAPENTIN 100 MG/1
CAPSULE ORAL
Qty: 90 CAPSULE | Refills: 3 | Status: SHIPPED | OUTPATIENT
Start: 2021-03-26 | End: 2021-07-26

## 2021-04-30 ENCOUNTER — OFFICE VISIT (OUTPATIENT)
Dept: FAMILY MEDICINE CLINIC | Facility: CLINIC | Age: 81
End: 2021-04-30
Payer: MEDICARE

## 2021-04-30 VITALS
WEIGHT: 233 LBS | RESPIRATION RATE: 18 BRPM | DIASTOLIC BLOOD PRESSURE: 60 MMHG | BODY MASS INDEX: 36.57 KG/M2 | SYSTOLIC BLOOD PRESSURE: 138 MMHG | HEIGHT: 67 IN | OXYGEN SATURATION: 96 % | HEART RATE: 52 BPM

## 2021-04-30 DIAGNOSIS — I10 ESSENTIAL HYPERTENSION: ICD-10-CM

## 2021-04-30 DIAGNOSIS — I71.2 THORACIC AORTIC ANEURYSM WITHOUT RUPTURE (HCC): ICD-10-CM

## 2021-04-30 DIAGNOSIS — C64.1 RENAL CELL CARCINOMA OF RIGHT KIDNEY (HCC): ICD-10-CM

## 2021-04-30 DIAGNOSIS — E66.01 OBESITY, MORBID (HCC): ICD-10-CM

## 2021-04-30 DIAGNOSIS — E66.9 OBESITY, CLASS II, BMI 35-39.9: ICD-10-CM

## 2021-04-30 DIAGNOSIS — E11.42 TYPE 2 DIABETES MELLITUS WITH DIABETIC POLYNEUROPATHY, WITHOUT LONG-TERM CURRENT USE OF INSULIN (HCC): Primary | ICD-10-CM

## 2021-04-30 DIAGNOSIS — E11.9 TYPE 2 DIABETES MELLITUS WITHOUT COMPLICATION, WITHOUT LONG-TERM CURRENT USE OF INSULIN (HCC): ICD-10-CM

## 2021-04-30 DIAGNOSIS — D69.6 THROMBOCYTOPENIA (HCC): ICD-10-CM

## 2021-04-30 LAB — SL AMB POCT HEMOGLOBIN AIC: 7 (ref ?–6.5)

## 2021-04-30 PROCEDURE — 99214 OFFICE O/P EST MOD 30 MIN: CPT | Performed by: FAMILY MEDICINE

## 2021-04-30 PROCEDURE — 83036 HEMOGLOBIN GLYCOSYLATED A1C: CPT | Performed by: FAMILY MEDICINE

## 2021-04-30 RX ORDER — LOSARTAN POTASSIUM AND HYDROCHLOROTHIAZIDE 25; 100 MG/1; MG/1
1 TABLET ORAL DAILY
Qty: 90 TABLET | Refills: 3 | Status: SHIPPED | OUTPATIENT
Start: 2021-04-30 | End: 2021-05-14 | Stop reason: SDUPTHER

## 2021-04-30 NOTE — PROGRESS NOTES
Assessment/Plan:       Problem List Items Addressed This Visit        Endocrine    Type 2 diabetes mellitus with diabetic polyneuropathy ([facility] [address] ) - Primary    Relevant Medications    sitaGLIPtin (Januvia) 100 mg tablet    Other Relevant Orders    POCT hemoglobin A1c (Completed)    Comprehensive metabolic panel    Lipid Panel with Direct LDL reflex    Hemoglobin A1C       Cardiovascular and Mediastinum    Essential hypertension    Relevant Medications    losartan-hydrochlorothiazide (HYZAAR) 100-25 MG per tablet    Other Relevant Orders    CBC and differential    Comprehensive metabolic panel    Thoracic aortic aneurysm without rupture (HCC)    Relevant Orders    CBC and differential       Genitourinary    Renal cell carcinoma of right kidney ([facility] [address] )     Continue close follow-up with urology         Relevant Orders    Comprehensive metabolic panel       Other    Thrombocytopenia (HCC)     Check CBC prior to follow-up         Relevant Orders    CBC and differential    Obesity, morbid ([facility] [address] )      Other Visit Diagnoses     Obesity, Class II, BMI 35-39 9        Type 2 diabetes mellitus without complication, without long-term current use of insulin (HCC)        Relevant Medications    sitaGLIPtin (Januvia) 100 mg tablet    Other Relevant Orders    Hemoglobin A1C    Microalbumin / creatinine urine ratio            Subjective:      Patient ID: Adarsh Damon is a 80 y o  male  HPI   The patient presents today for follow-up for diabetes  Fortunately, the A1c is well controlled  There have been no significant episodes of hypo or hyperglycemia  The patient is not experiencing any blurry vision, polyuria, polydipsia, or peripheral neuropathy symptoms  Patient remains compliant with medications and routine follow-up  The patient presents today for a hypertension follow-up  Patient remains compliant with medications and denies any chest pain, shortness of breath, palpitations, lightheadedness or diaphoresis    He has history of some low back pain which is stable  He has a history of neuropathy which is stable with 200 mg of gabapentin at nighttime  The following portions of the patient's history were reviewed and updated as appropriate: allergies, current medications, past family history, past medical history, past social history, past surgical history and problem list       Current Outpatient Medications:     ACCU-CHEK OMERO PLUS test strip, USE TO TEST ONCE DAILY, Disp: 100 each, Rfl: 2    Accu-Chek FastClix Lancets MISC, TEST TWO TIMES A DAY, Disp: 200 each, Rfl: 3    Alpha Lipoic Acid 200 MG CAPS, Take 1 daily as directed by Podiatry  , Disp: , Rfl:     amLODIPine (NORVASC) 5 mg tablet, TAKE 1 TABLET DAILY, Disp: 90 tablet, Rfl: 3    atorvastatin (LIPITOR) 40 mg tablet, Take 1 tablet (40 mg total) by mouth daily, Disp: 90 tablet, Rfl: 1    Blood Glucose Monitoring Suppl (ACCU-CHEK OMERO PLUS) w/Device KIT, by Does not apply route, Disp: , Rfl:     gabapentin (NEURONTIN) 100 mg capsule, TAKE 1 TAB AT BEDTIME  INCREASE UP TO 3 TABS NIGHTLY AS NEEDED FOR NEUROPATHY  , Disp: 90 capsule, Rfl: 3    glucose blood (ACCU-CHEK OMERO PLUS) test strip, Test twice daily, Disp: 200 each, Rfl: 3    losartan-hydrochlorothiazide (HYZAAR) 100-25 MG per tablet, Take 1 tablet by mouth daily, Disp: 90 tablet, Rfl: 3    metFORMIN (GLUCOPHAGE) 500 mg tablet, TAKE 2 TABLETS IN THE      MORNING AND 1 TABLET IN THEEVENING, Disp: 270 tablet, Rfl: 3    Polyethyl Glycol-Propyl Glycol (SYSTANE OP), Apply to eye 1 drop each eye twice a day , Disp: , Rfl:     sitaGLIPtin (Januvia) 100 mg tablet, Take 1 tablet (100 mg total) by mouth daily, Disp: 90 tablet, Rfl: 3     Review of Systems   Constitutional: Negative for appetite change, chills, fatigue, fever and unexpected weight change  HENT: Negative for trouble swallowing  Eyes: Negative for visual disturbance  Respiratory: Negative for cough, chest tightness, shortness of breath and wheezing  Cardiovascular: Negative for chest pain, palpitations and leg swelling  Gastrointestinal: Negative for abdominal distention, abdominal pain, blood in stool, constipation and diarrhea  Endocrine: Negative for polyuria  Genitourinary: Negative for difficulty urinating and flank pain  Musculoskeletal: Negative for arthralgias and myalgias  Skin: Negative for rash  Neurological: Negative for dizziness and light-headedness  Hematological: Negative for adenopathy  Does not bruise/bleed easily  Psychiatric/Behavioral: Negative for dysphoric mood and sleep disturbance  The patient is not nervous/anxious  Objective:      /60 (BP Location: Left arm, Patient Position: Sitting, Cuff Size: Large)   Pulse (!) 52   Resp 18   Ht 5' 7" (1 702 m)   Wt 106 kg (233 lb)   SpO2 96%   BMI 36 49 kg/m²          Physical Exam  Vitals signs reviewed  Constitutional:       General: He is not in acute distress  Appearance: He is well-developed  He is obese  He is not diaphoretic  HENT:      Head: Normocephalic  Eyes:      General:         Right eye: No discharge  Left eye: No discharge  Pupils: Pupils are equal, round, and reactive to light  Neck:      Thyroid: No thyromegaly  Trachea: No tracheal deviation  Cardiovascular:      Rate and Rhythm: Normal rate and regular rhythm  Heart sounds: Normal heart sounds  No murmur  Pulmonary:      Effort: Pulmonary effort is normal  No respiratory distress  Breath sounds: No wheezing or rales  Abdominal:      General: There is no distension  Palpations: Abdomen is soft  Tenderness: There is no abdominal tenderness  Musculoskeletal: Normal range of motion  Lymphadenopathy:      Cervical: No cervical adenopathy  Skin:     General: Skin is warm  Findings: No erythema  Neurological:      Mental Status: He is alert and oriented to person, place, and time  Cranial Nerves: No cranial nerve deficit  Psychiatric:         Thought Content:  Thought content normal          Judgment: Judgment normal            Jono Prather MD

## 2021-05-08 DIAGNOSIS — E11.9 TYPE 2 DIABETES MELLITUS WITHOUT COMPLICATION, WITHOUT LONG-TERM CURRENT USE OF INSULIN (HCC): ICD-10-CM

## 2021-05-10 RX ORDER — LANCETS
EACH MISCELLANEOUS
Qty: 204 EACH | Refills: 3 | Status: SHIPPED | OUTPATIENT
Start: 2021-05-10 | End: 2021-09-15 | Stop reason: SDUPTHER

## 2021-05-14 DIAGNOSIS — E78.00 PURE HYPERCHOLESTEROLEMIA: ICD-10-CM

## 2021-05-14 DIAGNOSIS — I10 ESSENTIAL HYPERTENSION: ICD-10-CM

## 2021-05-14 DIAGNOSIS — E11.9 TYPE 2 DIABETES MELLITUS WITHOUT COMPLICATION, WITHOUT LONG-TERM CURRENT USE OF INSULIN (HCC): ICD-10-CM

## 2021-05-18 RX ORDER — SITAGLIPTIN 100 MG/1
TABLET, FILM COATED ORAL
Qty: 90 TABLET | Refills: 3 | OUTPATIENT
Start: 2021-05-18

## 2021-05-18 RX ORDER — ATORVASTATIN CALCIUM 40 MG/1
40 TABLET, FILM COATED ORAL DAILY
Qty: 90 TABLET | Refills: 1 | Status: SHIPPED | OUTPATIENT
Start: 2021-05-18 | End: 2022-01-12

## 2021-05-18 RX ORDER — LOSARTAN POTASSIUM AND HYDROCHLOROTHIAZIDE 25; 100 MG/1; MG/1
1 TABLET ORAL DAILY
Qty: 90 TABLET | Refills: 3 | Status: SHIPPED | OUTPATIENT
Start: 2021-05-18 | End: 2022-03-01 | Stop reason: SDUPTHER

## 2021-05-18 RX ORDER — LOSARTAN POTASSIUM AND HYDROCHLOROTHIAZIDE 25; 100 MG/1; MG/1
TABLET ORAL
Qty: 90 TABLET | Refills: 3 | OUTPATIENT
Start: 2021-05-18

## 2021-07-06 ENCOUNTER — TELEPHONE (OUTPATIENT)
Dept: UROLOGY | Facility: AMBULATORY SURGERY CENTER | Age: 81
End: 2021-07-06

## 2021-07-06 ENCOUNTER — APPOINTMENT (OUTPATIENT)
Dept: LAB | Facility: CLINIC | Age: 81
End: 2021-07-06
Payer: MEDICARE

## 2021-07-06 DIAGNOSIS — C64.1 RENAL CELL CARCINOMA OF RIGHT KIDNEY (HCC): ICD-10-CM

## 2021-07-06 DIAGNOSIS — C64.1 RENAL CELL CARCINOMA OF RIGHT KIDNEY (HCC): Primary | ICD-10-CM

## 2021-07-06 LAB
ANION GAP SERPL CALCULATED.3IONS-SCNC: 8 MMOL/L (ref 4–13)
BUN SERPL-MCNC: 21 MG/DL (ref 5–25)
CALCIUM SERPL-MCNC: 8.9 MG/DL (ref 8.3–10.1)
CHLORIDE SERPL-SCNC: 106 MMOL/L (ref 100–108)
CO2 SERPL-SCNC: 24 MMOL/L (ref 21–32)
CREAT SERPL-MCNC: 0.96 MG/DL (ref 0.6–1.3)
GFR SERPL CREATININE-BSD FRML MDRD: 75 ML/MIN/1.73SQ M
GLUCOSE P FAST SERPL-MCNC: 141 MG/DL (ref 65–99)
POTASSIUM SERPL-SCNC: 4 MMOL/L (ref 3.5–5.3)
SODIUM SERPL-SCNC: 138 MMOL/L (ref 136–145)

## 2021-07-06 PROCEDURE — 80048 BASIC METABOLIC PNL TOTAL CA: CPT

## 2021-07-06 PROCEDURE — 36415 COLL VENOUS BLD VENIPUNCTURE: CPT

## 2021-07-06 NOTE — TELEPHONE ENCOUNTER
Patient was called and made aware that a new order for BMP has been placed for patient to have done prior to his cat scan

## 2021-07-06 NOTE — TELEPHONE ENCOUNTER
Patient has upcoming appointment for Cat Scan on 7/12/21  He needs a bmp lab order added to Epic  He will go to [email or web address]  Please call patient back to let them know when it is done

## 2021-07-12 ENCOUNTER — HOSPITAL ENCOUNTER (OUTPATIENT)
Dept: RADIOLOGY | Facility: HOSPITAL | Age: 81
Discharge: HOME | End: 2021-07-12
Payer: MEDICARE

## 2021-07-12 ENCOUNTER — HOSPITAL ENCOUNTER (OUTPATIENT)
Dept: CT IMAGING | Facility: HOSPITAL | Age: 81
Discharge: HOME | End: 2021-07-12
Payer: MEDICARE

## 2021-07-12 DIAGNOSIS — C64.1 RENAL CELL CARCINOMA OF RIGHT KIDNEY (HCC): ICD-10-CM

## 2021-07-12 PROCEDURE — 74178 CT ABD&PLV WO CNTR FLWD CNTR: CPT

## 2021-07-12 PROCEDURE — 71046 X-RAY EXAM CHEST 2 VIEWS: CPT

## 2021-07-12 RX ADMIN — IOHEXOL 100 ML: 350 INJECTION, SOLUTION INTRAVENOUS at 07:45

## 2021-07-23 ENCOUNTER — OFFICE VISIT (OUTPATIENT)
Dept: UROLOGY | Facility: CLINIC | Age: 81
End: 2021-07-23
Payer: MEDICARE

## 2021-07-23 VITALS
SYSTOLIC BLOOD PRESSURE: 128 MMHG | HEART RATE: 67 BPM | DIASTOLIC BLOOD PRESSURE: 72 MMHG | BODY MASS INDEX: 36.85 KG/M2 | WEIGHT: 234.8 LBS | HEIGHT: 67 IN

## 2021-07-23 DIAGNOSIS — C64.1 RENAL CELL CARCINOMA OF RIGHT KIDNEY (HCC): Primary | ICD-10-CM

## 2021-07-23 PROCEDURE — 99214 OFFICE O/P EST MOD 30 MIN: CPT | Performed by: PHYSICIAN ASSISTANT

## 2021-07-23 NOTE — PROGRESS NOTES
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  - s/p RALPNx September 4177   - surgical margins negative  - 8mm right iliac lymph node on preoperative CT, no hypermetabolic activity on CT PET, NO longer seen on past few years imaging  - CXR/CT a/p for today's visit no evidence of disease  - GFR 75      He is now 4 years out from his partial nephrectomy for T1b RCC  Upcoming surveillance will be transition to chest x-ray and renal ultrasound once per year through year 5 with BMP  History of Present Illness  Efe Tse is a 80 y o  male here for evaluation of   Annual follow-up right RCC status post partial nephrectomy September 2017  Presents today with no urinary complaint specifically no flank pain, hematuria or difficulty voiding  Surgical sites are well healed, no bulge or hernia or pain  Overall feels well  He had his surveillance CT abdomen pelvis as well as a chest x-ray with no evidence of residual/recurrent/metastatic disease  Stable prostatomegaly and small benign simple renal cysts  Kidney function is excellent with GFR 75  He feels great  He's going golfing later this afternoon  Review of Systems   Constitutional: Negative for activity change, appetite change, chills, fever and unexpected weight change  HENT: Negative  Respiratory: Negative  Negative for shortness of breath  Cardiovascular: Negative  Negative for chest pain  Gastrointestinal: Negative for abdominal pain, diarrhea, nausea and vomiting  Endocrine: Negative  Genitourinary: Negative for decreased urine volume, difficulty urinating, dysuria, flank pain, frequency, hematuria and urgency  Musculoskeletal: Negative for back pain and gait problem  Skin: Negative  Allergic/Immunologic: Negative  Neurological: Negative      Hematological: Negative for adenopathy  Does not bruise/bleed easily  Urinary Incontinence Screening      Most Recent Value   Urinary Incontinence   Urinary Incontinence? No   Incomplete emptying? No   Urinary frequency? No   Urinary urgency? No   Urinary hesitancy? No   Dysuria (painful difficult urination)? No   Nocturia (waking up to use the bathroom)? No   Straining (having to push to go)? No   Weak stream?  No   Intermittent stream?  No               Vitals  Vitals:    07/23/21 1052   BP: 128/72   BP Location: Left arm   Patient Position: Sitting   Cuff Size: Large   Pulse: 67   Weight: 107 kg (234 lb 12 8 oz)   Height: 5' 7" (1 702 m)       Physical Exam  Vitals and nursing note reviewed  Constitutional:       General: He is not in acute distress  Appearance: Normal appearance  He is well-developed  He is not diaphoretic  HENT:      Head: Normocephalic and atraumatic  Pulmonary:      Effort: Pulmonary effort is normal       Comments: No cough or audible wheeze  Abdominal:      General: There is no distension  Tenderness: There is no abdominal tenderness  There is no right CVA tenderness or left CVA tenderness  Musculoskeletal:      Right lower leg: No edema  Left lower leg: No edema  Skin:     General: Skin is warm and dry  Neurological:      Mental Status: He is alert and oriented to person, place, and time        Gait: Gait normal    Psychiatric:         Speech: Speech normal          Behavior: Behavior normal            Past History  Past Medical History:   Diagnosis Date    Arthritis     Bladder mass 6/28/2017    Follows with Urology    Cancer [facility])     right kidney    Cardiac aneurysm     Chronic pain disorder     Diabetes mellitus ([facility] [address] )     NIDDM    Erectile dysfunction of non-organic origin     Glaucoma     Hx of bleeding disorder     in urine    Hyperlipidemia     Hypertension     Meniscus tear     Left knee    Urinary tract infection with hematuria     last assessed 2017    Wears glasses     Wears partial dentures     upper partial     Social History     Socioeconomic History    Marital status: /Civil Union     Spouse name: None    Number of children: None    Years of education: None    Highest education level: None   Occupational History    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   Tobacco Use    Smoking status: Former Smoker     Quit date: 1985     Years since quittin 9    Smokeless tobacco: Never Used   Vaping Use    Vaping Use: Never used   Substance and Sexual Activity    Alcohol use: Not Currently     Comment: rarely    Drug use: No    Sexual activity: None   Other Topics Concern    None   Social History Narrative    Does not consume caffeine        Never drank alcohol per [employer]     Social Determinants of Health     Financial Resource Strain:     Difficulty of Paying Living Expenses:    Food Insecurity:     Worried About Running Out of Food in the Last Year:     Ran Out of Food in the Last Year:    Transportation Needs:     Lack of Transportation (Medical):      Lack of Transportation (Non-Medical):    Physical Activity:     Days of Exercise per Week:     Minutes of Exercise per Session:    Stress:     Feeling of Stress :    Social Connections:     Frequency of Communication with Friends and Family:     Frequency of Social Gatherings with Friends and Family:     Attends Latter-day Services:     Active Member of Clubs or Organizations:     Attends Club or Organization Meetings:     Marital Status:    Intimate Partner Violence:     Fear of Current or Ex-Partner:     Emotionally Abused:     Physically Abused:     Sexually Abused:      Social History     Tobacco Use   Smoking Status Former Smoker    Quit date: 1985    Years since quittin 9   Smokeless Tobacco Never Used     Family History   Problem Relation Age of Onset    Other Mother         malaria    Diabetes Sister     Hypertension Sister The following portions of the patient's history were reviewed and updated as appropriate: allergies, current medications, past medical history, past social history, past surgical history and problem list     Results  No results found for this or any previous visit (from the past 1 hour(s)) ]  Lab Results   Component Value Date    PSA 0 4 04/27/2015    PSA 0 4 01/20/2014     Lab Results   Component Value Date    GLUCOSE 276 (H) 07/29/2015    CALCIUM 8 9 07/06/2021     07/29/2015    K 4 0 07/06/2021    CO2 24 07/06/2021     07/06/2021    BUN 21 07/06/2021    CREATININE 0 96 07/06/2021     Lab Results   Component Value Date    WBC 7 26 11/30/2020    HGB 14 6 11/30/2020    HCT 43 5 11/30/2020    MCV 87 11/30/2020     11/30/2020

## 2021-07-24 DIAGNOSIS — E11.42 TYPE 2 DIABETES MELLITUS WITH DIABETIC POLYNEUROPATHY, WITHOUT LONG-TERM CURRENT USE OF INSULIN (HCC): ICD-10-CM

## 2021-07-26 RX ORDER — GABAPENTIN 100 MG/1
CAPSULE ORAL
Qty: 90 CAPSULE | Refills: 3 | Status: SHIPPED | OUTPATIENT
Start: 2021-07-26 | End: 2021-11-24

## 2021-08-13 DIAGNOSIS — I10 HTN (HYPERTENSION), BENIGN: ICD-10-CM

## 2021-08-14 RX ORDER — AMLODIPINE BESYLATE 5 MG/1
TABLET ORAL
Qty: 90 TABLET | Refills: 3 | Status: SHIPPED | OUTPATIENT
Start: 2021-08-14

## 2021-09-15 ENCOUNTER — PATIENT MESSAGE (OUTPATIENT)
Dept: FAMILY MEDICINE CLINIC | Facility: CLINIC | Age: 81
End: 2021-09-15

## 2021-09-15 ENCOUNTER — TELEPHONE (OUTPATIENT)
Dept: FAMILY MEDICINE CLINIC | Facility: CLINIC | Age: 81
End: 2021-09-15

## 2021-09-15 DIAGNOSIS — E11.9 TYPE 2 DIABETES MELLITUS WITHOUT COMPLICATION, WITHOUT LONG-TERM CURRENT USE OF INSULIN (HCC): ICD-10-CM

## 2021-09-15 RX ORDER — BLOOD SUGAR DIAGNOSTIC
STRIP MISCELLANEOUS
Qty: 100 EACH | Refills: 2 | Status: SHIPPED | OUTPATIENT
Start: 2021-09-15 | End: 2022-04-30 | Stop reason: SDUPTHER

## 2021-09-15 RX ORDER — LANCETS
EACH MISCELLANEOUS
Qty: 204 EACH | Refills: 3 | Status: SHIPPED | OUTPATIENT
Start: 2021-09-15 | End: 2021-09-16

## 2021-09-16 DIAGNOSIS — E11.9 TYPE 2 DIABETES MELLITUS WITHOUT COMPLICATION, WITHOUT LONG-TERM CURRENT USE OF INSULIN (HCC): ICD-10-CM

## 2021-09-16 RX ORDER — LANCETS
EACH MISCELLANEOUS DAILY
Qty: 102 EACH | Refills: 3 | Status: SHIPPED | OUTPATIENT
Start: 2021-09-16 | End: 2022-04-30 | Stop reason: SDUPTHER

## 2021-09-16 NOTE — TELEPHONE ENCOUNTER
Patient is not on insulin  Per medicare, patient only needs to check daily   Will update sig       PCP: FYI - no further action, orders placed under CPA

## 2021-10-08 ENCOUNTER — APPOINTMENT (OUTPATIENT)
Dept: LAB | Facility: CLINIC | Age: 81
End: 2021-10-08
Payer: MEDICARE

## 2021-10-08 DIAGNOSIS — I10 ESSENTIAL HYPERTENSION: ICD-10-CM

## 2021-10-08 DIAGNOSIS — I71.2 THORACIC AORTIC ANEURYSM WITHOUT RUPTURE (HCC): ICD-10-CM

## 2021-10-08 DIAGNOSIS — E11.9 TYPE 2 DIABETES MELLITUS WITHOUT COMPLICATION, WITHOUT LONG-TERM CURRENT USE OF INSULIN (HCC): ICD-10-CM

## 2021-10-08 DIAGNOSIS — C64.1 RENAL CELL CARCINOMA OF RIGHT KIDNEY (HCC): ICD-10-CM

## 2021-10-08 DIAGNOSIS — D69.6 THROMBOCYTOPENIA (HCC): ICD-10-CM

## 2021-10-08 DIAGNOSIS — E11.42 TYPE 2 DIABETES MELLITUS WITH DIABETIC POLYNEUROPATHY, WITHOUT LONG-TERM CURRENT USE OF INSULIN (HCC): ICD-10-CM

## 2021-10-08 LAB
ALBUMIN SERPL BCP-MCNC: 3.5 G/DL (ref 3.5–5)
ALP SERPL-CCNC: 89 U/L (ref 46–116)
ALT SERPL W P-5'-P-CCNC: 29 U/L (ref 12–78)
ANION GAP SERPL CALCULATED.3IONS-SCNC: 4 MMOL/L (ref 4–13)
AST SERPL W P-5'-P-CCNC: 18 U/L (ref 5–45)
BASOPHILS # BLD AUTO: 0.03 THOUSANDS/ΜL (ref 0–0.1)
BASOPHILS NFR BLD AUTO: 1 % (ref 0–1)
BILIRUB SERPL-MCNC: 0.64 MG/DL (ref 0.2–1)
BUN SERPL-MCNC: 24 MG/DL (ref 5–25)
CALCIUM SERPL-MCNC: 9 MG/DL (ref 8.3–10.1)
CHLORIDE SERPL-SCNC: 105 MMOL/L (ref 100–108)
CHOLEST SERPL-MCNC: 149 MG/DL (ref 50–200)
CO2 SERPL-SCNC: 27 MMOL/L (ref 21–32)
CREAT SERPL-MCNC: 0.99 MG/DL (ref 0.6–1.3)
CREAT UR-MCNC: 130 MG/DL
EOSINOPHIL # BLD AUTO: 0.21 THOUSAND/ΜL (ref 0–0.61)
EOSINOPHIL NFR BLD AUTO: 4 % (ref 0–6)
ERYTHROCYTE [DISTWIDTH] IN BLOOD BY AUTOMATED COUNT: 13.9 % (ref 11.6–15.1)
EST. AVERAGE GLUCOSE BLD GHB EST-MCNC: 154 MG/DL
GFR SERPL CREATININE-BSD FRML MDRD: 72 ML/MIN/1.73SQ M
GLUCOSE P FAST SERPL-MCNC: 159 MG/DL (ref 65–99)
HBA1C MFR BLD: 7 %
HCT VFR BLD AUTO: 40.3 % (ref 36.5–49.3)
HDLC SERPL-MCNC: 54 MG/DL
HGB BLD-MCNC: 13.4 G/DL (ref 12–17)
IMM GRANULOCYTES # BLD AUTO: 0.03 THOUSAND/UL (ref 0–0.2)
IMM GRANULOCYTES NFR BLD AUTO: 1 % (ref 0–2)
LDLC SERPL CALC-MCNC: 83 MG/DL (ref 0–100)
LYMPHOCYTES # BLD AUTO: 1.26 THOUSANDS/ΜL (ref 0.6–4.47)
LYMPHOCYTES NFR BLD AUTO: 22 % (ref 14–44)
MCH RBC QN AUTO: 29.2 PG (ref 26.8–34.3)
MCHC RBC AUTO-ENTMCNC: 33.3 G/DL (ref 31.4–37.4)
MCV RBC AUTO: 88 FL (ref 82–98)
MICROALBUMIN UR-MCNC: 7.3 MG/L (ref 0–20)
MICROALBUMIN/CREAT 24H UR: 6 MG/G CREATININE (ref 0–30)
MONOCYTES # BLD AUTO: 0.44 THOUSAND/ΜL (ref 0.17–1.22)
MONOCYTES NFR BLD AUTO: 8 % (ref 4–12)
NEUTROPHILS # BLD AUTO: 3.75 THOUSANDS/ΜL (ref 1.85–7.62)
NEUTS SEG NFR BLD AUTO: 64 % (ref 43–75)
NRBC BLD AUTO-RTO: 0 /100 WBCS
PLATELET # BLD AUTO: 167 THOUSANDS/UL (ref 149–390)
PMV BLD AUTO: 11.4 FL (ref 8.9–12.7)
POTASSIUM SERPL-SCNC: 3.9 MMOL/L (ref 3.5–5.3)
PROT SERPL-MCNC: 7 G/DL (ref 6.4–8.2)
RBC # BLD AUTO: 4.59 MILLION/UL (ref 3.88–5.62)
SODIUM SERPL-SCNC: 136 MMOL/L (ref 136–145)
TRIGL SERPL-MCNC: 58 MG/DL
WBC # BLD AUTO: 5.72 THOUSAND/UL (ref 4.31–10.16)

## 2021-10-08 PROCEDURE — 83036 HEMOGLOBIN GLYCOSYLATED A1C: CPT

## 2021-10-08 PROCEDURE — 82570 ASSAY OF URINE CREATININE: CPT

## 2021-10-08 PROCEDURE — 80053 COMPREHEN METABOLIC PANEL: CPT

## 2021-10-08 PROCEDURE — 82043 UR ALBUMIN QUANTITATIVE: CPT

## 2021-10-08 PROCEDURE — 80061 LIPID PANEL: CPT

## 2021-10-08 PROCEDURE — 85025 COMPLETE CBC W/AUTO DIFF WBC: CPT

## 2021-10-08 PROCEDURE — 36415 COLL VENOUS BLD VENIPUNCTURE: CPT

## 2021-10-13 ENCOUNTER — OFFICE VISIT (OUTPATIENT)
Dept: CARDIOLOGY CLINIC | Facility: CLINIC | Age: 81
End: 2021-10-13
Payer: MEDICARE

## 2021-10-13 VITALS
HEIGHT: 67 IN | BODY MASS INDEX: 37.46 KG/M2 | SYSTOLIC BLOOD PRESSURE: 120 MMHG | DIASTOLIC BLOOD PRESSURE: 54 MMHG | OXYGEN SATURATION: 97 % | WEIGHT: 238.7 LBS | HEART RATE: 60 BPM

## 2021-10-13 DIAGNOSIS — I71.2 THORACIC AORTIC ANEURYSM WITHOUT RUPTURE (HCC): ICD-10-CM

## 2021-10-13 DIAGNOSIS — E78.00 PURE HYPERCHOLESTEROLEMIA: Primary | ICD-10-CM

## 2021-10-13 DIAGNOSIS — I10 HTN (HYPERTENSION), BENIGN: ICD-10-CM

## 2021-10-13 PROCEDURE — 99214 OFFICE O/P EST MOD 30 MIN: CPT | Performed by: INTERNAL MEDICINE

## 2021-10-21 ENCOUNTER — IMMUNIZATIONS (OUTPATIENT)
Dept: FAMILY MEDICINE CLINIC | Facility: CLINIC | Age: 81
End: 2021-10-21
Payer: MEDICARE

## 2021-10-21 DIAGNOSIS — Z23 NEED FOR INFLUENZA VACCINATION: Primary | ICD-10-CM

## 2021-10-21 PROCEDURE — G0008 ADMIN INFLUENZA VIRUS VAC: HCPCS

## 2021-10-21 PROCEDURE — 90662 IIV NO PRSV INCREASED AG IM: CPT

## 2021-11-05 ENCOUNTER — APPOINTMENT (OUTPATIENT)
Dept: RADIOLOGY | Facility: MEDICAL CENTER | Age: 81
End: 2021-11-05
Payer: MEDICARE

## 2021-11-05 ENCOUNTER — OFFICE VISIT (OUTPATIENT)
Dept: FAMILY MEDICINE CLINIC | Facility: CLINIC | Age: 81
End: 2021-11-05
Payer: MEDICARE

## 2021-11-05 VITALS
WEIGHT: 238 LBS | OXYGEN SATURATION: 97 % | HEIGHT: 67 IN | RESPIRATION RATE: 18 BRPM | BODY MASS INDEX: 37.35 KG/M2 | DIASTOLIC BLOOD PRESSURE: 74 MMHG | SYSTOLIC BLOOD PRESSURE: 136 MMHG | HEART RATE: 72 BPM

## 2021-11-05 DIAGNOSIS — Z00.00 MEDICARE ANNUAL WELLNESS VISIT, SUBSEQUENT: Primary | ICD-10-CM

## 2021-11-05 DIAGNOSIS — G89.29 CHRONIC PAIN OF BOTH KNEES: ICD-10-CM

## 2021-11-05 DIAGNOSIS — D69.6 THROMBOCYTOPENIA (HCC): ICD-10-CM

## 2021-11-05 DIAGNOSIS — E66.9 OBESITY, CLASS II, BMI 35-39.9: ICD-10-CM

## 2021-11-05 DIAGNOSIS — E11.9 TYPE 2 DIABETES MELLITUS WITHOUT COMPLICATION, WITHOUT LONG-TERM CURRENT USE OF INSULIN (HCC): ICD-10-CM

## 2021-11-05 DIAGNOSIS — M25.562 CHRONIC PAIN OF BOTH KNEES: ICD-10-CM

## 2021-11-05 DIAGNOSIS — M25.561 CHRONIC PAIN OF BOTH KNEES: ICD-10-CM

## 2021-11-05 DIAGNOSIS — I71.2 THORACIC AORTIC ANEURYSM WITHOUT RUPTURE (HCC): ICD-10-CM

## 2021-11-05 DIAGNOSIS — E11.42 TYPE 2 DIABETES MELLITUS WITH DIABETIC POLYNEUROPATHY, WITHOUT LONG-TERM CURRENT USE OF INSULIN (HCC): ICD-10-CM

## 2021-11-05 PROCEDURE — G0439 PPPS, SUBSEQ VISIT: HCPCS | Performed by: FAMILY MEDICINE

## 2021-11-05 PROCEDURE — 73562 X-RAY EXAM OF KNEE 3: CPT

## 2021-11-05 PROCEDURE — 99214 OFFICE O/P EST MOD 30 MIN: CPT | Performed by: FAMILY MEDICINE

## 2021-11-05 RX ORDER — LATANOPROST 50 UG/ML
SOLUTION/ DROPS OPHTHALMIC
COMMUNITY

## 2021-11-05 RX ORDER — PYRIDOXINE HCL (VITAMIN B6) 100 MG
TABLET ORAL
COMMUNITY

## 2021-11-15 ENCOUNTER — TELEPHONE (OUTPATIENT)
Dept: OBGYN CLINIC | Facility: MEDICAL CENTER | Age: 81
End: 2021-11-15

## 2021-11-16 DIAGNOSIS — M17.0 BILATERAL PRIMARY OSTEOARTHRITIS OF KNEE: Primary | ICD-10-CM

## 2021-11-24 DIAGNOSIS — E11.42 TYPE 2 DIABETES MELLITUS WITH DIABETIC POLYNEUROPATHY, WITHOUT LONG-TERM CURRENT USE OF INSULIN (HCC): ICD-10-CM

## 2021-11-24 RX ORDER — GABAPENTIN 100 MG/1
CAPSULE ORAL
Qty: 90 CAPSULE | Refills: 3 | Status: SHIPPED | OUTPATIENT
Start: 2021-11-24 | End: 2022-03-27

## 2021-12-06 ENCOUNTER — APPOINTMENT (OUTPATIENT)
Dept: RADIOLOGY | Facility: MEDICAL CENTER | Age: 81
End: 2021-12-06
Payer: MEDICARE

## 2021-12-06 ENCOUNTER — OFFICE VISIT (OUTPATIENT)
Dept: OBGYN CLINIC | Facility: MEDICAL CENTER | Age: 81
End: 2021-12-06
Payer: MEDICARE

## 2021-12-06 VITALS
HEIGHT: 67 IN | BODY MASS INDEX: 37.04 KG/M2 | WEIGHT: 236 LBS | DIASTOLIC BLOOD PRESSURE: 72 MMHG | TEMPERATURE: 97.3 F | SYSTOLIC BLOOD PRESSURE: 135 MMHG | HEART RATE: 69 BPM

## 2021-12-06 DIAGNOSIS — M25.562 CHRONIC PAIN OF BOTH KNEES: ICD-10-CM

## 2021-12-06 DIAGNOSIS — M25.561 CHRONIC PAIN OF BOTH KNEES: ICD-10-CM

## 2021-12-06 DIAGNOSIS — M17.11 PRIMARY OSTEOARTHRITIS OF RIGHT KNEE: ICD-10-CM

## 2021-12-06 DIAGNOSIS — M17.12 PRIMARY OSTEOARTHRITIS OF LEFT KNEE: Primary | ICD-10-CM

## 2021-12-06 DIAGNOSIS — G89.29 CHRONIC PAIN OF BOTH KNEES: ICD-10-CM

## 2021-12-06 DIAGNOSIS — M17.12 PRIMARY OSTEOARTHRITIS OF LEFT KNEE: ICD-10-CM

## 2021-12-06 PROCEDURE — 73560 X-RAY EXAM OF KNEE 1 OR 2: CPT

## 2021-12-06 PROCEDURE — 99203 OFFICE O/P NEW LOW 30 MIN: CPT | Performed by: ORTHOPAEDIC SURGERY

## 2021-12-06 PROCEDURE — 20610 DRAIN/INJ JOINT/BURSA W/O US: CPT | Performed by: ORTHOPAEDIC SURGERY

## 2021-12-06 RX ORDER — HYALURONATE SODIUM 10 MG/ML
20 SYRINGE (ML) INTRAARTICULAR
Status: COMPLETED | OUTPATIENT
Start: 2021-12-06 | End: 2021-12-06

## 2021-12-06 RX ADMIN — Medication 20 MG: at 09:40

## 2021-12-12 DIAGNOSIS — E11.9 TYPE 2 DIABETES MELLITUS WITHOUT COMPLICATION, WITHOUT LONG-TERM CURRENT USE OF INSULIN (HCC): ICD-10-CM

## 2021-12-13 ENCOUNTER — PROCEDURE VISIT (OUTPATIENT)
Dept: OBGYN CLINIC | Facility: MEDICAL CENTER | Age: 81
End: 2021-12-13
Payer: MEDICARE

## 2021-12-13 VITALS
HEIGHT: 67 IN | BODY MASS INDEX: 37.29 KG/M2 | WEIGHT: 237.6 LBS | DIASTOLIC BLOOD PRESSURE: 72 MMHG | HEART RATE: 69 BPM | SYSTOLIC BLOOD PRESSURE: 136 MMHG

## 2021-12-13 DIAGNOSIS — M17.11 PRIMARY OSTEOARTHRITIS OF RIGHT KNEE: ICD-10-CM

## 2021-12-13 DIAGNOSIS — M17.12 PRIMARY OSTEOARTHRITIS OF LEFT KNEE: Primary | ICD-10-CM

## 2021-12-13 PROCEDURE — 99213 OFFICE O/P EST LOW 20 MIN: CPT | Performed by: ORTHOPAEDIC SURGERY

## 2021-12-13 PROCEDURE — 20610 DRAIN/INJ JOINT/BURSA W/O US: CPT | Performed by: ORTHOPAEDIC SURGERY

## 2021-12-13 RX ORDER — HYALURONATE SODIUM 10 MG/ML
20 SYRINGE (ML) INTRAARTICULAR
Status: COMPLETED | OUTPATIENT
Start: 2021-12-13 | End: 2021-12-13

## 2021-12-13 RX ADMIN — Medication 20 MG: at 11:19

## 2021-12-20 ENCOUNTER — PROCEDURE VISIT (OUTPATIENT)
Dept: OBGYN CLINIC | Facility: MEDICAL CENTER | Age: 81
End: 2021-12-20
Payer: MEDICARE

## 2021-12-20 VITALS
TEMPERATURE: 97.6 F | HEART RATE: 74 BPM | BODY MASS INDEX: 37.35 KG/M2 | SYSTOLIC BLOOD PRESSURE: 137 MMHG | DIASTOLIC BLOOD PRESSURE: 78 MMHG | WEIGHT: 238 LBS | HEIGHT: 67 IN

## 2021-12-20 DIAGNOSIS — M17.0 BILATERAL PRIMARY OSTEOARTHRITIS OF KNEE: Primary | ICD-10-CM

## 2021-12-20 PROCEDURE — 99213 OFFICE O/P EST LOW 20 MIN: CPT | Performed by: PHYSICIAN ASSISTANT

## 2021-12-20 PROCEDURE — 20610 DRAIN/INJ JOINT/BURSA W/O US: CPT | Performed by: PHYSICIAN ASSISTANT

## 2021-12-20 RX ORDER — HYALURONATE SODIUM 10 MG/ML
20 SYRINGE (ML) INTRAARTICULAR
Status: COMPLETED | OUTPATIENT
Start: 2021-12-20 | End: 2021-12-20

## 2021-12-20 RX ADMIN — Medication 20 MG: at 10:57

## 2022-01-12 DIAGNOSIS — E78.00 PURE HYPERCHOLESTEROLEMIA: ICD-10-CM

## 2022-01-12 RX ORDER — ATORVASTATIN CALCIUM 40 MG/1
TABLET, FILM COATED ORAL
Qty: 90 TABLET | Refills: 1 | Status: SHIPPED | OUTPATIENT
Start: 2022-01-12 | End: 2022-03-10 | Stop reason: SDUPTHER

## 2022-02-03 ENCOUNTER — APPOINTMENT (OUTPATIENT)
Dept: LAB | Facility: CLINIC | Age: 82
End: 2022-02-03
Payer: MEDICARE

## 2022-02-03 DIAGNOSIS — D69.6 THROMBOCYTOPENIA (HCC): ICD-10-CM

## 2022-02-03 DIAGNOSIS — E11.9 TYPE 2 DIABETES MELLITUS WITHOUT COMPLICATION, WITHOUT LONG-TERM CURRENT USE OF INSULIN (HCC): ICD-10-CM

## 2022-02-03 LAB
ALBUMIN SERPL BCP-MCNC: 3.6 G/DL (ref 3.5–5)
ALP SERPL-CCNC: 102 U/L (ref 46–116)
ALT SERPL W P-5'-P-CCNC: 31 U/L (ref 12–78)
ANION GAP SERPL CALCULATED.3IONS-SCNC: 4 MMOL/L (ref 4–13)
AST SERPL W P-5'-P-CCNC: 15 U/L (ref 5–45)
BASOPHILS # BLD AUTO: 0.03 THOUSANDS/ΜL (ref 0–0.1)
BASOPHILS NFR BLD AUTO: 1 % (ref 0–1)
BILIRUB SERPL-MCNC: 1.2 MG/DL (ref 0.2–1)
BUN SERPL-MCNC: 19 MG/DL (ref 5–25)
CALCIUM SERPL-MCNC: 9 MG/DL (ref 8.3–10.1)
CHLORIDE SERPL-SCNC: 105 MMOL/L (ref 100–108)
CO2 SERPL-SCNC: 28 MMOL/L (ref 21–32)
CREAT SERPL-MCNC: 0.94 MG/DL (ref 0.6–1.3)
EOSINOPHIL # BLD AUTO: 0.32 THOUSAND/ΜL (ref 0–0.61)
EOSINOPHIL NFR BLD AUTO: 6 % (ref 0–6)
ERYTHROCYTE [DISTWIDTH] IN BLOOD BY AUTOMATED COUNT: 14 % (ref 11.6–15.1)
EST. AVERAGE GLUCOSE BLD GHB EST-MCNC: 166 MG/DL
GFR SERPL CREATININE-BSD FRML MDRD: 76 ML/MIN/1.73SQ M
GLUCOSE P FAST SERPL-MCNC: 165 MG/DL (ref 65–99)
HBA1C MFR BLD: 7.4 %
HCT VFR BLD AUTO: 40.9 % (ref 36.5–49.3)
HGB BLD-MCNC: 13.6 G/DL (ref 12–17)
IMM GRANULOCYTES # BLD AUTO: 0.01 THOUSAND/UL (ref 0–0.2)
IMM GRANULOCYTES NFR BLD AUTO: 0 % (ref 0–2)
LYMPHOCYTES # BLD AUTO: 1.16 THOUSANDS/ΜL (ref 0.6–4.47)
LYMPHOCYTES NFR BLD AUTO: 22 % (ref 14–44)
MCH RBC QN AUTO: 29 PG (ref 26.8–34.3)
MCHC RBC AUTO-ENTMCNC: 33.3 G/DL (ref 31.4–37.4)
MCV RBC AUTO: 87 FL (ref 82–98)
MONOCYTES # BLD AUTO: 0.4 THOUSAND/ΜL (ref 0.17–1.22)
MONOCYTES NFR BLD AUTO: 8 % (ref 4–12)
NEUTROPHILS # BLD AUTO: 3.28 THOUSANDS/ΜL (ref 1.85–7.62)
NEUTS SEG NFR BLD AUTO: 63 % (ref 43–75)
NRBC BLD AUTO-RTO: 0 /100 WBCS
PLATELET # BLD AUTO: 166 THOUSANDS/UL (ref 149–390)
PMV BLD AUTO: 11.1 FL (ref 8.9–12.7)
POTASSIUM SERPL-SCNC: 3.8 MMOL/L (ref 3.5–5.3)
PROT SERPL-MCNC: 7.1 G/DL (ref 6.4–8.2)
RBC # BLD AUTO: 4.69 MILLION/UL (ref 3.88–5.62)
SODIUM SERPL-SCNC: 137 MMOL/L (ref 136–145)
WBC # BLD AUTO: 5.2 THOUSAND/UL (ref 4.31–10.16)

## 2022-02-03 PROCEDURE — 85025 COMPLETE CBC W/AUTO DIFF WBC: CPT

## 2022-02-03 PROCEDURE — 36415 COLL VENOUS BLD VENIPUNCTURE: CPT

## 2022-02-03 PROCEDURE — 83036 HEMOGLOBIN GLYCOSYLATED A1C: CPT

## 2022-02-03 PROCEDURE — 80053 COMPREHEN METABOLIC PANEL: CPT

## 2022-02-14 ENCOUNTER — TELEPHONE (OUTPATIENT)
Dept: FAMILY MEDICINE CLINIC | Facility: CLINIC | Age: 82
End: 2022-02-14

## 2022-02-14 NOTE — TELEPHONE ENCOUNTER
PATIENTS WIFE CALLED IS CONCERNED HAS SEEN HUSBANDS LAB WORK ON MY CHART AND WAITING FOR DR Jose Manuel Huber TO GO OVER PATIENTS LAB WORK WITH THEM

## 2022-03-01 DIAGNOSIS — I10 ESSENTIAL HYPERTENSION: ICD-10-CM

## 2022-03-01 RX ORDER — LOSARTAN POTASSIUM AND HYDROCHLOROTHIAZIDE 25; 100 MG/1; MG/1
1 TABLET ORAL DAILY
Qty: 90 TABLET | Refills: 0 | Status: SHIPPED | OUTPATIENT
Start: 2022-03-01 | End: 2022-03-03

## 2022-03-03 DIAGNOSIS — I10 ESSENTIAL HYPERTENSION: ICD-10-CM

## 2022-03-03 RX ORDER — OLMESARTAN MEDOXOMIL AND HYDROCHLOROTHIAZIDE 40/25 40; 25 MG/1; MG/1
1 TABLET ORAL DAILY
Qty: 90 TABLET | Refills: 3 | Status: SHIPPED | OUTPATIENT
Start: 2022-03-03 | End: 2022-05-02 | Stop reason: SDUPTHER

## 2022-03-03 RX ORDER — LOSARTAN POTASSIUM AND HYDROCHLOROTHIAZIDE 25; 100 MG/1; MG/1
1 TABLET ORAL DAILY
Qty: 90 TABLET | Refills: 3 | Status: SHIPPED | OUTPATIENT
Start: 2022-03-03 | End: 2022-05-11

## 2022-03-03 RX ORDER — LOSARTAN POTASSIUM AND HYDROCHLOROTHIAZIDE 25; 100 MG/1; MG/1
1 TABLET ORAL DAILY
COMMUNITY
End: 2022-03-03 | Stop reason: SDUPTHER

## 2022-03-03 NOTE — TELEPHONE ENCOUNTER
I called pt , he stated he would like to stay on the medication he was on    Please resend the losartan to [facility] mail order

## 2022-03-03 NOTE — TELEPHONE ENCOUNTER
Staff, please let patient know that his medication was not available so he will be switched to a similar medication for his blood pressure

## 2022-03-10 DIAGNOSIS — E78.00 PURE HYPERCHOLESTEROLEMIA: ICD-10-CM

## 2022-03-10 RX ORDER — ATORVASTATIN CALCIUM 40 MG/1
40 TABLET, FILM COATED ORAL DAILY
Qty: 90 TABLET | Refills: 1 | Status: SHIPPED | OUTPATIENT
Start: 2022-03-10 | End: 2022-03-12 | Stop reason: SDUPTHER

## 2022-03-12 RX ORDER — ATORVASTATIN CALCIUM 40 MG/1
40 TABLET, FILM COATED ORAL DAILY
Qty: 90 TABLET | Refills: 1 | Status: SHIPPED | OUTPATIENT
Start: 2022-03-12

## 2022-03-27 DIAGNOSIS — E11.42 TYPE 2 DIABETES MELLITUS WITH DIABETIC POLYNEUROPATHY, WITHOUT LONG-TERM CURRENT USE OF INSULIN (HCC): ICD-10-CM

## 2022-03-27 RX ORDER — GABAPENTIN 100 MG/1
CAPSULE ORAL
Qty: 90 CAPSULE | Refills: 3 | Status: SHIPPED | OUTPATIENT
Start: 2022-03-27

## 2022-03-28 ENCOUNTER — TELEPHONE (OUTPATIENT)
Dept: OBGYN CLINIC | Facility: MEDICAL CENTER | Age: 82
End: 2022-03-28

## 2022-03-28 NOTE — TELEPHONE ENCOUNTER
Patient has appointment schedued with Dr Hayley Zafar on 4/1  I tried calling patient to confirm but no answer  I did leave a VM to give us a call back to confirm appointment

## 2022-04-01 ENCOUNTER — OFFICE VISIT (OUTPATIENT)
Dept: OBGYN CLINIC | Facility: MEDICAL CENTER | Age: 82
End: 2022-04-01
Payer: MEDICARE

## 2022-04-01 VITALS
BODY MASS INDEX: 36.73 KG/M2 | WEIGHT: 234 LBS | DIASTOLIC BLOOD PRESSURE: 70 MMHG | HEIGHT: 67 IN | SYSTOLIC BLOOD PRESSURE: 137 MMHG | HEART RATE: 67 BPM

## 2022-04-01 DIAGNOSIS — M17.12 PRIMARY LOCALIZED OSTEOARTHRITIS OF LEFT KNEE: ICD-10-CM

## 2022-04-01 DIAGNOSIS — M17.11 PRIMARY OSTEOARTHRITIS OF RIGHT KNEE: Primary | ICD-10-CM

## 2022-04-01 PROCEDURE — 99213 OFFICE O/P EST LOW 20 MIN: CPT | Performed by: ORTHOPAEDIC SURGERY

## 2022-04-01 PROCEDURE — 20610 DRAIN/INJ JOINT/BURSA W/O US: CPT | Performed by: ORTHOPAEDIC SURGERY

## 2022-04-01 RX ORDER — LIDOCAINE HYDROCHLORIDE 10 MG/ML
3 INJECTION, SOLUTION INFILTRATION; PERINEURAL
Status: COMPLETED | OUTPATIENT
Start: 2022-04-01 | End: 2022-04-01

## 2022-04-01 RX ORDER — TRIAMCINOLONE ACETONIDE 40 MG/ML
40 INJECTION, SUSPENSION INTRA-ARTICULAR; INTRAMUSCULAR
Status: COMPLETED | OUTPATIENT
Start: 2022-04-01 | End: 2022-04-01

## 2022-04-01 RX ADMIN — TRIAMCINOLONE ACETONIDE 40 MG: 40 INJECTION, SUSPENSION INTRA-ARTICULAR; INTRAMUSCULAR at 09:02

## 2022-04-01 RX ADMIN — LIDOCAINE HYDROCHLORIDE 3 ML: 10 INJECTION, SOLUTION INFILTRATION; PERINEURAL at 09:02

## 2022-04-01 NOTE — PROGRESS NOTES
Assessment/Plan:  1  Primary osteoarthritis of right knee    2  Primary localized osteoarthritis of left knee      Orders Placed This Encounter   Procedures    Large joint arthrocentesis: R knee     -Mr Hammond has bilateral knee arthritis  His right knee is more painful  We discussed treatment options as well as risks and benefits of treatment options  He would like to move forward with bilateral knee steroid injections  His fasting sugar was 135  We agreed it would be best to do one injection at a time     -Received right knee steroid injection today  Patient should ice and avoid strenuous activity for 1-2 days if needed  Patient should avoid vaccines for 2 weeks if possible  If patient is diabetic should also monitor glucose over the next 7 to 10 days  -Can take Tylenol 1,000mg by mouth every 8 hours as needed for pain  Do not exceed 3,000mg per day  Return in about 1 week (around 4/8/2022)  I answered all of the patient's questions during the visit and provided education of the patient's condition during the visit  The patient verbalized understanding of the information given and agrees with the plan  This note was dictated using [email or web address] software  It may contain errors including improperly dictated words  Please contact physician directly for any questions  Subjective   Chief Complaint:   Chief Complaint   Patient presents with    Left Knee - Follow-up    Right Knee - Follow-up       TERRIE Saldaña Manner is a [de-identified] y o  male who presents for follow up for bilateral knee pain  Patient has severe right knee osteoarthritis and mild left knee osteoarthritis  Patient has previously completed 3 series Euflexxa 2 months ago  He thinks it was helpful but is now having intermittent pain  Most of the pain is over the anterior aspect of the knee  He notices it with weather changes  He takes tylenol prn pain  Last time he had bilateral knee steroid injections his blood sugar went over 200  Review of Systems  ROS:    See HPI for musculoskeletal review  All other systems reviewed are negative     History:  Past Medical History:   Diagnosis Date    Arthritis     Bladder mass 2017    Follows with Urology    Cancer [facility])     right kidney    Cardiac aneurysm     Chronic pain disorder     Diabetes mellitus ([address] )     NIDDM    Erectile dysfunction of non-organic origin     Glaucoma     Hx of bleeding disorder     in urine    Hyperlipidemia     Hypertension     Meniscus tear     Left knee    Urinary tract infection with hematuria     last assessed 2017    Wears glasses     Wears partial dentures     upper partial     Past Surgical History:   Procedure Laterality Date    CATARACT EXTRACTION      COLONOSCOPY      CYSTOSCOPY      onset 2017    DENTAL SURGERY      EYE SURGERY Bilateral     stents    EYE SURGERY Bilateral     stents in both eyes     NEPHRECTOMY LAPAROSCOPIC Right 2017    Procedure: NEPHRECTOMY PARTIAL LAPAROSCOPIC W ROBOTICS ,;  Surgeon: Herson Ramirez MD;  Location: [facility];  Service: Urology    NERVE BLOCK      transforaminal epidural lumbar    WISDOM TOOTH EXTRACTION      as well as broken neighboring tooth     Social History   Social History     Substance and Sexual Activity   Alcohol Use Not Currently    Comment: rarely     Social History     Substance and Sexual Activity   Drug Use No     Social History     Tobacco Use   Smoking Status Former Smoker    Quit date: 1985    Years since quittin 6   Smokeless Tobacco Never Used     Family History:   Family History   Problem Relation Age of Onset    Other Mother         malaria    Diabetes Sister     Hypertension Sister        Current Outpatient Medications on File Prior to Visit   Medication Sig Dispense Refill    Accu-Chek FastClix Lancets MISC Use daily E11 9 102 each 3    Alpha Lipoic Acid 200 MG CAPS Take 1 daily as directed by Podiatry        amLODIPine (NORVASC) 5 mg tablet TAKE 1 TABLET DAILY 90 tablet 3    atorvastatin (LIPITOR) 40 mg tablet Take 1 tablet (40 mg total) by mouth daily 90 tablet 1    Blood Glucose Monitoring Suppl (ACCU-CHEK OMERO PLUS) w/Device KIT by Does not apply route      Calcium Carb-Cholecalciferol 600-200 MG-UNIT TABS       Cholecalciferol 50 MCG (2000 UT) TABS       Cyanocobalamin (VITAMIN B 12 PO) Take by mouth      gabapentin (NEURONTIN) 100 mg capsule TAKE 1 CAPSULE AT BEDTIME INCREASE UP TO 3 CAPSULES NIGHTLY AS NEEDED FOR NEUROPATHY 90 capsule 3    glucose blood (ACCU-CHEK OMERO PLUS) test strip Test twice daily 200 each 3    glucose blood (Accu-Chek Omero Plus) test strip Use as instructed 100 each 2    latanoprost (XALATAN) 0 005 % ophthalmic solution       losartan-hydrochlorothiazide (HYZAAR) 100-25 MG per tablet Take 1 tablet by mouth daily 90 tablet 3    Magnesium 100 MG CAPS Take by mouth      metFORMIN (GLUCOPHAGE) 500 mg tablet TAKE 2 TABLETS IN THE      MORNING AND 1 TABLET IN THEEVENING 270 tablet 3    olmesartan-hydrochlorothiazide (BENICAR HCT) 40-25 MG per tablet Take 1 tablet by mouth daily 90 tablet 3    Polyethyl Glycol-Propyl Glycol (SYSTANE OP) Apply to eye 1 drop each eye twice a day       sitaGLIPtin (Januvia) 100 mg tablet Take 1 tablet (100 mg total) by mouth daily 90 tablet 3     No current facility-administered medications on file prior to visit       No Known Allergies     Objective     /70   Pulse 67   Ht 5' 7" (1 702 m)   Wt 106 kg (234 lb)   BMI 36 65 kg/m²      PE:  AAOx 3  WDWN  Hearing intact, no drainage from eyes  no audible wheezing  no abdominal distension  LE compartments soft, skin intact    Ortho Exam:  bilateral Knee:   No erythema  Mild generalized swelling  no effusion  no warmth  AROM: R knee: 0-115  L knee: 0-120      Large joint arthrocentesis: R knee  Universal Protocol:  Consent given by: patient  Time out: Immediately prior to procedure a "time out" was called to verify the correct patient, procedure, equipment, support staff and site/side marked as required    Site marked: the operative site was marked  Supporting Documentation  Indications: pain   Procedure Details  Location: knee - R knee  Preparation: Patient was prepped and draped in the usual sterile fashion  Needle size: 22 G  Ultrasound guidance: no  Approach: anterolateral  Medications administered: 3 mL lidocaine 1 %; 40 mg triamcinolone acetonide 40 mg/mL    Patient tolerance: patient tolerated the procedure well with no immediate complications  Dressing:  Sterile dressing applied

## 2022-04-08 ENCOUNTER — OFFICE VISIT (OUTPATIENT)
Dept: OBGYN CLINIC | Facility: MEDICAL CENTER | Age: 82
End: 2022-04-08
Payer: MEDICARE

## 2022-04-08 VITALS
DIASTOLIC BLOOD PRESSURE: 65 MMHG | WEIGHT: 232 LBS | HEART RATE: 66 BPM | HEIGHT: 67 IN | BODY MASS INDEX: 36.41 KG/M2 | SYSTOLIC BLOOD PRESSURE: 132 MMHG

## 2022-04-08 DIAGNOSIS — M17.12 PRIMARY OSTEOARTHRITIS OF LEFT KNEE: Primary | ICD-10-CM

## 2022-04-08 PROCEDURE — 20610 DRAIN/INJ JOINT/BURSA W/O US: CPT | Performed by: PHYSICIAN ASSISTANT

## 2022-04-08 PROCEDURE — 99213 OFFICE O/P EST LOW 20 MIN: CPT | Performed by: PHYSICIAN ASSISTANT

## 2022-04-08 RX ORDER — LIDOCAINE HYDROCHLORIDE 10 MG/ML
4 INJECTION, SOLUTION INFILTRATION; PERINEURAL
Status: COMPLETED | OUTPATIENT
Start: 2022-04-08 | End: 2022-04-08

## 2022-04-08 RX ORDER — TRIAMCINOLONE ACETONIDE 40 MG/ML
40 INJECTION, SUSPENSION INTRA-ARTICULAR; INTRAMUSCULAR
Status: COMPLETED | OUTPATIENT
Start: 2022-04-08 | End: 2022-04-08

## 2022-04-08 RX ADMIN — TRIAMCINOLONE ACETONIDE 40 MG: 40 INJECTION, SUSPENSION INTRA-ARTICULAR; INTRAMUSCULAR at 12:02

## 2022-04-08 RX ADMIN — LIDOCAINE HYDROCHLORIDE 4 ML: 10 INJECTION, SOLUTION INFILTRATION; PERINEURAL at 12:02

## 2022-04-08 NOTE — PROGRESS NOTES
Patient is here for left knee steroid injection as discussed at last visit  Of note, glucose did not exceed 150 after last injection  Post injection instructions reviewed  Follow up 3 months for knee CSI  Large joint arthrocentesis: L knee  Universal Protocol:  Consent: Verbal consent obtained    Risks and benefits: risks, benefits and alternatives were discussed  Consent given by: patient  Site marked: the operative site was marked  Supporting Documentation  Indications: pain   Procedure Details  Location: knee - L knee  Preparation: Patient was prepped and draped in the usual sterile fashion  Needle size: 22 G  Ultrasound guidance: no  Approach: anterolateral  Medications administered: 4 mL lidocaine 1 %; 40 mg triamcinolone acetonide 40 mg/mL    Patient tolerance: patient tolerated the procedure well with no immediate complications  Dressing:  Sterile dressing applied

## 2022-04-25 ENCOUNTER — TELEPHONE (OUTPATIENT)
Dept: FAMILY MEDICINE CLINIC | Facility: CLINIC | Age: 82
End: 2022-04-25

## 2022-04-25 DIAGNOSIS — E11.9 TYPE 2 DIABETES MELLITUS WITHOUT COMPLICATION, WITHOUT LONG-TERM CURRENT USE OF INSULIN (HCC): ICD-10-CM

## 2022-04-25 NOTE — TELEPHONE ENCOUNTER
Patient requesting call regarding his DM medication and dosage  States his sugar was 99 on Friday 04/22/2022 and he is concerned  Would like Dr Charissa Ramirez to call him please   Please call patient to advise [phone]

## 2022-04-25 NOTE — TELEPHONE ENCOUNTER
Patient requesting call regarding his DM medication and dosage  States his sugar was 99 on Friday 04/22/2022 and he is concerned  Would like Dr Grey Fernandes to call him please   Please call patient to advise [phone]

## 2022-04-26 NOTE — TELEPHONE ENCOUNTER
Patient came into office with concerns since no return call back  Because of his episode on Friday  Pt has change taking metformin from two in am to 1 pill in am  Please advise

## 2022-04-27 DIAGNOSIS — I10 ESSENTIAL HYPERTENSION: ICD-10-CM

## 2022-04-27 DIAGNOSIS — E11.42 TYPE 2 DIABETES MELLITUS WITH DIABETIC POLYNEUROPATHY, WITHOUT LONG-TERM CURRENT USE OF INSULIN (HCC): Primary | ICD-10-CM

## 2022-04-27 NOTE — TELEPHONE ENCOUNTER
Called and notified pt and wife as detailed below  Medication updated in chart  Educated pts wife on mychart messages on her behalf

## 2022-04-27 NOTE — PROGRESS NOTES
Check labs prior to follow uip    Problem List Items Addressed This Visit        Endocrine    Type 2 diabetes mellitus with diabetic polyneuropathy ([facility] [address] ) - Primary    Relevant Orders    CBC and differential    Comprehensive metabolic panel    Hemoglobin A1C    Lipid Panel with Direct LDL reflex       Cardiovascular and Mediastinum    Essential hypertension    Relevant Orders    CBC and differential    Comprehensive metabolic panel    Hemoglobin A1C    Lipid Panel with Direct LDL reflex

## 2022-04-27 NOTE — TELEPHONE ENCOUNTER
To add  Jarad Marquez He recently has lost some weight  He stated he felt dizzy and nausea around this glucose number  It's why he reduced taking metformin from 2 tabs in the morning to one tab  Is he okay to continue with change?

## 2022-04-30 DIAGNOSIS — E11.9 TYPE 2 DIABETES MELLITUS WITHOUT COMPLICATION, WITHOUT LONG-TERM CURRENT USE OF INSULIN (HCC): ICD-10-CM

## 2022-05-01 RX ORDER — BLOOD SUGAR DIAGNOSTIC
STRIP MISCELLANEOUS
Qty: 100 EACH | Refills: 0 | Status: SHIPPED | OUTPATIENT
Start: 2022-05-01 | End: 2022-05-24

## 2022-05-01 RX ORDER — LANCETS
EACH MISCELLANEOUS DAILY
Qty: 102 EACH | Refills: 0 | Status: SHIPPED | OUTPATIENT
Start: 2022-05-01 | End: 2022-07-26 | Stop reason: SDUPTHER

## 2022-05-02 ENCOUNTER — APPOINTMENT (OUTPATIENT)
Dept: LAB | Facility: CLINIC | Age: 82
End: 2022-05-02
Payer: MEDICARE

## 2022-05-02 DIAGNOSIS — E11.42 TYPE 2 DIABETES MELLITUS WITH DIABETIC POLYNEUROPATHY, WITHOUT LONG-TERM CURRENT USE OF INSULIN (HCC): ICD-10-CM

## 2022-05-02 DIAGNOSIS — I10 ESSENTIAL HYPERTENSION: ICD-10-CM

## 2022-05-02 DIAGNOSIS — E11.9 TYPE 2 DIABETES MELLITUS WITHOUT COMPLICATION, WITHOUT LONG-TERM CURRENT USE OF INSULIN (HCC): ICD-10-CM

## 2022-05-02 LAB
ALBUMIN SERPL BCP-MCNC: 3.9 G/DL (ref 3.5–5)
ALP SERPL-CCNC: 82 U/L (ref 46–116)
ALT SERPL W P-5'-P-CCNC: 31 U/L (ref 12–78)
ANION GAP SERPL CALCULATED.3IONS-SCNC: 3 MMOL/L (ref 4–13)
AST SERPL W P-5'-P-CCNC: 14 U/L (ref 5–45)
BASOPHILS # BLD AUTO: 0.02 THOUSANDS/ΜL (ref 0–0.1)
BASOPHILS NFR BLD AUTO: 0 % (ref 0–1)
BILIRUB SERPL-MCNC: 0.66 MG/DL (ref 0.2–1)
BUN SERPL-MCNC: 29 MG/DL (ref 5–25)
CALCIUM SERPL-MCNC: 9.2 MG/DL (ref 8.3–10.1)
CHLORIDE SERPL-SCNC: 105 MMOL/L (ref 100–108)
CHOLEST SERPL-MCNC: 157 MG/DL
CO2 SERPL-SCNC: 29 MMOL/L (ref 21–32)
CREAT SERPL-MCNC: 1.14 MG/DL (ref 0.6–1.3)
EOSINOPHIL # BLD AUTO: 0.16 THOUSAND/ΜL (ref 0–0.61)
EOSINOPHIL NFR BLD AUTO: 3 % (ref 0–6)
ERYTHROCYTE [DISTWIDTH] IN BLOOD BY AUTOMATED COUNT: 13.9 % (ref 11.6–15.1)
EST. AVERAGE GLUCOSE BLD GHB EST-MCNC: 148 MG/DL
GFR SERPL CREATININE-BSD FRML MDRD: 60 ML/MIN/1.73SQ M
GLUCOSE P FAST SERPL-MCNC: 140 MG/DL (ref 65–99)
HBA1C MFR BLD: 6.8 %
HCT VFR BLD AUTO: 42.8 % (ref 36.5–49.3)
HDLC SERPL-MCNC: 62 MG/DL
HGB BLD-MCNC: 13.9 G/DL (ref 12–17)
IMM GRANULOCYTES # BLD AUTO: 0.03 THOUSAND/UL (ref 0–0.2)
IMM GRANULOCYTES NFR BLD AUTO: 1 % (ref 0–2)
LDLC SERPL CALC-MCNC: 85 MG/DL (ref 0–100)
LYMPHOCYTES # BLD AUTO: 1.39 THOUSANDS/ΜL (ref 0.6–4.47)
LYMPHOCYTES NFR BLD AUTO: 24 % (ref 14–44)
MCH RBC QN AUTO: 28.7 PG (ref 26.8–34.3)
MCHC RBC AUTO-ENTMCNC: 32.5 G/DL (ref 31.4–37.4)
MCV RBC AUTO: 88 FL (ref 82–98)
MONOCYTES # BLD AUTO: 0.44 THOUSAND/ΜL (ref 0.17–1.22)
MONOCYTES NFR BLD AUTO: 8 % (ref 4–12)
NEUTROPHILS # BLD AUTO: 3.77 THOUSANDS/ΜL (ref 1.85–7.62)
NEUTS SEG NFR BLD AUTO: 64 % (ref 43–75)
NRBC BLD AUTO-RTO: 0 /100 WBCS
PLATELET # BLD AUTO: 169 THOUSANDS/UL (ref 149–390)
PMV BLD AUTO: 11 FL (ref 8.9–12.7)
POTASSIUM SERPL-SCNC: 4 MMOL/L (ref 3.5–5.3)
PROT SERPL-MCNC: 7.2 G/DL (ref 6.4–8.2)
RBC # BLD AUTO: 4.84 MILLION/UL (ref 3.88–5.62)
SODIUM SERPL-SCNC: 137 MMOL/L (ref 136–145)
TRIGL SERPL-MCNC: 49 MG/DL
WBC # BLD AUTO: 5.81 THOUSAND/UL (ref 4.31–10.16)

## 2022-05-02 PROCEDURE — 80053 COMPREHEN METABOLIC PANEL: CPT

## 2022-05-02 PROCEDURE — 85025 COMPLETE CBC W/AUTO DIFF WBC: CPT

## 2022-05-02 PROCEDURE — 80061 LIPID PANEL: CPT

## 2022-05-02 PROCEDURE — 36415 COLL VENOUS BLD VENIPUNCTURE: CPT

## 2022-05-02 PROCEDURE — 83036 HEMOGLOBIN GLYCOSYLATED A1C: CPT

## 2022-05-02 RX ORDER — OLMESARTAN MEDOXOMIL AND HYDROCHLOROTHIAZIDE 40/25 40; 25 MG/1; MG/1
1 TABLET ORAL DAILY
Qty: 90 TABLET | Refills: 3 | Status: SHIPPED | OUTPATIENT
Start: 2022-05-02 | End: 2022-05-02 | Stop reason: ALTCHOICE

## 2022-05-02 RX ORDER — BLOOD SUGAR DIAGNOSTIC
STRIP MISCELLANEOUS
Qty: 100 EACH | Refills: 0 | OUTPATIENT
Start: 2022-05-02

## 2022-05-02 NOTE — TELEPHONE ENCOUNTER
Pts wife called in to let us know pt no longer on Benicar  Pt was switched over to losartan HZTC on encounter dated 3/3/22  Refill approved today by [facility] was forward to [facility]   Benicar D/nicole'd

## 2022-05-09 ENCOUNTER — RA CDI HCC (OUTPATIENT)
Dept: OTHER | Facility: HOSPITAL | Age: 82
End: 2022-05-09

## 2022-05-09 NOTE — PROGRESS NOTES
[address]  coding opportunities       Chart reviewed, no opportunity found: CHART REVIEWED, NO OPPORTUNITY FOUND        Patients Insurance     Medicare Insurance: Medicare

## 2022-05-11 DIAGNOSIS — I10 ESSENTIAL HYPERTENSION: ICD-10-CM

## 2022-05-11 RX ORDER — LOSARTAN POTASSIUM AND HYDROCHLOROTHIAZIDE 25; 100 MG/1; MG/1
TABLET ORAL
Qty: 90 TABLET | Refills: 3 | Status: SHIPPED | OUTPATIENT
Start: 2022-05-11

## 2022-05-13 ENCOUNTER — OFFICE VISIT (OUTPATIENT)
Dept: FAMILY MEDICINE CLINIC | Facility: CLINIC | Age: 82
End: 2022-05-13
Payer: MEDICARE

## 2022-05-13 VITALS
HEIGHT: 67 IN | DIASTOLIC BLOOD PRESSURE: 70 MMHG | SYSTOLIC BLOOD PRESSURE: 144 MMHG | BODY MASS INDEX: 35.79 KG/M2 | OXYGEN SATURATION: 96 % | HEART RATE: 54 BPM | TEMPERATURE: 97.5 F | WEIGHT: 228 LBS

## 2022-05-13 DIAGNOSIS — E11.9 TYPE 2 DIABETES MELLITUS WITHOUT COMPLICATION, WITHOUT LONG-TERM CURRENT USE OF INSULIN (HCC): Primary | ICD-10-CM

## 2022-05-13 DIAGNOSIS — I10 ESSENTIAL HYPERTENSION: ICD-10-CM

## 2022-05-13 DIAGNOSIS — I71.2 THORACIC AORTIC ANEURYSM WITHOUT RUPTURE (HCC): ICD-10-CM

## 2022-05-13 DIAGNOSIS — D69.6 THROMBOCYTOPENIA (HCC): ICD-10-CM

## 2022-05-13 DIAGNOSIS — E66.01 OBESITY, MORBID (HCC): ICD-10-CM

## 2022-05-13 DIAGNOSIS — C64.1 RENAL CELL CARCINOMA OF RIGHT KIDNEY (HCC): ICD-10-CM

## 2022-05-13 DIAGNOSIS — E11.42 TYPE 2 DIABETES MELLITUS WITH DIABETIC POLYNEUROPATHY, WITHOUT LONG-TERM CURRENT USE OF INSULIN (HCC): ICD-10-CM

## 2022-05-13 PROCEDURE — 99214 OFFICE O/P EST MOD 30 MIN: CPT | Performed by: FAMILY MEDICINE

## 2022-05-13 NOTE — ASSESSMENT & PLAN NOTE
The CT scan was stable back in February  I did reach out to Cardiology to see I would like this repeated

## 2022-05-13 NOTE — PROGRESS NOTES
Assessment/Plan:       Problem List Items Addressed This Visit        Endocrine    Type 2 diabetes mellitus with diabetic polyneuropathy ([facility] [address] )    Relevant Orders    CBC and differential    Comprehensive metabolic panel    Hemoglobin A1C       Cardiovascular and Mediastinum    Essential hypertension    Relevant Orders    CBC and differential    Comprehensive metabolic panel    Hemoglobin A1C    Thoracic aortic aneurysm without rupture ([facility] )     The CT scan was stable back in February  I did reach out to Cardiology to see I would like this repeated  Genitourinary    Renal cell carcinoma of right kidney (HCC)    Relevant Orders    CBC and differential    Comprehensive metabolic panel       Other    Thrombocytopenia (HCC)    Relevant Orders    CBC and differential    Obesity, morbid (HCC)    Relevant Orders    CBC and differential    Comprehensive metabolic panel    Hemoglobin A1C      Other Visit Diagnoses     Type 2 diabetes mellitus without complication, without long-term current use of insulin (HCC)    -  Primary    Relevant Orders    CBC and differential    Comprehensive metabolic panel    Hemoglobin A1C            Subjective:      Patient ID: Samantha Goldberg is a [de-identified] y o  male  HPI patient presents today for follow-up for chronic health issues  He has been losing weight and his sugars are down  He stop taking Januvia and they remain excellent  Recent A1c was 6 8  He is following with orthopedics for his bilateral osteoarthritis of his knees  The following portions of the patient's history were reviewed and updated as appropriate: allergies, current medications, past family history, past medical history, past social history, past surgical history and problem list       Current Outpatient Medications:     Accu-Chek FastClix Lancets MISC, Use daily E11 9, Disp: 102 each, Rfl: 0    Alpha Lipoic Acid 200 MG CAPS, Take 1 daily as directed by Podiatry  , Disp: , Rfl:     amLODIPine (NORVASC) 5 mg tablet, TAKE 1 TABLET DAILY, Disp: 90 tablet, Rfl: 3    atorvastatin (LIPITOR) 40 mg tablet, Take 1 tablet (40 mg total) by mouth daily, Disp: 90 tablet, Rfl: 1    Blood Glucose Monitoring Suppl (ACCU-CHEK OMERO PLUS) w/Device KIT, by Does not apply route, Disp: , Rfl:     Calcium Carb-Cholecalciferol 600-200 MG-UNIT TABS, , Disp: , Rfl:     Cholecalciferol 50 MCG (2000 UT) TABS, , Disp: , Rfl:     Cyanocobalamin (VITAMIN B 12 PO), Take by mouth, Disp: , Rfl:     gabapentin (NEURONTIN) 100 mg capsule, TAKE 1 CAPSULE AT BEDTIME INCREASE UP TO 3 CAPSULES NIGHTLY AS NEEDED FOR NEUROPATHY, Disp: 90 capsule, Rfl: 3    glucose blood (Accu-Chek Omero Plus) test strip, Use as instructed, Disp: 100 each, Rfl: 0    latanoprost (XALATAN) 0 005 % ophthalmic solution, , Disp: , Rfl:     losartan-hydrochlorothiazide (HYZAAR) 100-25 MG per tablet, TAKE 1 TABLET DAILY, Disp: 90 tablet, Rfl: 3    Magnesium 100 MG CAPS, Take by mouth, Disp: , Rfl:     metFORMIN (GLUCOPHAGE) 500 mg tablet, Take 1 tablet in the morning and 1 tablet in the evening, Disp: 180 tablet, Rfl: 3    Polyethyl Glycol-Propyl Glycol (SYSTANE OP), Apply to eye 1 drop each eye twice a day , Disp: , Rfl:      Review of Systems   Constitutional: Negative for fatigue  HENT: Negative for trouble swallowing  Respiratory: Negative for chest tightness, shortness of breath and wheezing  Cardiovascular: Negative for chest pain, palpitations and leg swelling  Gastrointestinal: Negative for abdominal pain, constipation and diarrhea  Endocrine: Negative for polyuria  Genitourinary: Negative for difficulty urinating and flank pain  Musculoskeletal: Negative for arthralgias and myalgias  Skin: Negative for rash  Psychiatric/Behavioral: Negative for sleep disturbance           Objective:      /70 (BP Location: Left arm, Patient Position: Sitting, Cuff Size: Large)   Pulse (!) 54   Temp 97 5 °F (36 4 °C)   Ht 5' 7" (1 702 m)   Wt 103 kg (228 lb)   SpO2 96%   BMI 35 71 kg/m²          Physical Exam  Vitals reviewed  Constitutional:       Appearance: He is well-developed  He is obese  HENT:      Head: Normocephalic  Cardiovascular:      Rate and Rhythm: Regular rhythm  Heart sounds: Normal heart sounds  No murmur heard  Pulmonary:      Effort: No respiratory distress  Breath sounds: No wheezing or rales  Abdominal:      General: There is no distension  Tenderness: There is no abdominal tenderness  Skin:     Findings: No erythema or rash  Neurological:      Mental Status: He is alert and oriented to person, place, and time             Camilla Baeza MD

## 2022-05-20 ENCOUNTER — TELEPHONE (OUTPATIENT)
Dept: CARDIOLOGY CLINIC | Facility: CLINIC | Age: 82
End: 2022-05-20

## 2022-05-20 NOTE — TELEPHONE ENCOUNTER
P/c'd , he saw PCP and said that Dr Gayla Felty was going to call you  He was concerned at aneurysm  Pt is doing fine  F/u not until 10/2022  Would you like an Echo ordered? [place] one was 2018        Please advise

## 2022-05-24 DIAGNOSIS — E11.9 TYPE 2 DIABETES MELLITUS WITHOUT COMPLICATION, WITHOUT LONG-TERM CURRENT USE OF INSULIN (HCC): ICD-10-CM

## 2022-05-24 RX ORDER — BLOOD SUGAR DIAGNOSTIC
STRIP MISCELLANEOUS
Qty: 100 STRIP | Refills: 0 | Status: SHIPPED | OUTPATIENT
Start: 2022-05-24 | End: 2022-07-26 | Stop reason: SDUPTHER

## 2022-06-28 ENCOUNTER — APPOINTMENT (OUTPATIENT)
Dept: LAB | Facility: CLINIC | Age: 82
End: 2022-06-28
Payer: MEDICARE

## 2022-06-28 DIAGNOSIS — C64.1 RENAL CELL CARCINOMA OF RIGHT KIDNEY (HCC): ICD-10-CM

## 2022-06-28 DIAGNOSIS — E11.42 TYPE 2 DIABETES MELLITUS WITH DIABETIC POLYNEUROPATHY, WITHOUT LONG-TERM CURRENT USE OF INSULIN (HCC): ICD-10-CM

## 2022-06-28 DIAGNOSIS — E11.9 TYPE 2 DIABETES MELLITUS WITHOUT COMPLICATION, WITHOUT LONG-TERM CURRENT USE OF INSULIN (HCC): ICD-10-CM

## 2022-06-28 DIAGNOSIS — E66.01 OBESITY, MORBID (HCC): ICD-10-CM

## 2022-06-28 DIAGNOSIS — D69.6 THROMBOCYTOPENIA (HCC): ICD-10-CM

## 2022-06-28 DIAGNOSIS — I10 ESSENTIAL HYPERTENSION: ICD-10-CM

## 2022-06-28 LAB
ALBUMIN SERPL BCP-MCNC: 3.3 G/DL (ref 3.5–5)
ALP SERPL-CCNC: 100 U/L (ref 46–116)
ALT SERPL W P-5'-P-CCNC: 36 U/L (ref 12–78)
ANION GAP SERPL CALCULATED.3IONS-SCNC: 8 MMOL/L (ref 4–13)
AST SERPL W P-5'-P-CCNC: 19 U/L (ref 5–45)
BASOPHILS # BLD AUTO: 0.03 THOUSANDS/ΜL (ref 0–0.1)
BASOPHILS NFR BLD AUTO: 1 % (ref 0–1)
BILIRUB SERPL-MCNC: 0.38 MG/DL (ref 0.2–1)
BUN SERPL-MCNC: 22 MG/DL (ref 5–25)
CALCIUM ALBUM COR SERPL-MCNC: 9.4 MG/DL (ref 8.3–10.1)
CALCIUM SERPL-MCNC: 8.8 MG/DL (ref 8.3–10.1)
CHLORIDE SERPL-SCNC: 105 MMOL/L (ref 100–108)
CO2 SERPL-SCNC: 25 MMOL/L (ref 21–32)
CREAT SERPL-MCNC: 0.84 MG/DL (ref 0.6–1.3)
EOSINOPHIL # BLD AUTO: 0.3 THOUSAND/ΜL (ref 0–0.61)
EOSINOPHIL NFR BLD AUTO: 5 % (ref 0–6)
ERYTHROCYTE [DISTWIDTH] IN BLOOD BY AUTOMATED COUNT: 14.4 % (ref 11.6–15.1)
EST. AVERAGE GLUCOSE BLD GHB EST-MCNC: 151 MG/DL
GFR SERPL CREATININE-BSD FRML MDRD: 82 ML/MIN/1.73SQ M
GLUCOSE P FAST SERPL-MCNC: 159 MG/DL (ref 65–99)
HBA1C MFR BLD: 6.9 %
HCT VFR BLD AUTO: 38.7 % (ref 36.5–49.3)
HGB BLD-MCNC: 12.9 G/DL (ref 12–17)
IMM GRANULOCYTES # BLD AUTO: 0.03 THOUSAND/UL (ref 0–0.2)
IMM GRANULOCYTES NFR BLD AUTO: 1 % (ref 0–2)
LYMPHOCYTES # BLD AUTO: 1.17 THOUSANDS/ΜL (ref 0.6–4.47)
LYMPHOCYTES NFR BLD AUTO: 21 % (ref 14–44)
MCH RBC QN AUTO: 29.3 PG (ref 26.8–34.3)
MCHC RBC AUTO-ENTMCNC: 33.3 G/DL (ref 31.4–37.4)
MCV RBC AUTO: 88 FL (ref 82–98)
MONOCYTES # BLD AUTO: 0.4 THOUSAND/ΜL (ref 0.17–1.22)
MONOCYTES NFR BLD AUTO: 7 % (ref 4–12)
NEUTROPHILS # BLD AUTO: 3.58 THOUSANDS/ΜL (ref 1.85–7.62)
NEUTS SEG NFR BLD AUTO: 65 % (ref 43–75)
NRBC BLD AUTO-RTO: 0 /100 WBCS
PLATELET # BLD AUTO: 155 THOUSANDS/UL (ref 149–390)
PMV BLD AUTO: 11.4 FL (ref 8.9–12.7)
POTASSIUM SERPL-SCNC: 4.1 MMOL/L (ref 3.5–5.3)
PROT SERPL-MCNC: 6.7 G/DL (ref 6.4–8.2)
RBC # BLD AUTO: 4.4 MILLION/UL (ref 3.88–5.62)
SODIUM SERPL-SCNC: 138 MMOL/L (ref 136–145)
WBC # BLD AUTO: 5.51 THOUSAND/UL (ref 4.31–10.16)

## 2022-06-28 PROCEDURE — 80053 COMPREHEN METABOLIC PANEL: CPT

## 2022-06-28 PROCEDURE — 83036 HEMOGLOBIN GLYCOSYLATED A1C: CPT

## 2022-06-28 PROCEDURE — 85025 COMPLETE CBC W/AUTO DIFF WBC: CPT

## 2022-06-28 PROCEDURE — 36415 COLL VENOUS BLD VENIPUNCTURE: CPT

## 2022-06-29 ENCOUNTER — TELEPHONE (OUTPATIENT)
Dept: FAMILY MEDICINE CLINIC | Facility: CLINIC | Age: 82
End: 2022-06-29

## 2022-07-05 ENCOUNTER — HOSPITAL ENCOUNTER (OUTPATIENT)
Dept: ULTRASOUND IMAGING | Facility: HOSPITAL | Age: 82
Discharge: HOME | End: 2022-07-05
Payer: MEDICARE

## 2022-07-05 ENCOUNTER — HOSPITAL ENCOUNTER (OUTPATIENT)
Dept: RADIOLOGY | Facility: HOSPITAL | Age: 82
Discharge: HOME | End: 2022-07-05
Payer: MEDICARE

## 2022-07-05 DIAGNOSIS — C64.1 RENAL CELL CARCINOMA OF RIGHT KIDNEY (HCC): ICD-10-CM

## 2022-07-05 PROCEDURE — 71046 X-RAY EXAM CHEST 2 VIEWS: CPT

## 2022-07-05 PROCEDURE — 76770 US EXAM ABDO BACK WALL COMP: CPT

## 2022-07-07 ENCOUNTER — TELEPHONE (OUTPATIENT)
Dept: UROLOGY | Facility: AMBULATORY SURGERY CENTER | Age: 82
End: 2022-07-07

## 2022-07-07 NOTE — TELEPHONE ENCOUNTER
Radiology Test Results - Radiology Calling with report update    Pt under care of:  Bryant Davis     Significant Findings - Please review

## 2022-07-15 ENCOUNTER — OFFICE VISIT (OUTPATIENT)
Dept: OBGYN CLINIC | Facility: MEDICAL CENTER | Age: 82
End: 2022-07-15
Payer: MEDICARE

## 2022-07-15 VITALS
HEIGHT: 67 IN | WEIGHT: 230 LBS | DIASTOLIC BLOOD PRESSURE: 70 MMHG | BODY MASS INDEX: 36.1 KG/M2 | HEART RATE: 69 BPM | SYSTOLIC BLOOD PRESSURE: 135 MMHG

## 2022-07-15 DIAGNOSIS — M17.0 BILATERAL PRIMARY OSTEOARTHRITIS OF KNEE: Primary | ICD-10-CM

## 2022-07-15 PROCEDURE — 20610 DRAIN/INJ JOINT/BURSA W/O US: CPT | Performed by: ORTHOPAEDIC SURGERY

## 2022-07-15 PROCEDURE — 99213 OFFICE O/P EST LOW 20 MIN: CPT | Performed by: ORTHOPAEDIC SURGERY

## 2022-07-15 RX ORDER — BUPIVACAINE HYDROCHLORIDE 2.5 MG/ML
2 INJECTION, SOLUTION INFILTRATION; PERINEURAL
Status: COMPLETED | OUTPATIENT
Start: 2022-07-15 | End: 2022-07-15

## 2022-07-15 RX ORDER — TRIAMCINOLONE ACETONIDE 40 MG/ML
40 INJECTION, SUSPENSION INTRA-ARTICULAR; INTRAMUSCULAR
Status: COMPLETED | OUTPATIENT
Start: 2022-07-15 | End: 2022-07-15

## 2022-07-15 RX ADMIN — BUPIVACAINE HYDROCHLORIDE 2 ML: 2.5 INJECTION, SOLUTION INFILTRATION; PERINEURAL at 10:44

## 2022-07-15 RX ADMIN — TRIAMCINOLONE ACETONIDE 40 MG: 40 INJECTION, SUSPENSION INTRA-ARTICULAR; INTRAMUSCULAR at 10:44

## 2022-07-15 NOTE — PROGRESS NOTES
Assessment/Plan:  1  Bilateral primary osteoarthritis of knee      Orders Placed This Encounter   Procedures    Large joint arthrocentesis    Injection Procedure Prior Authorization     Bilateral knee DJD    Corticosteroid injection performed today into the bilateral knees  Activities as tolerated with modification to avoid pain  Tylenol as needed for pain  Authorization process initiated for bilateral knee Euflexxa injections  Follow up once injections are approved  Return for Euflexxa  I answered all of the patient's questions during the visit and provided education of the patient's condition during the visit  The patient verbalized understanding of the information given and agrees with the plan  This note was dictated using [employer] software  It may contain errors including improperly dictated words  Please contact physician directly for any questions  Subjective   Chief Complaint:   Chief Complaint   Patient presents with    Left Knee - Follow-up    Right Knee - Follow-up       HPI  Kathrin Cruz is a [de-identified] y o  male who presents for follow up for regarding his bilateral knee DJD  He was last seen on 4/1/22  At that time he received a corticosteroid injection into the right knee  He noted significant improvement after undergoing the injection  He notes a return of his pain recently  He notes generalized pain in the bilateral knees with associated swelling and stiffness  Pain is worse with prolonged standing and walking specifically up and down steps  He denies any weakness or instability  No numbness or tingling  No fevers or chills  He takes Tylenol on occasion  He is interested in repeat corticosteroid injections today  Review of Systems  ROS:    See HPI for musculoskeletal review     All other systems reviewed are negative     History:  Past Medical History:   Diagnosis Date    Arthritis     Bladder mass 6/28/2017    Follows with Urology    Cancer [facility])     right kidney  Cardiac aneurysm     Chronic pain disorder     Diabetes mellitus ([facility] [address] )     NIDDM    Erectile dysfunction of non-organic origin     Glaucoma     Hx of bleeding disorder     in urine    Hyperlipidemia     Hypertension     Meniscus tear     Left knee    Urinary tract infection with hematuria     last assessed 2017    Wears glasses     Wears partial dentures     upper partial     Past Surgical History:   Procedure Laterality Date    CATARACT EXTRACTION      COLONOSCOPY      CYSTOSCOPY      onset 2017    DENTAL SURGERY      EYE SURGERY Bilateral     stents    EYE SURGERY Bilateral     stents in both eyes     NEPHRECTOMY LAPAROSCOPIC Right 2017    Procedure: NEPHRECTOMY PARTIAL LAPAROSCOPIC W ROBOTICS ,;  Surgeon: Dino Sykes MD;  Location: [facility];  Service: Urology    NERVE BLOCK      transforaminal epidural lumbar    WISDOM TOOTH EXTRACTION      as well as broken neighboring tooth     Social History   Social History     Substance and Sexual Activity   Alcohol Use Not Currently    Comment: rarely     Social History     Substance and Sexual Activity   Drug Use No     Social History     Tobacco Use   Smoking Status Former Smoker    Quit date: 1985    Years since quittin 8   Smokeless Tobacco Never Used     Family History:   Family History   Problem Relation Age of Onset    Other Mother         malaria    Diabetes Sister     Hypertension Sister        Current Outpatient Medications on File Prior to Visit   Medication Sig Dispense Refill    Accu-Chek Crista Plus test strip USE AS INSTRUCTED 100 strip 0    Accu-Chek FastClix Lancets MISC Use daily E11 9 102 each 0    Alpha Lipoic Acid 200 MG CAPS Take 1 daily as directed by Podiatry        amLODIPine (NORVASC) 5 mg tablet TAKE 1 TABLET DAILY 90 tablet 3    atorvastatin (LIPITOR) 40 mg tablet Take 1 tablet (40 mg total) by mouth daily 90 tablet 1    Blood Glucose Monitoring Suppl (ACCU-CHEK CRISTA PLUS) w/Device KIT by Does not apply route      Calcium Carb-Cholecalciferol 600-200 MG-UNIT TABS       Cholecalciferol 50 MCG (2000 UT) TABS       Cyanocobalamin (VITAMIN B 12 PO) Take by mouth      gabapentin (NEURONTIN) 100 mg capsule TAKE 1 CAPSULE AT BEDTIME INCREASE UP TO 3 CAPSULES NIGHTLY AS NEEDED FOR NEUROPATHY 90 capsule 3    latanoprost (XALATAN) 0 005 % ophthalmic solution       losartan-hydrochlorothiazide (HYZAAR) 100-25 MG per tablet TAKE 1 TABLET DAILY 90 tablet 3    Magnesium 100 MG CAPS Take by mouth      metFORMIN (GLUCOPHAGE) 500 mg tablet Take 1 tablet in the morning and 1 tablet in the evening 180 tablet 3    Polyethyl Glycol-Propyl Glycol (SYSTANE OP) Apply to eye 1 drop each eye twice a day        No current facility-administered medications on file prior to visit  No Known Allergies     Objective     /70   Pulse 69   Ht 5' 7" (1 702 m)   Wt 104 kg (230 lb)   BMI 36 02 kg/m²      PE:  AAOx 3  WDWN  Hearing intact, no drainage from eyes  no audible wheezing  no abdominal distension  LE compartments soft, skin intact    Ortho Exam:  bilateral Knee:   No erythema  no swelling  no effusion  no warmth  AROM: -3 extension, flexion 110 bilaterally  Stable to varus/valgus stress  No joint line tenderness        Large joint arthrocentesis: bilateral knee  Procedure Details  Location: knee - bilateral knee  Needle size: 22 G  Ultrasound guidance: no  Approach: anterolateral    Medications (Right): 2 mL bupivacaine 0 25 %; 40 mg triamcinolone acetonide 40 mg/mLMedications (Left): 2 mL bupivacaine 0 25 %; 40 mg triamcinolone acetonide 40 mg/mL

## 2022-07-26 ENCOUNTER — OFFICE VISIT (OUTPATIENT)
Dept: UROLOGY | Facility: CLINIC | Age: 82
End: 2022-07-26
Payer: MEDICARE

## 2022-07-26 VITALS
HEART RATE: 62 BPM | WEIGHT: 217 LBS | DIASTOLIC BLOOD PRESSURE: 74 MMHG | SYSTOLIC BLOOD PRESSURE: 132 MMHG | HEIGHT: 67 IN | BODY MASS INDEX: 34.06 KG/M2 | OXYGEN SATURATION: 98 %

## 2022-07-26 DIAGNOSIS — C64.1 RENAL CELL CARCINOMA OF RIGHT KIDNEY (HCC): Primary | ICD-10-CM

## 2022-07-26 DIAGNOSIS — E11.9 TYPE 2 DIABETES MELLITUS WITHOUT COMPLICATION, WITHOUT LONG-TERM CURRENT USE OF INSULIN (HCC): ICD-10-CM

## 2022-07-26 PROCEDURE — 99214 OFFICE O/P EST MOD 30 MIN: CPT | Performed by: PHYSICIAN ASSISTANT

## 2022-07-26 RX ORDER — BLOOD SUGAR DIAGNOSTIC
STRIP MISCELLANEOUS
Qty: 100 STRIP | Refills: 0 | Status: SHIPPED | OUTPATIENT
Start: 2022-07-26

## 2022-07-26 RX ORDER — SITAGLIPTIN 100 MG/1
TABLET, FILM COATED ORAL
COMMUNITY
Start: 2022-06-26 | End: 2022-09-07 | Stop reason: ALTCHOICE

## 2022-07-26 RX ORDER — LANCETS
EACH MISCELLANEOUS DAILY
Qty: 102 EACH | Refills: 0 | Status: SHIPPED | OUTPATIENT
Start: 2022-07-26

## 2022-07-26 NOTE — PROGRESS NOTES
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  2  Bilateral renal cysts simple, mild complex; no solid features    He is five years out from laparoscopic right partial nephrectomy without evidence of disease in chest or abdomen  He has stable bilateral renal cysts more on the left side, simple and one mildly complex without solid features  Will follow-up another ultrasound in one year  History of Present Illness  Jesica Stroud is a [de-identified] y o  male here for evaluation of annual follow-up renal cell carcinoma status post right partial nephrectomy September 2017 for a 7 cm tumor with negative surgical margins, previous 8 mm right iliac lymph node with negative CT hat, no longer seen on follow-up imaging  Surgical sites are well healed with no bulges hernia or pain over the years  Overall feels well, voids well  No flank pain or hematuria  Presents today with updated renal US and CXR with no evidence of residual recurrence nor metastatic disease  He has contralateral simple renal cysts on current US measure around 2cm one with a questionable septation mild complex no solid features stable compared to prior CTs over several years  Kidney function is excellent with a GFR 82  Quit smoking 50 yrs ago  Review of Systems   Constitutional: Negative for activity change, appetite change, chills, fever and unexpected weight change  HENT: Negative  Respiratory: Negative  Negative for shortness of breath  Cardiovascular: Negative  Negative for chest pain  Gastrointestinal: Negative for abdominal pain, diarrhea, nausea and vomiting  Endocrine: Negative  Genitourinary: Negative for decreased urine volume, difficulty urinating, dysuria, flank pain, frequency, hematuria, penile pain, testicular pain and urgency  Musculoskeletal: Negative for back pain and gait problem  Skin: Negative  Allergic/Immunologic: Negative  Neurological: Negative  Hematological: Negative for adenopathy  Does not bruise/bleed easily  AUA SYMPTOM SCORE    Flowsheet Row Most Recent Value   AUA SYMPTOM SCORE    How often have you had a sensation of not emptying your bladder completely after you finished urinating? 3   How often have you had to urinate again less than two hours after you finished urinating? 2   How often have you found you stopped and started again several times when you urinate? 0   How often have you found it difficult to postpone urination? 0   How often have you had a weak urinary stream? 0   How often have you had to push or strain to begin urination? 0   How many times did you most typically get up to urinate from the time you went to bed at night until the time you got up in the morning? 0   Quality of Life: If you were to spend the rest of your life with your urinary condition just the way it is now, how would you feel about that? 2   AUA SYMPTOM SCORE 5           Vitals  Vitals:    07/26/22 0938   BP: 132/74   Pulse: 62   SpO2: 98%   Weight: 98 4 kg (217 lb)   Height: 5' 7" (1 702 m)       Physical Exam  Vitals and nursing note reviewed  Constitutional:       General: He is not in acute distress  Appearance: He is well-developed  He is not diaphoretic  HENT:      Head: Normocephalic and atraumatic  Pulmonary:      Effort: Pulmonary effort is normal    Abdominal:      General: There is no distension  Palpations: There is no mass  Tenderness: There is no abdominal tenderness  Hernia: No hernia is present  Skin:     General: Skin is warm and dry  Neurological:      Mental Status: He is alert and oriented to person, place, and time        Gait: Gait normal    Psychiatric:         Speech: Speech normal          Behavior: Behavior normal            Past History  Past Medical History:   Diagnosis Date    Arthritis     Bladder mass 6/28/2017    Follows with Urology    Cancer [facility])     right kidney    Cardiac aneurysm     Chronic pain disorder     Diabetes mellitus ([address] )     NIDDM    Erectile dysfunction of non-organic origin     Glaucoma     Hx of bleeding disorder     in urine    Hyperlipidemia     Hypertension     Meniscus tear     Left knee    Urinary tract infection with hematuria     last assessed 2017    Wears glasses     Wears partial dentures     upper partial     Social History     Socioeconomic History    Marital status: /Civil Union     Spouse name: None    Number of children: None    Years of education: None    Highest education level: None   Occupational History    Occupation:  in [place] transit   Tobacco Use    Smoking status: Former Smoker     Quit date: 1985     Years since quittin 9    Smokeless tobacco: Never Used   Vaping Use    Vaping Use: Never used   Substance and Sexual Activity    Alcohol use: Not Currently     Comment: rarely    Drug use: No    Sexual activity: None   Other Topics Concern    None   Social History Narrative    Does not consume caffeine        Never drank alcohol per [employer]     Social Determinants of Health     Financial Resource Strain: Not on file   Food Insecurity: Not on file   Transportation Needs: Not on file   Physical Activity: Not on file   Stress: Not on file   Social Connections: Not on file   Intimate Partner Violence: Not on file   Housing Stability: Not on file     Social History     Tobacco Use   Smoking Status Former Smoker    Quit date: 1985    Years since quittin 9   Smokeless Tobacco Never Used     Family History   Problem Relation Age of Onset    Other Mother         malaria    Diabetes Sister     Hypertension Sister        The following portions of the patient's history were reviewed and updated as appropriate: allergies, current medications, past medical history, past social history, past surgical history and problem list     Results  No results found for this or any previous visit (from the past 1 hour(s)) ]  Lab Results   Component Value Date    PSA 0 4 04/27/2015    PSA 0 4 01/20/2014     Lab Results   Component Value Date    GLUCOSE 276 (H) 07/29/2015    CALCIUM 8 8 06/28/2022     07/29/2015    K 4 1 06/28/2022    CO2 25 06/28/2022     06/28/2022    BUN 22 06/28/2022    CREATININE 0 84 06/28/2022     Lab Results   Component Value Date    WBC 5 51 06/28/2022    HGB 12 9 06/28/2022    HCT 38 7 06/28/2022    MCV 88 06/28/2022     06/28/2022

## 2022-09-01 ENCOUNTER — RA CDI HCC (OUTPATIENT)
Dept: OTHER | Facility: HOSPITAL | Age: 82
End: 2022-09-01

## 2022-09-07 ENCOUNTER — OFFICE VISIT (OUTPATIENT)
Dept: FAMILY MEDICINE CLINIC | Facility: CLINIC | Age: 82
End: 2022-09-07
Payer: MEDICARE

## 2022-09-07 VITALS
OXYGEN SATURATION: 94 % | DIASTOLIC BLOOD PRESSURE: 70 MMHG | TEMPERATURE: 98 F | HEART RATE: 58 BPM | BODY MASS INDEX: 34.53 KG/M2 | WEIGHT: 220 LBS | SYSTOLIC BLOOD PRESSURE: 134 MMHG | HEIGHT: 67 IN

## 2022-09-07 DIAGNOSIS — E78.00 PURE HYPERCHOLESTEROLEMIA: ICD-10-CM

## 2022-09-07 DIAGNOSIS — C64.1 RENAL CELL CARCINOMA OF RIGHT KIDNEY (HCC): ICD-10-CM

## 2022-09-07 DIAGNOSIS — E11.42 TYPE 2 DIABETES MELLITUS WITH DIABETIC POLYNEUROPATHY, WITHOUT LONG-TERM CURRENT USE OF INSULIN (HCC): Primary | ICD-10-CM

## 2022-09-07 DIAGNOSIS — D69.6 THROMBOCYTOPENIA (HCC): ICD-10-CM

## 2022-09-07 DIAGNOSIS — R26.89 IMBALANCE: ICD-10-CM

## 2022-09-07 DIAGNOSIS — I71.20 THORACIC AORTIC ANEURYSM WITHOUT RUPTURE: ICD-10-CM

## 2022-09-07 DIAGNOSIS — I10 ESSENTIAL HYPERTENSION: ICD-10-CM

## 2022-09-07 DIAGNOSIS — E66.09 CLASS 1 OBESITY DUE TO EXCESS CALORIES WITH SERIOUS COMORBIDITY AND BODY MASS INDEX (BMI) OF 34.0 TO 34.9 IN ADULT: ICD-10-CM

## 2022-09-07 PROBLEM — E66.811 CLASS 1 OBESITY DUE TO EXCESS CALORIES WITH SERIOUS COMORBIDITY AND BODY MASS INDEX (BMI) OF 34.0 TO 34.9 IN ADULT: Status: ACTIVE | Noted: 2020-11-30

## 2022-09-07 LAB
LEFT EYE DIABETIC RETINOPATHY: NORMAL
RIGHT EYE DIABETIC RETINOPATHY: NORMAL
SEVERITY (EYE EXAM): NORMAL

## 2022-09-07 PROCEDURE — 99214 OFFICE O/P EST MOD 30 MIN: CPT | Performed by: FAMILY MEDICINE

## 2022-09-07 NOTE — ASSESSMENT & PLAN NOTE
He appears have a multifactorial imbalance issue  This likely comes from some arthritis and some deconditioning  I am going to have him see physical therapy

## 2022-09-07 NOTE — PROGRESS NOTES
Assessment/Plan:       Problem List Items Addressed This Visit        Endocrine    Type 2 diabetes mellitus with diabetic polyneuropathy ([facility] [address] ) - Primary       Lab Results   Component Value Date    HGBA1C 6 9 (H) 06/28/2022   His home glucose readings all look pretty good  Continue on metformin at this time  He is off Januvia  Relevant Orders    Hemoglobin A1C    Comprehensive metabolic panel       Cardiovascular and Mediastinum    Essential hypertension     I encouraged him to check blood pressure readings intermittently at home and let me know if he is running above 140/90  Relevant Orders    CBC and differential    Comprehensive metabolic panel    Lipid Panel with Direct LDL reflex    Thoracic aortic aneurysm without rupture (HCC)       Genitourinary    Renal cell carcinoma of right kidney (HCC)    Relevant Orders    CBC and differential    Comprehensive metabolic panel       Other    Thrombocytopenia (HCC)    Relevant Orders    CBC and differential    Pure hypercholesterolemia    Relevant Orders    Comprehensive metabolic panel    Lipid Panel with Direct LDL reflex    Class 1 obesity due to excess calories with serious comorbidity and body mass index (BMI) of 34 0 to 34 9 in adult    Relevant Orders    Lipid Panel with Direct LDL reflex    Imbalance     He appears have a multifactorial imbalance issue  This likely comes from some arthritis and some deconditioning  I am going to have him see physical therapy  Relevant Orders    Ambulatory referral to Physical Therapy            Subjective:      Patient ID: Bella Rockwell is a [de-identified] y o  male  HPI patient presents today accompanied by his wife for follow-up for routine health care  Overall, he is doing well  Last round of diabetic blood work looks good and his Accu-Cheks from home were good  He is just on metformin at this time  He has a history of a thoracic aneurysm and does follow with Cardiology twice annually    He will be seeing them in the fall  Does have some persistent lower extremity swelling but has been losing weight which has been helpful  The following portions of the patient's history were reviewed and updated as appropriate: allergies, current medications, past family history, past medical history, past social history, past surgical history and problem list       Current Outpatient Medications:     Accu-Chek FastClix Lancets MISC, Use daily E11 9, Disp: 102 each, Rfl: 0    amLODIPine (NORVASC) 5 mg tablet, TAKE 1 TABLET DAILY, Disp: 90 tablet, Rfl: 3    atorvastatin (LIPITOR) 40 mg tablet, Take 1 tablet (40 mg total) by mouth daily, Disp: 90 tablet, Rfl: 1    Blood Glucose Monitoring Suppl (ACCU-CHEK OMERO PLUS) w/Device KIT, by Does not apply route, Disp: , Rfl:     Calcium Carb-Cholecalciferol 600-200 MG-UNIT TABS, , Disp: , Rfl:     Cholecalciferol 50 MCG (2000 UT) TABS, , Disp: , Rfl:     Cyanocobalamin (VITAMIN B 12 PO), Take by mouth, Disp: , Rfl:     gabapentin (NEURONTIN) 100 mg capsule, TAKE 1 CAPSULE AT BEDTIME INCREASE UP TO 3 CAPSULES NIGHTLY AS NEEDED FOR NEUROPATHY, Disp: 90 capsule, Rfl: 3    glucose blood (Accu-Chek Omero Plus) test strip, Use as instructed, Disp: 100 strip, Rfl: 0    latanoprost (XALATAN) 0 005 % ophthalmic solution, , Disp: , Rfl:     losartan-hydrochlorothiazide (HYZAAR) 100-25 MG per tablet, TAKE 1 TABLET DAILY, Disp: 90 tablet, Rfl: 3    MAGNESIUM GLUCONATE PO, Take 400 mg by mouth, Disp: , Rfl:     metFORMIN (GLUCOPHAGE) 500 mg tablet, Take 1 tablet in the morning and 1 tablet in the evening, Disp: 180 tablet, Rfl: 3    Polyethyl Glycol-Propyl Glycol (SYSTANE OP), Apply to eye 1 drop each eye twice a day , Disp: , Rfl:      Review of Systems   Constitutional: Negative for appetite change, chills, fatigue, fever and unexpected weight change  HENT: Negative for trouble swallowing  Eyes: Negative for visual disturbance     Respiratory: Negative for cough, chest tightness, shortness of breath and wheezing  Cardiovascular: Negative for chest pain, palpitations and leg swelling  Gastrointestinal: Negative for abdominal distention, abdominal pain, blood in stool, constipation and diarrhea  Endocrine: Negative for polyuria  Genitourinary: Negative for difficulty urinating and flank pain  Musculoskeletal: Negative for arthralgias and myalgias  Skin: Negative for rash  Neurological: Negative for dizziness and light-headedness  Hematological: Negative for adenopathy  Does not bruise/bleed easily  Psychiatric/Behavioral: Negative for dysphoric mood and sleep disturbance  The patient is not nervous/anxious  Objective:      /70 (BP Location: Left arm, Patient Position: Sitting, Cuff Size: Large)   Pulse 58   Temp 98 °F (36 7 °C)   Ht 5' 7" (1 702 m)   Wt 99 8 kg (220 lb)   SpO2 94%   BMI 34 46 kg/m²          Physical Exam  Constitutional:       General: He is not in acute distress  Appearance: He is well-developed  He is obese  He is not diaphoretic  HENT:      Head: Normocephalic and atraumatic  Right Ear: External ear normal       Left Ear: External ear normal       Mouth/Throat:      Pharynx: No oropharyngeal exudate  Eyes:      General: No scleral icterus  Right eye: No discharge  Left eye: No discharge  Pupils: Pupils are equal, round, and reactive to light  Neck:      Thyroid: No thyromegaly  Vascular: Normal carotid pulses  No carotid bruit  Trachea: No tracheal deviation  Cardiovascular:      Rate and Rhythm: Normal rate and regular rhythm  Heart sounds: Normal heart sounds  No murmur heard  No gallop  Pulmonary:      Effort: Pulmonary effort is normal  No tachypnea or respiratory distress  Breath sounds: No stridor  No wheezing or rales  Abdominal:      General: Bowel sounds are normal  There is no distension  Palpations: Abdomen is soft  There is no mass  Tenderness:  There is no abdominal tenderness  There is no guarding or rebound  Musculoskeletal:         General: No tenderness or deformity  Normal range of motion  Cervical back: No edema or erythema  Lymphadenopathy:      Cervical: No cervical adenopathy  Skin:     General: Skin is warm  Coloration: Skin is not pale  Findings: No erythema or rash  Neurological:      Mental Status: He is alert and oriented to person, place, and time  Cranial Nerves: No cranial nerve deficit  Motor: No abnormal muscle tone  Coordination: Coordination normal       Deep Tendon Reflexes: Reflexes normal    Psychiatric:         Mood and Affect: Mood is not anxious or depressed  Speech: Speech normal          Behavior: Behavior normal          Thought Content:  Thought content normal          Judgment: Judgment normal            Jono Prather MD

## 2022-09-07 NOTE — ASSESSMENT & PLAN NOTE
Lab Results   Component Value Date    HGBA1C 6 9 (H) 06/28/2022   His home glucose readings all look pretty good  Continue on metformin at this time  He is off Januvia

## 2022-09-07 NOTE — ASSESSMENT & PLAN NOTE
I encouraged him to check blood pressure readings intermittently at home and let me know if he is running above 140/90

## 2022-09-08 ENCOUNTER — TELEPHONE (OUTPATIENT)
Dept: ADMINISTRATIVE | Facility: OTHER | Age: 82
End: 2022-09-08

## 2022-09-08 NOTE — TELEPHONE ENCOUNTER
----- Message from Peter James sent at 9/7/2022  3:03 PM EDT -----  Regarding: DM Foot Exam  09/07/22 3:04 PM    Hello, our patient Perfecto Dandy has had Diabetic Foot Exam completed/performed  Please assist in updating the patient chart by pulling the document from the Media Tab  The date of service is 8/24/22       Thank you,  Peter James  [facility]

## 2022-09-08 NOTE — TELEPHONE ENCOUNTER
DM Eye: Upon review of the In Basket request and the patient's chart, initial outreach has been made via fax, please see Contacts section for details       Thank you  Brodie Cavazos

## 2022-09-08 NOTE — TELEPHONE ENCOUNTER
----- Message from Bhupinder Salmon sent at 9/7/2022  2:39 PM EDT -----  Regarding: DM Eye exam  09/07/22 2:39 PM    Hello, our patient Beti Hermosillo has had Diabetic Eye Exam completed/performed  Please assist in updating the patient chart by making an External outreach to Dr Jalen Mathis [phone] facility located in [facility]  The date of service is 9/6/22      Thank you,  Shayla Quiroga MA  [facility]

## 2022-09-08 NOTE — LETTER
Diabetic Eye Exam Form    Date Requested: 22  Patient: Annalisa Agent  Patient : 1940   Referring Provider: Nitin Chou MD    DIABETIC Eye Exam Date _______________________________    Type of Exam MUST be documented for Diabetic Eye Exams  Please CHECK ONE  Retinal Exam       Dilated Retinal Exam       OCT       Optomap-Iris Exam      Fundus Photography     Left Eye - Please check Retinopathy AND Type or No Retinopathy      Exam did show retinopathy    Exam did not show retinopathy         Mild     Proliferative           Moderate    Severe            None         Right Eye - Please check Retinopathy AND Type or No Retinopathy     Exam did show retinopathy    Exam did not show retinopathy         Mild     Proliferative        Moderate    Severe        None       Comments Please indicate the type of exam patient had performed  Thank you! Practice Providing Exam ______________________________________________    Exam Performed By (print name) _______________________________________      Provider Signature ___________________________________________________    These reports are needed for  compliance  Please fax this completed form and a copy of the Diabetic Eye Exam report to our office located at [place] [address] as soon as possible via [phone] maki Boyle: Phone [phone]  We thank you for your assistance in treating our mutual patient

## 2022-09-08 NOTE — TELEPHONE ENCOUNTER
----- Message from Bhupinder Medina sent at 9/7/2022  7:19 PM EDT -----  Regarding: DM foot exam  09/07/22 7:19 PM    Hello, our patient Lorenzo Huynh has had Diabetic Foot Exam completed/performed  Please assist in updating the patient chart by making an External outreach to Dr Amirah Mcduffie facility located in [facility]   The date of service is  20021, 2022    Thank you,  Shayla Love MA  [facility]

## 2022-09-08 NOTE — LETTER
Diabetic Eye Exam Form    Date Requested: 22  Patient: Chayito Xiao  Patient : 1940   Referring Provider: Hayden Mark MD    DIABETIC Eye Exam Date _______________________________    Type of Exam MUST be documented for Diabetic Eye Exams  Please CHECK ONE  Retinal Exam       Dilated Retinal Exam       OCT       Optomap-Iris Exam      Fundus Photography     Left Eye - Please check Retinopathy AND Type or No Retinopathy      Exam did show retinopathy    Exam did not show retinopathy         Mild     Proliferative           Moderate    Severe            None         Right Eye - Please check Retinopathy AND Type or No Retinopathy     Exam did show retinopathy    Exam did not show retinopathy         Mild     Proliferative        Moderate    Severe        None       Comments DOS: 2022    Practice Providing Exam ______________________________________________    Exam Performed By (print name) _______________________________________      Provider Signature ___________________________________________________    These reports are needed for  compliance  Please fax this completed form and a copy of the Diabetic Eye Exam report to our office located at [place] [address] as soon as possible via [phone] maki Murryr: Phone [phone]  We thank you for your assistance in treating our mutual patient

## 2022-09-13 NOTE — TELEPHONE ENCOUNTER
As a follow-up, a second attempt has been made for outreach via fax, please see Contacts section for details      Thank you  Aakash Amado

## 2022-09-14 ENCOUNTER — EVALUATION (OUTPATIENT)
Dept: PHYSICAL THERAPY | Facility: MEDICAL CENTER | Age: 82
End: 2022-09-14
Payer: MEDICARE

## 2022-09-14 DIAGNOSIS — R26.89 IMBALANCE: Primary | ICD-10-CM

## 2022-09-14 PROCEDURE — 97161 PT EVAL LOW COMPLEX 20 MIN: CPT

## 2022-09-14 PROCEDURE — 97112 NEUROMUSCULAR REEDUCATION: CPT

## 2022-09-14 NOTE — PROGRESS NOTES
PT Evaluation     Today's date: 2022  Patient name: No Abdi  : 1940  MRN: [id number]  Referring provider: Del Bustamante , *  Dx:   Encounter Diagnosis     ICD-10-CM    1  Imbalance  R26 89 Ambulatory referral to Physical Therapy                  Assessment  Assessment details: No Abdi  is a pleasant [de-identified] y o  male who presents with imbalance   The primary movement problem is balance deficit resulting in gait abnormality, difficulty with functional tasks, and weakness , which limit his ability to perform ADLs and IADLs   No referral is necessary at this time based on examination results  The patient's greatest concerns is falling with stairs and walking  Problem List:  1) balance deficit- addressing with balance training  2) gait deficit- addressing with gait training  3) strength deficit- addressing with strengthening    Etiologic factors include neuropathy  Pt  will benefit from skilled PT services that includes manual therapy techniques to enhance tissue extensibility, neuromuscular re-education to facilitate motor control, therapeutic exercise to increase functional mobility, and modalities prn to reduce pain and inflammation    Impairments: abnormal gait, abnormal muscle firing, abnormal muscle tone, abnormal or restricted ROM, abnormal movement, activity intolerance, impaired balance, impaired physical strength, lacks appropriate home exercise program, pain with function and safety issue  Understanding of Dx/Px/POC: good   Prognosis: good  Prognosis details: Positive prognostic indicators: motivation to improve   Negative prognostic indicators: chronicity of symptoms    Goals  Impairment Goals  - Pt I with initial HEP in 1-2 visits  - Increase strength to 5/5 in all affected areas in 6-8 weeks    Functional Goals  - Increase Functional Status Measure to: greater than expected in 10-12 weeks  - Patient will be independent with comprehensive HEP in 10-12 weeks  - Ambulation is improved to prior level of function in 10-12 weeks  - Stair climbing is improved to prior level of function in 10-12 weeks  - Leandra Sprung is improved to prior level of function in 10-12 weeks  - Improve TUG time with WNL in 10-12 weeks      Plan  Plan details: Prognosis above is given PT services 2x/week tapering to 1x/week over the next 10-12 weeks and home program adherence  Patient would benefit from: skilled physical therapy  Planned modality interventions: low level laser therapy, TENS and cryotherapy  Planned therapy interventions: joint mobilization, manual therapy, massage, neuromuscular re-education, patient education, postural training, strengthening, stretching, therapeutic activities, therapeutic exercise, flexibility, functional ROM exercises, graded exercise, home exercise program, balance, balance/weight bearing training and gait training  Treatment plan discussed with: patient        Subjective Evaluation    History of Present Illness  Mechanism of injury: Eri Dennis presents with c/c of imbalance  Symptoms began 3 months with mechanism of injury: Pt reports that his sugar went low one day when he was bowling and his whole body was shaking  He reports that he is unable to walk a straight line when he bowls  He is getting shots in his knees  Pt reports neuropathy in his feet secondary to diabetes     Aggravating factors: steps, walking  Relieving factors: rest  24hr pain pattern: 0/10 (current), 0/10 (best), 8/10 (worst), location: anterior knee, descriptors: sharp, dull ache  Imaging: none  Previous treatments: none  Occupation/recreation: retired, bowling  Primary concern: going up the steps, walking and feeling and unstable  Patient goals: improve balance and confidence    At home with no steps in home and a few steps to get into house  [employer] when in community settings          Objective     Strength/Myotome Testing     Left Hip   Planes of Motion   Flexion: 4-  Abduction: 4-  Adduction: 4-    Right Hip   Planes of Motion   Flexion: 4-  Abduction: 4-  Adduction: 4-    Left Knee   Flexion: 3+  Extension: 4-    Right Knee   Flexion: 3+  Extension: 4-    Left Ankle/Foot   Dorsiflexion: 4  Plantar flexion: 4-    Right Ankle/Foot   Dorsiflexion: 4  Plantar flexion: 4-    Ambulation     Observational Gait     Additional Observational Gait Details  Lacks heel strike and push off b/l   Wide [place] and increased stance time b/l       Functional Assessment        Comments  TU 68 seconds   5xSTS: 15 45 seconds  Romberg: 10 sec  Modified tandem: 10 sec b/l unstable  - requires contact guard   Tandem: unable without external support             Precautions: DM, HTN, neuropathy, fall risk    HEP: TR, CR, LAQ, hamstring curl  Manuals                                                                 Neuro Re-Ed             romberg With pertubations 2x30 s            Cone stack 1x on airex            Line walks 2x            Modified tandem  2x30s                                                    Ther Ex                                                                                                                     Ther Activity                                       Gait Training                                       Modalities

## 2022-09-16 ENCOUNTER — PROCEDURE VISIT (OUTPATIENT)
Dept: OBGYN CLINIC | Facility: MEDICAL CENTER | Age: 82
End: 2022-09-16
Payer: MEDICARE

## 2022-09-16 VITALS
WEIGHT: 222 LBS | HEIGHT: 67 IN | SYSTOLIC BLOOD PRESSURE: 148 MMHG | BODY MASS INDEX: 34.84 KG/M2 | HEART RATE: 64 BPM | DIASTOLIC BLOOD PRESSURE: 63 MMHG

## 2022-09-16 DIAGNOSIS — M17.0 BILATERAL PRIMARY OSTEOARTHRITIS OF KNEE: Primary | ICD-10-CM

## 2022-09-16 PROCEDURE — 20610 DRAIN/INJ JOINT/BURSA W/O US: CPT | Performed by: ORTHOPAEDIC SURGERY

## 2022-09-16 RX ORDER — HYALURONATE SODIUM 10 MG/ML
20 SYRINGE (ML) INTRAARTICULAR
Status: COMPLETED | OUTPATIENT
Start: 2022-09-16 | End: 2022-09-16

## 2022-09-16 RX ADMIN — Medication 20 MG: at 08:25

## 2022-09-16 NOTE — PROGRESS NOTES
Patient is here for his 1st set of bilateral knee Euflexxa injections  He tolerated the procedure well  Follow up 1 week  Large joint arthrocentesis: bilateral knee  Universal Protocol:  Consent: Verbal consent obtained    Risks and benefits: risks, benefits and alternatives were discussed  Consent given by: patient  Patient understanding: patient states understanding of the procedure being performed  Patient consent: the patient's understanding of the procedure matches consent given  Site marked: the operative site was marked  Supporting Documentation  Indications: pain   Procedure Details  Location: knee - bilateral knee  Needle size: 22 G  Ultrasound guidance: no  Approach: anterolateral    Medications (Right): 20 mg Sodium Hyaluronate 20 MG/2MLMedications (Left): 20 mg Sodium Hyaluronate 20 MG/2ML   Patient tolerance: patient tolerated the procedure well with no immediate complications  Dressing:  Sterile dressing applied

## 2022-09-19 NOTE — TELEPHONE ENCOUNTER
Upon review of the In Basket request we were able to locate, review, and update the patient chart as requested for Diabetic Eye Exam and Diabetic Foot Exam     Any additional questions or concerns should be emailed to the Practice Liaisons via [email or web address] email, please do not reply via In Basket      Thank you  Aquilino Zafar

## 2022-09-20 ENCOUNTER — OFFICE VISIT (OUTPATIENT)
Dept: PHYSICAL THERAPY | Facility: MEDICAL CENTER | Age: 82
End: 2022-09-20
Payer: MEDICARE

## 2022-09-20 DIAGNOSIS — R26.89 IMBALANCE: Primary | ICD-10-CM

## 2022-09-20 PROCEDURE — 97112 NEUROMUSCULAR REEDUCATION: CPT

## 2022-09-20 PROCEDURE — 97110 THERAPEUTIC EXERCISES: CPT

## 2022-09-20 NOTE — PROGRESS NOTES
Daily Note     Today's date: 2022  Patient name: No Abdi  : 1940  MRN: [id number]  Referring provider: Del Bustamante , *  Dx:   Encounter Diagnosis     ICD-10-CM    1  Imbalance  R26 89                   Subjective: Pt reports that he feels good  Objective: See treatment diary below      Assessment: Progressed balance with change in direction and resistance  Patient felt that his blood sugar dropped during session  Patient was placed in seated position and drank orange juice provided by his wife  He then took his own blood sugar, which was 211  Session was terminated at that point secondary to sugar changes  Tolerated treatment well  Patient demonstrated fatigue post treatment, exhibited good technique with therapeutic exercises and would benefit from continued PT      Plan: Continue per plan of care        Precautions: DM, HTN, neuropathy, fall risk    HEP: TR, CR, LAQ, hamstring curl  Manuals                                                                 Neuro Re-Ed             romberg With pertubations 2x30 s            Cone stack 2x on airex            Line walks 2x 10#            Modified tandem  3x30s             heron walk outs nv                                      Ther Ex             bike 10' res 3            Step ups             Leg press             squat                                                                 Ther Activity                                       Gait Training                                       Modalities

## 2022-09-22 ENCOUNTER — OFFICE VISIT (OUTPATIENT)
Dept: PHYSICAL THERAPY | Facility: MEDICAL CENTER | Age: 82
End: 2022-09-22
Payer: MEDICARE

## 2022-09-22 DIAGNOSIS — R26.89 IMBALANCE: Primary | ICD-10-CM

## 2022-09-22 PROCEDURE — 97112 NEUROMUSCULAR REEDUCATION: CPT

## 2022-09-22 PROCEDURE — 97110 THERAPEUTIC EXERCISES: CPT

## 2022-09-22 NOTE — PROGRESS NOTES
Daily Note     Today's date: 2022  Patient name: Mikayla Victor  : 1940  MRN: [id number]  Referring provider: Charisse Boyce , *  Dx:   Encounter Diagnosis     ICD-10-CM    1  Imbalance  R26 89                   Subjective: Pt reports that he feels better  Objective: See treatment diary below      Assessment: Pt had increase Lob with pertubation's PA, requiring upper extremity support  Patient had difficulty with side steps to L on heron  Decreased weight to 3#  Eccentric control was limited with steps back toward wall required contact guard throughout  Tolerated treatment well  Patient demonstrated fatigue post treatment, exhibited good technique with therapeutic exercises and would benefit from continued PT      Plan: Continue per plan of care        Precautions: DM, HTN, neuropathy, fall risk    HEP: TR, CR, LAQ, hamstring curl  Manuals                                                                 Neuro Re-Ed             romberg With pertubations 2x30 s            Cone stack 2x on airex            Line walks 2x 10#            Modified tandem  3x30s             heron walk outs 5x 5# (dropped to 3# with L side steps) sideways/fw                                      Ther Ex             bike np            Step ups x20            Stand hip abd/ext x30 ea            Stand marches x30            Leg press             squat                                                                 Ther Activity                                       Gait Training                                       Modalities

## 2022-09-23 ENCOUNTER — PROCEDURE VISIT (OUTPATIENT)
Dept: OBGYN CLINIC | Facility: MEDICAL CENTER | Age: 82
End: 2022-09-23
Payer: MEDICARE

## 2022-09-23 VITALS
WEIGHT: 220 LBS | SYSTOLIC BLOOD PRESSURE: 113 MMHG | HEIGHT: 67 IN | BODY MASS INDEX: 34.53 KG/M2 | DIASTOLIC BLOOD PRESSURE: 65 MMHG | HEART RATE: 65 BPM

## 2022-09-23 DIAGNOSIS — M17.0 BILATERAL PRIMARY OSTEOARTHRITIS OF KNEE: Primary | ICD-10-CM

## 2022-09-23 PROCEDURE — 20610 DRAIN/INJ JOINT/BURSA W/O US: CPT | Performed by: PHYSICIAN ASSISTANT

## 2022-09-23 RX ORDER — HYALURONATE SODIUM 10 MG/ML
20 SYRINGE (ML) INTRAARTICULAR
Status: COMPLETED | OUTPATIENT
Start: 2022-09-23 | End: 2022-09-23

## 2022-09-23 RX ADMIN — Medication 20 MG: at 08:36

## 2022-09-23 NOTE — PROGRESS NOTES
Patient has severe right knee and mild left knee osteoarthritis  Patient received bilateral knee euflexxa injections 2/3  Post injection instructions reviewed  Follow up 1 week  Large joint arthrocentesis: bilateral knee  Universal Protocol:  Consent: Verbal consent obtained    Risks and benefits: risks, benefits and alternatives were discussed  Consent given by: patient  Site marked: the operative site was marked  Supporting Documentation  Indications: pain   Procedure Details  Location: knee - bilateral knee  Preparation: Patient was prepped and draped in the usual sterile fashion  Needle size: 22 G  Ultrasound guidance: no  Approach: anterolateral    Medications (Right): 20 mg Sodium Hyaluronate 20 MG/2MLMedications (Left): 20 mg Sodium Hyaluronate 20 MG/2ML   Patient tolerance: patient tolerated the procedure well with no immediate complications  Dressing:  Sterile dressing applied

## 2022-09-27 ENCOUNTER — OFFICE VISIT (OUTPATIENT)
Dept: PHYSICAL THERAPY | Facility: MEDICAL CENTER | Age: 82
End: 2022-09-27
Payer: MEDICARE

## 2022-09-27 DIAGNOSIS — R26.89 IMBALANCE: Primary | ICD-10-CM

## 2022-09-27 PROCEDURE — 97112 NEUROMUSCULAR REEDUCATION: CPT

## 2022-09-27 PROCEDURE — 97110 THERAPEUTIC EXERCISES: CPT

## 2022-09-27 NOTE — PROGRESS NOTES
Daily Note     Today's date: 2022  Patient name: No Abdi  : 1940  MRN: [id number]  Referring provider: Del Bustamante , *  Dx:   Encounter Diagnosis     ICD-10-CM    1  Imbalance  R26 89                   Subjective: Pt reports that he feels good  He reports that he is able to walk down the alley better  Objective: See treatment diary below      Assessment: Pt demonstrates improved balance with cone stacks  He continues to have difficulty with side steps on heron  Added offset KB carry with no adverse effects  Contact guard provided throughout session  Tolerated treatment well  Patient demonstrated fatigue post treatment, exhibited good technique with therapeutic exercises and would benefit from continued PT      Plan: Continue per plan of care        Precautions: DM, HTN, neuropathy, fall risk    HEP: TR, CR, LAQ, hamstring curl  Manuals                                                                 Neuro Re-Ed             romberg With pertubations 2x30 s            Cone stack 2x on airex            Line walks 2x 10#            Modified tandem  3x30s             heron walk outs 5x 3# sideways/fw            KB carry 10/5# off set 2 laps ea                         Ther Ex             bike np            Step ups x30            Stand hip abd/ext x30 ea            Stand marches x30            Leg press             squat                                                                 Ther Activity                                       Gait Training                                       Modalities

## 2022-09-29 ENCOUNTER — OFFICE VISIT (OUTPATIENT)
Dept: PHYSICAL THERAPY | Facility: MEDICAL CENTER | Age: 82
End: 2022-09-29
Payer: MEDICARE

## 2022-09-29 DIAGNOSIS — R26.89 IMBALANCE: Primary | ICD-10-CM

## 2022-09-29 PROCEDURE — 97110 THERAPEUTIC EXERCISES: CPT

## 2022-09-29 PROCEDURE — 97112 NEUROMUSCULAR REEDUCATION: CPT

## 2022-09-29 NOTE — PROGRESS NOTES
Daily Note     Today's date: 2022  Patient name: Deann Rice  : 1940  MRN: [id number]  Referring provider: Tamera Cardoso , *  Dx:   Encounter Diagnosis     ICD-10-CM    1  Imbalance  R26 89                   Subjective: Pt reports that he feels good  He denies any soreness      Objective: See treatment diary below      Assessment: Progressed balance exercises with no adverse effects  Patient's semi-tandem stance has improved since starting therapy  He demonstrates less LOB  He requires guarding throughout session  Tolerated treatment well  Patient demonstrated fatigue post treatment, exhibited good technique with therapeutic exercises and would benefit from continued PT      Plan: Continue per plan of care        Precautions: DM, HTN, neuropathy, fall risk    HEP: TR, CR, LAQ, hamstring curl  Manuals                                                                 Neuro Re-Ed             romberg With pertubations 2x30 s            Cone stack 2x on airex            Line walks 2x 10#            Modified tandem  3x30s             heron walk outs 5x 3# sideways/fw            KB carry 10/5# off set 2 laps ea            Cone taps 3x30 s fingertip support            Ther Ex             bike np            Step ups x30            Stand hip abd/ext x30 ea            Stand marches x30            Leg press             squat                                                                 Ther Activity                                       Gait Training                                       Modalities

## 2022-09-30 ENCOUNTER — PROCEDURE VISIT (OUTPATIENT)
Dept: OBGYN CLINIC | Facility: MEDICAL CENTER | Age: 82
End: 2022-09-30
Payer: MEDICARE

## 2022-09-30 VITALS
DIASTOLIC BLOOD PRESSURE: 63 MMHG | HEART RATE: 59 BPM | HEIGHT: 67 IN | BODY MASS INDEX: 34.46 KG/M2 | SYSTOLIC BLOOD PRESSURE: 158 MMHG

## 2022-09-30 DIAGNOSIS — M17.0 PRIMARY OSTEOARTHRITIS OF BOTH KNEES: Primary | ICD-10-CM

## 2022-09-30 PROCEDURE — 20610 DRAIN/INJ JOINT/BURSA W/O US: CPT | Performed by: PHYSICIAN ASSISTANT

## 2022-09-30 RX ORDER — HYALURONATE SODIUM 10 MG/ML
20 SYRINGE (ML) INTRAARTICULAR
Status: COMPLETED | OUTPATIENT
Start: 2022-09-30 | End: 2022-09-30

## 2022-09-30 RX ADMIN — Medication 20 MG: at 08:38

## 2022-09-30 NOTE — PROGRESS NOTES
Patient has severe right knee and mild left knee osteoarthritis  After previous to Euflexxa injections the patient has had improvement  He has continued with outpatient physical therapy which he feels is helping  Patient received bilateral knee euflexxa injections 3/3  Post injection instructions reviewed  We did briefly discuss ongoing treatment options for osteoarthritis of the knees including modalities such as ice and heat, analgesics verses NSAIDs, further injections going forward sources steroids versus viscosupplementation and briefly surgical options  Follow up p r n         Large joint arthrocentesis: bilateral knee  Universal Protocol:  Consent: Verbal consent obtained    Consent given by: patient    Supporting Documentation  Indications: pain   Procedure Details  Location: knee - bilateral knee  Needle size: 22 G  Approach: anterolateral    Medications (Right): 20 mg Sodium Hyaluronate 20 MG/2MLMedications (Left): 20 mg Sodium Hyaluronate 20 MG/2ML   Patient tolerance: patient tolerated the procedure well with no immediate complications  Dressing:  Sterile dressing applied

## 2022-10-04 ENCOUNTER — OFFICE VISIT (OUTPATIENT)
Dept: PHYSICAL THERAPY | Facility: MEDICAL CENTER | Age: 82
End: 2022-10-04
Payer: MEDICARE

## 2022-10-04 DIAGNOSIS — R26.89 IMBALANCE: Primary | ICD-10-CM

## 2022-10-04 PROCEDURE — 97112 NEUROMUSCULAR REEDUCATION: CPT

## 2022-10-04 PROCEDURE — 97110 THERAPEUTIC EXERCISES: CPT

## 2022-10-04 NOTE — PROGRESS NOTES
Daily Note     Today's date: 10/4/2022  Patient name: Salas Mendosa  : 1940  MRN: [id number]  Referring provider: Thom Bocangera , *  Dx:   Encounter Diagnosis     ICD-10-CM    1  Imbalance  R26 89                   Subjective: Pt reports that he feels good  He reports that his balance is getting better with bowling  Objective: See treatment diary below      Assessment: Added dong steps to promote longer single leg stance time  Provided contact guard throughout  Tolerated treatment well  Patient demonstrated fatigue post treatment, exhibited good technique with therapeutic exercises and would benefit from continued PT      Plan: Continue per plan of care        Precautions: DM, HTN, neuropathy, fall risk    HEP: TR, CR, LAQ, hamstring curl  Manuals 10/4                                                                Neuro Re-Ed             marcoberg With pertubations 2x30 s            Cone stack 2x on airex            Line walks 2x 10#            Modified tandem  3x30s             heron walk outs 5x 3 5# sideways/fw            KB carry 15/7 5# off set 2 laps ea            Cone taps 3x30 s fingertip support            Hurdles Side steps/FW 3x5            Ther Ex             bike np            Step ups x30            Stand hip abd/ext x30 ea            Stand marches x30            Leg press             squat                                                                 Ther Activity                                       Gait Training                                       Modalities

## 2022-10-06 ENCOUNTER — OFFICE VISIT (OUTPATIENT)
Dept: PHYSICAL THERAPY | Facility: MEDICAL CENTER | Age: 82
End: 2022-10-06
Payer: MEDICARE

## 2022-10-06 DIAGNOSIS — R26.89 IMBALANCE: Primary | ICD-10-CM

## 2022-10-06 PROCEDURE — 97110 THERAPEUTIC EXERCISES: CPT

## 2022-10-06 PROCEDURE — 97112 NEUROMUSCULAR REEDUCATION: CPT

## 2022-10-06 NOTE — PROGRESS NOTES
Daily Note     Today's date: 10/6/2022  Patient name: Bella Rockwell  : 1940  MRN: [id number]  Referring provider: Teresa Enriquez , *  Dx:   Encounter Diagnosis     ICD-10-CM    1  Imbalance  R26 89                   Subjective: Pt reports that he feels good  He reports that his balance is getting better with bowling  Objective: See treatment diary below      Assessment: Patient demonstrates improvement with heron side steps  He continues to have more LOB with L side steps  Overall, his balance has improved  Did not perform step ups today secondary to L knee pain  Added sit to stands with no adverse effects  Tolerated treatment well  Patient demonstrated fatigue post treatment, exhibited good technique with therapeutic exercises and would benefit from continued PT      Plan: Continue per plan of care        Precautions: DM, HTN, neuropathy, fall risk    HEP: TR, CR, LAQ, hamstring curl  Manuals 10/6                                                                Neuro Re-Ed             romberg With pertubations 2x30 s            Cone stack 2x on airex            Line walks 2x 10#            Modified tandem  3x30s             heron walk outs 5x 3 5# sideways/fw            KB carry 15/7 5# off set 2 laps ea            Cone taps 3x30 s fingertip support            Hurdles Side steps/FW 3x5            Ther Ex             bike np            Step ups np            Stand hip abd/ext x30 ea            Stand marches x30            Sit to stand 3x10            Leg press             squat                                                                 Ther Activity                                       Gait Training                                       Modalities

## 2022-10-13 ENCOUNTER — APPOINTMENT (OUTPATIENT)
Dept: PHYSICAL THERAPY | Facility: MEDICAL CENTER | Age: 82
End: 2022-10-13

## 2022-10-14 ENCOUNTER — APPOINTMENT (OUTPATIENT)
Dept: PHYSICAL THERAPY | Facility: MEDICAL CENTER | Age: 82
End: 2022-10-14

## 2022-10-17 ENCOUNTER — APPOINTMENT (OUTPATIENT)
Dept: PHYSICAL THERAPY | Facility: MEDICAL CENTER | Age: 82
End: 2022-10-17

## 2022-10-17 ENCOUNTER — OFFICE VISIT (OUTPATIENT)
Dept: PHYSICAL THERAPY | Facility: MEDICAL CENTER | Age: 82
End: 2022-10-17
Payer: MEDICARE

## 2022-10-17 DIAGNOSIS — R26.89 IMBALANCE: Primary | ICD-10-CM

## 2022-10-17 PROCEDURE — 97112 NEUROMUSCULAR REEDUCATION: CPT

## 2022-10-17 PROCEDURE — 97110 THERAPEUTIC EXERCISES: CPT

## 2022-10-17 NOTE — PROGRESS NOTES
Daily Note     Today's date: 10/17/2022  Patient name: Mikayla Victor  : 1940  MRN: [id number]  Referring provider: Charisse Boyce , *  Dx:   Encounter Diagnosis     ICD-10-CM    1  Imbalance  R26 89                   Subjective: Pt reports that he feels good  Objective: See treatment diary below      Assessment: Pt presents after brief hiatus secondary to sickness  Patient was able to complete all therapeutic exercise without any symptoms  Patient demonstrates improvement in balance  Guarding provided throughout session  Tolerated treatment well  Patient demonstrated fatigue post treatment, exhibited good technique with therapeutic exercises and would benefit from continued PT      Plan: Continue per plan of care        Precautions: DM, HTN, neuropathy, fall risk    HEP: TR, CR, LAQ, hamstring curl  Manuals 10/17                                                                Neuro Re-Ed             romberg With pertubations 3x30 s            Cone stack 2x on airex            Line walks 2x 10#            Modified tandem  3x30s             heron walk outs 5x 3 5# sideways/fw            KB carry 15/7 5# off set 2 laps ea            Cone taps 3x30 s fingertip support            Hurdles Side steps/FW 3x5            Ther Ex             bike np            Step ups np            Stand hip abd/ext x30 ea            Stand marches x30            Sit to stand 3x10            Leg press             squat                                                                 Ther Activity                                       Gait Training                                       Modalities

## 2022-10-19 ENCOUNTER — OFFICE VISIT (OUTPATIENT)
Dept: URGENT CARE | Facility: MEDICAL CENTER | Age: 82
End: 2022-10-19
Payer: MEDICARE

## 2022-10-19 VITALS
RESPIRATION RATE: 16 BRPM | DIASTOLIC BLOOD PRESSURE: 79 MMHG | SYSTOLIC BLOOD PRESSURE: 169 MMHG | OXYGEN SATURATION: 97 % | TEMPERATURE: 96.8 F | HEART RATE: 75 BPM

## 2022-10-19 DIAGNOSIS — J20.9 ACUTE BRONCHITIS, UNSPECIFIED ORGANISM: Primary | ICD-10-CM

## 2022-10-19 PROCEDURE — G0463 HOSPITAL OUTPT CLINIC VISIT: HCPCS

## 2022-10-19 PROCEDURE — 99213 OFFICE O/P EST LOW 20 MIN: CPT

## 2022-10-19 RX ORDER — DOXYCYCLINE 100 MG/1
100 TABLET ORAL 2 TIMES DAILY
Qty: 14 TABLET | Refills: 0 | Status: SHIPPED | OUTPATIENT
Start: 2022-10-19 | End: 2022-10-26

## 2022-10-19 NOTE — PATIENT INSTRUCTIONS
Take antibiotic as prescribed  Recommend probiotic use while taking antibiotic  Avoid direct sunlight with use of doxycyline, reapply SPF 50 at least every 1-2 hours, wear long sleeves, and a wide brimmed hat  Acetaminophen for pain  Follow-up with PCP in 3-5 days  Go to ER if symptoms worsen

## 2022-10-19 NOTE — PROGRESS NOTES
[facility]        NAME: David Pearl is a [de-identified] y o  male  : 1940    MRN: [id number]  DATE: 2022  TIME: 11:59 AM      Assessment and Plan     Acute bronchitis, unspecified organism [J20 9]  1  Acute bronchitis, unspecified organism  doxycycline (ADOXA) 100 MG tablet    CANCELED: Poct Covid 19 Rapid Antigen Test     Will hold on steroids given DM hx  Patient Instructions     Take antibiotic as prescribed  Recommend probiotic use while taking antibiotic  Avoid direct sunlight with use of doxycyline, reapply SPF 50 at least every 1-2 hours, wear long sleeves, and a wide brimmed hat  Acetaminophen for pain  Follow-up with PCP in 3-5 days  Go to ER if symptoms worsen  Chief Complaint     Chief Complaint   Patient presents with   • Cough     Pt reports cough and phlegm stuck in throat since Sat, 10/08  No home COVID test           History of Present Illness     Patient is a 53-year-old male who presents with cough for 11 days  States the cough is productive  Reports white mucous production  Reports associated chest congestion  Denies CP or SOB  Denies fever or chills  Denies n/v/d  Denies asthma history  Denies smoking history  Reports diabetic history, denies symptoms of hyperglycemia  Review of Systems     Review of Systems   Constitutional: Negative for appetite change and fever  Respiratory: Positive for cough  Negative for shortness of breath  Cardiovascular: Negative for chest pain  Gastrointestinal: Negative for diarrhea, nausea and vomiting  Endocrine: Negative for polydipsia, polyphagia and polyuria  All other systems reviewed and are negative          Current Medications       Current Outpatient Medications:   •  doxycycline (ADOXA) 100 MG tablet, Take 1 tablet (100 mg total) by mouth 2 (two) times a day for 7 days, Disp: 14 tablet, Rfl: 0  •  Accu-Chek FastClix Lancets MISC, Use daily E11 9, Disp: 102 each, Rfl: 0  •  amLODIPine (NORVASC) 5 mg tablet, TAKE 1 TABLET DAILY, Disp: 90 tablet, Rfl: 3  •  atorvastatin (LIPITOR) 40 mg tablet, Take 1 tablet (40 mg total) by mouth daily, Disp: 90 tablet, Rfl: 1  •  Blood Glucose Monitoring Suppl (ACCU-CHEK OMERO PLUS) w/Device KIT, by Does not apply route, Disp: , Rfl:   •  Calcium Carb-Cholecalciferol 600-200 MG-UNIT TABS, , Disp: , Rfl:   •  Cholecalciferol 50 MCG (2000 UT) TABS, , Disp: , Rfl:   •  Cyanocobalamin (VITAMIN B 12 PO), Take by mouth, Disp: , Rfl:   •  gabapentin (NEURONTIN) 100 mg capsule, TAKE 1 CAPSULE AT BEDTIME INCREASE UP TO 3 CAPSULES NIGHTLY AS NEEDED FOR NEUROPATHY, Disp: 90 capsule, Rfl: 3  •  glucose blood (Accu-Chek Omero Plus) test strip, Use as instructed, Disp: 100 strip, Rfl: 0  •  latanoprost (XALATAN) 0 005 % ophthalmic solution, , Disp: , Rfl:   •  losartan-hydrochlorothiazide (HYZAAR) 100-25 MG per tablet, TAKE 1 TABLET DAILY, Disp: 90 tablet, Rfl: 3  •  MAGNESIUM GLUCONATE PO, Take 400 mg by mouth, Disp: , Rfl:   •  metFORMIN (GLUCOPHAGE) 500 mg tablet, Take 1 tablet in the morning and 1 tablet in the evening, Disp: 180 tablet, Rfl: 3  •  Polyethyl Glycol-Propyl Glycol (SYSTANE OP), Apply to eye 1 drop each eye twice a day , Disp: , Rfl:     Current Allergies     Allergies as of 10/19/2022   • (No Known Allergies)              The following portions of the patient's history were reviewed and updated as appropriate: allergies, current medications, past family history, past medical history, past social history, past surgical history and problem list      Past Medical History:   Diagnosis Date   • Arthritis    • Bladder mass 6/28/2017    Follows with Urology   • Cancer [facility])     right kidney   • Cardiac aneurysm    • Chronic pain disorder    • Diabetes mellitus (HCC)     NIDDM   • Erectile dysfunction of non-organic origin    • Glaucoma    • Hx of bleeding disorder     in urine   • Hyperlipidemia    • Hypertension    • Meniscus tear     Left knee   • Urinary tract infection with hematuria last assessed 05/30/2017   • Wears glasses    • Wears partial dentures     upper partial       Past Surgical History:   Procedure Laterality Date   • CATARACT EXTRACTION     • COLONOSCOPY     • CYSTOSCOPY      onset 07/21/2017   • DENTAL SURGERY     • EYE SURGERY Bilateral     stents   • EYE SURGERY Bilateral     stents in both eyes    • NEPHRECTOMY LAPAROSCOPIC Right 9/14/2017    Procedure: NEPHRECTOMY PARTIAL LAPAROSCOPIC W ROBOTICS ,;  Surgeon: Júnior Morton MD;  Location: [facility];  Service: Urology   • NERVE BLOCK      transforaminal epidural lumbar   • WISDOM TOOTH EXTRACTION      as well as broken neighboring tooth       Family History   Problem Relation Age of Onset   • Other Mother         malaria   • Diabetes Sister    • Hypertension Sister          Medications have been verified  Objective     /79   Pulse 75   Temp (!) 96 8 °F (36 °C)   Resp 16   SpO2 97%   No LMP for male patient  Physical Exam     Physical Exam  Vitals and nursing note reviewed  Constitutional:       General: He is not in acute distress  Appearance: Normal appearance  He is not ill-appearing, toxic-appearing or diaphoretic  HENT:      Right Ear: Tympanic membrane, ear canal and external ear normal       Left Ear: Tympanic membrane, ear canal and external ear normal       Nose: Nose normal  No congestion or rhinorrhea  Mouth/Throat:      Lips: Pink  Mouth: Mucous membranes are moist       Pharynx: Oropharynx is clear  Uvula midline  Cardiovascular:      Rate and Rhythm: Normal rate  Pulses: Normal pulses  Heart sounds: Normal heart sounds, S1 normal and S2 normal    Pulmonary:      Effort: Pulmonary effort is normal       Breath sounds: Normal breath sounds and air entry  No stridor, decreased air movement or transmitted upper airway sounds  No decreased breath sounds, wheezing, rhonchi or rales  Skin:     General: Skin is warm        Capillary Refill: Capillary refill takes less than 2 seconds  Neurological:      General: No focal deficit present  Mental Status: He is alert  Psychiatric:         Mood and Affect: Mood normal          Behavior: Behavior normal          Thought Content:  Thought content normal          Judgment: Judgment normal

## 2022-10-20 ENCOUNTER — OFFICE VISIT (OUTPATIENT)
Dept: PHYSICAL THERAPY | Facility: MEDICAL CENTER | Age: 82
End: 2022-10-20
Payer: MEDICARE

## 2022-10-20 DIAGNOSIS — R26.89 IMBALANCE: Primary | ICD-10-CM

## 2022-10-20 PROCEDURE — 97110 THERAPEUTIC EXERCISES: CPT

## 2022-10-20 PROCEDURE — 97112 NEUROMUSCULAR REEDUCATION: CPT

## 2022-10-20 NOTE — PROGRESS NOTES
Daily Note     Today's date: 10/20/2022  Patient name: Eloise Garcia  : 1940  MRN: [id number]  Referring provider: Aditya Jackson , *  Dx:   Encounter Diagnosis     ICD-10-CM    1  Imbalance  R26 89                   Subjective: Pt reports that he feels good  Objective: See treatment diary below      Assessment: Eloise Garcia has been compliant with attending PT and home exercise program since initial eval   Narendra Hunter  has made improvements in objective data since initial evalulation and has achieved all goals  Patient provided with updated Home Exercise Program, all questions answered, verbalized understanding and agreement to plan of care  Thus it was mutually decided to discontinue this episode of care and transition to Home Exercise Program       Plan: Discharge to HEP        Precautions: DM, HTN, neuropathy, fall risk    HEP: TR, CR, LAQ, hamstring curl  Manuals 10/20                                                                Neuro Re-Ed             romberg With pertubations 3x30 s            Cone stack 2x on airex            Line walks 2x 10#            Modified tandem  3x30s             heron walk outs 5x 3 5# sideways/fw            KB carry 15/10# off set 2 laps ea            Cone taps 3x30 s fingertip support            Hurdles Side steps/FW 3x5            Ther Ex             bike np            Step ups np            Stand hip abd/ext x30 ea            Stand marches x30            Sit to stand 3x10            Leg press             squat                                                                 Ther Activity                                       Gait Training                                       Modalities

## 2022-10-22 DIAGNOSIS — I10 HTN (HYPERTENSION), BENIGN: ICD-10-CM

## 2022-10-24 RX ORDER — AMLODIPINE BESYLATE 5 MG/1
TABLET ORAL
Qty: 90 TABLET | Refills: 3 | Status: SHIPPED | OUTPATIENT
Start: 2022-10-24

## 2022-10-31 ENCOUNTER — APPOINTMENT (OUTPATIENT)
Dept: LAB | Facility: CLINIC | Age: 82
End: 2022-10-31

## 2022-10-31 DIAGNOSIS — R26.89 IMBALANCE: ICD-10-CM

## 2022-10-31 LAB
BUN SERPL-MCNC: 22 MG/DL (ref 5–25)
CREAT SERPL-MCNC: 0.92 MG/DL (ref 0.6–1.3)
GFR SERPL CREATININE-BSD FRML MDRD: 78 ML/MIN/1.73SQ M

## 2022-11-04 ENCOUNTER — HOSPITAL ENCOUNTER (OUTPATIENT)
Dept: MRI IMAGING | Facility: HOSPITAL | Age: 82
Discharge: HOME | End: 2022-11-04

## 2022-11-04 DIAGNOSIS — R26.89 IMBALANCE: ICD-10-CM

## 2022-11-04 RX ADMIN — GADOBUTROL 10 ML: 604.72 INJECTION INTRAVENOUS at 08:52

## 2022-11-14 ENCOUNTER — RA CDI HCC (OUTPATIENT)
Dept: OTHER | Facility: HOSPITAL | Age: 82
End: 2022-11-14

## 2022-11-14 ENCOUNTER — APPOINTMENT (OUTPATIENT)
Dept: LAB | Facility: CLINIC | Age: 82
End: 2022-11-14

## 2022-11-14 DIAGNOSIS — I10 ESSENTIAL HYPERTENSION: ICD-10-CM

## 2022-11-14 DIAGNOSIS — D69.6 THROMBOCYTOPENIA (HCC): ICD-10-CM

## 2022-11-14 DIAGNOSIS — E11.42 TYPE 2 DIABETES MELLITUS WITH DIABETIC POLYNEUROPATHY, WITHOUT LONG-TERM CURRENT USE OF INSULIN (HCC): ICD-10-CM

## 2022-11-14 DIAGNOSIS — E78.00 PURE HYPERCHOLESTEROLEMIA: ICD-10-CM

## 2022-11-14 DIAGNOSIS — E66.09 CLASS 1 OBESITY DUE TO EXCESS CALORIES WITH SERIOUS COMORBIDITY AND BODY MASS INDEX (BMI) OF 34.0 TO 34.9 IN ADULT: ICD-10-CM

## 2022-11-14 DIAGNOSIS — C64.1 RENAL CELL CARCINOMA OF RIGHT KIDNEY (HCC): ICD-10-CM

## 2022-11-14 LAB
ALBUMIN SERPL BCP-MCNC: 3.2 G/DL (ref 3.5–5)
ALP SERPL-CCNC: 91 U/L (ref 46–116)
ALT SERPL W P-5'-P-CCNC: 27 U/L (ref 12–78)
ANION GAP SERPL CALCULATED.3IONS-SCNC: 4 MMOL/L (ref 4–13)
AST SERPL W P-5'-P-CCNC: 12 U/L (ref 5–45)
BASOPHILS # BLD AUTO: 0.03 THOUSANDS/ÂΜL (ref 0–0.1)
BASOPHILS NFR BLD AUTO: 1 % (ref 0–1)
BILIRUB SERPL-MCNC: 0.61 MG/DL (ref 0.2–1)
BUN SERPL-MCNC: 23 MG/DL (ref 5–25)
CALCIUM ALBUM COR SERPL-MCNC: 9.7 MG/DL (ref 8.3–10.1)
CALCIUM SERPL-MCNC: 9.1 MG/DL (ref 8.3–10.1)
CHLORIDE SERPL-SCNC: 103 MMOL/L (ref 96–108)
CHOLEST SERPL-MCNC: 148 MG/DL
CO2 SERPL-SCNC: 27 MMOL/L (ref 21–32)
CREAT SERPL-MCNC: 0.92 MG/DL (ref 0.6–1.3)
EOSINOPHIL # BLD AUTO: 0.16 THOUSAND/ÂΜL (ref 0–0.61)
EOSINOPHIL NFR BLD AUTO: 3 % (ref 0–6)
ERYTHROCYTE [DISTWIDTH] IN BLOOD BY AUTOMATED COUNT: 14 % (ref 11.6–15.1)
EST. AVERAGE GLUCOSE BLD GHB EST-MCNC: 166 MG/DL
GFR SERPL CREATININE-BSD FRML MDRD: 78 ML/MIN/1.73SQ M
GLUCOSE P FAST SERPL-MCNC: 173 MG/DL (ref 65–99)
HBA1C MFR BLD: 7.4 %
HCT VFR BLD AUTO: 41.1 % (ref 36.5–49.3)
HDLC SERPL-MCNC: 59 MG/DL
HGB BLD-MCNC: 13.6 G/DL (ref 12–17)
IMM GRANULOCYTES # BLD AUTO: 0.04 THOUSAND/UL (ref 0–0.2)
IMM GRANULOCYTES NFR BLD AUTO: 1 % (ref 0–2)
LDLC SERPL CALC-MCNC: 79 MG/DL (ref 0–100)
LYMPHOCYTES # BLD AUTO: 1.1 THOUSANDS/ÂΜL (ref 0.6–4.47)
LYMPHOCYTES NFR BLD AUTO: 23 % (ref 14–44)
MCH RBC QN AUTO: 30 PG (ref 26.8–34.3)
MCHC RBC AUTO-ENTMCNC: 33.1 G/DL (ref 31.4–37.4)
MCV RBC AUTO: 91 FL (ref 82–98)
MONOCYTES # BLD AUTO: 0.55 THOUSAND/ÂΜL (ref 0.17–1.22)
MONOCYTES NFR BLD AUTO: 11 % (ref 4–12)
NEUTROPHILS # BLD AUTO: 3.01 THOUSANDS/ÂΜL (ref 1.85–7.62)
NEUTS SEG NFR BLD AUTO: 61 % (ref 43–75)
NRBC BLD AUTO-RTO: 0 /100 WBCS
PLATELET # BLD AUTO: 155 THOUSANDS/UL (ref 149–390)
PMV BLD AUTO: 10.9 FL (ref 8.9–12.7)
POTASSIUM SERPL-SCNC: 4.1 MMOL/L (ref 3.5–5.3)
PROT SERPL-MCNC: 7 G/DL (ref 6.4–8.4)
RBC # BLD AUTO: 4.54 MILLION/UL (ref 3.88–5.62)
SODIUM SERPL-SCNC: 134 MMOL/L (ref 135–147)
TRIGL SERPL-MCNC: 50 MG/DL
WBC # BLD AUTO: 4.89 THOUSAND/UL (ref 4.31–10.16)

## 2022-11-18 ENCOUNTER — OFFICE VISIT (OUTPATIENT)
Dept: FAMILY MEDICINE CLINIC | Facility: CLINIC | Age: 82
End: 2022-11-18

## 2022-11-18 VITALS
BODY MASS INDEX: 35 KG/M2 | WEIGHT: 223 LBS | HEART RATE: 63 BPM | OXYGEN SATURATION: 95 % | TEMPERATURE: 97.9 F | DIASTOLIC BLOOD PRESSURE: 74 MMHG | SYSTOLIC BLOOD PRESSURE: 160 MMHG | HEIGHT: 67 IN

## 2022-11-18 DIAGNOSIS — K63.5 POLYP OF COLON, UNSPECIFIED PART OF COLON, UNSPECIFIED TYPE: ICD-10-CM

## 2022-11-18 DIAGNOSIS — I10 ESSENTIAL HYPERTENSION: ICD-10-CM

## 2022-11-18 DIAGNOSIS — E11.42 TYPE 2 DIABETES MELLITUS WITH DIABETIC POLYNEUROPATHY, WITHOUT LONG-TERM CURRENT USE OF INSULIN (HCC): ICD-10-CM

## 2022-11-18 DIAGNOSIS — I71.9 AORTIC ANEURYSM (HCC): Primary | ICD-10-CM

## 2022-11-18 DIAGNOSIS — Z00.00 MEDICARE ANNUAL WELLNESS VISIT, SUBSEQUENT: Primary | ICD-10-CM

## 2022-11-18 DIAGNOSIS — M54.16 CHRONIC LUMBAR RADICULOPATHY: ICD-10-CM

## 2022-11-18 DIAGNOSIS — M17.0 PRIMARY OSTEOARTHRITIS OF BOTH KNEES: ICD-10-CM

## 2022-11-18 DIAGNOSIS — I71.21 ANEURYSM OF ASCENDING AORTA WITHOUT RUPTURE: ICD-10-CM

## 2022-11-18 DIAGNOSIS — E66.09 CLASS 1 OBESITY DUE TO EXCESS CALORIES WITH SERIOUS COMORBIDITY AND BODY MASS INDEX (BMI) OF 34.0 TO 34.9 IN ADULT: ICD-10-CM

## 2022-11-18 DIAGNOSIS — D69.6 THROMBOCYTOPENIA (HCC): ICD-10-CM

## 2022-11-18 DIAGNOSIS — Z23 NEED FOR INFLUENZA VACCINATION: ICD-10-CM

## 2022-11-18 NOTE — PATIENT INSTRUCTIONS
Medicare Preventive Visit Patient Instructions  Thank you for completing your Welcome to Medicare Visit or Medicare Annual Wellness Visit today  Your next wellness visit will be due in one year (11/19/2023)  The screening/preventive services that you may require over the next 5-10 years are detailed below  Some tests may not apply to you based off risk factors and/or age  Screening tests ordered at today's visit but not completed yet may show as past due  Also, please note that scanned in results may not display below  Preventive Screenings:  Service Recommendations Previous Testing/Comments   Colorectal Cancer Screening  · Colonoscopy    · Fecal Occult Blood Test (FOBT)/Fecal Immunochemical Test (FIT)  · Fecal DNA/Cologuard Test  · Flexible Sigmoidoscopy Age: 39-70 years old   Colonoscopy: every 10 years (May be performed more frequently if at higher risk)  OR  FOBT/FIT: every 1 year  OR  Cologuard: every 3 years  OR  Sigmoidoscopy: every 5 years  Screening may be recommended earlier than age 39 if at higher risk for colorectal cancer  Also, an individualized decision between you and your healthcare provider will decide whether screening between the ages of 74-80 would be appropriate   Colonoscopy: 09/12/2019  FOBT/FIT: Not on file  Cologuard: Not on file  Sigmoidoscopy: Not on file          Prostate Cancer Screening Individualized decision between patient and health care provider in men between ages of 53-78   Medicare will cover every 12 months beginning on the day after your 50th birthday PSA: No results in last 5 years           Hepatitis C Screening Once for adults born between 1945 and 1965  More frequently in patients at high risk for Hepatitis C Hep C Antibody: 04/02/2018        Diabetes Screening 1-2 times per year if you're at risk for diabetes or have pre-diabetes Fasting glucose: 173 mg/dL (11/14/2022)  A1C: 7 4 % (11/14/2022)      Cholesterol Screening Once every 5 years if you don't have a lipid disorder  May order more often based on risk factors  Lipid panel: 11/14/2022         Other Preventive Screenings Covered by Medicare:  1  Abdominal Aortic Aneurysm (AAA) Screening: covered once if your at risk  You're considered to be at risk if you have a family history of AAA or a male between the age of 73-68 who smoking at least 100 cigarettes in your lifetime  2  Lung Cancer Screening: covers low dose CT scan once per year if you meet all of the following conditions: (1) Age 50-69; (2) No signs or symptoms of lung cancer; (3) Current smoker or have quit smoking within the last 15 years; (4) You have a tobacco smoking history of at least 20 pack years (packs per day x number of years you smoked); (5) You get a written order from a healthcare provider  3  Glaucoma Screening: covered annually if you're considered high risk: (1) You have diabetes OR (2) Family history of glaucoma OR (3)  aged 48 and older OR (3)  American aged 72 and older  3  Osteoporosis Screening: covered every 2 years if you meet one of the following conditions: (1) Have a vertebral abnormality; (2) On glucocorticoid therapy for more than 3 months; (3) Have primary hyperparathyroidism; (4) On osteoporosis medications and need to assess response to drug therapy  5  HIV Screening: covered annually if you're between the age of 12-76  Also covered annually if you are younger than 13 and older than 72 with risk factors for HIV infection  For pregnant patients, it is covered up to 3 times per pregnancy      Immunizations:  Immunization Recommendations   Influenza Vaccine Annual influenza vaccination during flu season is recommended for all persons aged >= 6 months who do not have contraindications   Pneumococcal Vaccine   * Pneumococcal conjugate vaccine = PCV13 (Prevnar 13), PCV15 (Vaxneuvance), PCV20 (Prevnar 20)  * Pneumococcal polysaccharide vaccine = PPSV23 (Pneumovax) Adults 25-60 years old: 1-3 doses may be recommended based on certain risk factors  Adults 72 years old: 1-2 doses may be recommended based off what pneumonia vaccine you previously received   Hepatitis B Vaccine 3 dose series if at intermediate or high risk (ex: diabetes, end stage renal disease, liver disease)   Tetanus (Td) Vaccine - COST NOT COVERED BY MEDICARE PART B Following completion of primary series, a booster dose should be given every 10 years to maintain immunity against tetanus  Td may also be given as tetanus wound prophylaxis  Tdap Vaccine - COST NOT COVERED BY MEDICARE PART B Recommended at least once for all adults  For pregnant patients, recommended with each pregnancy  Shingles Vaccine (Shingrix) - COST NOT COVERED BY MEDICARE PART B  2 shot series recommended in those aged 48 and above     Health Maintenance Due:      Topic Date Due   • Colorectal Cancer Screening  09/12/2022   • Hepatitis C Screening  Completed     Immunizations Due:      Topic Date Due   • Hepatitis B Vaccine (1 of 3 - 3-dose series) Never done   • COVID-19 Vaccine (3 - Booster for Moderna series) 08/02/2021   • Influenza Vaccine (1) 09/01/2022     Advance Directives   What are advance directives? Advance directives are legal documents that state your wishes and plans for medical care  These plans are made ahead of time in case you lose your ability to make decisions for yourself  Advance directives can apply to any medical decision, such as the treatments you want, and if you want to donate organs  What are the types of advance directives? There are many types of advance directives, and each state has rules about how to use them  You may choose a combination of any of the following:  · Living will: This is a written record of the treatment you want  You can also choose which treatments you do not want, which to limit, and which to stop at a certain time  This includes surgery, medicine, IV fluid, and tube feedings     · Durable power of  for [facility]): This is a written record that states who you want to make healthcare choices for you when you are unable to make them for yourself  This person, called a proxy, is usually a family member or a friend  You may choose more than 1 proxy  · Do not resuscitate (DNR) order:  A DNR order is used in case your heart stops beating or you stop breathing  It is a request not to have certain forms of treatment, such as CPR  A DNR order may be included in other types of advance directives  · Medical directive: This covers the care that you want if you are in a coma, near death, or unable to make decisions for yourself  You can list the treatments you want for each condition  Treatment may include pain medicine, surgery, blood transfusions, dialysis, IV or tube feedings, and a ventilator (breathing machine)  · Values history: This document has questions about your views, beliefs, and how you feel and think about life  This information can help others choose the care that you would choose  Why are advance directives important? An advance directive helps you control your care  Although spoken wishes may be used, it is better to have your wishes written down  Spoken wishes can be misunderstood, or not followed  Treatments may be given even if you do not want them  An advance directive may make it easier for your family to make difficult choices about your care  Weight Management   Why it is important to manage your weight:  Being overweight increases your risk of health conditions such as heart disease, high blood pressure, type 2 diabetes, and certain types of cancer  It can also increase your risk for osteoarthritis, sleep apnea, and other respiratory problems  Aim for a slow, steady weight loss  Even a small amount of weight loss can lower your risk of health problems  How to lose weight safely:  A safe and healthy way to lose weight is to eat fewer calories and get regular exercise   You can lose up about 1 pound a week by decreasing the number of calories you eat by 500 calories each day  Healthy meal plan for weight management:  A healthy meal plan includes a variety of foods, contains fewer calories, and helps you stay healthy  A healthy meal plan includes the following:  · Eat whole-grain foods more often  A healthy meal plan should contain fiber  Fiber is the part of grains, fruits, and vegetables that is not broken down by your body  Whole-grain foods are healthy and provide extra fiber in your diet  Some examples of whole-grain foods are whole-wheat breads and pastas, oatmeal, brown rice, and bulgur  · Eat a variety of vegetables every day  Include dark, leafy greens such as spinach, kale, marcin greens, and mustard greens  Eat yellow and orange vegetables such as carrots, sweet potatoes, and winter squash  · Eat a variety of fruits every day  Choose fresh or canned fruit (canned in its own juice or light syrup) instead of juice  Fruit juice has very little or no fiber  · Eat low-fat dairy foods  Drink fat-free (skim) milk or 1% milk  Eat fat-free yogurt and low-fat cottage cheese  Try low-fat cheeses such as mozzarella and other reduced-fat cheeses  · Choose meat and other protein foods that are low in fat  Choose beans or other legumes such as split peas or lentils  Choose fish, skinless poultry (chicken or turkey), or lean cuts of red meat (beef or pork)  Before you cook meat or poultry, cut off any visible fat  · Use less fat and oil  Try baking foods instead of frying them  Add less fat, such as margarine, sour cream, regular salad dressing and mayonnaise to foods  Eat fewer high-fat foods  Some examples of high-fat foods include french fries, doughnuts, ice cream, and cakes  · Eat fewer sweets  Limit foods and drinks that are high in sugar  This includes candy, cookies, regular soda, and sweetened drinks  Exercise:  Exercise at least 30 minutes per day on most days of the week  Some examples of exercise include walking, biking, dancing, and swimming  You can also fit in more physical activity by taking the stairs instead of the elevator or parking farther away from stores  Ask your healthcare provider about the best exercise plan for you  © Copyright [employer] 2018 Information is for End User's use only and may not be sold, redistributed or otherwise used for commercial purposes   All illustrations and images included in CareNotes® are the copyrighted property of [employer]  or [address] [place] [address]

## 2022-11-18 NOTE — ASSESSMENT & PLAN NOTE
Blood pressure initially was a bit elevated  Came down throughout the visit  I did encourage him to check his blood pressure at home several times per week and notify me if he is running above 140/90

## 2022-11-18 NOTE — PROGRESS NOTES
Assessment and Plan:     Problem List Items Addressed This Visit        Digestive    Colon polyp     I did reach out to GI as he appears to be due for a repeat colonoscopy and has not heard back            Endocrine    Type 2 diabetes mellitus with diabetic polyneuropathy ([facility] [address] )       Lab Results   Component Value Date    HGBA1C 7 4 (H) 11/14/2022   Symptoms are stable  He follows with Podiatry  He is wearing diabetic footwear  Relevant Orders    Hemoglobin A1C    Comprehensive metabolic panel       Cardiovascular and Mediastinum    Essential hypertension     Blood pressure initially was a bit elevated  Came down throughout the visit  I did encourage him to check his blood pressure at home several times per week and notify me if he is running above 140/90  Relevant Orders    Hemoglobin A1C    Comprehensive metabolic panel    Lipid Panel with Direct LDL reflex    CBC and Platelet    Thoracic aortic aneurysm without rupture     He is following with Cardiology in this regard  They did not yet order CT this year  I am happy to order the CT for him but I reached out to Cardiology to see if they would like anything else done  Relevant Orders    Comprehensive metabolic panel    CBC and Platelet       Nervous and Auditory    Chronic lumbar radiculopathy       Musculoskeletal and Integument    Primary osteoarthritis of both knees       Hematopoietic and Hemostatic    Thrombocytopenia (HCC)     Continue routine monitoring of CBC  Relevant Orders    CBC and Platelet       Other    Class 1 obesity due to excess calories with serious comorbidity and body mass index (BMI) of 34 0 to 34 9 in adult   Other Visit Diagnoses     Medicare annual wellness visit, subsequent    -  Primary    Need for influenza vaccination        Relevant Orders    influenza vaccine, high-dose, PF 0 7 mL (FLUZONE HIGH-DOSE) (Completed)        BMI Counseling: Body mass index is 34 93 kg/m²   The BMI is above normal  Nutrition recommendations include encouraging healthy choices of fruits and vegetables  Exercise recommendations include moderate physical activity 150 minutes/week  Rationale for BMI follow-up plan is due to patient being overweight or obese  Depression Screening and Follow-up Plan: Patient was screened for depression during today's encounter  They screened negative with a PHQ-2 score of 0  Preventive health issues were discussed with patient, and age appropriate screening tests were ordered as noted in patient's After Visit Summary  Personalized health advice and appropriate referrals for health education or preventive services given if needed, as noted in patient's After Visit Summary  History of Present Illness:     Patient presents for a Medicare Wellness Visit    HPI   Patient presents today for Medicare wellness visit as well as a follow-up for chronic health issues  Overall, he is doing pretty well  He remains frustrated by his intermittent gait dysfunction  He is following with orthopedics for injections  He is following closely with Urology for his history of renal cell cancer and is now having of cyst monitor on his left kidney  He is due for follow-up in December with Cardiology  Patient Care Team:  Kiersten Patton MD as PCP - General  Carlyon Search, MD Trude Hashimoto, MD Dionte Manuel, DO Juan Obando, DO Roger Addison MD (Ophthalmology)     Review of Systems:     Review of Systems   Constitutional: Negative for appetite change, chills, fatigue, fever and unexpected weight change  HENT: Negative for trouble swallowing  Eyes: Negative for visual disturbance  Respiratory: Negative for cough, chest tightness, shortness of breath and wheezing  Cardiovascular: Negative for chest pain, palpitations and leg swelling     Gastrointestinal: Negative for abdominal distention, abdominal pain, blood in stool, constipation and diarrhea  Endocrine: Negative for polyuria  Genitourinary: Negative for difficulty urinating and flank pain  Musculoskeletal: Positive for gait problem  Negative for arthralgias and myalgias  Skin: Negative for rash  Neurological: Negative for dizziness and light-headedness  Hematological: Negative for adenopathy  Does not bruise/bleed easily  Psychiatric/Behavioral: Negative for dysphoric mood and sleep disturbance  The patient is not nervous/anxious           Problem List:     Patient Active Problem List   Diagnosis   • Essential hypertension   • Thoracic aortic aneurysm without rupture   • Abnormal EKG   • Acute meniscal tear of left knee   • Back pain, thoracic   • Chronic lumbar radiculopathy   • Chronic pain disorder   • Type 2 diabetes mellitus with diabetic polyneuropathy (HCC)   • Erectile dysfunction of non-organic origin   • Glaucoma   • Knee osteoarthritis   • Lumbar canal stenosis   • Lung nodules   • Osteopenia   • Primary localized osteoarthritis of left knee   • Renal cell carcinoma of right kidney (HCC)   • Colon polyp   • Bilateral hearing loss   • Thrombocytopenia (HCC)   • Pure hypercholesterolemia   • Abnormal findings on diagnostic imaging of skull and head, not elsewhere classified   • Class 1 obesity due to excess calories with serious comorbidity and body mass index (BMI) of 34 0 to 34 9 in adult   • Chronic pain of both knees   • Imbalance   • Primary osteoarthritis of both knees      Past Medical and Surgical History:     Past Medical History:   Diagnosis Date   • Arthritis    • Bladder mass 6/28/2017    Follows with Urology   • Cancer [facility])     right kidney   • Cardiac aneurysm    • Chronic pain disorder    • Diabetes mellitus (HCC)     NIDDM   • Erectile dysfunction of non-organic origin    • Glaucoma    • Hx of bleeding disorder     in urine   • Hyperlipidemia    • Hypertension    • Meniscus tear     Left knee   • Urinary tract infection with hematuria last assessed 2017   • Wears glasses    • Wears partial dentures     upper partial     Past Surgical History:   Procedure Laterality Date   • CATARACT EXTRACTION     • COLONOSCOPY     • CYSTOSCOPY      onset 2017   • DENTAL SURGERY     • EYE SURGERY Bilateral     stents   • EYE SURGERY Bilateral     stents in both eyes    • NEPHRECTOMY LAPAROSCOPIC Right 2017    Procedure: NEPHRECTOMY PARTIAL LAPAROSCOPIC W ROBOTICS ,;  Surgeon: Dino Sykes MD;  Location: [facility];  Service: Urology   • NERVE BLOCK      transforaminal epidural lumbar   • WISDOM TOOTH EXTRACTION      as well as broken neighboring tooth      Family History:     Family History   Problem Relation Age of Onset   • Other Mother         malaria   • Diabetes Sister    • Hypertension Sister       Social History:     Social History     Socioeconomic History   • Marital status: /Civil Union     Spouse name: None   • Number of children: None   • Years of education: None   • Highest education level: None   Occupational History   • 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   Tobacco Use   • Smoking status: Former     Types: Cigarettes     Quit date: 1985     Years since quittin 2   • Smokeless tobacco: Never   Vaping Use   • Vaping Use: Never used   Substance and Sexual Activity   • Alcohol use: Not Currently     Comment: rarely   • Drug use: No   • Sexual activity: None   Other Topics Concern   • None   Social History Narrative    Does not consume caffeine        Never drank alcohol per [employer]     Social Determinants of Health     Financial Resource Strain: Not on file   Food Insecurity: Not on file   Transportation Needs: Not on file   Physical Activity: Not on file   Stress: Not on file   Social Connections: Not on file   Intimate Partner Violence: Not on file   Housing Stability: Not on file      Medications and Allergies:     Current Outpatient Medications   Medication Sig Dispense Refill   • Accu-Chek FastClix Lancets MISC Use daily E11 9 102 each 0   • amLODIPine (NORVASC) 5 mg tablet TAKE 1 TABLET DAILY 90 tablet 3   • atorvastatin (LIPITOR) 40 mg tablet Take 1 tablet (40 mg total) by mouth daily 90 tablet 1   • Blood Glucose Monitoring Suppl (ACCU-CHEK OMERO PLUS) w/Device KIT by Does not apply route     • Calcium Carb-Cholecalciferol 600-200 MG-UNIT TABS      • Cholecalciferol 50 MCG (2000 UT) TABS      • Cyanocobalamin (VITAMIN B 12 PO) Take by mouth     • gabapentin (NEURONTIN) 100 mg capsule TAKE 1 CAPSULE AT BEDTIME INCREASE UP TO 3 CAPSULES NIGHTLY AS NEEDED FOR NEUROPATHY 90 capsule 3   • glucose blood (Accu-Chek Omero Plus) test strip Use as instructed 100 strip 0   • latanoprost (XALATAN) 0 005 % ophthalmic solution      • losartan-hydrochlorothiazide (HYZAAR) 100-25 MG per tablet TAKE 1 TABLET DAILY 90 tablet 3   • MAGNESIUM GLUCONATE PO Take 400 mg by mouth     • metFORMIN (GLUCOPHAGE) 500 mg tablet Take 1 tablet in the morning and 1 tablet in the evening 180 tablet 3   • Polyethyl Glycol-Propyl Glycol (SYSTANE OP) Apply to eye 1 drop each eye twice a day        No current facility-administered medications for this visit       No Known Allergies   Immunizations:     Immunization History   Administered Date(s) Administered   • COVID-19 MODERNA VACC 0 5 ML IM 02/03/2021, 03/02/2021   • H1N1, All Formulations 02/04/2010   • INFLUENZA 10/09/2015, 08/31/2016, 10/17/2017, 11/05/2021   • Influenza Split High Dose Preservative Free IM 11/14/2012, 10/02/2013, 10/23/2014, 10/09/2015, 08/31/2016, 10/17/2017   • Influenza, high dose seasonal 0 7 mL 10/05/2018, 10/16/2019, 10/26/2020, 10/21/2021, 11/18/2022   • Influenza, seasonal, injectable 12/17/2008, 10/28/2011   • Pneumococcal Conjugate 13-Valent 01/20/2015   • Pneumococcal Polysaccharide PPV23 02/06/2008   • Tdap 07/24/2012      Health Maintenance:         Topic Date Due   • Colorectal Cancer Screening  09/12/2022   • Hepatitis C Screening  Completed         Topic Date Due • Hepatitis B Vaccine (1 of 3 - 3-dose series) Never done   • COVID-19 Vaccine (3 - Booster for Moderna series) 08/02/2021      Medicare Screening Tests and Risk Assessments:         Depression Screening:   PHQ-2 Score: 0      PREVENTIVE SCREENINGS      Cardiovascular Screening:    General: Screening Not Indicated and History Lipid Disorder      Diabetes Screening:     General: Screening Not Indicated and History Diabetes      Prostate Cancer Screening:    General: Screening Not Indicated      Abdominal Aortic Aneurysm (AAA) Screening:    Risk factors include: tobacco use        Lung Cancer Screening:     General: Screening Not Indicated      Hepatitis C Screening:    General: Screening Current    No results found  Physical Exam:     /74 (BP Location: Left arm, Patient Position: Sitting, Cuff Size: Large)   Pulse 63   Temp 97 9 °F (36 6 °C)   Ht 5' 7" (1 702 m)   Wt 101 kg (223 lb)   SpO2 95%   BMI 34 93 kg/m²     Physical Exam  Constitutional:       General: He is not in acute distress  Appearance: Normal appearance  He is well-developed  He is obese  He is not diaphoretic  HENT:      Head: Normocephalic  Right Ear: External ear normal       Left Ear: External ear normal       Nose: Nose normal    Eyes:      General:         Right eye: No discharge  Left eye: No discharge  Conjunctiva/sclera: Conjunctivae normal       Pupils: Pupils are equal, round, and reactive to light  Neck:      Thyroid: No thyromegaly  Trachea: No tracheal deviation  Cardiovascular:      Rate and Rhythm: Normal rate and regular rhythm  Heart sounds: Normal heart sounds  No murmur heard  No friction rub  Pulmonary:      Effort: Pulmonary effort is normal  No respiratory distress  Breath sounds: Normal breath sounds  No wheezing  Chest:      Chest wall: No tenderness  Abdominal:      General: There is no distension  Palpations: There is no mass  Tenderness:  There is no abdominal tenderness  There is no guarding or rebound  Hernia: No hernia is present  Musculoskeletal:         General: No swelling or deformity  Cervical back: Normal range of motion  Right lower leg: No edema  Left lower leg: No edema  Skin:     Findings: No erythema or rash  Neurological:      General: No focal deficit present  Mental Status: He is alert  Cranial Nerves: No cranial nerve deficit  Coordination: Coordination normal    Psychiatric:         Thought Content:  Thought content normal           Cele Ashley MD

## 2022-11-18 NOTE — ASSESSMENT & PLAN NOTE
Lab Results   Component Value Date    HGBA1C 7 4 (H) 11/14/2022   Symptoms are stable  He follows with Podiatry  He is wearing diabetic footwear

## 2022-11-18 NOTE — PROGRESS NOTES
Assessment and Plan:     Problem List Items Addressed This Visit    None  Visit Diagnoses     Colon cancer screening    -  Primary    Need for influenza vaccination        Relevant Orders    influenza vaccine, high-dose, PF 0 7 mL (FLUZONE HIGH-DOSE)    Medicare annual wellness visit, subsequent               Preventive health issues were discussed with patient, and age appropriate screening tests were ordered as noted in patient's After Visit Summary  Personalized health advice and appropriate referrals for health education or preventive services given if needed, as noted in patient's After Visit Summary       History of Present Illness:     Patient presents for a Medicare Wellness Visit    HPI   Patient Care Team:  Ken Ricardo MD as PCP - General  MD Amanda Carmen DO Germaine Engman, MD [place] [facility] HealthcareDO Elham DO Daryl Debroah Hering, MD (Ophthalmology)     Review of Systems:     Review of Systems     Problem List:     Patient Active Problem List   Diagnosis   • Essential hypertension   • Thoracic aortic aneurysm without rupture   • Abnormal EKG   • Acute meniscal tear of left knee   • Back pain, thoracic   • Chronic lumbar radiculopathy   • Chronic pain disorder   • Type 2 diabetes mellitus with diabetic polyneuropathy [facility])   • Erectile dysfunction of non-organic origin   • Glaucoma   • Knee osteoarthritis   • Lumbar canal stenosis   • Lung nodules   • Osteopenia   • Primary localized osteoarthritis of left knee   • Renal cell carcinoma of right kidney (HCC)   • Colon polyp   • Bilateral hearing loss   • Thrombocytopenia (HCC)   • Pure hypercholesterolemia   • Abnormal findings on diagnostic imaging of skull and head, not elsewhere classified   • Class 1 obesity due to excess calories with serious comorbidity and body mass index (BMI) of 34 0 to 34 9 in adult   • Chronic pain of both knees   • Imbalance   • Primary osteoarthritis of both knees      Past Medical and Surgical History:     Past Medical History:   Diagnosis Date   • Arthritis    • Bladder mass 2017    Follows with Urology   • Cancer [facility])     right kidney   • Cardiac aneurysm    • Chronic pain disorder    • Diabetes mellitus (HCC)     NIDDM   • Erectile dysfunction of non-organic origin    • Glaucoma    • Hx of bleeding disorder     in urine   • Hyperlipidemia    • Hypertension    • Meniscus tear     Left knee   • Urinary tract infection with hematuria     last assessed 2017   • Wears glasses    • Wears partial dentures     upper partial     Past Surgical History:   Procedure Laterality Date   • CATARACT EXTRACTION     • COLONOSCOPY     • CYSTOSCOPY      onset 2017   • DENTAL SURGERY     • EYE SURGERY Bilateral     stents   • EYE SURGERY Bilateral     stents in both eyes    • NEPHRECTOMY LAPAROSCOPIC Right 2017    Procedure: NEPHRECTOMY PARTIAL LAPAROSCOPIC W ROBOTICS ,;  Surgeon: Rickie Borrego MD;  Location: [facility];  Service: Urology   • NERVE BLOCK      transforaminal epidural lumbar   • WISDOM TOOTH EXTRACTION      as well as broken neighboring tooth      Family History:     Family History   Problem Relation Age of Onset   • Other Mother         malaria   • Diabetes Sister    • Hypertension Sister       Social History:     Social History     Socioeconomic History   • Marital status: /Civil Union     Spouse name: None   • Number of children: None   • Years of education: None   • Highest education level: None   Occupational History   • 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   Tobacco Use   • Smoking status: Former     Types: Cigarettes     Quit date: 1985     Years since quittin 2   • Smokeless tobacco: Never   Vaping Use   • Vaping Use: Never used   Substance and Sexual Activity   • Alcohol use: Not Currently     Comment: rarely   • Drug use: No   • Sexual activity: None   Other Topics Concern   • None   Social History Narrative    Does not consume caffeine        Never drank alcohol per [employer]     Social Determinants of Health     Financial Resource Strain: Not on file   Food Insecurity: Not on file   Transportation Needs: Not on file   Physical Activity: Not on file   Stress: Not on file   Social Connections: Not on file   Intimate Partner Violence: Not on file   Housing Stability: Not on file      Medications and Allergies:     Current Outpatient Medications   Medication Sig Dispense Refill   • Accu-Chek FastClix Lancets MISC Use daily E11 9 102 each 0   • amLODIPine (NORVASC) 5 mg tablet TAKE 1 TABLET DAILY 90 tablet 3   • atorvastatin (LIPITOR) 40 mg tablet Take 1 tablet (40 mg total) by mouth daily 90 tablet 1   • Blood Glucose Monitoring Suppl (ACCU-CHEK OMERO PLUS) w/Device KIT by Does not apply route     • Calcium Carb-Cholecalciferol 600-200 MG-UNIT TABS      • Cholecalciferol 50 MCG (2000 UT) TABS      • Cyanocobalamin (VITAMIN B 12 PO) Take by mouth     • gabapentin (NEURONTIN) 100 mg capsule TAKE 1 CAPSULE AT BEDTIME INCREASE UP TO 3 CAPSULES NIGHTLY AS NEEDED FOR NEUROPATHY 90 capsule 3   • glucose blood (Accu-Chek Omero Plus) test strip Use as instructed 100 strip 0   • latanoprost (XALATAN) 0 005 % ophthalmic solution      • losartan-hydrochlorothiazide (HYZAAR) 100-25 MG per tablet TAKE 1 TABLET DAILY 90 tablet 3   • MAGNESIUM GLUCONATE PO Take 400 mg by mouth     • metFORMIN (GLUCOPHAGE) 500 mg tablet Take 1 tablet in the morning and 1 tablet in the evening 180 tablet 3   • Polyethyl Glycol-Propyl Glycol (SYSTANE OP) Apply to eye 1 drop each eye twice a day        No current facility-administered medications for this visit       No Known Allergies   Immunizations:     Immunization History   Administered Date(s) Administered   • COVID-19 MODERNA VACC 0 5 ML IM 02/03/2021, 03/02/2021   • H1N1, All Formulations 02/04/2010   • INFLUENZA 10/09/2015, 08/31/2016, 10/17/2017, 11/05/2021   • Influenza Split High Dose Preservative Free IM 11/14/2012, 10/02/2013, 10/23/2014, 10/09/2015, 08/31/2016, 10/17/2017   • Influenza, high dose seasonal 0 7 mL 10/05/2018, 10/16/2019, 10/26/2020, 10/21/2021   • Influenza, seasonal, injectable 12/17/2008, 10/28/2011   • Pneumococcal Conjugate 13-Valent 01/20/2015   • Pneumococcal Polysaccharide PPV23 02/06/2008   • Tdap 07/24/2012      Health Maintenance:         Topic Date Due   • Colorectal Cancer Screening  09/12/2022   • Hepatitis C Screening  Completed         Topic Date Due   • Hepatitis B Vaccine (1 of 3 - 3-dose series) Never done   • COVID-19 Vaccine (3 - Booster for Moderna series) 08/02/2021   • Influenza Vaccine (1) 09/01/2022      Medicare Screening Tests and Risk Assessments:     Estela Lubin is here for his Subsequent Wellness visit  Health Risk Assessment:   Patient rates overall health as very good  Patient feels that their physical health rating is much better  Patient is very satisfied with their life  Eyesight was rated as same  Hearing was rated as same  Patient feels that their emotional and mental health rating is same  Patients states they are never, rarely angry  Patient states they are never, rarely unusually tired/fatigued  Pain experienced in the last 7 days has been some  Patient's pain rating has been 3/10  Patient states that he has experienced no weight loss or gain in last 6 months  Depression Screening:   PHQ-2 Score: 0      Fall Risk Screening: In the past year, patient has experienced: no history of falling in past year      Home Safety:  Patient does not have trouble with stairs inside or outside of their home  Patient has no working smoke alarms and has no working carbon monoxide detector  Home safety hazards include: none  Nutrition:   Current diet is Diabetic       Activities of Daily Living (ADLs)/Instrumental Activities of Daily Living (IADLs):   Walk and transfer into and out of bed and chair?: Yes  Dress and groom yourself?: Yes    Bathe or shower yourself?: Yes Feed yourself? Yes  Do your laundry/housekeeping?: Yes  Manage your money, pay your bills and track your expenses?: Yes  Make your own meals?: Yes    Do your own shopping?: Yes    Previous Hospitalizations:   Any hospitalizations or ED visits within the last 12 months?: No      Advance Care Planning:   Living will: No    Durable POA for healthcare: No    Advanced directive: No      PREVENTIVE SCREENINGS      Cardiovascular Screening:    General: Screening Not Indicated and History Lipid Disorder      Diabetes Screening:     General: Screening Not Indicated and History Diabetes      Prostate Cancer Screening:    General: Screening Not Indicated      Abdominal Aortic Aneurysm (AAA) Screening:    Risk factors include: tobacco use        Lung Cancer Screening:     General: Screening Not Indicated      Hepatitis C Screening:    General: Screening Current    Screening, Brief Intervention, and Referral to Treatment (SBIRT)    Screening  Typical number of drinks in a day: 0  Typical number of drinks in a week: 0  Interpretation: Low risk drinking behavior  Single Item Drug Screening:  How often have you used an illegal drug (including marijuana) or a prescription medication for non-medical reasons in the past year? never    Single Item Drug Screen Score: 0  Interpretation: Negative screen for possible drug use disorder    No results found       Physical Exam:     /74 (BP Location: Left arm, Patient Position: Sitting, Cuff Size: Large)   Pulse 63   Temp 97 9 °F (36 6 °C)   Ht 5' 7" (1 702 m)   Wt 101 kg (223 lb)   SpO2 95%   BMI 34 93 kg/m²     Physical Exam     Ag Mora MD

## 2022-11-18 NOTE — ASSESSMENT & PLAN NOTE
He is following with Cardiology in this regard  They did not yet order CT this year  I am happy to order the CT for him but I reached out to Cardiology to see if they would like anything else done

## 2022-11-18 NOTE — ASSESSMENT & PLAN NOTE
Continue routine monitoring of renal function  He did have a cyst on his left kidney which is now being monitored by Urology

## 2022-11-21 DIAGNOSIS — E78.00 PURE HYPERCHOLESTEROLEMIA: ICD-10-CM

## 2022-11-21 RX ORDER — ATORVASTATIN CALCIUM 40 MG/1
40 TABLET, FILM COATED ORAL DAILY
Qty: 90 TABLET | Refills: 1 | Status: SHIPPED | OUTPATIENT
Start: 2022-11-21

## 2022-11-22 ENCOUNTER — TELEPHONE (OUTPATIENT)
Dept: FAMILY MEDICINE CLINIC | Facility: CLINIC | Age: 82
End: 2022-11-22

## 2022-11-22 NOTE — TELEPHONE ENCOUNTER
----- Message from Jessica Greene MD sent at 11/18/2022  4:39 PM EST -----  Please let the patient know that Dr Pamela Avalos will be setting up his chest CT   ----- Message -----  From: Meliton Mercedes DO  Sent: 11/18/2022  11:30 AM EST  To: Jessica Greene MD    Thanks, I placed the order now so I can have it on my follow up  ----- Message -----  From: Jessica Greene MD  Sent: 11/18/2022  10:19 AM EST  To: DO Iris Pena,    I saw this patient today  He does seem to be due for chest CT and I was wondering if you want me to order it, or if you want to see him 1st?  He is scheduled to see you in December      Thank you,    Kody Marshall

## 2022-11-22 NOTE — TELEPHONE ENCOUNTER
Patient was called and informed about referral for chest CT  Central scheduling number provided to the patient and his wife, they will schedule CT

## 2022-11-23 ENCOUNTER — PREP FOR PROCEDURE (OUTPATIENT)
Dept: GASTROENTEROLOGY | Facility: CLINIC | Age: 82
End: 2022-11-23

## 2022-11-23 DIAGNOSIS — K63.5 POLYP OF COLON, UNSPECIFIED PART OF COLON, UNSPECIFIED TYPE: Primary | ICD-10-CM

## 2022-11-23 DIAGNOSIS — Z86.010 HISTORY OF COLON POLYPS: Primary | ICD-10-CM

## 2022-12-02 ENCOUNTER — CONSULT (OUTPATIENT)
Dept: GASTROENTEROLOGY | Facility: CLINIC | Age: 82
End: 2022-12-02

## 2022-12-02 ENCOUNTER — TELEPHONE (OUTPATIENT)
Dept: GASTROENTEROLOGY | Facility: CLINIC | Age: 82
End: 2022-12-02

## 2022-12-02 VITALS
WEIGHT: 224 LBS | BODY MASS INDEX: 35.16 KG/M2 | HEIGHT: 67 IN | DIASTOLIC BLOOD PRESSURE: 70 MMHG | TEMPERATURE: 98.7 F | SYSTOLIC BLOOD PRESSURE: 132 MMHG

## 2022-12-02 DIAGNOSIS — K63.5 POLYP OF COLON, UNSPECIFIED PART OF COLON, UNSPECIFIED TYPE: ICD-10-CM

## 2022-12-02 NOTE — PROGRESS NOTES
[facility] - Outpatient Consultation  Lizette De León [de-identified] y o  male MRN: [id number]  Encounter: [id number]          ASSESSMENT AND PLAN:      1  Polyp of colon, unspecified part of colon, unspecified type  - Colonoscopy  We discussed the risk and benefit of the procedure at his age and he decided to hold off on colonoscopy evaluation at this time  We discuss if he changes his mind he can call us to schedule colonoscopy as long as his medical issues do not change  He will not need office visit if he causes within a year without any change in medical issues  Currently will hold off on any further screening protocol   ______________________________________________________________________    HPI:      He is a 82-year-old male with history of colon polyps in 2019 presents here for colonoscopy evaluation  He is doing well overall  Denies any medical issue  He is not interested in colonoscopy evaluation at this time for screening purposes  His wife is here was also agreeable to avoid colonoscopy at this time  REVIEW OF SYSTEMS:    CONSTITUTIONAL: Denies any fever, chills, rigors, and weight loss  HEENT: No earache or tinnitus  Denies hearing loss or visual disturbances  CARDIOVASCULAR: No chest pain or palpitations  RESPIRATORY: Denies any cough, hemoptysis, shortness of breath or dyspnea on exertion  GASTROINTESTINAL: As noted in the History of Present Illness  GENITOURINARY: No problems with urination  Denies any hematuria or dysuria  NEUROLOGIC: No dizziness or vertigo, denies headaches  MUSCULOSKELETAL: Denies any muscle or joint pain  SKIN: Denies skin rashes or itching  ENDOCRINE: Denies excessive thirst  Denies intolerance to heat or cold  PSYCHOSOCIAL: Denies depression or anxiety  Denies any recent memory loss         Historical Information   Past Medical History:   Diagnosis Date   • Arthritis    • Bladder mass 6/28/2017    Follows with Urology   • Cancer [facility]) right kidney   • Cardiac aneurysm    • Chronic pain disorder    • Diabetes mellitus (HCC)     NIDDM   • Erectile dysfunction of non-organic origin    • Glaucoma    • Hx of bleeding disorder     in urine   • Hyperlipidemia    • Hypertension    • Meniscus tear     Left knee   • Urinary tract infection with hematuria     last assessed 2017   • Wears glasses    • Wears partial dentures     upper partial     Past Surgical History:   Procedure Laterality Date   • CATARACT EXTRACTION     • COLONOSCOPY     • CYSTOSCOPY      onset 2017   • DENTAL SURGERY     • EYE SURGERY Bilateral     stents   • EYE SURGERY Bilateral     stents in both eyes    • NEPHRECTOMY LAPAROSCOPIC Right 2017    Procedure: NEPHRECTOMY PARTIAL LAPAROSCOPIC W ROBOTICS ,;  Surgeon: Jessika Gaitan MD;  Location: [facility];  Service: Urology   • NERVE BLOCK      transforaminal epidural lumbar   • WISDOM TOOTH EXTRACTION      as well as broken neighboring tooth     Social History   Social History     Substance and Sexual Activity   Alcohol Use Not Currently    Comment: rarely     Social History     Substance and Sexual Activity   Drug Use No     Social History     Tobacco Use   Smoking Status Former   • Types: Cigarettes   • Quit date: 1985   • Years since quittin 2   Smokeless Tobacco Never     Family History   Problem Relation Age of Onset   • Other Mother         malaria   • Diabetes Sister    • Hypertension Sister        Meds/Allergies       Current Outpatient Medications:   •  Accu-Chek FastClix Lancets MISC  •  amLODIPine (NORVASC) 5 mg tablet  •  atorvastatin (LIPITOR) 40 mg tablet  •  Blood Glucose Monitoring Suppl (ACCU-CHEK OMERO PLUS) w/Device KIT  •  Calcium Carb-Cholecalciferol 600-200 MG-UNIT TABS  •  Cholecalciferol 50 MCG (2000) TABS  •  Cyanocobalamin (VITAMIN B 12 PO)  •  gabapentin (NEURONTIN) 100 mg capsule  •  glucose blood (Accu-Chek Omero Plus) test strip  •  latanoprost (XALATAN) 0 005 % ophthalmic solution  •  losartan-hydrochlorothiazide (HYZAAR) 100-25 MG per tablet  •  MAGNESIUM GLUCONATE PO  •  metFORMIN (GLUCOPHAGE) 500 mg tablet  •  Polyethyl Glycol-Propyl Glycol (SYSTANE OP)  •  polyethylene glycol (GOLYTELY) 4000 mL solution    No Known Allergies        Objective     Blood pressure 132/70, temperature 98 7 °F (37 1 °C), temperature source Tympanic, height 5' 7" (1 702 m), weight 102 kg (224 lb)  Body mass index is 35 08 kg/m²  PHYSICAL EXAM:      General Appearance:   Alert, cooperative, no distress   HEENT:   Normocephalic, atraumatic, anicteric      Neck:  Supple, symmetrical, trachea midline   Lungs:   Clear to auscultation bilaterally; no rales, rhonchi or wheezing; respirations unlabored    Heart[de-identified]   Regular rate and rhythm; no murmur, rub, or gallop  Abdomen:   Soft, non-tender, non-distended; normal bowel sounds; no masses, no organomegaly    Genitalia:   Deferred    Rectal:   Deferred    Extremities:  No cyanosis, clubbing or edema    Pulses:  2+ and symmetric    Skin:  No jaundice, rashes, or lesions    Lymph nodes:  No palpable cervical lymphadenopathy        Lab Results:   No visits with results within 1 Day(s) from this visit     Latest known visit with results is:   Appointment on 11/14/2022   Component Date Value   • WBC 11/14/2022 4 89    • RBC 11/14/2022 4 54    • Hemoglobin 11/14/2022 13 6    • Hematocrit 11/14/2022 41 1    • MCV 11/14/2022 91    • MCH 11/14/2022 30 0    • MCHC 11/14/2022 33 1    • RDW 11/14/2022 14 0    • MPV 11/14/2022 10 9    • Platelets 57/18/0271 155    • nRBC 11/14/2022 0    • Neutrophils Relative 11/14/2022 61    • Immat GRANS % 11/14/2022 1    • Lymphocytes Relative 11/14/2022 23    • Monocytes Relative 11/14/2022 11    • Eosinophils Relative 11/14/2022 3    • Basophils Relative 11/14/2022 1    • Neutrophils Absolute 11/14/2022 3 01    • Immature Grans Absolute 11/14/2022 0 04    • Lymphocytes Absolute 11/14/2022 1 10    • Monocytes Absolute 11/14/2022 0 55    • Eosinophils Absolute 11/14/2022 0 16    • Basophils Absolute 11/14/2022 0 03    • Hemoglobin A1C 11/14/2022 7 4 (H)    • EAG 11/14/2022 166    • Sodium 11/14/2022 134 (L)    • Potassium 11/14/2022 4 1    • Chloride 11/14/2022 103    • CO2 11/14/2022 27    • ANION GAP 11/14/2022 4    • BUN 11/14/2022 23    • Creatinine 11/14/2022 0 92    • Glucose, Fasting 11/14/2022 173 (H)    • Calcium 11/14/2022 9 1    • Corrected Calcium 11/14/2022 9 7    • AST 11/14/2022 12    • ALT 11/14/2022 27    • Alkaline Phosphatase 11/14/2022 91    • Total Protein 11/14/2022 7 0    • Albumin 11/14/2022 3 2 (L)    • Total Bilirubin 11/14/2022 0 61    • eGFR 11/14/2022 78    • Cholesterol 11/14/2022 148    • Triglycerides 11/14/2022 50    • HDL, Direct 11/14/2022 59    • LDL Calculated 11/14/2022 79          Radiology Results:   MRI brain IAC wo and w contrast    Result Date: 11/7/2022  Narrative: MRI BRAIN AND IAC'S -  WITH AND WITHOUT CONTRAST INDICATION: R26 89: Other abnormalities of gait and mobility  COMPARISON:  None  TECHNIQUE: Brain:   Sagittal T1, axial T2, axial Mans[place], axial T1, axial FLAIR, axial diffusion imaging  Axial T1 postcontrast   Axial BRAVO post contrast  IAC'S:  Coronal FIESTA, coronal T1 postcontrast, axial T1 postcontrast with fat suppression  Targeted images of the IAC'S were performed requiring additional time at acquisition and interpretation of approximately 25% IV Contrast:  10 mL of Gadobutrol injection (SINGLE-DOSE) IMAGE QUALITY:   Diagnostic  FINDINGS: BRAIN PARENCHYMA:  There is no discrete mass, mass effect or midline shift  Brainstem and cerebellum demonstrate normal signal  There is no intracranial hemorrhage  There is no evidence of acute infarction and diffusion imaging is unremarkable   Small scattered hyperintensities on T2/FLAIR imaging are noted in the periventricular and subcortical white matter demonstrating an appearance that is statistically most likely to represent mild microangiopathic change  Mineralization identified within the basal ganglia bilaterally extending into the right centrum semiovale compatible with calcification seen on prior PET/CT  This finding is nonspecific and may be idiopathic  Differential considerations to include metabolic abnormalities, sequela of prior  infection versus toxic metabolic abnormalities  Normal postcontrast imaging  IAC'S:  No CP angle mass or abnormal enhancement  Normal aeration of the mastoid air cells and middle ear cavity  VENTRICLES:  Normal  SELLA AND PITUITARY GLAND:  Normal  ORBITS:  Normal  PARANASAL SINUSES:  Mild scattered mucosal thickening  VASCULATURE:  Evaluation of the major intracranial vasculature demonstrates appropriate flow voids  CALVARIUM AND SKULL BASE:  Normal  EXTRACRANIAL SOFT TISSUES:  Normal      Impression: Normal appearance of the IACs  Mild chronic microangiopathic changes   Workstation performed: [id number]

## 2022-12-02 NOTE — TELEPHONE ENCOUNTER
Patient had ov today, 12/02/22, with Dr Allegra Ley  Will not be scheduling procedure at this time  If patient does decide to schedule, he will call office at a later time

## 2022-12-02 NOTE — TELEPHONE ENCOUNTER
----- Message from Nitza Branham sent at 11/25/2022  7:47 AM EST -----  Please assist in scheduling    ----- Message -----  From: Odella Hodgkins, MD  Sent: 11/23/2022   3:57 PM EST  To: Miriam Duval MD, #    Dr  Ltanya Caul- will have the office reach out to him  Happy holidays! Hi,     Can be please schedule for colonoscopy at our hospital setting for history of colon polyps  Thank you so much   ----- Message -----  From: Miriam Duval MD  Sent: 11/18/2022  10:32 AM EST  To: Odella Hodgkins, MD Dr Marven Slimmer,    I hope all is well  This patient is due for colonoscopy but has not yet been scheduled  He would love to proceed with a colonoscopy with you if you are available      Thank you,    Quoc Fraction

## 2022-12-06 ENCOUNTER — HOSPITAL ENCOUNTER (OUTPATIENT)
Dept: CT IMAGING | Facility: HOSPITAL | Age: 82
Discharge: HOME | End: 2022-12-06
Attending: INTERNAL MEDICINE

## 2022-12-06 DIAGNOSIS — I71.9 AORTIC ANEURYSM (HCC): ICD-10-CM

## 2022-12-12 ENCOUNTER — TELEPHONE (OUTPATIENT)
Dept: CARDIOLOGY CLINIC | Facility: CLINIC | Age: 82
End: 2022-12-12

## 2022-12-12 NOTE — TELEPHONE ENCOUNTER
Johny Beltre called w/results of significant finding  Of CT chest, please see report    Sent message to Dr Simin Rosas, but realized he is off    Please see

## 2022-12-13 NOTE — PROGRESS NOTES
Cardiology Outpatient Progress Note - Bella Rockwell [de-identified] y o  male MRN: [id number]    @ Encounter: [id number]      Patient Active Problem List    Diagnosis Date Noted   • Primary osteoarthritis of both knees 09/30/2022   • Imbalance 09/07/2022   • Chronic pain of both knees 11/05/2021   • Class 1 obesity due to excess calories with serious comorbidity and body mass index (BMI) of 34 0 to 34 9 in adult 11/30/2020   • Abnormal findings on diagnostic imaging of skull and head, not elsewhere classified 09/09/2020   • Colon polyp 10/05/2018   • Bilateral hearing loss 10/05/2018   • Thrombocytopenia ([facility] ) 10/05/2018   • Thoracic aortic aneurysm without rupture 03/12/2018   • Abnormal EKG 08/29/2017   • [facility] right [facility] ) 07/21/2017   • Acute meniscal tear of left knee 05/02/2017   • Primary localized osteoarthritis of left knee 05/02/2017   • Chronic pain disorder 01/04/2016   • Back pain, thoracic 08/20/2015   • Lung nodules 07/29/2015   • Knee osteoarthritis 09/09/2014   • Chronic lumbar radiculopathy 06/17/2014   • Lumbar canal stenosis 07/30/2013   • Osteopenia 07/09/2012   • Erectile dysfunction of non-organic origin 06/15/2012   • Type 2 diabetes mellitus with diabetic polyneuropathy ([facility] ) 05/16/2012   • Glaucoma 12/17/2008   • Essential hypertension 02/06/2008   • Pure hypercholesterolemia 02/06/2008       Assessment:  #  Dilated ascending thoracic aorta  CT Chest 12/6/22: ascending aorta 44 mm  CT Chest 2/23/21: dilated ascending thoracic aorta 4 4 cm, no change  CT Chest 6/15/19: ascending aorta 4 5 cm    # HTN- BP at goal with SBP < 130 mmHg  Losartan/ hctz 100/25 mg daily, amlodipine 5 mg     Echo 5/8/18:   EF: 60%; aorta 4 3 cm    # Coronary eval  Stress test 8/31/17: normal  EKG today: SR, first degree AV block    # 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    # DM- metformin, Januvia  HgA1C 11/14/22: 7 4%%    # dyslipidemia- atorvastatin 40 mg   11/14/22: LDL 79, HDL 59  5/2/22: LDL 85, HDL 62  10/8/21: LDL 83, HDL 54    # ED- Cialis prn    TODAY'S PLAN:  BP pretty much at goal for dilated aorta  Aorta size, no change on follow up CT 12/6  Continue atorvastatin 40 mg daily  Discussed exercise- walking to increase physical activity  HgA1C at about goal on therapy    HPI:   [de-identified] yo male who had initially presented for pre-op clearance for right nephrectomy for renal mass for RCC  No cardiac history  He had DM, hyperlipidemia, former smoker over 30 years ago  He had stress in past for screening not for CP  He does bowling but no true exercise  No chest pain or SOB  No edema  Interval History:   CT Chest 12/6/22: ascending aorta 44 mm  No new symptoms, feels well, no chest pain or shortness of breath  walking  Review of Systems   Constitutional: Negative for activity change, appetite change, fatigue and unexpected weight change  HENT: Negative for congestion and nosebleeds  Eyes: Negative  Respiratory: Negative for cough, chest tightness and shortness of breath  Cardiovascular: Negative for chest pain, palpitations and leg swelling  Gastrointestinal: Negative for abdominal distention  Endocrine: Negative  Genitourinary: Negative  Musculoskeletal: Negative  Skin: Negative  Neurological: Negative for dizziness, syncope and weakness  Hematological: Negative  Psychiatric/Behavioral: Negative          Past Medical History:   Diagnosis Date   • Arthritis    • Bladder mass 6/28/2017    Follows with Urology   • Cancer [facility])     right kidney   • Cardiac aneurysm    • Chronic pain disorder    • Diabetes mellitus (HCC)     NIDDM   • Erectile dysfunction of non-organic origin    • Glaucoma    • Hx of bleeding disorder     in urine   • Hyperlipidemia    • Hypertension    • Meniscus tear     Left knee   • Urinary tract infection with hematuria     last assessed 05/30/2017   • Wears glasses    • Wears partial dentures     upper partial       No Known Allergies        Current Outpatient Medications:   •  Accu-Chek FastClix Lancets MISC, Use daily E11 9, Disp: 102 each, Rfl: 0  •  amLODIPine (NORVASC) 5 mg tablet, TAKE 1 TABLET DAILY, Disp: 90 tablet, Rfl: 3  •  atorvastatin (LIPITOR) 40 mg tablet, Take 1 tablet (40 mg total) by mouth daily, Disp: 90 tablet, Rfl: 1  •  Blood Glucose Monitoring Suppl (ACCU-CHEK OMERO PLUS) w/Device KIT, by Does not apply route, Disp: , Rfl:   •  Calcium Carb-Cholecalciferol 600-200 MG-UNIT TABS, , Disp: , Rfl:   •  Cholecalciferol 50 MCG ( [place]) TABS, , Disp: , Rfl:   •  Cyanocobalamin (VITAMIN B 12 PO), Take by mouth, Disp: , Rfl:   •  gabapentin (NEURONTIN) 100 mg capsule, TAKE 1 CAPSULE AT BEDTIME INCREASE UP TO 3 CAPSULES NIGHTLY AS NEEDED FOR NEUROPATHY, Disp: 90 capsule, Rfl: 3  •  glucose blood (Accu-Chek Omero Plus) test strip, Use as instructed, Disp: 100 strip, Rfl: 0  •  latanoprost (XALATAN) 0 005 % ophthalmic solution, , Disp: , Rfl:   •  losartan-hydrochlorothiazide (HYZAAR) 100-25 MG per tablet, TAKE 1 TABLET DAILY, Disp: 90 tablet, Rfl: 3  •  MAGNESIUM GLUCONATE PO, Take 400 mg by mouth, Disp: , Rfl:   •  metFORMIN (GLUCOPHAGE) 500 mg tablet, Take 1 tablet in the morning and 1 tablet in the evening, Disp: 180 tablet, Rfl: 3  •  Polyethyl Glycol-Propyl Glycol (SYSTANE OP), Apply to eye 1 drop each eye twice a day , Disp: , Rfl:   •  polyethylene glycol (GOLYTELY) 4000 mL solution, Take 4,000 mL by mouth once for 1 dose, Disp: 4000 mL, Rfl: 0    Social History     Socioeconomic History   • Marital status: /Civil Union     Spouse name: Not on file   • Number of children: Not on file   • Years of education: Not on file   • Highest education level: Not on file   Occupational History   • Occupation:  in [employer] transit   Tobacco Use   • Smoking status: Former     Types: Cigarettes     Quit date: 1985     Years since quittin 3   • Smokeless tobacco: Never   Vaping Use   • Vaping Use: Never used Substance and Sexual Activity   • Alcohol use: Not Currently     Comment: rarely   • Drug use: No   • Sexual activity: Not on file   Other Topics Concern   • Not on file   Social History Narrative    Does not consume caffeine        Never drank alcohol per [employer]     Social Determinants of Health     Financial Resource Strain: Not on file   Food Insecurity: Not on file   Transportation Needs: Not on file   Physical Activity: Not on file   Stress: Not on file   Social Connections: Not on file   Intimate Partner Violence: Not on file   Housing Stability: Not on file       Family History   Problem Relation Age of Onset   • Other Mother         malaria   • Diabetes Sister    • Hypertension Sister        Physical Exam:    Vitals: Blood pressure 130/68, pulse 80, height 5' 7" (1 702 m), weight 99 kg (218 lb 3 2 oz), SpO2 96 %  , Body mass index is 34 17 kg/m² ,   Wt Readings from Last 3 Encounters:   12/15/22 99 kg (218 lb 3 2 oz)   12/02/22 102 kg (224 lb)   11/18/22 101 kg (223 lb)         Physical Exam:    Physical Exam  Constitutional:       Appearance: He is well-developed  He is not diaphoretic  HENT:      Head: Normocephalic and atraumatic  Eyes:      Pupils: Pupils are equal, round, and reactive to light  Neck:      Vascular: No JVD  Cardiovascular:      Rate and Rhythm: Normal rate and regular rhythm  Heart sounds: Normal heart sounds  No murmur heard  Pulmonary:      Effort: Pulmonary effort is normal       Breath sounds: Normal breath sounds  No rales  Abdominal:      General: Bowel sounds are normal  There is no distension  Palpations: Abdomen is soft  Musculoskeletal:         General: Normal range of motion  Cervical back: Normal range of motion  Skin:     General: Skin is warm and dry  Neurological:      Mental Status: He is alert and oriented to person, place, and time         Labs & Results:    Lab Results   Component Value Date    GLUCOSE 276 (H) 07/29/2015    CALCIUM 9 1 11/14/2022     07/29/2015    K 4 1 11/14/2022    CO2 27 11/14/2022     11/14/2022    BUN 23 11/14/2022    CREATININE 0 92 11/14/2022     Lab Results   Component Value Date    WBC 4 89 11/14/2022    HGB 13 6 11/14/2022    HCT 41 1 11/14/2022    MCV 91 11/14/2022     11/14/2022     No results found for: BNP   Lab Results   Component Value Date    CHOL 173 01/20/2014     Lab Results   Component Value Date    HDL 59 11/14/2022    HDL 62 05/02/2022    HDL 54 10/08/2021     Lab Results   Component Value Date    LDLCALC 79 11/14/2022    LDLCALC 85 05/02/2022    LDLCALC 83 10/08/2021     Lab Results   Component Value Date    TRIG 50 11/14/2022    TRIG 49 05/02/2022    TRIG 58 10/08/2021     No results found for: CHOLHDL     EKG personally reviewed by Wyatt Salazar  Counseling / Coordination of Care  Time spent today 25 minutes  Greater than 50% of total time was spent with the patient and / or family counseling and / or coordination of care  We discussed diagnoses, most recent studies, tests and any changes in treatment plan  Thank you for the opportunity to participate in the care of this patient      [address] [facility] PULMONARY HYPERTENSION  MEDICAL DIRECTOR OF [place]

## 2022-12-15 ENCOUNTER — OFFICE VISIT (OUTPATIENT)
Dept: CARDIOLOGY CLINIC | Facility: CLINIC | Age: 82
End: 2022-12-15

## 2022-12-15 VITALS
DIASTOLIC BLOOD PRESSURE: 68 MMHG | WEIGHT: 218.2 LBS | BODY MASS INDEX: 34.25 KG/M2 | SYSTOLIC BLOOD PRESSURE: 130 MMHG | OXYGEN SATURATION: 96 % | HEART RATE: 80 BPM | HEIGHT: 67 IN

## 2022-12-15 DIAGNOSIS — E78.00 PURE HYPERCHOLESTEROLEMIA: Primary | ICD-10-CM

## 2022-12-15 DIAGNOSIS — I71.20 THORACIC AORTIC ANEURYSM WITHOUT RUPTURE, UNSPECIFIED PART: ICD-10-CM

## 2022-12-15 DIAGNOSIS — I10 HTN (HYPERTENSION), BENIGN: ICD-10-CM

## 2023-01-12 DIAGNOSIS — E11.9 TYPE 2 DIABETES MELLITUS WITHOUT COMPLICATION, WITHOUT LONG-TERM CURRENT USE OF INSULIN (HCC): ICD-10-CM

## 2023-01-12 RX ORDER — LANCETS
EACH MISCELLANEOUS DAILY
Qty: 102 EACH | Refills: 0 | Status: SHIPPED | OUTPATIENT
Start: 2023-01-12

## 2023-01-12 RX ORDER — BLOOD SUGAR DIAGNOSTIC
STRIP MISCELLANEOUS
Qty: 100 STRIP | Refills: 0 | Status: SHIPPED | OUTPATIENT
Start: 2023-01-12

## 2023-02-08 ENCOUNTER — VBI (OUTPATIENT)
Dept: ADMINISTRATIVE | Facility: OTHER | Age: 83
End: 2023-02-08

## 2023-02-13 ENCOUNTER — VBI (OUTPATIENT)
Dept: ADMINISTRATIVE | Facility: OTHER | Age: 83
End: 2023-02-13

## 2023-02-20 ENCOUNTER — VBI (OUTPATIENT)
Dept: ADMINISTRATIVE | Facility: OTHER | Age: 83
End: 2023-02-20

## 2023-03-14 ENCOUNTER — APPOINTMENT (OUTPATIENT)
Dept: LAB | Facility: CLINIC | Age: 83
End: 2023-03-14

## 2023-03-14 DIAGNOSIS — I71.21 ANEURYSM OF ASCENDING AORTA WITHOUT RUPTURE: ICD-10-CM

## 2023-03-14 DIAGNOSIS — D69.6 THROMBOCYTOPENIA (HCC): ICD-10-CM

## 2023-03-14 DIAGNOSIS — I10 ESSENTIAL HYPERTENSION: ICD-10-CM

## 2023-03-14 DIAGNOSIS — E11.42 TYPE 2 DIABETES MELLITUS WITH DIABETIC POLYNEUROPATHY, WITHOUT LONG-TERM CURRENT USE OF INSULIN (HCC): ICD-10-CM

## 2023-03-14 LAB
ALBUMIN SERPL BCP-MCNC: 3.5 G/DL (ref 3.5–5)
ALP SERPL-CCNC: 85 U/L (ref 46–116)
ALT SERPL W P-5'-P-CCNC: 23 U/L (ref 12–78)
ANION GAP SERPL CALCULATED.3IONS-SCNC: 3 MMOL/L (ref 4–13)
AST SERPL W P-5'-P-CCNC: 14 U/L (ref 5–45)
BILIRUB SERPL-MCNC: 0.6 MG/DL (ref 0.2–1)
BUN SERPL-MCNC: 20 MG/DL (ref 5–25)
CALCIUM SERPL-MCNC: 9 MG/DL (ref 8.3–10.1)
CHLORIDE SERPL-SCNC: 103 MMOL/L (ref 96–108)
CHOLEST SERPL-MCNC: 135 MG/DL
CO2 SERPL-SCNC: 28 MMOL/L (ref 21–32)
CREAT SERPL-MCNC: 0.89 MG/DL (ref 0.6–1.3)
ERYTHROCYTE [DISTWIDTH] IN BLOOD BY AUTOMATED COUNT: 13.8 % (ref 11.6–15.1)
EST. AVERAGE GLUCOSE BLD GHB EST-MCNC: 177 MG/DL
GFR SERPL CREATININE-BSD FRML MDRD: 80 ML/MIN/1.73SQ M
GLUCOSE P FAST SERPL-MCNC: 150 MG/DL (ref 65–99)
HBA1C MFR BLD: 7.8 %
HCT VFR BLD AUTO: 40.5 % (ref 36.5–49.3)
HDLC SERPL-MCNC: 56 MG/DL
HGB BLD-MCNC: 13.6 G/DL (ref 12–17)
LDLC SERPL CALC-MCNC: 71 MG/DL (ref 0–100)
MCH RBC QN AUTO: 29.1 PG (ref 26.8–34.3)
MCHC RBC AUTO-ENTMCNC: 33.6 G/DL (ref 31.4–37.4)
MCV RBC AUTO: 87 FL (ref 82–98)
PLATELET # BLD AUTO: 178 THOUSANDS/UL (ref 149–390)
PMV BLD AUTO: 11.5 FL (ref 8.9–12.7)
POTASSIUM SERPL-SCNC: 3.9 MMOL/L (ref 3.5–5.3)
PROT SERPL-MCNC: 6.8 G/DL (ref 6.4–8.4)
RBC # BLD AUTO: 4.68 MILLION/UL (ref 3.88–5.62)
SODIUM SERPL-SCNC: 134 MMOL/L (ref 135–147)
TRIGL SERPL-MCNC: 41 MG/DL
WBC # BLD AUTO: 5.05 THOUSAND/UL (ref 4.31–10.16)

## 2023-03-15 ENCOUNTER — TELEPHONE (OUTPATIENT)
Dept: OBGYN CLINIC | Facility: HOSPITAL | Age: 83
End: 2023-03-15

## 2023-03-15 DIAGNOSIS — M17.0 BILATERAL PRIMARY OSTEOARTHRITIS OF KNEE: Primary | ICD-10-CM

## 2023-03-15 NOTE — TELEPHONE ENCOUNTER
Caller: Colby Ladd  Doctor/office: Sanchez HOROWITZ#: [phone]    Patient would like B/L visco injections again  Last series 9/30/2022    Body Part: B/L Knee  Date of last Gel Injection: 9/30/22    Informed pt doctor was out on maternity leave  Educated patient on Visco procedure   Medications must first be authorized and delivered to our office BEFORE we can schedule

## 2023-03-15 NOTE — TELEPHONE ENCOUNTER
Spoke with patient  He is having 5/10 pain  Pain is impacting sleep  He is taking tylenol with insufficient relief  He has tried steroid and relief did not last 3 months  Patient has had previous VS series and had good lasting relief  Order placed for euflexxa

## 2023-05-08 DIAGNOSIS — E11.9 TYPE 2 DIABETES MELLITUS WITHOUT COMPLICATION, WITHOUT LONG-TERM CURRENT USE OF INSULIN (HCC): ICD-10-CM

## 2023-05-08 DIAGNOSIS — E78.00 PURE HYPERCHOLESTEROLEMIA: ICD-10-CM

## 2023-05-09 ENCOUNTER — PROCEDURE VISIT (OUTPATIENT)
Dept: OBGYN CLINIC | Facility: MEDICAL CENTER | Age: 83
End: 2023-05-09

## 2023-05-09 VITALS
HEIGHT: 67 IN | SYSTOLIC BLOOD PRESSURE: 129 MMHG | DIASTOLIC BLOOD PRESSURE: 66 MMHG | HEART RATE: 73 BPM | BODY MASS INDEX: 34.37 KG/M2 | WEIGHT: 219 LBS

## 2023-05-09 DIAGNOSIS — M25.561 CHRONIC PAIN OF BOTH KNEES: ICD-10-CM

## 2023-05-09 DIAGNOSIS — M25.562 CHRONIC PAIN OF BOTH KNEES: ICD-10-CM

## 2023-05-09 DIAGNOSIS — G89.29 CHRONIC PAIN OF BOTH KNEES: ICD-10-CM

## 2023-05-09 DIAGNOSIS — M17.0 BILATERAL PRIMARY OSTEOARTHRITIS OF KNEE: Primary | ICD-10-CM

## 2023-05-09 RX ORDER — HYALURONATE SODIUM 10 MG/ML
20 SYRINGE (ML) INTRAARTICULAR
Status: COMPLETED | OUTPATIENT
Start: 2023-05-09 | End: 2023-05-09

## 2023-05-09 RX ORDER — BLOOD SUGAR DIAGNOSTIC
STRIP MISCELLANEOUS
Qty: 100 STRIP | Refills: 0 | Status: SHIPPED | OUTPATIENT
Start: 2023-05-09

## 2023-05-09 RX ORDER — ATORVASTATIN CALCIUM 40 MG/1
TABLET, FILM COATED ORAL
Qty: 90 TABLET | Refills: 1 | Status: SHIPPED | OUTPATIENT
Start: 2023-05-09

## 2023-05-09 RX ADMIN — Medication 20 MG: at 14:21

## 2023-05-09 NOTE — PROGRESS NOTES
Assessment/Plan:  1  Bilateral primary osteoarthritis of knee    2  Chronic pain of both knees      Orders Placed This Encounter   Procedures   • Large joint arthrocentesis       · Patient has severe right and mild left knee osteoarthritis  · Decision was made to proceed with bilateral knee Euflexxa injections which were ordered over the phone  Patient received bilateral knee Euflexxa injections 1 out of 3  Tolerated the procedures well  Postinjection instructions reviewed  · Continue tylenol as needed for pain  · Continue activity as tolerated  Return in about 1 week (around 5/16/2023) for bilat knee euflexxa 2  I answered all of the patient's questions during the visit and provided education of the patient's condition during the visit  The patient verbalized understanding of the information given and agrees with the plan  This note was dictated using [employer] software  It may contain errors including improperly dictated words  Please contact physician directly for any questions  Subjective   Chief Complaint:   Chief Complaint   Patient presents with   • Left Knee - Follow-up   • Right Knee - Follow-up       [facility]  Giovanny Ariza is a 82 y o  male who presents for follow up for bilateral knee pain  Patient completed bilateral knee euflexxa injections on 9/30/22 and reports good relief for several months  Patient notes the return of bilateral knee pain, right knee worse than left knee  Pain is rated 5/10  Patient is taking tylenol as needed for pain  Patient has tried steroid injections previous but they did not last 3 months  Patient would like repeat euflexxa injections today, which were previously ordered over the phone  Review of Systems  ROS:    See HPI for musculoskeletal review     All other systems reviewed are negative     History:  Past Medical History:   Diagnosis Date   • Arthritis    • Bladder mass 6/28/2017    Follows with Urology   • Cancer [facility])     right kidney   • Cardiac aneurysm    • Chronic pain disorder    • Diabetes mellitus (HCC)     NIDDM   • Erectile dysfunction of non-organic origin    • Glaucoma    • Hx of bleeding disorder     in urine   • Hyperlipidemia    • Hypertension    • Meniscus tear     Left knee   • Urinary tract infection with hematuria     last assessed 2017   • Wears glasses    • Wears partial dentures     upper partial     Past Surgical History:   Procedure Laterality Date   • CATARACT EXTRACTION     • COLONOSCOPY     • CYSTOSCOPY      onset 2017   • DENTAL SURGERY     • EYE SURGERY Bilateral     stents   • EYE SURGERY Bilateral     stents in both eyes    • NEPHRECTOMY LAPAROSCOPIC Right 2017    Procedure: NEPHRECTOMY PARTIAL LAPAROSCOPIC W ROBOTICS ,;  Surgeon: Nyla Franco MD;  Location: [facility];  Service: Urology   • NERVE BLOCK      transforaminal epidural lumbar   • WISDOM TOOTH EXTRACTION      as well as broken neighboring tooth     Social History   Social History     Substance and Sexual Activity   Alcohol Use Not Currently    Comment: rarely     Social History     Substance and Sexual Activity   Drug Use No     Social History     Tobacco Use   Smoking Status Former   • Types: Cigarettes   • Quit date: 1985   • Years since quittin 7   Smokeless Tobacco Never     Family History:   Family History   Problem Relation Age of Onset   • Other Mother         malaria   • Diabetes Sister    • Hypertension Sister        Current Outpatient Medications on File Prior to Visit   Medication Sig Dispense Refill   • Accu-Chek Crista Plus test strip USE AS INSTRUCTED 100 strip 0   • Accu-Chek FastClix Lancets MISC Use daily E11 9 102 each 0   • amLODIPine (NORVASC) 5 mg tablet TAKE 1 TABLET DAILY 90 tablet 3   • atorvastatin (LIPITOR) 40 mg tablet TAKE 1 TABLET DAILY 90 tablet 1   • Blood Glucose Monitoring Suppl (ACCU-CHEK CRISTA PLUS) w/Device KIT by Does not apply route     • Calcium Carb-Cholecalciferol 600-200 MG-UNIT TABS      • "Cholecalciferol 50 MCG (2000 UT) TABS      • Cyanocobalamin (VITAMIN B 12 PO) Take by mouth     • gabapentin (NEURONTIN) 100 mg capsule TAKE 1 CAPSULE AT BEDTIME INCREASE UP TO 3 CAPSULES NIGHTLY AS NEEDED FOR NEUROPATHY 90 capsule 3   • latanoprost (XALATAN) 0 005 % ophthalmic solution      • losartan-hydrochlorothiazide (HYZAAR) 100-25 MG per tablet TAKE 1 TABLET DAILY 90 tablet 3   • MAGNESIUM GLUCONATE PO Take 400 mg by mouth     • metFORMIN (GLUCOPHAGE) 500 mg tablet Take 1 tablet in the morning and 1 tablet in the evening 180 tablet 3   • Polyethyl Glycol-Propyl Glycol (SYSTANE OP) Apply to eye 1 drop each eye twice a day      • polyethylene glycol (GOLYTELY) 4000 mL solution Take 4,000 mL by mouth once for 1 dose 4000 mL 0   • [DISCONTINUED] atorvastatin (LIPITOR) 40 mg tablet Take 1 tablet (40 mg total) by mouth daily 90 tablet 1   • [DISCONTINUED] glucose blood (Accu-Chek Crisat Plus) test strip Use as instructed 100 strip 0     No current facility-administered medications on file prior to visit  No Known Allergies     Objective     /66   Pulse 73   Ht 5' 7\" (1 702 m)   Wt 99 3 kg (219 lb)   BMI 34 30 kg/m²      PE:  AAOx 3  WDWN  Hearing intact, no drainage from eyes  no audible wheezing  no abdominal distension  LE compartments soft, skin intact    Ortho Exam:  bilateral Knee:   No erythema  no swelling  no effusion  no warmth  No TTP  AROM: 0- 115  Stable to varus/valgus stress      Large joint arthrocentesis: bilateral knee  Universal Protocol:  Consent: Verbal consent obtained    Risks and benefits: risks, benefits and alternatives were discussed  Consent given by: patient  Site marked: the operative site was marked  Supporting Documentation  Indications: pain   Procedure Details  Location: knee - bilateral knee  Preparation: Patient was prepped and draped in the usual sterile fashion  Needle size: 22 G  Ultrasound guidance: no  Approach: anterolateral    Medications (Right): 20 mg Sodium " Hyaluronate 20 MG/2MLMedications (Left): 20 mg Sodium Hyaluronate 20 MG/2ML   Patient tolerance: patient tolerated the procedure well with no immediate complications  Dressing:  Sterile dressing applied

## 2023-05-15 ENCOUNTER — RA CDI HCC (OUTPATIENT)
Dept: OTHER | Facility: HOSPITAL | Age: 83
End: 2023-05-15

## 2023-05-15 NOTE — PROGRESS NOTES
[address]  coding opportunities     E11 65     Chart Reviewed number of suggestions sent to Provider: 1     Patients Insurance     Medicare Insurance: [employer]

## 2023-05-16 ENCOUNTER — PROCEDURE VISIT (OUTPATIENT)
Dept: OBGYN CLINIC | Facility: MEDICAL CENTER | Age: 83
End: 2023-05-16

## 2023-05-16 VITALS
WEIGHT: 219 LBS | HEART RATE: 74 BPM | HEIGHT: 67 IN | SYSTOLIC BLOOD PRESSURE: 123 MMHG | DIASTOLIC BLOOD PRESSURE: 71 MMHG | BODY MASS INDEX: 34.37 KG/M2

## 2023-05-16 DIAGNOSIS — M17.0 BILATERAL PRIMARY OSTEOARTHRITIS OF KNEE: Primary | ICD-10-CM

## 2023-05-16 RX ORDER — HYALURONATE SODIUM 10 MG/ML
20 SYRINGE (ML) INTRAARTICULAR
Status: COMPLETED | OUTPATIENT
Start: 2023-05-16 | End: 2023-05-16

## 2023-05-16 RX ADMIN — Medication 20 MG: at 14:25

## 2023-05-16 NOTE — PROGRESS NOTES
Patient has severe right and mild left knee osteoarthritis  Patient received bilateral knee euflexxa injections 2/3  Tolerated the procedures well  Post injection instructions reviewed  Follow up 1 week for injection 3  Large joint arthrocentesis: bilateral knee  Universal Protocol:  Consent: Verbal consent obtained    Risks and benefits: risks, benefits and alternatives were discussed  Consent given by: patient  Site marked: the operative site was marked  Supporting Documentation  Indications: pain   Procedure Details  Location: knee - bilateral knee  Preparation: Patient was prepped and draped in the usual sterile fashion  Needle size: 22 G  Approach: anterolateral    Medications (Right): 20 mg Sodium Hyaluronate 20 MG/2MLMedications (Left): 20 mg Sodium Hyaluronate 20 MG/2ML   Patient tolerance: patient tolerated the procedure well with no immediate complications  Dressing:  Sterile dressing applied

## 2023-05-19 ENCOUNTER — OFFICE VISIT (OUTPATIENT)
Dept: FAMILY MEDICINE CLINIC | Facility: CLINIC | Age: 83
End: 2023-05-19

## 2023-05-19 VITALS
DIASTOLIC BLOOD PRESSURE: 64 MMHG | BODY MASS INDEX: 34.84 KG/M2 | SYSTOLIC BLOOD PRESSURE: 134 MMHG | TEMPERATURE: 97.8 F | OXYGEN SATURATION: 95 % | HEART RATE: 67 BPM | WEIGHT: 222 LBS | HEIGHT: 67 IN

## 2023-05-19 DIAGNOSIS — E11.42 TYPE 2 DIABETES MELLITUS WITH DIABETIC POLYNEUROPATHY, WITHOUT LONG-TERM CURRENT USE OF INSULIN (HCC): ICD-10-CM

## 2023-05-19 DIAGNOSIS — C64.1 RENAL CELL CARCINOMA OF RIGHT KIDNEY (HCC): ICD-10-CM

## 2023-05-19 DIAGNOSIS — I71.21 ANEURYSM OF ASCENDING AORTA WITHOUT RUPTURE (HCC): ICD-10-CM

## 2023-05-19 DIAGNOSIS — E66.01 OBESITY, MORBID (HCC): ICD-10-CM

## 2023-05-19 DIAGNOSIS — I71.20 THORACIC AORTIC ANEURYSM WITHOUT RUPTURE, UNSPECIFIED PART (HCC): Primary | ICD-10-CM

## 2023-05-19 DIAGNOSIS — I10 ESSENTIAL HYPERTENSION: ICD-10-CM

## 2023-05-19 DIAGNOSIS — D69.6 THROMBOCYTOPENIA (HCC): ICD-10-CM

## 2023-05-19 NOTE — ASSESSMENT & PLAN NOTE
Lab Results   Component Value Date    HGBA1C 7 8 (H) 03/14/2023   He is feeling a bit lightheaded in the afternoon which he attributes to a lower sugar  I am going to switch him over to Jardiance at 10 mg daily and stop metformin  Repeat labs at 3 months and again at 6 months    Consider titrating Jardiance as needed

## 2023-05-19 NOTE — PATIENT INSTRUCTIONS
Type 2 Diabetes Management for Adults   AMBULATORY CARE:   Type 2 diabetes  is a disease that affects how your body uses glucose (sugar)  Either your body cannot make enough insulin, or it cannot use the insulin correctly  It is important to keep diabetes controlled to prevent damage to your heart, blood vessels, and other organs  Management will help you feel well and enjoy your daily activities  Your diabetes care team providers can help you make a plan to fit diabetes care into your schedule  Your plan can change over time to fit your needs and your family's needs  Have someone call your local emergency number (911 in the [address]) if:   • You cannot be woken  • You have signs of diabetic ketoacidosis:     ? confusion, fatigue    ? vomiting    ? rapid heartbeat    ? fruity smelling breath    ? extreme thirst    ? dry mouth and skin    • You have any of the following signs of a heart attack:      ? Squeezing, pressure, or pain in your chest    ? You may  also have any of the following:     - Discomfort or pain in your back, neck, jaw, stomach, or arm    - Shortness of breath    - Nausea or vomiting    - Lightheadedness or a sudden cold sweat    • You have any of the following signs of a stroke:      ? Numbness or drooping on one side of your face     ? Weakness in an arm or leg    ? Confusion or difficulty speaking    ? Dizziness, a severe headache, or vision loss    Call your doctor or diabetes care team provider if:   • You have a sore or wound that will not heal     • You have a change in the amount you urinate  • Your blood sugar levels are higher than your target goals  • You often have lower blood sugar levels than your target goals  • Your skin is red, dry, warm, or swollen  • You have trouble coping with diabetes, or you feel anxious or depressed  • You have questions or concerns about your condition or care      What you need to know about high blood sugar levels:  High blood sugar levels may not cause any symptoms  You may feel more thirsty or urinate more often than usual  Over time, high blood sugar levels can damage your nerves, blood vessels, tissues, and organs  The following can increase your blood sugar levels:  • Large meals or large amounts of carbohydrates at one time    • Less physical activity    • Stress    • Illness    • A lower dose of diabetes medicine or insulin, or a late dose    What you need to know about low blood sugar levels:  Symptoms include feeling shaky, dizzy, irritable, or confused  You can prevent symptoms by keeping your blood sugar levels from going too low  • Treat a low blood sugar level right away:      ? Drink 4 ounces of juice or have 1 tube of glucose gel  ? Check your blood sugar level again 10 to 15 minutes later  ? When the level goes back to normal, eat a meal or snack to prevent another decrease  • Keep glucose gel, raisins, or hard candy with you at all times to treat a low blood sugar level  • Your blood sugar level can get too low if you take diabetes medicine or insulin and do not eat enough food  • If you use insulin, check your blood sugar level before you exercise  ? If your blood sugar level is below 100 mg/dL, eat 4 crackers or 2 ounces of raisins, or drink 4 ounces of juice  ? Check your level every 30 minutes if you exercise longer than 1 hour  ? You may need a snack during or after exercise  What you can do to manage your blood sugar levels:   • Check your blood sugar levels as directed and as needed  Several items are available to use to check your levels  You may need to check by testing a drop of blood in a glucose monitor  You may instead be given a continuous glucose monitoring (CGM) device  The device is worn at all times  The CGM checks your blood sugar level every 5 minutes  It sends results to an electronic device such as a smart phone  A CGM can be used with or without an insulin pump   You and your diabetes care team providers will decide on the best method for you  The goal for blood sugar levels before meals  is between 80 and 130 mg/dL and 2 hours after eating  is lower than 180 mg/dL  • Make healthy food choices  Work with a dietitian to develop a meal plan that works for you and your schedule  A dietitian can help you learn how to eat the right amount of carbohydrates during your meals and snacks  Carbohydrates can raise your blood sugar level if you eat too many at one time  Examples of foods that contain carbohydrates are breads, cereals, rice, pasta, and sweets  • Eat high-fiber foods as directed  Fiber helps improve blood sugar levels  Fiber also lowers your risk for heart disease and other problems diabetes can cause  Examples of high-fiber foods include vegetables, whole-grain bread, and beans such as real beans  Your dietitian can tell you how much fiber to have each day  • Get regular physical activity  Physical activity can help you get to your target blood sugar level goal and manage your weight  Get at least 150 minutes of moderate to vigorous aerobic physical activity each week  Do not miss more than 2 days in a row  Do not sit longer than 30 minutes at a time  Your healthcare provider can help you create an activity plan  The plan can include the best activities for you and can help you build your strength and endurance  • Maintain a healthy weight  Ask your team what a healthy weight is for you  A healthy weight can help you control diabetes and prevent heart disease  Ask your team to help you create a weight loss plan, if needed  Weight loss can help make a difference in managing diabetes  Your team will help you set a weight-loss goal, such as 10 to 15 pounds, or 5% of your extra weight  Together you and your team can set manageable weight loss goals  • Take your diabetes medicine or insulin as directed    You may need diabetes medicine, insulin, or both to help control your blood sugar levels  Your healthcare provider will teach you how and when to take your diabetes medicine or insulin  You will also be taught about side effects oral diabetes medicine can cause  Insulin may be injected or given through a pump or pen  You and your providers will decide on the best method for you:    ? An insulin pump  is an implanted device that gives your insulin 24 hours a day  An insulin pump prevents the need for multiple insulin injections in a day  ? An insulin pen  is a device prefilled with the right amount of insulin  ? You and your family members will be taught how to draw up and give insulin  if this is the best method for you  Your providers will also teach you how to dispose of needles and syringes  ? You will learn how much insulin you need  and when to give it  You will be taught when not to give insulin  You will also be taught what to do if your blood sugar level drops too low  This may happen if you take insulin and do not eat the right amount of carbohydrates  More ways to manage type 2 diabetes:   • Wear medical alert identification  Wear medical alert jewelry or carry a card that says you have diabetes  Ask your provider where to get these items  • Do not smoke  Nicotine and other chemicals in cigarettes and cigars can cause lung and blood vessel damage  It also makes it more difficult to manage your diabetes  Ask your provider for information if you currently smoke and need help to quit  Do not use e-cigarettes or smokeless tobacco in place of cigarettes or to help you quit  They still contain nicotine  • Check your feet each day for cuts, scratches, calluses, or other wounds  Look for redness and swelling, and feel for warmth  Wear shoes that fit well  Check your shoes for rocks or other objects that can hurt your feet  Do not walk barefoot or wear shoes without socks   Wear cotton socks to help keep your feet dry  • Ask about vaccines you may need  You have a higher risk for serious illness if you get the flu, pneumonia, COVID-19, or hepatitis  Ask your provider if you should get vaccines to prevent these or other diseases, and when to get the vaccines  • Talk to your provider if you become stressed about diabetes care  Sometimes being able to fit diabetes care into your life can cause increased stress  The stress can cause you not to take care of yourself properly  Your care team providers can help by offering tips about self-care  Your providers may suggest you talk to a mental health provider who can listen and offer help with self-care issues  • Have your A1c checked as directed  Your provider may check your A1c every 3 months, or 2 times each year if your diabetes is controlled  An A1c test shows the average amount of sugar in your blood over the past 2 to 3 months  Your provider will tell you what your A1c level should be  • Have screening tests as directed  Your provider may recommend screening for complications of diabetes and other conditions that may develop  Some screenings may begin right away and some may happen within the first 5 years of diagnosis:    ? Examples of diabetes complications  include kidney problems, high cholesterol, high blood pressure, blood vessel problems, eye problems, and sleep apnea  ? You may be screened for a low vitamin B level  if you take oral diabetes medicine for a long time  ? Women of childbearing years may be screened  for polycystic ovarian syndrome (PCOS)  Follow up with your doctor or diabetes care team providers as directed: You may need to have blood tests done before your follow-up visit  The test results will show if changes need to be made in your treatment or self-care  Talk to your provider if you cannot afford your medicine  Write down your questions so you remember to ask them during your visits    © Copyright [employer] 2022 Information is for End User's use only and may not be sold, redistributed or otherwise used for commercial purposes  The above information is an  only  It is not intended as medical advice for individual conditions or treatments  Talk to your doctor, nurse or pharmacist before following any medical regimen to see if it is safe and effective for you

## 2023-05-19 NOTE — PROGRESS NOTES
Assessment/Plan:       Problem List Items Addressed This Visit        Endocrine    Type 2 diabetes mellitus with diabetic polyneuropathy ([facility] )       Lab Results   Component Value Date    HGBA1C 7 8 (H) 03/14/2023   He is feeling a bit lightheaded in the afternoon which he attributes to a lower sugar  I am going to switch him over to Jardiance at 10 mg daily and stop metformin  Repeat labs at 3 months and again at 6 months  Consider titrating Jardiance as needed         Relevant Medications    Empagliflozin (Jardiance) 10 MG TABS tablet    Other Relevant Orders    Albumin / creatinine urine ratio    Comprehensive metabolic panel    Hemoglobin A1C    Hemoglobin A1C       Cardiovascular and Mediastinum    Essential hypertension    Relevant Medications    Empagliflozin (Jardiance) 10 MG TABS tablet    Other Relevant Orders    Albumin / creatinine urine ratio    Comprehensive metabolic panel    Hemoglobin A1C    Comprehensive metabolic panel    Lipid Panel with Direct LDL reflex    CBC and Platelet    [facility]or[facility] without [facility] ) - Primary    Aneurysm of ascending aorta without rupture ([facility] )     CAT scan done in December was stable  Hematopoietic and Hemostatic    Thrombocytopenia ([facility] )     Stable on recent labs         Relevant Orders    CBC and Platelet       Genitourinary    Renal cell carcinoma of right kidney ([facility] )     Continue to monitor renal function  Relevant Orders    Comprehensive metabolic panel    Comprehensive metabolic panel       Other    Obesity, morbid (HCC)         Subjective:      Patient ID: Renae Gordon is a 82 y o  male  HPI presents today for follow-up for chronic health issues  He is doing pretty well  A1c did bump up  Notes he had a rough few months  His sister just passed the other day which has been difficult  Blood pressures very well controlled    He is remaining very active without cardiovascular limitations such as chest pain, shortness of breath, palpitations or lightheadedness  He is having ongoing follow-up with orthopedics for his bilateral knee pain    The following portions of the patient's history were reviewed and updated as appropriate: allergies, current medications, past family history, past medical history, past social history, past surgical history and problem list       Current Outpatient Medications:   •  Accu-Chek Crista Plus test strip, USE AS INSTRUCTED, Disp: 100 strip, Rfl: 0  •  Accu-Chek FastClix Lancets MISC, Use daily E11 9, Disp: 102 each, Rfl: 0  •  amLODIPine (NORVASC) 5 mg tablet, TAKE 1 TABLET DAILY, Disp: 90 tablet, Rfl: 3  •  atorvastatin (LIPITOR) 40 mg tablet, TAKE 1 TABLET DAILY, Disp: 90 tablet, Rfl: 1  •  Blood Glucose Monitoring Suppl (ACCU-CHEK CRISTA PLUS) w/Device KIT, by Does not apply route, Disp: , Rfl:   •  Calcium Carb-Cholecalciferol 600-200 MG-UNIT TABS, , Disp: , Rfl:   •  Cholecalciferol 50 MCG (2000 UT) TABS, , Disp: , Rfl:   •  Cyanocobalamin (VITAMIN B 12 PO), Take by mouth, Disp: , Rfl:   •  Empagliflozin (Jardiance) 10 MG TABS tablet, Take 1 tablet (10 mg total) by mouth in the morning, Disp: 90 tablet, Rfl: 1  •  latanoprost (XALATAN) 0 005 % ophthalmic solution, , Disp: , Rfl:   •  losartan-hydrochlorothiazide (HYZAAR) 100-25 MG per tablet, TAKE 1 TABLET DAILY, Disp: 90 tablet, Rfl: 3  •  MAGNESIUM GLUCONATE PO, Take 400 mg by mouth, Disp: , Rfl:   •  Polyethyl Glycol-Propyl Glycol (SYSTANE OP), Apply to eye 1 drop each eye twice a day , Disp: , Rfl:      Review of Systems   Constitutional: Negative for appetite change, chills, fatigue, fever and unexpected weight change  HENT: Negative for trouble swallowing  Eyes: Negative for visual disturbance  Respiratory: Negative for cough, chest tightness, shortness of breath and wheezing  Cardiovascular: Negative for chest pain, palpitations and leg swelling     Gastrointestinal: Negative for abdominal distention, abdominal pain, blood in stool, "constipation and diarrhea  Endocrine: Negative for polyuria  Genitourinary: Negative for difficulty urinating and flank pain  Musculoskeletal: Negative for arthralgias and myalgias  Skin: Negative for rash  Neurological: Negative for dizziness and light-headedness  Hematological: Negative for adenopathy  Does not bruise/bleed easily  Psychiatric/Behavioral: Negative for dysphoric mood and sleep disturbance  The patient is not nervous/anxious  Objective:      /64 (BP Location: Left arm, Patient Position: Sitting, Cuff Size: Large)   Pulse 67   Temp 97 8 °F (36 6 °C)   Ht 5' 7\" (1 702 m)   Wt 101 kg (222 lb)   SpO2 95%   BMI 34 77 kg/m²          Physical Exam  Vitals reviewed  Constitutional:       Appearance: He is well-developed  He is obese  HENT:      Head: Normocephalic  Cardiovascular:      Rate and Rhythm: Regular rhythm  Heart sounds: Normal heart sounds  No murmur heard  Pulmonary:      Effort: No respiratory distress  Breath sounds: No wheezing or rales  Abdominal:      General: There is no distension  Tenderness: There is no abdominal tenderness  Skin:     Findings: No erythema or rash  Neurological:      Mental Status: He is alert and oriented to person, place, and time             Rangel Pruitt MD  "

## 2023-05-25 ENCOUNTER — PROCEDURE VISIT (OUTPATIENT)
Dept: OBGYN CLINIC | Facility: MEDICAL CENTER | Age: 83
End: 2023-05-25

## 2023-05-25 VITALS
SYSTOLIC BLOOD PRESSURE: 127 MMHG | DIASTOLIC BLOOD PRESSURE: 74 MMHG | HEART RATE: 78 BPM | BODY MASS INDEX: 34.84 KG/M2 | WEIGHT: 222 LBS | HEIGHT: 67 IN

## 2023-05-25 DIAGNOSIS — M17.0 BILATERAL PRIMARY OSTEOARTHRITIS OF KNEE: Primary | ICD-10-CM

## 2023-05-25 DIAGNOSIS — M25.561 CHRONIC PAIN OF BOTH KNEES: ICD-10-CM

## 2023-05-25 DIAGNOSIS — M25.562 CHRONIC PAIN OF BOTH KNEES: ICD-10-CM

## 2023-05-25 DIAGNOSIS — G89.29 CHRONIC PAIN OF BOTH KNEES: ICD-10-CM

## 2023-05-25 RX ORDER — HYALURONATE SODIUM 10 MG/ML
20 SYRINGE (ML) INTRAARTICULAR
Status: COMPLETED | OUTPATIENT
Start: 2023-05-25 | End: 2023-05-25

## 2023-05-25 RX ADMIN — Medication 20 MG: at 13:00

## 2023-05-25 NOTE — PROGRESS NOTES
Patient has severe right and mild left knee osteoarthritis  Patient received bilateral knee euflexxa 3/3 today  Tolerated the procedures well  Post injection instructions reviewed  Follow up 2 months for CSI if glucose is well controlled or call in 5 months for VS order  Large joint arthrocentesis: bilateral knee  Universal Protocol:  Consent: Verbal consent obtained    Risks and benefits: risks, benefits and alternatives were discussed  Consent given by: patient  Site marked: the operative site was marked  Supporting Documentation  Indications: pain   Procedure Details  Location: knee - bilateral knee  Preparation: Patient was prepped and draped in the usual sterile fashion  Needle size: 22 G  Ultrasound guidance: no  Approach: anterolateral    Medications (Right): 20 mg Sodium Hyaluronate 20 MG/2MLMedications (Left): 20 mg Sodium Hyaluronate 20 MG/2ML   Patient tolerance: patient tolerated the procedure well with no immediate complications  Dressing:  Sterile dressing applied

## 2023-06-26 ENCOUNTER — TELEPHONE (OUTPATIENT)
Dept: FAMILY MEDICINE CLINIC | Facility: CLINIC | Age: 83
End: 2023-06-26

## 2023-06-26 DIAGNOSIS — E11.9 TYPE 2 DIABETES MELLITUS WITHOUT COMPLICATION, WITHOUT LONG-TERM CURRENT USE OF INSULIN (HCC): ICD-10-CM

## 2023-06-26 NOTE — TELEPHONE ENCOUNTER
accucheck guideme test strips  accucheck lancets    He recently got new meter and would need new strips and lancets for this meter

## 2023-06-27 DIAGNOSIS — E11.9 TYPE 2 DIABETES MELLITUS WITHOUT COMPLICATION, WITHOUT LONG-TERM CURRENT USE OF INSULIN (HCC): ICD-10-CM

## 2023-06-27 DIAGNOSIS — E11.42 TYPE 2 DIABETES MELLITUS WITH DIABETIC POLYNEUROPATHY, WITHOUT LONG-TERM CURRENT USE OF INSULIN (HCC): Primary | ICD-10-CM

## 2023-06-27 RX ORDER — BLOOD SUGAR DIAGNOSTIC
STRIP MISCELLANEOUS
Qty: 100 STRIP | Refills: 0 | Status: SHIPPED | OUTPATIENT
Start: 2023-06-27

## 2023-06-27 RX ORDER — LANCETS
EACH MISCELLANEOUS DAILY
Qty: 102 EACH | Refills: 0 | Status: SHIPPED | OUTPATIENT
Start: 2023-06-27 | End: 2023-06-28

## 2023-06-27 NOTE — TELEPHONE ENCOUNTER
Wrong test strips and lancets were sent  Patient's wife came into the office with glucometer kit to request the correct test strip and lancets: [address] [place]  Pending test strips; unable to find Accu-chek Guide Me Lancets under database  Macrk Antony, are you able to assist with this, please?

## 2023-06-28 ENCOUNTER — TELEPHONE (OUTPATIENT)
Dept: FAMILY MEDICINE CLINIC | Facility: CLINIC | Age: 83
End: 2023-06-28

## 2023-06-28 DIAGNOSIS — E11.42 TYPE 2 DIABETES MELLITUS WITH DIABETIC POLYNEUROPATHY, WITHOUT LONG-TERM CURRENT USE OF INSULIN (HCC): Primary | ICD-10-CM

## 2023-06-28 PROBLEM — E11.9 TYPE 2 DIABETES MELLITUS WITHOUT COMPLICATION, WITHOUT LONG-TERM CURRENT USE OF INSULIN (HCC): Status: ACTIVE | Noted: 2023-06-28

## 2023-06-28 NOTE — TELEPHONE ENCOUNTER
patient needs the [address] [place] lancets as well  Please place script when possible   (rx for guide me test strips has been sent to the pharmacy already)

## 2023-07-06 RX ORDER — BLOOD SUGAR DIAGNOSTIC
STRIP MISCELLANEOUS
Qty: 100 STRIP | Refills: 1 | Status: SHIPPED | OUTPATIENT
Start: 2023-07-06

## 2023-07-06 RX ORDER — LANCETS
EACH MISCELLANEOUS DAILY
Qty: 100 EACH | Refills: 3 | Status: SHIPPED | OUTPATIENT
Start: 2023-07-06

## 2023-07-24 DIAGNOSIS — I10 ESSENTIAL HYPERTENSION: ICD-10-CM

## 2023-07-24 RX ORDER — LOSARTAN POTASSIUM AND HYDROCHLOROTHIAZIDE 25; 100 MG/1; MG/1
TABLET ORAL
Qty: 90 TABLET | Refills: 3 | Status: SHIPPED | OUTPATIENT
Start: 2023-07-24

## 2023-07-25 ENCOUNTER — OFFICE VISIT (OUTPATIENT)
Dept: OBGYN CLINIC | Facility: MEDICAL CENTER | Age: 83
End: 2023-07-25
Payer: MEDICARE

## 2023-07-25 VITALS
WEIGHT: 217.2 LBS | DIASTOLIC BLOOD PRESSURE: 68 MMHG | SYSTOLIC BLOOD PRESSURE: 110 MMHG | HEART RATE: 43 BPM | BODY MASS INDEX: 34.09 KG/M2 | HEIGHT: 67 IN

## 2023-07-25 DIAGNOSIS — M25.561 CHRONIC PAIN OF BOTH KNEES: ICD-10-CM

## 2023-07-25 DIAGNOSIS — M25.562 CHRONIC PAIN OF BOTH KNEES: ICD-10-CM

## 2023-07-25 DIAGNOSIS — G89.29 CHRONIC PAIN OF BOTH KNEES: ICD-10-CM

## 2023-07-25 DIAGNOSIS — M17.0 BILATERAL PRIMARY OSTEOARTHRITIS OF KNEE: Primary | ICD-10-CM

## 2023-07-25 PROCEDURE — 99213 OFFICE O/P EST LOW 20 MIN: CPT | Performed by: PHYSICIAN ASSISTANT

## 2023-07-25 PROCEDURE — 20610 DRAIN/INJ JOINT/BURSA W/O US: CPT | Performed by: PHYSICIAN ASSISTANT

## 2023-07-25 RX ORDER — TRIAMCINOLONE ACETONIDE 40 MG/ML
40 INJECTION, SUSPENSION INTRA-ARTICULAR; INTRAMUSCULAR
Status: COMPLETED | OUTPATIENT
Start: 2023-07-25 | End: 2023-07-25

## 2023-07-25 RX ORDER — BUPIVACAINE HYDROCHLORIDE 2.5 MG/ML
2 INJECTION, SOLUTION INFILTRATION; PERINEURAL
Status: COMPLETED | OUTPATIENT
Start: 2023-07-25 | End: 2023-07-25

## 2023-07-25 RX ADMIN — BUPIVACAINE HYDROCHLORIDE 2 ML: 2.5 INJECTION, SOLUTION INFILTRATION; PERINEURAL at 11:45

## 2023-07-25 RX ADMIN — TRIAMCINOLONE ACETONIDE 40 MG: 40 INJECTION, SUSPENSION INTRA-ARTICULAR; INTRAMUSCULAR at 11:45

## 2023-07-25 NOTE — PROGRESS NOTES
Assessment/Plan:  1. Bilateral primary osteoarthritis of knee    2. Chronic pain of both knees      Orders Placed This Encounter   Procedures   • Large joint arthrocentesis       · Patient has severe right knee and mild left knee osteoarthritis. · Patient received bilateral knee steroid injections today. Tolerated the procedure well. Post injection instructions reviewed. · Patient will monitor his blood glucose closely for the next week and will call PCP if >200. · Patient will consider repeat VS if he gets elevated glucose with CSI. We could also considering staggering his injections two weeks apart. · May take tylenol as needed for pain up to 3000 mg per day. · Continue activity as tolerated. Return in about 3 months (around 10/25/2023). I answered all of the patient's questions during the visit and provided education of the patient's condition during the visit. The patient verbalized understanding of the information given and agrees with the plan. This note was dictated using Peer5 software. It may contain errors including improperly dictated words. Please contact physician directly for any questions. Subjective   Chief Complaint:   Chief Complaint   Patient presents with   • Left Knee - Follow-up   • Right Knee - Follow-up       [facility]  Richard Bird is a 82 y.o. male who presents for follow up for bilateral knee pain. Patient completed bilateral knee euflexxa series on 5/25/23 and reports some pain relief from the injections. Patient notes right knee pain worse than left knee pain. Right knee pain occurs with activity. Left knee pain described as mild. Patient is not taking anything for pain at this time as he tries to avoid medications. Patient is diabetic, fasting glucose 140. He is aware he should monitor this closely. Review of Systems  ROS:    See [facility] for musculoskeletal review.    All other systems reviewed are negative     History:  Past Medical History:   Diagnosis Date   • Arthritis    • Bladder mass 2017    Follows with Urology   • Cancer [facility])     right kidney   • Cardiac aneurysm    • Chronic pain disorder    • Diabetes mellitus (HCC)     NIDDM   • Erectile dysfunction of non-organic origin    • Glaucoma    • Hx of bleeding disorder     in urine   • Hyperlipidemia    • Hypertension    • Meniscus tear     Left knee   • Urinary tract infection with hematuria     last assessed 2017   • Wears glasses    • Wears partial dentures     upper partial     Past Surgical History:   Procedure Laterality Date   • CATARACT EXTRACTION     • COLONOSCOPY     • CYSTOSCOPY      onset 2017   • DENTAL SURGERY     • EYE SURGERY Bilateral     stents   • EYE SURGERY Bilateral     stents in both eyes    • NEPHRECTOMY LAPAROSCOPIC Right 2017    Procedure: NEPHRECTOMY PARTIAL LAPAROSCOPIC W ROBOTICS ,;  Surgeon: Spring Marsh MD;  Location: [facility];  Service: Urology   • NERVE BLOCK      transforaminal epidural lumbar   • WISDOM TOOTH EXTRACTION      as well as broken neighboring tooth     Social History   Social History     Substance and Sexual Activity   Alcohol Use Not Currently    Comment: rarely     Social History     Substance and Sexual Activity   Drug Use No     Social History     Tobacco Use   Smoking Status Former   • Types: Cigarettes   • Quit date: 1985   • Years since quittin.9   Smokeless Tobacco Never     Family History:   Family History   Problem Relation Age of Onset   • Other Mother         malaria   • Diabetes Sister    • Hypertension Sister        Current Outpatient Medications on File Prior to Visit   Medication Sig Dispense Refill   • amLODIPine (NORVASC) 5 mg tablet TAKE 1 TABLET DAILY 90 tablet 3   • Cholecalciferol 50 MCG ( UT) TABS      • Cyanocobalamin (VITAMIN B 12 PO) Take by mouth     • Empagliflozin (Jardiance) 10 MG TABS tablet Take 1 tablet (10 mg total) by mouth in the morning 90 tablet 1   • latanoprost (XALATAN) 0.005 % ophthalmic solution      • losartan-hydrochlorothiazide (HYZAAR) 100-25 MG per tablet TAKE 1 TABLET DAILY 90 tablet 3   • Accu-Chek FastClix Lancets MISC Use in the morning (Patient not taking: Reported on 7/25/2023) 100 each 3   • atorvastatin (LIPITOR) 40 mg tablet TAKE 1 TABLET DAILY (Patient not taking: Reported on 7/25/2023) 90 tablet 1   • Blood Glucose Monitoring Suppl (ACCU-CHEK OMERO PLUS) w/Device KIT by Does not apply route (Patient not taking: Reported on 7/25/2023)     • Calcium Carb-Cholecalciferol 600-200 MG-UNIT TABS  (Patient not taking: Reported on 7/25/2023)     • glucose blood (Accu-Chek Omero Plus) test strip Use as instructed (Patient not taking: Reported on 7/25/2023) 100 strip 0   • glucose blood (Accu-Chek Guide) test strip Use as instructed (Patient not taking: Reported on 7/25/2023) 100 strip 1   • MAGNESIUM GLUCONATE PO Take 400 mg by mouth (Patient not taking: Reported on 7/25/2023)     • Polyethyl Glycol-Propyl Glycol (SYSTANE OP) Apply to eye 1 drop each eye twice a day  (Patient not taking: Reported on 7/25/2023)       No current facility-administered medications on file prior to visit. No Known Allergies     Objective     /68   Pulse (!) 43   Ht 5' 7" (1.702 m)   Wt 98.5 kg (217 lb 3.2 oz)   BMI 34.02 kg/m²      PE:  AAOx 3  WDWN  Hearing intact, no drainage from eyes  no audible wheezing  no abdominal distension  LE compartments soft, skin intact    Ortho Exam:  bilateral Knee:   No erythema  no swelling  no effusion  no warmth  No TTP  AROM:right 0- 115, left 0- 120  Stable to varus/valgus stress    Large joint arthrocentesis: bilateral knee  Universal Protocol:  Consent: Verbal consent obtained.   Risks and benefits: risks, benefits and alternatives were discussed  Consent given by: patient  Site marked: the operative site was marked  Supporting Documentation  Indications: pain   Procedure Details  Location: knee - bilateral knee  Preparation: Patient was prepped and draped in the usual sterile fashion  Needle size: 22 G  Ultrasound guidance: no  Approach: anterolateral    Medications (Right): 2 mL bupivacaine 0.25 %; 40 mg triamcinolone acetonide 40 mg/mLMedications (Left): 2 mL bupivacaine 0.25 %; 40 mg triamcinolone acetonide 40 mg/mL   Patient tolerance: patient tolerated the procedure well with no immediate complications  Dressing:  Sterile dressing applied              Scribe Attestation    I,:  Pina Pemberton PA-C am acting as a scribe while in the presence of the attending physician.:       I,:  Tres Astudillo DO personally performed the services described in this documentation    as scribed in my presence.:

## 2023-07-26 ENCOUNTER — HOSPITAL ENCOUNTER (OUTPATIENT)
Dept: ULTRASOUND IMAGING | Facility: HOSPITAL | Age: 83
Discharge: HOME | End: 2023-07-26
Payer: MEDICARE

## 2023-07-26 DIAGNOSIS — C64.1 RENAL CELL CARCINOMA OF RIGHT KIDNEY (HCC): ICD-10-CM

## 2023-07-26 PROCEDURE — 76775 US EXAM ABDO BACK WALL LIM: CPT

## 2023-08-03 ENCOUNTER — TELEPHONE (OUTPATIENT)
Dept: UROLOGY | Facility: CLINIC | Age: 83
End: 2023-08-03

## 2023-08-03 NOTE — TELEPHONE ENCOUNTER
----- Message from Luz Elena Goel PA-C sent at 8/2/2023  4:31 PM EDT -----  Dr Vickye Baumgarten patient. [place]. This is year 6 after partial nephrectomy for RCC. Please let him know all of his previous scans and this year's ultrasound look great.   We will watch his other small renal cysts with an ultrasound again in 18 months

## 2023-08-03 NOTE — TELEPHONE ENCOUNTER
There is no communication sheet on file. Telephone call to patient.  Left generic message for pt to call the office back at [phone]

## 2023-08-21 ENCOUNTER — TELEPHONE (OUTPATIENT)
Dept: FAMILY MEDICINE CLINIC | Facility: CLINIC | Age: 83
End: 2023-08-21

## 2023-08-21 NOTE — TELEPHONE ENCOUNTER
Patient had a steroid shot in each knee on the 25th. He is wondering if he can hold off on getting blood work done as he was told it may cause his diabetic numbers to elevate. Is this okay? Would you like pt to go for lab work as scheduled?

## 2023-09-01 ENCOUNTER — TELEPHONE (OUTPATIENT)
Dept: FAMILY MEDICINE CLINIC | Facility: CLINIC | Age: 83
End: 2023-09-01

## 2023-09-05 ENCOUNTER — TELEPHONE (OUTPATIENT)
Dept: FAMILY MEDICINE CLINIC | Facility: CLINIC | Age: 83
End: 2023-09-05

## 2023-09-05 DIAGNOSIS — E11.9 TYPE 2 DIABETES MELLITUS WITHOUT COMPLICATION, WITHOUT LONG-TERM CURRENT USE OF INSULIN (HCC): ICD-10-CM

## 2023-09-05 RX ORDER — BLOOD SUGAR DIAGNOSTIC
STRIP MISCELLANEOUS
Qty: 100 STRIP | Refills: 1 | Status: SHIPPED | OUTPATIENT
Start: 2023-09-05

## 2023-09-05 RX ORDER — LANCETS
EACH MISCELLANEOUS DAILY
Qty: 100 EACH | Refills: 3 | Status: SHIPPED | OUTPATIENT
Start: 2023-09-05

## 2023-09-05 NOTE — TELEPHONE ENCOUNTER
Not sure why these medications on the pts medlist states he is no longer taking these medications. I confirmed with the patients wife and she states he is still taking all of his medication as directed and there have been no changes to his regimen.

## 2023-09-06 LAB
LEFT EYE DIABETIC RETINOPATHY: NORMAL
RIGHT EYE DIABETIC RETINOPATHY: NORMAL

## 2023-09-13 ENCOUNTER — APPOINTMENT (OUTPATIENT)
Dept: RADIOLOGY | Facility: MEDICAL CENTER | Age: 83
End: 2023-09-13
Payer: MEDICARE

## 2023-09-13 ENCOUNTER — OFFICE VISIT (OUTPATIENT)
Dept: URGENT CARE | Facility: MEDICAL CENTER | Age: 83
End: 2023-09-13
Payer: MEDICARE

## 2023-09-13 VITALS
TEMPERATURE: 97.7 F | OXYGEN SATURATION: 97 % | RESPIRATION RATE: 18 BRPM | SYSTOLIC BLOOD PRESSURE: 150 MMHG | HEART RATE: 67 BPM | DIASTOLIC BLOOD PRESSURE: 78 MMHG

## 2023-09-13 DIAGNOSIS — S49.91XA INJURY OF RIGHT SHOULDER, INITIAL ENCOUNTER: ICD-10-CM

## 2023-09-13 DIAGNOSIS — M25.511 ACUTE PAIN OF RIGHT SHOULDER: Primary | ICD-10-CM

## 2023-09-13 PROCEDURE — 99213 OFFICE O/P EST LOW 20 MIN: CPT

## 2023-09-13 PROCEDURE — G0463 HOSPITAL OUTPT CLINIC VISIT: HCPCS

## 2023-09-13 PROCEDURE — 73030 X-RAY EXAM OF SHOULDER: CPT

## 2023-09-13 NOTE — PATIENT INSTRUCTIONS
The final result of your xray will appear in your mychart. Rest.   Heat or ice as needed. Acetaminophen for pain. Follow-up with orthopedics.   PCP follow-up in 3-5 days  Proceed to the ER if symptoms worsen

## 2023-09-13 NOTE — PROGRESS NOTES
[facility]        NAME: Pineda Arechiga is a 82 y.o. male  : 1940    MRN: [id number]  DATE: 2023  TIME: 8:32 AM      Assessment and Plan     Injury of right shoulder, initial encounter [S49.91XA]  1. Injury of right shoulder, initial encounter          Right shoulder xray shows no acute bony abnormalities. Shows degenerative changes- patient aware. Patient Instructions     The final result of your xray will appear in your mychart. Rest.   Heat or ice as needed. Acetaminophen for pain. Follow-up with orthopedics. PCP follow-up in 3-5 days  Proceed to the ER if symptoms worsen    Chief Complaint     Chief Complaint   Patient presents with   • right shoulder pain     For about 3 weeks has had progressively worsening right shoulder pain. Got worse then usual on Monday. Has been taking tylenol for pain. Denies any known injury. History of Present Illness     Patient is a 56-year-old male who presents with right shoulder pain. States pain started approximately 3 weeks ago- worsened over the past 2 days. Reports intermittent stiffness. Reports pain is worse with movements. Denies known injury/trauma. Review of Systems     Review of Systems   Musculoskeletal: Positive for arthralgias. Negative for joint swelling. Skin: Negative for wound. All other systems reviewed and are negative.         Current Medications       Current Outpatient Medications:   •  amLODIPine (NORVASC) 5 mg tablet, TAKE 1 TABLET DAILY, Disp: 90 tablet, Rfl: 3  •  atorvastatin (LIPITOR) 40 mg tablet, TAKE 1 TABLET DAILY, Disp: 90 tablet, Rfl: 1  •  Cyanocobalamin (VITAMIN B 12 PO), Take by mouth, Disp: , Rfl:   •  glucose blood (Accu-Chek Crista Plus) test strip, Use as instructed, Disp: 100 strip, Rfl: 0  •  glucose blood (Accu-Chek Guide) test strip, Use as instructed, Disp: 100 strip, Rfl: 1  •  losartan-hydrochlorothiazide (HYZAAR) 100-25 MG per tablet, TAKE 1 TABLET DAILY, Disp: 90 tablet, Rfl: 3  •  Accu-Chek FastClix Lancets MISC, Use in the morning (Patient not taking: Reported on 9/13/2023), Disp: 100 each, Rfl: 3  •  Blood Glucose Monitoring Suppl (ACCU-CHEK OMERO PLUS) w/Device KIT, by Does not apply route (Patient not taking: Reported on 7/25/2023), Disp: , Rfl:   •  Calcium Carb-Cholecalciferol 600-200 MG-UNIT TABS, , Disp: , Rfl:   •  Cholecalciferol 50 MCG (2000 UT) TABS, , Disp: , Rfl:   •  Empagliflozin (Jardiance) 10 MG TABS tablet, Take 1 tablet (10 mg total) by mouth in the morning, Disp: 90 tablet, Rfl: 1  •  latanoprost (XALATAN) 0.005 % ophthalmic solution, , Disp: , Rfl:   •  MAGNESIUM GLUCONATE PO, Take 400 mg by mouth (Patient not taking: Reported on 7/25/2023), Disp: , Rfl:   •  Polyethyl Glycol-Propyl Glycol (SYSTANE OP), Apply to eye 1 drop each eye twice a day  (Patient not taking: Reported on 7/25/2023), Disp: , Rfl:     Current Allergies     Allergies as of 09/13/2023   • (No Known Allergies)              The following portions of the patient's history were reviewed and updated as appropriate: allergies, current medications, past family history, past medical history, past social history, past surgical history and problem list.     Past Medical History:   Diagnosis Date   • Arthritis    • Bladder mass 6/28/2017    Follows with Urology   • Cancer [facility])     right kidney   • Cardiac aneurysm    • Chronic pain disorder    • Diabetes mellitus ([address])     NIDDM   • Erectile dysfunction of non-organic origin    • Glaucoma    • Hx of bleeding disorder     in urine   • Hyperlipidemia    • Hypertension    • Meniscus tear     Left knee   • Urinary tract infection with hematuria     last assessed 05/30/2017   • Wears glasses    • Wears partial dentures     upper partial       Past Surgical History:   Procedure Laterality Date   • CATARACT EXTRACTION     • COLONOSCOPY     • CYSTOSCOPY      onset 07/21/2017   • DENTAL SURGERY     • EYE SURGERY Bilateral     stents   • EYE SURGERY Bilateral stents in both eyes    • NEPHRECTOMY LAPAROSCOPIC Right 9/14/2017    Procedure: [address] ,;  Surgeon: South Harris MD;  [facility]: [facility];  Service: Urology   • NERVE BLOCK      transforaminal epidural lumbar   • WISDOM TOOTH EXTRACTION      as well as broken neighboring tooth       Family History   Problem Relation Age of Onset   • Other Mother         malaria   • Diabetes Sister    • Hypertension Sister          Medications have been verified. Objective     /78 (BP Location: Left arm, Patient Position: Sitting)   Pulse 67   Temp 97.7 °F (36.5 °C)   Resp 18   SpO2 97%   No LMP for male patient. Physical Exam     Physical Exam  Vitals and nursing note reviewed. Cardiovascular:      Pulses: Normal pulses. Musculoskeletal:      Right shoulder: Tenderness (posterior aspect of right shoulder, increases with passive ROM) present. No swelling, deformity, effusion or bony tenderness. Normal range of motion. Normal strength. Normal pulse. Skin:     Capillary Refill: Capillary refill takes less than 2 seconds. Psychiatric:         Mood and Affect: Mood normal.         Behavior: Behavior normal.         Thought Content:  Thought content normal.         Judgment: Judgment normal.

## 2023-09-21 ENCOUNTER — APPOINTMENT (OUTPATIENT)
Dept: LAB | Facility: CLINIC | Age: 83
End: 2023-09-21
Payer: MEDICARE

## 2023-09-21 DIAGNOSIS — E11.42 TYPE 2 DIABETES MELLITUS WITH DIABETIC POLYNEUROPATHY, WITHOUT LONG-TERM CURRENT USE OF INSULIN (HCC): ICD-10-CM

## 2023-09-21 DIAGNOSIS — I10 ESSENTIAL HYPERTENSION: ICD-10-CM

## 2023-09-21 DIAGNOSIS — C64.1 RENAL CELL CARCINOMA OF RIGHT KIDNEY (HCC): ICD-10-CM

## 2023-09-21 LAB
ALBUMIN SERPL BCP-MCNC: 3.9 G/DL (ref 3.5–5)
ALP SERPL-CCNC: 70 U/L (ref 34–104)
ALT SERPL W P-5'-P-CCNC: 16 U/L (ref 7–52)
ANION GAP SERPL CALCULATED.3IONS-SCNC: 11 MMOL/L
AST SERPL W P-5'-P-CCNC: 13 U/L (ref 13–39)
BILIRUB SERPL-MCNC: 0.78 MG/DL (ref 0.2–1)
BUN SERPL-MCNC: 31 MG/DL (ref 5–25)
CALCIUM SERPL-MCNC: 8.9 MG/DL (ref 8.4–10.2)
CHLORIDE SERPL-SCNC: 101 MMOL/L (ref 96–108)
CO2 SERPL-SCNC: 25 MMOL/L (ref 21–32)
CREAT SERPL-MCNC: 1.23 MG/DL (ref 0.6–1.3)
CREAT UR-MCNC: 94.5 MG/DL
EST. AVERAGE GLUCOSE BLD GHB EST-MCNC: 183 MG/DL
GFR SERPL CREATININE-BSD FRML MDRD: 54 ML/MIN/1.73SQ M
GLUCOSE P FAST SERPL-MCNC: 132 MG/DL (ref 65–99)
HBA1C MFR BLD: 8 %
MICROALBUMIN UR-MCNC: <7 MG/L
MICROALBUMIN/CREAT 24H UR: <7 MG/G CREATININE (ref 0–30)
POTASSIUM SERPL-SCNC: 3.8 MMOL/L (ref 3.5–5.3)
PROT SERPL-MCNC: 6.3 G/DL (ref 6.4–8.4)
SODIUM SERPL-SCNC: 137 MMOL/L (ref 135–147)

## 2023-09-21 PROCEDURE — 36415 COLL VENOUS BLD VENIPUNCTURE: CPT

## 2023-09-21 PROCEDURE — 80053 COMPREHEN METABOLIC PANEL: CPT

## 2023-09-21 PROCEDURE — 82570 ASSAY OF URINE CREATININE: CPT

## 2023-09-21 PROCEDURE — 83036 HEMOGLOBIN GLYCOSYLATED A1C: CPT

## 2023-09-21 PROCEDURE — 82043 UR ALBUMIN QUANTITATIVE: CPT

## 2023-10-05 ENCOUNTER — TELEPHONE (OUTPATIENT)
Dept: FAMILY MEDICINE CLINIC | Facility: CLINIC | Age: 83
End: 2023-10-05

## 2023-10-10 DIAGNOSIS — I10 HTN (HYPERTENSION), BENIGN: ICD-10-CM

## 2023-10-10 RX ORDER — AMLODIPINE BESYLATE 5 MG/1
TABLET ORAL
Qty: 90 TABLET | Refills: 3 | Status: SHIPPED | OUTPATIENT
Start: 2023-10-10

## 2023-10-11 ENCOUNTER — TELEPHONE (OUTPATIENT)
Dept: FAMILY MEDICINE CLINIC | Facility: CLINIC | Age: 83
End: 2023-10-11

## 2023-10-13 ENCOUNTER — TELEPHONE (OUTPATIENT)
Dept: FAMILY MEDICINE CLINIC | Facility: CLINIC | Age: 83
End: 2023-10-13

## 2023-10-13 NOTE — TELEPHONE ENCOUNTER
10-13-23 / 10:26 AM    Pt's wife, Ressie Becton called inquiring about the paperwork she dropped off on Wed., Oct. 11, 2023 for Dr. Tonya Arauz to sign so Consuelo Inocencio can get his diabetic shoes. She wanted me to let  you know that this paperwork was faxed on Sept. 15 th, & Oct. 15 th, and was told that if was never received so she brought it in herself on Oct. 11 th. She doesn't understand why it's taking so long when it takes about 4-5 weeks to get the shoes. She is requesting you to please sign the paperwork so he can get is shoes.

## 2023-10-18 ENCOUNTER — TELEPHONE (OUTPATIENT)
Dept: FAMILY MEDICINE CLINIC | Facility: CLINIC | Age: 83
End: 2023-10-18

## 2023-10-27 ENCOUNTER — OFFICE VISIT (OUTPATIENT)
Dept: OBGYN CLINIC | Facility: MEDICAL CENTER | Age: 83
End: 2023-10-27
Payer: MEDICARE

## 2023-10-27 VITALS
HEIGHT: 67 IN | HEART RATE: 81 BPM | BODY MASS INDEX: 34.09 KG/M2 | WEIGHT: 217.2 LBS | SYSTOLIC BLOOD PRESSURE: 123 MMHG | DIASTOLIC BLOOD PRESSURE: 72 MMHG

## 2023-10-27 DIAGNOSIS — G89.29 CHRONIC PAIN OF BOTH KNEES: ICD-10-CM

## 2023-10-27 DIAGNOSIS — M17.0 BILATERAL PRIMARY OSTEOARTHRITIS OF KNEE: Primary | ICD-10-CM

## 2023-10-27 DIAGNOSIS — M25.561 CHRONIC PAIN OF BOTH KNEES: ICD-10-CM

## 2023-10-27 DIAGNOSIS — M25.562 CHRONIC PAIN OF BOTH KNEES: ICD-10-CM

## 2023-10-27 PROCEDURE — 20610 DRAIN/INJ JOINT/BURSA W/O US: CPT | Performed by: ORTHOPAEDIC SURGERY

## 2023-10-27 PROCEDURE — 99213 OFFICE O/P EST LOW 20 MIN: CPT | Performed by: ORTHOPAEDIC SURGERY

## 2023-10-27 RX ORDER — BUPIVACAINE HYDROCHLORIDE 2.5 MG/ML
2 INJECTION, SOLUTION INFILTRATION; PERINEURAL
Status: COMPLETED | OUTPATIENT
Start: 2023-10-27 | End: 2023-10-27

## 2023-10-27 RX ADMIN — BUPIVACAINE HYDROCHLORIDE 2 ML: 2.5 INJECTION, SOLUTION INFILTRATION; PERINEURAL at 08:15

## 2023-10-27 NOTE — PROGRESS NOTES
Assessment/Plan:  1. Bilateral primary osteoarthritis of knee    2. Chronic pain of both knees      Orders Placed This Encounter   Procedures    Large joint arthrocentesis: R knee    Large joint arthrocentesis: L knee    Injection Procedure Prior Authorization       Patient has severe right knee and mild left knee osteoarthritis. Patient received bilateral knee steroid injections today. Tolerated the procedure well. Post injection instructions reviewed. Patient will monitor his blood glucose closely for the next week    We will petition insurance for repeat VS injections. Continue activity as tolerated. Return for Visco.    I answered all of the patient's questions during the visit and provided education of the patient's condition during the visit. The patient verbalized understanding of the information given and agrees with the plan. This note was dictated using Apriva software. It may contain errors including improperly dictated words. Please contact physician directly for any questions. Subjective   Chief Complaint:    Left Knee - Follow-up    Right Knee - Follow-up         HPI  Adina Meehan is a 82 y.o. male who presents for follow up for bilateral knee pain. Patient had bilateral knee steroid injections on 7/25/2023 which he states provides good relief. He is starting to get increased bilateral knee pain, especially in his Right knee. He does state the Visco injections seem to provide better relief that the steroid injections and he would like to have this injections again. He continues to have increased knee pain with weight bearing activities R>L. He still avoids medications for pain relief. Patient is diabetic, fasting glucose 140. He is aware he should monitor this closely. Review of Systems  ROS:    See HPI for musculoskeletal review.    All other systems reviewed are negative     History:  Past Medical History:   Diagnosis Date    Arthritis     Bladder mass 6/28/2017    Follows with Urology    Cancer [facility])     right kidney    Cardiac aneurysm     Chronic pain disorder     Diabetes mellitus (HCC)     NIDDM    Erectile dysfunction of non-organic origin     Glaucoma     Hx of bleeding disorder     in urine    Hyperlipidemia     Hypertension     Meniscus tear     Left knee    Urinary tract infection with hematuria     last assessed 2017    Wears glasses     Wears partial dentures     upper partial     Past Surgical History:   Procedure Laterality Date    CATARACT EXTRACTION      COLONOSCOPY      CYSTOSCOPY      onset 2017    DENTAL SURGERY      EYE SURGERY Bilateral     stents    EYE SURGERY Bilateral     stents in both eyes     NEPHRECTOMY LAPAROSCOPIC Right 2017    Procedure: [address] ,;  Surgeon: Nati Patel MD;  [facility];  Service: Urology    NERVE BLOCK      transforaminal epidural lumbar    WISDOM TOOTH EXTRACTION      as well as broken neighboring tooth     Social History   Social History     Substance and Sexual Activity   Alcohol Use Not Currently    Comment: rarely     Social History     Substance and Sexual Activity   Drug Use No     Social History     Tobacco Use   Smoking Status Former    Types: Cigarettes    Quit date: 1985    Years since quittin.1   Smokeless Tobacco Never     Family History:   Family History   Problem Relation Age of Onset    Other Mother         malaria    Diabetes Sister     Hypertension Sister        Current Outpatient Medications on File Prior to Visit   Medication Sig Dispense Refill    Accu-Chek FastClix Lancets MISC Use in the morning (Patient not taking: Reported on 2023) 100 each 3    amLODIPine (NORVASC) 5 mg tablet TAKE 1 TABLET DAILY 90 tablet 3    atorvastatin (LIPITOR) 40 mg tablet TAKE 1 TABLET DAILY 90 tablet 1    Blood Glucose Monitoring Suppl (ACCU-CHEK OMERO PLUS) w/Device KIT by Does not apply route (Patient not taking: Reported on 2023)      Calcium Carb-Cholecalciferol 600-200 MG-UNIT TABS  (Patient not taking: Reported on 7/25/2023)      Cholecalciferol 50 MCG (2000 UT) TABS  (Patient not taking: Reported on 9/13/2023)      Cyanocobalamin (VITAMIN B 12 PO) Take by mouth      Empagliflozin (Jardiance) 10 MG TABS tablet Take 1 tablet (10 mg total) by mouth in the morning 90 tablet 1    glucose blood (Accu-Chek Crista Plus) test strip Use as instructed 100 strip 0    glucose blood (Accu-Chek Guide) test strip Use as instructed 100 strip 1    latanoprost (XALATAN) 0.005 % ophthalmic solution  (Patient not taking: Reported on 9/13/2023)      losartan-hydrochlorothiazide (HYZAAR) 100-25 MG per tablet TAKE 1 TABLET DAILY 90 tablet 3    MAGNESIUM GLUCONATE PO Take 400 mg by mouth (Patient not taking: Reported on 7/25/2023)      Polyethyl Glycol-Propyl Glycol (SYSTANE OP) Apply to eye 1 drop each eye twice a day  (Patient not taking: Reported on 7/25/2023)       No current facility-administered medications on file prior to visit. No Known Allergies     Objective     /72   Pulse 81   Ht 5' 7" (1.702 m)   Wt 98.5 kg (217 lb 3.2 oz)   BMI 34.02 kg/m²      PE:  AAOx 3  WDWN  Hearing intact, no drainage from eyes  no audible wheezing  no abdominal distension  LE compartments soft, skin intact    Ortho Exam:  bilateral Knee:   No erythema  no swelling  no effusion  no warmth  No TTP  AROM:right 0- 115, left 0- 120  Stable to varus/valgus stress    Large joint arthrocentesis: R knee  Universal Protocol:  Consent: Verbal consent obtained.   Risks and benefits: risks, benefits and alternatives were discussed  Consent given by: patient  Timeout called at: 10/27/2023 9:11 AM.  Patient understanding: patient states understanding of the procedure being performed  Site marked: the operative site was marked  Patient identity confirmed: verbally with patient  Supporting Documentation  Indications: pain   Procedure Details  Location: knee - R knee  Needle size: 22 G  Ultrasound guidance: no  Approach: anterolateral  Medications administered: 2 mL bupivacaine 0.25 %; 10 mg triamcinolone acetonide 10 mg/mL    Patient tolerance: patient tolerated the procedure well with no immediate complications  Dressing:  Sterile dressing applied      Large joint arthrocentesis: L knee  Universal Protocol:  Consent: Verbal consent obtained.   Risks and benefits: risks, benefits and alternatives were discussed  Consent given by: patient  Timeout called at: 10/27/2023 9:12 AM.  Patient understanding: patient states understanding of the procedure being performed  Site marked: the operative site was marked  Patient identity confirmed: verbally with patient  Supporting Documentation  Indications: pain   Procedure Details  Location: knee - L knee  Needle size: 22 G  Ultrasound guidance: no  Approach: anterolateral  Medications administered: 2 mL bupivacaine 0.25 %; 10 mg triamcinolone acetonide 10 mg/mL    Patient tolerance: patient tolerated the procedure well with no immediate complications  Dressing:  Sterile dressing applied              Scribe Attestation      I,:  Aparna Ware am acting as a scribe while in the presence of the attending physician.:       I,:  Laz Dee DO personally performed the services described in this documentation    as scribed in my presence.:

## 2023-10-29 DIAGNOSIS — I10 ESSENTIAL HYPERTENSION: ICD-10-CM

## 2023-10-29 DIAGNOSIS — E11.42 TYPE 2 DIABETES MELLITUS WITH DIABETIC POLYNEUROPATHY, WITHOUT LONG-TERM CURRENT USE OF INSULIN (HCC): ICD-10-CM

## 2023-10-30 RX ORDER — EMPAGLIFLOZIN 10 MG/1
10 TABLET, FILM COATED ORAL EVERY MORNING
Qty: 90 TABLET | Refills: 3 | Status: SHIPPED | OUTPATIENT
Start: 2023-10-30

## 2023-11-01 ENCOUNTER — TELEPHONE (OUTPATIENT)
Age: 83
End: 2023-11-01

## 2023-11-01 NOTE — TELEPHONE ENCOUNTER
Radha Noble, can you please call patient's wife and let them know that VS does not interfere with vaccination and he will be fine getting both. Thanks!

## 2023-11-01 NOTE — TELEPHONE ENCOUNTER
Caller: Spouse     Doctor: Alva Aviles     Reason for call: Patient asking if he can get a flu shot on 11/27 with PCP when he is getting visco 12/1 with Dr Alva Aviles.  Please advise     Call back#: [phone]

## 2023-11-02 NOTE — TELEPHONE ENCOUNTER
As noted in chart - Dr Enrique Fret -patient fine for colonoscopy from Urology standpoint  Left message on voice mail to call back with any further questions or concerns  Hydroxyzine Pregnancy And Lactation Text: This medication is not safe during pregnancy and should not be taken. It is also excreted in breast milk and breast feeding isn't recommended.

## 2023-11-02 NOTE — TELEPHONE ENCOUNTER
Called and spoke w/pt's wife and relayed Cristi's msg. She says Thank you for calling back. No further questions or concerns.

## 2023-11-07 ENCOUNTER — TELEPHONE (OUTPATIENT)
Dept: FAMILY MEDICINE CLINIC | Facility: CLINIC | Age: 83
End: 2023-11-07

## 2023-11-10 ENCOUNTER — APPOINTMENT (OUTPATIENT)
Dept: RADIOLOGY | Facility: MEDICAL CENTER | Age: 83
End: 2023-11-10
Payer: MEDICARE

## 2023-11-10 ENCOUNTER — HOSPITAL ENCOUNTER (OUTPATIENT)
Facility: MEDICAL CENTER | Age: 83
Discharge: HOME | End: 2023-11-10
Payer: MEDICARE

## 2023-11-10 ENCOUNTER — OFFICE VISIT (OUTPATIENT)
Dept: OBGYN CLINIC | Facility: MEDICAL CENTER | Age: 83
End: 2023-11-10
Payer: MEDICARE

## 2023-11-10 VITALS
SYSTOLIC BLOOD PRESSURE: 131 MMHG | DIASTOLIC BLOOD PRESSURE: 64 MMHG | HEART RATE: 98 BPM | WEIGHT: 208 LBS | BODY MASS INDEX: 32.58 KG/M2

## 2023-11-10 DIAGNOSIS — M21.372 LEFT FOOT DROP: ICD-10-CM

## 2023-11-10 DIAGNOSIS — G62.9 NEUROPATHY: ICD-10-CM

## 2023-11-10 DIAGNOSIS — M17.0 BILATERAL PRIMARY OSTEOARTHRITIS OF KNEE: ICD-10-CM

## 2023-11-10 DIAGNOSIS — G62.9 NEUROPATHY: Primary | ICD-10-CM

## 2023-11-10 PROCEDURE — 99214 OFFICE O/P EST MOD 30 MIN: CPT | Performed by: ORTHOPAEDIC SURGERY

## 2023-11-10 PROCEDURE — 72110 X-RAY EXAM L-2 SPINE 4/>VWS: CPT

## 2023-11-10 PROCEDURE — 72148 MRI LUMBAR SPINE W/O DYE: CPT

## 2023-11-10 PROCEDURE — G1004 CDSM NDSC: HCPCS

## 2023-11-10 NOTE — PROGRESS NOTES
Assessment/Plan:  1. Neuropathy    2. Left foot drop    3. Bilateral primary osteoarthritis of knee      Orders Placed This Encounter   Procedures    Walker    XR spine lumbar minimum 4 views non injury    MRI lumbar spine wo contrast       Patient has bilateral knee osteoarthritis and acute onset of left foot drop. MRI lumbar spine ordered STAT to evaluate for lumbar spine pathology. Patient provided with a walker for assistance ambulating. Will reach out to DME regarding getting MAFO for patient. Return for MRI review. I answered all of the patient's questions during the visit and provided education of the patient's condition during the visit. The patient verbalized understanding of the information given and agrees with the plan. This note was dictated using [employer] software. It may contain errors including improperly dictated words. Please contact physician directly for any questions. Subjective   Chief Complaint:   Chief Complaint   Patient presents with    Left Knee - Follow-up    Right Knee - Follow-up       hospitals  Sheri López is a 82 y.o. male who presents for follow up for left foot. Patient was last seen on 10/27/2023 at which time he received bilateral knee steroid injections. Patient reports that the injections did provide significant pain relief in his knees. However he reports that ever since the injection he has developed issues lifting his left foot. He states he feels that his left foot drags when he walks and just drops against the ground. He states he did trip over his left foot at bowling because it caught on the carpet causing him to trip forward onto his left knee. Otherwise patient denies any injuries. He denies any low back pain, saddle anesthesia, bowel or bladder changes, distal numbness or tingling. Patient recalls a history of getting steroid injections in his low back several years ago but has not had any recent treatment for his back.   He notes he does have some neuropathy in his feet which is monitored closely by his podiatrist.  He states he has not had anything like the symptoms in the past.    Review of Systems  ROS:    See HPI for musculoskeletal review.    All other systems reviewed are negative     History:  Past Medical History:   Diagnosis Date    Arthritis     Bladder mass 2017    Follows with Urology    Cancer [facility])     right kidney    Cardiac aneurysm     Chronic pain disorder     Diabetes mellitus (HCC)     NIDDM    Erectile dysfunction of non-organic origin     Glaucoma     Hx of bleeding disorder     in urine    Hyperlipidemia     Hypertension     Meniscus tear     Left knee    Urinary tract infection with hematuria     last assessed 2017    Wears glasses     Wears partial dentures     upper partial     Past Surgical History:   Procedure Laterality Date    CATARACT EXTRACTION      COLONOSCOPY      CYSTOSCOPY      onset 2017    DENTAL SURGERY      EYE SURGERY Bilateral     stents    EYE SURGERY Bilateral     stents in both eyes     NEPHRECTOMY LAPAROSCOPIC Right 2017    Procedure: NEPHRECTOMY PARTIAL LAPAROSCOPIC W ROBOTICS ,;  Surgeon: Jossue Barragan MD;  Location: [facility];  Service: Urology    NERVE BLOCK      transforaminal epidural lumbar    WISDOM TOOTH EXTRACTION      as well as broken neighboring tooth     Social History   Social History     Substance and Sexual Activity   Alcohol Use Not Currently    Comment: rarely     Social History     Substance and Sexual Activity   Drug Use No     Social History     Tobacco Use   Smoking Status Former    Types: Cigarettes    Quit date: 1985    Years since quittin.2   Smokeless Tobacco Never     Family History:   Family History   Problem Relation Age of Onset    Other Mother         malaria    Diabetes Sister     Hypertension Sister        Current Outpatient Medications on File Prior to Visit   Medication Sig Dispense Refill    Accu-Chek FastClix Lancets MISC Use in the morning (Patient not taking: Reported on 9/13/2023) 100 each 3    amLODIPine (NORVASC) 5 mg tablet TAKE 1 TABLET DAILY 90 tablet 3    atorvastatin (LIPITOR) 40 mg tablet TAKE 1 TABLET DAILY 90 tablet 1    Blood Glucose Monitoring Suppl (ACCU-CHEK OMERO PLUS) w/Device KIT by Does not apply route (Patient not taking: Reported on 7/25/2023)      Calcium Carb-Cholecalciferol 600-200 MG-UNIT TABS  (Patient not taking: Reported on 7/25/2023)      Cholecalciferol 50 MCG (2000 UT) TABS  (Patient not taking: Reported on 9/13/2023)      Cyanocobalamin (VITAMIN B 12 PO) Take by mouth      Empagliflozin (Jardiance) 10 MG TABS tablet TAKE 1 TABLET (10 MG TOTAL) BY MOUTH IN THE MORNING 90 tablet 3    glucose blood (Accu-Chek Omero Plus) test strip Use as instructed 100 strip 0    glucose blood (Accu-Chek Guide) test strip Use as instructed 100 strip 1    latanoprost (XALATAN) 0.005 % ophthalmic solution  (Patient not taking: Reported on 9/13/2023)      losartan-hydrochlorothiazide (HYZAAR) 100-25 MG per tablet TAKE 1 TABLET DAILY 90 tablet 3    MAGNESIUM GLUCONATE PO Take 400 mg by mouth (Patient not taking: Reported on 7/25/2023)      Polyethyl Glycol-Propyl Glycol (SYSTANE OP) Apply to eye 1 drop each eye twice a day  (Patient not taking: Reported on 7/25/2023)       No current facility-administered medications on file prior to visit.      No Known Allergies     Objective     /64   Pulse 98   Wt 94.3 kg (208 lb)   BMI 32.58 kg/m²      PE:  AAOx 3  WDWN  Hearing intact, no drainage from eyes  no audible wheezing  no abdominal distension  LE compartments soft, skin intact    Ortho Exam:  left Knee:   No erythema  no swelling  no effusion  no warmth  No TTP  AROM: 0- 115  Stable to varus/valgus stress    Bilateral LE:  Sensation intact L4-S1  Palpable pedal pulses  Strength 5/5 RIGHT AT, GS, EHL, quad, hamstrings, hip flexors, hip adductors, and hip abductors   Strength 0/5 LEFT AT and EHL  Strength 5/5 GS, quad, hamstring, hip flexors, hip adductors, hip abductors       Imaging Studies: I have personally reviewed pertinent films in PACS  XR lumbar spine: multilevel degenerative changes, L4-5 spondylolisthesis, multilevel DDD      Scribe Attestation      I,:  Maylin Marquez PA-C am acting as a scribe while in the presence of the attending physician.:       I,:  Nancy Abel, DO personally performed the services described in this documentation    as scribed in my presence.:

## 2023-11-13 ENCOUNTER — TELEPHONE (OUTPATIENT)
Dept: FAMILY MEDICINE CLINIC | Facility: CLINIC | Age: 83
End: 2023-11-13

## 2023-11-13 ENCOUNTER — TELEPHONE (OUTPATIENT)
Dept: OBGYN CLINIC | Facility: MEDICAL CENTER | Age: 83
End: 2023-11-13

## 2023-11-13 DIAGNOSIS — M48.061 SPINAL STENOSIS OF LUMBAR REGION, UNSPECIFIED WHETHER NEUROGENIC CLAUDICATION PRESENT: Primary | ICD-10-CM

## 2023-11-15 ENCOUNTER — APPOINTMENT (OUTPATIENT)
Dept: LAB | Facility: CLINIC | Age: 83
End: 2023-11-15
Payer: MEDICARE

## 2023-11-15 DIAGNOSIS — C64.1 RENAL CELL CARCINOMA OF RIGHT KIDNEY (HCC): ICD-10-CM

## 2023-11-15 DIAGNOSIS — D69.6 THROMBOCYTOPENIA (HCC): ICD-10-CM

## 2023-11-15 DIAGNOSIS — I10 ESSENTIAL HYPERTENSION: ICD-10-CM

## 2023-11-15 DIAGNOSIS — E11.42 TYPE 2 DIABETES MELLITUS WITH DIABETIC POLYNEUROPATHY, WITHOUT LONG-TERM CURRENT USE OF INSULIN (HCC): ICD-10-CM

## 2023-11-15 LAB
ALBUMIN SERPL BCP-MCNC: 3.9 G/DL (ref 3.5–5)
ALP SERPL-CCNC: 70 U/L (ref 34–104)
ALT SERPL W P-5'-P-CCNC: 35 U/L (ref 7–52)
ANION GAP SERPL CALCULATED.3IONS-SCNC: 5 MMOL/L
AST SERPL W P-5'-P-CCNC: 15 U/L (ref 13–39)
BILIRUB SERPL-MCNC: 0.98 MG/DL (ref 0.2–1)
BUN SERPL-MCNC: 30 MG/DL (ref 5–25)
CALCIUM SERPL-MCNC: 8.7 MG/DL (ref 8.4–10.2)
CHLORIDE SERPL-SCNC: 103 MMOL/L (ref 96–108)
CHOLEST SERPL-MCNC: 164 MG/DL
CO2 SERPL-SCNC: 29 MMOL/L (ref 21–32)
CREAT SERPL-MCNC: 1.01 MG/DL (ref 0.6–1.3)
ERYTHROCYTE [DISTWIDTH] IN BLOOD BY AUTOMATED COUNT: 14.2 % (ref 11.6–15.1)
EST. AVERAGE GLUCOSE BLD GHB EST-MCNC: 183 MG/DL
GFR SERPL CREATININE-BSD FRML MDRD: 69 ML/MIN/1.73SQ M
GLUCOSE P FAST SERPL-MCNC: 150 MG/DL (ref 65–99)
HBA1C MFR BLD: 8 %
HCT VFR BLD AUTO: 45.6 % (ref 36.5–49.3)
HDLC SERPL-MCNC: 56 MG/DL
HGB BLD-MCNC: 15.3 G/DL (ref 12–17)
LDLC SERPL CALC-MCNC: 98 MG/DL (ref 0–100)
MCH RBC QN AUTO: 29.7 PG (ref 26.8–34.3)
MCHC RBC AUTO-ENTMCNC: 33.6 G/DL (ref 31.4–37.4)
MCV RBC AUTO: 88 FL (ref 82–98)
PLATELET # BLD AUTO: 175 THOUSANDS/UL (ref 149–390)
PMV BLD AUTO: 11.5 FL (ref 8.9–12.7)
POTASSIUM SERPL-SCNC: 3.8 MMOL/L (ref 3.5–5.3)
PROT SERPL-MCNC: 6.7 G/DL (ref 6.4–8.4)
RBC # BLD AUTO: 5.16 MILLION/UL (ref 3.88–5.62)
SODIUM SERPL-SCNC: 137 MMOL/L (ref 135–147)
TRIGL SERPL-MCNC: 51 MG/DL
WBC # BLD AUTO: 5.36 THOUSAND/UL (ref 4.31–10.16)

## 2023-11-15 PROCEDURE — 80053 COMPREHEN METABOLIC PANEL: CPT

## 2023-11-15 PROCEDURE — 85027 COMPLETE CBC AUTOMATED: CPT

## 2023-11-15 PROCEDURE — 83036 HEMOGLOBIN GLYCOSYLATED A1C: CPT

## 2023-11-15 PROCEDURE — 36415 COLL VENOUS BLD VENIPUNCTURE: CPT

## 2023-11-15 PROCEDURE — 80061 LIPID PANEL: CPT

## 2023-11-17 ENCOUNTER — OFFICE VISIT (OUTPATIENT)
Dept: OBGYN CLINIC | Facility: HOSPITAL | Age: 83
End: 2023-11-17
Payer: MEDICARE

## 2023-11-17 ENCOUNTER — TELEPHONE (OUTPATIENT)
Age: 83
End: 2023-11-17

## 2023-11-17 VITALS
BODY MASS INDEX: 32.63 KG/M2 | SYSTOLIC BLOOD PRESSURE: 137 MMHG | DIASTOLIC BLOOD PRESSURE: 78 MMHG | WEIGHT: 207.89 LBS | HEIGHT: 67 IN | HEART RATE: 64 BPM

## 2023-11-17 DIAGNOSIS — M21.372 LEFT FOOT DROP: Primary | ICD-10-CM

## 2023-11-17 DIAGNOSIS — M48.061 SPINAL STENOSIS OF LUMBAR REGION, UNSPECIFIED WHETHER NEUROGENIC CLAUDICATION PRESENT: ICD-10-CM

## 2023-11-17 DIAGNOSIS — E78.00 PURE HYPERCHOLESTEROLEMIA: ICD-10-CM

## 2023-11-17 PROCEDURE — 99214 OFFICE O/P EST MOD 30 MIN: CPT | Performed by: ORTHOPAEDIC SURGERY

## 2023-11-17 RX ORDER — ATORVASTATIN CALCIUM 40 MG/1
40 TABLET, FILM COATED ORAL DAILY
Qty: 90 TABLET | Refills: 1 | Status: SHIPPED | OUTPATIENT
Start: 2023-11-17

## 2023-11-17 NOTE — PROGRESS NOTES
Assessment & Plan/Medical Decision Makin y.o. male with left ankle weakness and imaging findings most notable for lumbar spondylosis , spinal stenosis        The clinical, physical and imaging findings were reviewed with the patient. Consuelo Painter  has a constellation of findings consistent with left foot drop in the setting of lumbar degenerative disease, spinal stenosis. Onset of left ankle weakness on 10/27. Does report falling once due to foot getting caught on carpet. Has been ambulating with rolling walker since. Denies any back/gluteal pain or other radicular symptoms. Functioning has decreased due to onset of weakness. Physical exam showing left ankle weakness. We discussed the treatment options including physical therapy, at home exercises, activity modifications, chiropractic medicine, oral medications, interventional spine procedures. Did briefly discuss role of surgery to address spinal stenosis and left ankle weakness, however patient is reluctant to pursue surgical intervention and will continue with conservative treatment at this time. Referral placed for physical therapy to work on left lower extremity strengthening and stretching exercises. Will also place order for AFO brace for left foot/ankle to help with left foot drop. Information for Larry Handy provided to patient in AVS.  Patient instructed to return to office/ER sooner if symptoms are not improving, getting worse, or new worrisome/neurologic symptoms arise. Patient will follow up if symptoms do not improve and he wishes to pursue surgical intervention. Subjective:      Chief Complaint: Left Ankle Weakness     HPI:  Devon Carrera is a 82 y.o. male presenting for initial visit with chief complaint of left ankle weakness. Notes onset of left ankle weakness on 10/27.  He reports going to get a routine knee injection that morning and then later that evening when going bowling, he noticed some numbness in his left leg and wasn't able to lift his ankle up. He subsequently tripped over carpet. Has had ongoing left ankle weakness since. Denies any other falls. Has been ambulating with walker since. Denies back, gluteal or leg pain. Denies right sided symptoms. Denies any christel trauma. Denies fever or chills, no night sweats. Denies any bladder or bowel changes. PMH T2DM (HbA1c 8.0 on 9/21/2023), renal carcinoma s/p partial nephrectomy right kidney 9/2017    Conservative therapy includes the following:   Medications: denies    Injections: denies     Physical Therapy: denies  Chiropractic Medicine: has not attempted  Accupunture/Massage Therapy: has not attempted   These therapeutic modalities were ineffective at providing sustained pain relief/functional improvement.      Nicotine dependent: denies  Occupation: n/a  Living situation: Lives with family   ADLs: patient is able to perform     Objective:     Family History   Problem Relation Age of Onset    Other Mother         malaria    Diabetes Sister     Hypertension Sister        Past Medical History:   Diagnosis Date    Arthritis     Bladder mass 6/28/2017    Follows with Urology    Cancer [facility])     right kidney    Cardiac aneurysm     Chronic pain disorder     Diabetes mellitus ([address])     NIDDM    Erectile dysfunction of non-organic origin     Glaucoma     Hx of bleeding disorder     in urine    Hyperlipidemia     Hypertension     Meniscus tear     Left knee    Urinary tract infection with hematuria     last assessed 05/30/2017    Wears glasses     Wears partial dentures     upper partial       Current Outpatient Medications   Medication Sig Dispense Refill    Accu-Chek FastClix Lancets MISC Use in the morning (Patient not taking: Reported on 9/13/2023) 100 each 3    amLODIPine (NORVASC) 5 mg tablet TAKE 1 TABLET DAILY 90 tablet 3    atorvastatin (LIPITOR) 40 mg tablet TAKE 1 TABLET DAILY 90 tablet 1    Blood Glucose Monitoring Suppl (ACCU-CHEK OMERO PLUS) w/Device KIT by Does not apply route (Patient not taking: Reported on 7/25/2023)      Calcium Carb-Cholecalciferol 600-200 MG-UNIT TABS  (Patient not taking: Reported on 7/25/2023)      Cholecalciferol 50 MCG (2000 UT) TABS  (Patient not taking: Reported on 9/13/2023)      Cyanocobalamin (VITAMIN B 12 PO) Take by mouth      Empagliflozin (Jardiance) 10 MG TABS tablet TAKE 1 TABLET (10 MG TOTAL) BY MOUTH IN THE MORNING 90 tablet 3    glucose blood (Accu-Chek Crista Plus) test strip Use as instructed 100 strip 0    glucose blood (Accu-Chek Guide) test strip Use as instructed 100 strip 1    latanoprost (XALATAN) 0.005 % ophthalmic solution  (Patient not taking: Reported on 9/13/2023)      losartan-hydrochlorothiazide (HYZAAR) 100-25 MG per tablet TAKE 1 TABLET DAILY 90 tablet 3    MAGNESIUM GLUCONATE PO Take 400 mg by mouth (Patient not taking: Reported on 7/25/2023)      Polyethyl Glycol-Propyl Glycol (SYSTANE OP) Apply to eye 1 drop each eye twice a day  (Patient not taking: Reported on 7/25/2023)       No current facility-administered medications for this visit.        Past Surgical History:   Procedure Laterality Date    CATARACT EXTRACTION      COLONOSCOPY      CYSTOSCOPY      onset 07/21/2017    DENTAL SURGERY      EYE SURGERY Bilateral     stents    EYE SURGERY Bilateral     stents in both eyes     NEPHRECTOMY LAPAROSCOPIC Right 9/14/2017    Procedure: [address] ,;  Surgeon: Quoc Llanes MD;  Location: [facility] OR;  Service: Urology    NERVE BLOCK      transforaminal epidural lumbar    WISDOM TOOTH EXTRACTION      as well as broken neighboring tooth       Social History     Socioeconomic History    Marital status: /Civil Union     Spouse name: Not on file    Number of children: Not on file    Years of education: Not on file    Highest education level: Not on file   Occupational History    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   Tobacco Use    Smoking status: Former     Types: Cigarettes     Quit date: 1985     Years since quittin.2    Smokeless tobacco: Never   Vaping Use    Vaping Use: Never used   Substance and Sexual Activity    Alcohol use: Not Currently     Comment: rarely    Drug use: No    Sexual activity: Not on file   Other Topics Concern    Not on file   Social History Narrative    Does not consume caffeine        Never drank alcohol per [employer]     Social Determinants of Health     Financial Resource Strain: Not on file   Food Insecurity: Not on file   Transportation Needs: Not on file   Physical Activity: Not on file   Stress: Not on file   Social Connections: Not on file   Intimate Partner Violence: Not on file   Housing Stability: Not on file       No Known Allergies    Review of Systems  General- denies fever/chills  HEENT- denies hearing loss or sore throat  Eyes- denies eye pain or visual disturbances, denies red eyes  Respiratory- denies cough or SOB  Cardio- denies chest pain or palpitations  GI- denies abdominal pain  Endocrine- denies urinary frequency  Urinary- denies pain with urination  Musculoskeletal- Negative except noted above  Skin- denies rashes or wounds  Neurological- denies dizziness or headache  Psychiatric- denies anxiety or difficulty concentrating    Physical Exam  /78   Pulse 64   Ht 5' 7" (1.702 m)   Wt 94.3 kg (207 lb 14.3 oz)   BMI 32.56 kg/m²     General/Constitutional: No apparent distress: well-nourished and well developed. Lymphatic: No appreciable lymphadenopathy  Respiratory: Non-labored breathing  Vascular: No edema, swelling or tenderness, except as noted in detailed exam.  Integumentary: No impressive skin lesions present, except as noted in detailed exam.  Psych: Normal mood and affect, oriented to person, place and time.   MSK: normal other than stated in HPI and exam  Gait & balance: no evidence of myelopathic gait, ambulates with a Walker, was previoulsy walking independently prior to onset of weakness    Lumbar spine range of motion:  -Forward flexion to 90  -Extension to neutral  -Lateral bend 25 right, 25 left  -Rotation 25 right, 25 left  There tenderness with palpation along lumbar paraspinal musculature, no midline tenderness     Neurologic:  Lower Extremity Motor Function    Right  Left    Iliopsoas  5/5  5/5    Quadriceps 5/5 5/5   Tibialis anterior  5/5  2/5    EHL  5/5  2/5    Gastroc. muscle  5/5  5/5    Heel rise  5/5  2/5    Toe rise  5/5  2/5    Hip abduction 5/5 3+/5     Sensory: light touch is intact to bilateral upper and lower extremities     Reflexes:    Right Left   Patellar 1+ 1+   Achilles 1+ 1+   Babinski neg neg     Other tests:  Straight Leg Raise: negative  Lauren SI: negative  AJ SI: negative  Greater troch: no tenderness   Internal/external hip ROM: intact, no pain   Flexion/extension knee ROM: intact, no pain   Vascular: WWP extremities, 2+DP bilateral      Diagnostic Tests   IMAGING: I have personally reviewed the images and these are my findings:  Lumbar Spine X-rays from 11/10/2023: multi level lumbar spondylosis with loss of disc height, osteophyte formation and facet hypertrophy, L4-5 spondylolisthesis, no appreciated lytic/blastic lesions, no obvious instability    Lumbar Spine MRI from 11/10/2023: multi level lumbar disc degeneration with disc desiccation, loss of disc height, facet and ligamentum hypertrophy, L3-4 and L4-5 central, lateral recess, foraminal stenosis     Electronic Medical Records were reviewed including office notes, imaging studies    Procedures, if performed today     None performed       Portions of the record may have been created with voice recognition software. Occasional wrong word or "sound a like" substitutions may have occurred due to the inherent limitations of voice recognition software. Read the chart carefully and recognize, using context, where substitutions have occurred.

## 2023-11-17 NOTE — TELEPHONE ENCOUNTER
Caller: Wife    Doctor: [facility]    Reason for call: Was just in and was given an order for a Foot boot, but they did not know where to go for this.      Call back#: [phone]

## 2023-11-20 NOTE — TELEPHONE ENCOUNTER
Caller: Patient    Doctor: Jeramie Huizar    Reason for call: Patient contacted [facility] and was advised they didn't have an order for foot boot.  Please call tall to advise    Call back#: [phone]

## 2023-11-27 ENCOUNTER — TELEPHONE (OUTPATIENT)
Dept: ADMINISTRATIVE | Facility: OTHER | Age: 83
End: 2023-11-27

## 2023-11-27 ENCOUNTER — TELEPHONE (OUTPATIENT)
Dept: FAMILY MEDICINE CLINIC | Facility: CLINIC | Age: 83
End: 2023-11-27

## 2023-11-27 ENCOUNTER — OFFICE VISIT (OUTPATIENT)
Dept: FAMILY MEDICINE CLINIC | Facility: CLINIC | Age: 83
End: 2023-11-27
Payer: MEDICARE

## 2023-11-27 VITALS
SYSTOLIC BLOOD PRESSURE: 106 MMHG | BODY MASS INDEX: 32.18 KG/M2 | HEIGHT: 67 IN | OXYGEN SATURATION: 100 % | DIASTOLIC BLOOD PRESSURE: 70 MMHG | WEIGHT: 205 LBS | HEART RATE: 87 BPM

## 2023-11-27 DIAGNOSIS — E11.9 TYPE 2 DIABETES MELLITUS WITHOUT COMPLICATION, WITHOUT LONG-TERM CURRENT USE OF INSULIN (HCC): ICD-10-CM

## 2023-11-27 DIAGNOSIS — I10 ESSENTIAL HYPERTENSION: ICD-10-CM

## 2023-11-27 DIAGNOSIS — M25.561 CHRONIC PAIN OF BOTH KNEES: ICD-10-CM

## 2023-11-27 DIAGNOSIS — G89.29 CHRONIC PAIN OF BOTH KNEES: ICD-10-CM

## 2023-11-27 DIAGNOSIS — C64.1 RENAL CELL CARCINOMA OF RIGHT KIDNEY (HCC): ICD-10-CM

## 2023-11-27 DIAGNOSIS — R91.8 LUNG NODULES: ICD-10-CM

## 2023-11-27 DIAGNOSIS — D69.6 THROMBOCYTOPENIA (HCC): ICD-10-CM

## 2023-11-27 DIAGNOSIS — Z23 ENCOUNTER FOR IMMUNIZATION: ICD-10-CM

## 2023-11-27 DIAGNOSIS — R26.89 IMBALANCE: ICD-10-CM

## 2023-11-27 DIAGNOSIS — M21.372 LEFT FOOT DROP: ICD-10-CM

## 2023-11-27 DIAGNOSIS — Z00.00 MEDICARE ANNUAL WELLNESS VISIT, SUBSEQUENT: Primary | ICD-10-CM

## 2023-11-27 DIAGNOSIS — I71.21 ANEURYSM OF ASCENDING AORTA WITHOUT RUPTURE (HCC): ICD-10-CM

## 2023-11-27 DIAGNOSIS — M48.061 SPINAL STENOSIS OF LUMBAR REGION, UNSPECIFIED WHETHER NEUROGENIC CLAUDICATION PRESENT: ICD-10-CM

## 2023-11-27 DIAGNOSIS — I48.20 CHRONIC ATRIAL FIBRILLATION (HCC): ICD-10-CM

## 2023-11-27 DIAGNOSIS — E11.42 TYPE 2 DIABETES MELLITUS WITH DIABETIC POLYNEUROPATHY, WITHOUT LONG-TERM CURRENT USE OF INSULIN (HCC): ICD-10-CM

## 2023-11-27 DIAGNOSIS — M25.562 CHRONIC PAIN OF BOTH KNEES: ICD-10-CM

## 2023-11-27 DIAGNOSIS — M48.062 SPINAL STENOSIS OF LUMBAR REGION WITH NEUROGENIC CLAUDICATION: ICD-10-CM

## 2023-11-27 LAB
DME PARACHUTE DELIVERY DATE REQUESTED: NORMAL
DME PARACHUTE ITEM DESCRIPTION: NORMAL
DME PARACHUTE ITEM DESCRIPTION: NORMAL
DME PARACHUTE ORDER STATUS: NORMAL
DME PARACHUTE SUPPLIER NAME: NORMAL
DME PARACHUTE SUPPLIER PHONE: NORMAL

## 2023-11-27 PROCEDURE — 93000 ELECTROCARDIOGRAM COMPLETE: CPT | Performed by: FAMILY MEDICINE

## 2023-11-27 PROCEDURE — 90662 IIV NO PRSV INCREASED AG IM: CPT

## 2023-11-27 PROCEDURE — G0008 ADMIN INFLUENZA VIRUS VAC: HCPCS

## 2023-11-27 PROCEDURE — 99215 OFFICE O/P EST HI 40 MIN: CPT | Performed by: FAMILY MEDICINE

## 2023-11-27 PROCEDURE — G0439 PPPS, SUBSEQ VISIT: HCPCS | Performed by: FAMILY MEDICINE

## 2023-11-27 RX ORDER — LANCETS
EACH MISCELLANEOUS DAILY
Qty: 100 EACH | Refills: 3 | Status: SHIPPED | OUTPATIENT
Start: 2023-11-27

## 2023-11-27 RX ORDER — BLOOD SUGAR DIAGNOSTIC
STRIP MISCELLANEOUS
Qty: 100 STRIP | Refills: 1 | Status: SHIPPED | OUTPATIENT
Start: 2023-11-27

## 2023-11-27 NOTE — ASSESSMENT & PLAN NOTE
He was given a prescription today for a rollator with a seat.   He will also be initiating physical therapy for

## 2023-11-27 NOTE — ASSESSMENT & PLAN NOTE
Patient is noted to have atrial fibrillation today. He is rate controlled. I am going to initiate Eliquis 5 mg twice daily. I did reach out to cardiology as he is due for a visit with them anyway.   I would not anticipate any significant change in his symptoms, but he is counseled to present to the emergency room with any significant chest tightness or excessive shortness of breath

## 2023-11-27 NOTE — LETTER
Diabetic Foot Exam Form    Date Requested: 23  Patient: Chsaity Alcantara  Patient : 1940   Referring Provider: Anca Blanco MD    Diabetic Foot Exam Performed with shoes and socks removed        Yes         No     Date of Diabetic Foot Exam ______________________________  Risk Score ____________________________________________    Left Foot       Visual Inspection         Monofilament Testing Sensory Exam        Pedal Pulses         Additional Comments         Right Foot      Visual Inspection         Monofilament Testing Sensory Exam       Pedal Pulses         Additional Comments         Comments __________________________________________________________    Practice Providing Exam ______________________________________________    Exam Performed By (print name) _______________________________________      Provider Signature ___________________________________________________      These reports are needed for  compliance. Please fax this completed form and a copy of the Diabetic Foot Exam report to our office located at [address] [place] [address] as soon as possible via Fax [phone] attention Arnulfo Lyme: Phone [phone]    We thank you for your assistance in treating our mutual patient.

## 2023-11-27 NOTE — TELEPHONE ENCOUNTER
----- Message from Anca Martin MD sent at 11/27/2023  9:32 AM EST -----  The patient had a diabetic foot exam in September with [facility]. Please update.

## 2023-11-27 NOTE — PROGRESS NOTES
Assessment and Plan:     Problem List Items Addressed This Visit        Endocrine    Type 2 diabetes mellitus with diabetic polyneuropathy ([address])    Relevant Orders    Hemoglobin A1C       Respiratory    Lung nodules    Relevant Orders    CT chest wo contrast       Cardiovascular and Mediastinum    Essential hypertension     Blood pressure is well controlled at this time. Aneurysm of ascending aorta without rupture ([address])     Repeat chest CT. Relevant Orders    CT chest wo contrast    Chronic atrial fibrillation [facility])     Patient is noted to have atrial fibrillation today. He is rate controlled. I am going to initiate Eliquis 5 mg twice daily. I did reach out to cardiology as he is due for a visit with them anyway. I would not anticipate any significant change in his symptoms, but he is counseled to present to the emergency room with any significant chest tightness or excessive shortness of breath         Relevant Medications    apixaban (ELIQUIS) 5 mg    Other Relevant Orders    POCT ECG (Completed)    Comprehensive metabolic panel    TSH, 3rd generation with Free T4 reflex    CBC and differential       Hematopoietic and Hemostatic    Thrombocytopenia (HCC)    Relevant Medications    apixaban (ELIQUIS) 5 mg    Other Relevant Orders    TSH, 3rd generation with Free T4 reflex    CBC and differential       Genitourinary    Renal cell carcinoma of right kidney (HCC)    Relevant Orders    Comprehensive metabolic panel       Other    Chronic pain of both knees     Continue orthopedic follow-up. Imbalance    Left foot drop     I prescribed him a rolling as he is having some issues with his gait. He has a persistent left foot drop. He is about to start with physical therapy. He is following with orthopedics as well. Spinal stenosis of lumbar region with neurogenic claudication. He was given a prescription today for a rollator with a seat.   He will also be initiating physical therapy for        Other Visit Diagnoses     Medicare annual wellness visit, subsequent    -  Primary    Type 2 diabetes mellitus without complication, without long-term current use of insulin (HCC)        Relevant Medications    glucose blood (Accu-Chek Guide) test strip    Accu-Chek FastClix Lancets MISC    Other Relevant Orders    Comprehensive metabolic panel    Hemoglobin A1C    TSH, 3rd generation with Free T4 reflex    CBC and differential    Encounter for immunization        Relevant Orders    influenza vaccine, high-dose, PF 0.7 mL (FLUZONE HIGH-DOSE) (Completed)    Spinal stenosis of lumbar region with neurogenic claudication              Depression Screening and Follow-up Plan: Patient was screened for depression during today's encounter. They screened negative with a PHQ-2 score of 0. Preventive health issues were discussed with patient, and age appropriate screening tests were ordered as noted in patient's After Visit Summary. Personalized health advice and appropriate referrals for health education or preventive services given if needed, as noted in patient's After Visit Summary. History of Present Illness:     Patient presents for a Medicare Wellness Visit    Patient presents today for follow-up for chronic health issues as well as Medicare wellness visit. He has been dealing with a left foot drop and is now using a walker. He is in with physical therapy as well as orthopedics. Unfortunate, he did have a fall last month. He denies any current problems chest pain, shortness of breath, palpitations or lightheadedness.          Patient Care Team:  Chao Sims MD as PCP - General  MD Sammy Lalnos DO Morna Noss, MD Modena Homestead, DO Dolores Melgar, DO Roger Elkins MD (Ophthalmology)     Review of Systems:     Review of Systems   Constitutional:  Negative for appetite change, chills, fatigue, fever and unexpected weight change. HENT:  Negative for trouble swallowing. Eyes:  Negative for visual disturbance. Respiratory:  Negative for cough, chest tightness, shortness of breath and wheezing. Cardiovascular:  Negative for chest pain, palpitations and leg swelling. Gastrointestinal:  Negative for abdominal distention, abdominal pain, blood in stool, constipation and diarrhea. Endocrine: Negative for polyuria. Genitourinary:  Negative for difficulty urinating and flank pain. Musculoskeletal:  Positive for arthralgias and gait problem. Negative for myalgias. Skin:  Negative for rash. Neurological:  Negative for dizziness and light-headedness. Hematological:  Negative for adenopathy. Does not bruise/bleed easily. Psychiatric/Behavioral:  Negative for dysphoric mood and sleep disturbance. The patient is not nervous/anxious. Problem List:     Patient Active Problem List   Diagnosis   • Essential hypertension   • Aneurysm of ascending aorta without rupture ([facility])   • Acute meniscal tear of left knee   • Back pain, thoracic   • Chronic lumbar radiculopathy   • Chronic pain disorder   • Type 2 diabetes mellitus with diabetic polyneuropathy ([facility])   • Erectile dysfunction of non-organic origin   • Glaucoma   • Knee osteoarthritis   • Lung nodules   • Osteopenia   • Primary localized osteoarthritis of left knee   • Renal cell carcinoma of right kidney (HCC)   • Colon polyp   • Bilateral hearing loss   • Thrombocytopenia (HCC)   • Pure hypercholesterolemia   • Abnormal findings on diagnostic imaging of skull and head, not elsewhere classified   • Obesity, morbid (HCC)   • Chronic pain of both knees   • Imbalance   • Primary osteoarthritis of both knees   • Neuropathy   • Left foot drop   • Bilateral primary osteoarthritis of knee   • Spinal stenosis of lumbar region with neurogenic claudication.    • Chronic atrial fibrillation [facility])      Past Medical and Surgical History:     Past Medical History:   Diagnosis Date • Arthritis    • Bladder mass 2017    Follows with Urology   • Cancer [facility])     right kidney   • Cardiac aneurysm    • Chronic pain disorder    • Diabetes mellitus (HCC)     NIDDM   • Erectile dysfunction of non-organic origin    • Glaucoma    • Hx of bleeding disorder     in urine   • Hyperlipidemia    • Hypertension    • Meniscus tear     Left knee   • Urinary tract infection with hematuria     last assessed 2017   • Wears glasses    • Wears partial dentures     upper partial     Past Surgical History:   Procedure Laterality Date   • CATARACT EXTRACTION     • COLONOSCOPY     • CYSTOSCOPY      onset 2017   • DENTAL SURGERY     • EYE SURGERY Bilateral     stents   • EYE SURGERY Bilateral     stents in both eyes    • NEPHRECTOMY LAPAROSCOPIC Right 2017    Procedure: NEPHRECTOMY PARTIAL LAPAROSCOPIC W ROBOTICS ,;  Surgeon: Robson Aguayo MD;  Location: [facility];  Service: Urology   • NERVE BLOCK      transforaminal epidural lumbar   • WISDOM TOOTH EXTRACTION      as well as broken neighboring tooth      Family History:     Family History   Problem Relation Age of Onset   • Other Mother         malaria   • Diabetes Sister    • Hypertension Sister       Social History:     Social History     Socioeconomic History   • Marital status: /Civil Union     Spouse name: None   • Number of children: None   • Years of education: None   • Highest education level: None   Occupational History   • 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   Tobacco Use   • Smoking status: Former     Types: Cigarettes     Quit date: 1985     Years since quittin.2   • Smokeless tobacco: Never   Vaping Use   • Vaping Use: Never used   Substance and Sexual Activity   • Alcohol use: Not Currently     Comment: rarely   • Drug use: No   • Sexual activity: None   Other Topics Concern   • None   Social History Narrative    Does not consume caffeine        Never drank alcohol per [employer]     Social Determinants of Health     Financial Resource Strain: Low Risk  (11/27/2023)    Overall Financial Resource Strain (CARDIA)    • Difficulty of Paying Living Expenses: Not hard at all   Food Insecurity: Not on file   Transportation Needs: No Transportation Needs (11/27/2023)    PRAPARE - Transportation    • Lack of Transportation (Medical): No    • Lack of Transportation (Non-Medical): No   Physical Activity: Not on file   Stress: Not on file   Social Connections: Not on file   Intimate Partner Violence: Not on file   Housing Stability: Not on file      Medications and Allergies:     Current Outpatient Medications   Medication Sig Dispense Refill   • Accu-Chek FastClix Lancets MISC Use in the morning Use twice daily 100 each 3   • amLODIPine (NORVASC) 5 mg tablet TAKE 1 TABLET DAILY 90 tablet 3   • apixaban (ELIQUIS) 5 mg Take 1 tablet (5 mg total) by mouth 2 (two) times a day 180 tablet 3   • atorvastatin (LIPITOR) 40 mg tablet Take 1 tablet (40 mg total) by mouth daily 90 tablet 1   • Blood Glucose Monitoring Suppl (ACCU-CHEK OMERO PLUS) w/Device KIT Use     • Calcium Carb-Cholecalciferol 600-200 MG-UNIT TABS      • Cholecalciferol 50 MCG (2000 UT) TABS      • Cyanocobalamin (VITAMIN B 12 PO) Take by mouth in the morning     • Empagliflozin (Jardiance) 10 MG TABS tablet TAKE 1 TABLET (10 MG TOTAL) BY MOUTH IN THE MORNING 90 tablet 3   • glucose blood (Accu-Chek Omero Plus) test strip Use as instructed 100 strip 0   • glucose blood (Accu-Chek Guide) test strip Use twice daily 100 strip 1   • latanoprost (XALATAN) 0.005 % ophthalmic solution      • MAGNESIUM GLUCONATE PO Take 400 mg by mouth     • Polyethyl Glycol-Propyl Glycol (SYSTANE OP) Apply to eye 1 drop each eye twice a day     • losartan-hydrochlorothiazide (HYZAAR) 100-25 MG per tablet TAKE 1 TABLET DAILY 90 tablet 3     No current facility-administered medications for this visit.      No Known Allergies   Immunizations:     Immunization History   Administered Date(s) Administered   • COVID-19 MODERNA VACC 0.5 ML IM 02/03/2021, 03/02/2021   • H1N1, All Formulations 02/04/2010   • INFLUENZA 10/09/2015, 08/31/2016, 10/17/2017, 11/05/2021   • Influenza Split High Dose Preservative Free IM 11/14/2012, 10/02/2013, 10/23/2014, 10/09/2015, 08/31/2016, 10/17/2017   • Influenza, high dose seasonal 0.7 mL 10/05/2018, 10/16/2019, 10/26/2020, 10/21/2021, 11/18/2022, 11/27/2023   • Influenza, seasonal, injectable 12/17/2008, 10/28/2011   • Pneumococcal Conjugate 13-Valent 01/20/2015   • Pneumococcal Polysaccharide PPV23 02/06/2008   • Tdap 07/24/2012      Health Maintenance:         Topic Date Due   • Colorectal Cancer Screening  09/12/2022   • Hepatitis C Screening  Completed         Topic Date Due   • COVID-19 Vaccine (3 - Hassel Cassis series) 04/27/2021      Medicare Screening Tests and Risk Assessments:     Salas Mccoy is here for his Subsequent Wellness visit. Health Risk Assessment:   Patient rates overall health as very good. Patient feels that their physical health rating is same. Patient is very satisfied with their life. Eyesight was rated as same. Hearing was rated as same. Patient feels that their emotional and mental health rating is same. Patients states they are never, rarely angry. Patient states they are never, rarely unusually tired/fatigued. Pain experienced in the last 7 days has been none. Patient states that he has experienced weight loss or gain in last 6 months. Depression Screening:   PHQ-2 Score: 0      Fall Risk Screening: In the past year, patient has experienced: no history of falling in past year      Home Safety:  Patient has trouble with stairs inside or outside of their home. Patient has working smoke alarms and has working carbon monoxide detector. Home safety hazards include: none. Nutrition:   Current diet is Diabetic. Medications:   Patient is currently taking over-the-counter supplements.  OTC medications include: see medication list. Patient is able to manage medications. Activities of Daily Living (ADLs)/Instrumental Activities of Daily Living (IADLs):   Walk and transfer into and out of bed and chair?: Yes  Dress and groom yourself?: Yes    Bathe or shower yourself?: Yes    Feed yourself? Yes  Do your laundry/housekeeping?: Yes  Manage your money, pay your bills and track your expenses?: Yes  Make your own meals?: Yes    Do your own shopping?: Yes    Previous Hospitalizations:   Any hospitalizations or ED visits within the last 12 months?: No      Advance Care Planning:   Living will: No    Durable POA for healthcare: No    Advanced directive: No      PREVENTIVE SCREENINGS      Cardiovascular Screening:    General: Screening Not Indicated and History Lipid Disorder      Diabetes Screening:     General: Screening Not Indicated and History Diabetes      Prostate Cancer Screening:    General: Screening Not Indicated      Abdominal Aortic Aneurysm (AAA) Screening:    Risk factors include: tobacco use        Lung Cancer Screening:     General: Screening Not Indicated      Hepatitis C Screening:    General: Screening Current    Screening, Brief Intervention, and Referral to Treatment (SBIRT)    Screening  Typical number of drinks in a day: 0  Typical number of drinks in a week: 0  Interpretation: Low risk drinking behavior. Single Item Drug Screening:  How often have you used an illegal drug (including marijuana) or a prescription medication for non-medical reasons in the past year? never    Single Item Drug Screen Score: 0  Interpretation: Negative screen for possible drug use disorder    No results found. Physical Exam:     /70 (BP Location: Left arm, Patient Position: Sitting, Cuff Size: Large)   Pulse 87   Ht 5' 7" (1.702 m)   Wt 93 kg (205 lb)   SpO2 100%   BMI 32.11 kg/m²     Physical Exam  Constitutional:       General: He is not in acute distress. Appearance: Normal appearance. He is well-developed. He is not diaphoretic. HENT:      Head: Normocephalic. Right Ear: External ear normal.      Left Ear: External ear normal.      Nose: Nose normal.   Eyes:      General:         Right eye: No discharge. Left eye: No discharge. Conjunctiva/sclera: Conjunctivae normal.      Pupils: Pupils are equal, round, and reactive to light. Neck:      Thyroid: No thyromegaly. Trachea: No tracheal deviation. Cardiovascular:      Rate and Rhythm: Normal rate. Rhythm irregular. Heart sounds: Normal heart sounds. No murmur heard. No friction rub. Pulmonary:      Effort: Pulmonary effort is normal. No respiratory distress. Breath sounds: Normal breath sounds. No wheezing. Chest:      Chest wall: No tenderness. Abdominal:      General: There is no distension. Palpations: There is no mass. Tenderness: There is no abdominal tenderness. There is no guarding or rebound. Hernia: No hernia is present. Musculoskeletal:         General: No swelling or deformity. Cervical back: Normal range of motion. Right lower leg: Edema (trace b/l) present. Left lower leg: Edema present. Comments: L foot drop   Skin:     Findings: No erythema or rash. Neurological:      General: No focal deficit present. Mental Status: He is alert. Cranial Nerves: No cranial nerve deficit. Coordination: Coordination normal.   Psychiatric:         Thought Content:  Thought content normal.          Leotis Canavan, MD

## 2023-11-27 NOTE — PATIENT INSTRUCTIONS
Medicare Preventive Visit Patient Instructions  Thank you for completing your Welcome to Medicare Visit or Medicare Annual Wellness Visit today. Your next wellness visit will be due in one year (11/27/2024). The screening/preventive services that you may require over the next 5-10 years are detailed below. Some tests may not apply to you based off risk factors and/or age. Screening tests ordered at today's visit but not completed yet may show as past due. Also, please note that scanned in results may not display below. Preventive Screenings:  Service Recommendations Previous Testing/Comments   Colorectal Cancer Screening  Colonoscopy    Fecal Occult Blood Test (FOBT)/Fecal Immunochemical Test (FIT)  Fecal DNA/Cologuard Test  Flexible Sigmoidoscopy Age: 43-73 years old   Colonoscopy: every 10 years (May be performed more frequently if at higher risk)  OR  FOBT/FIT: every 1 year  OR  Cologuard: every 3 years  OR  Sigmoidoscopy: every 5 years  Screening may be recommended earlier than age 39 if at higher risk for colorectal cancer. Also, an individualized decision between you and your healthcare provider will decide whether screening between the ages of 77-80 would be appropriate. Colonoscopy: 09/12/2019  FOBT/FIT: Not on file  Cologuard: Not on file  Sigmoidoscopy: Not on file          Prostate Cancer Screening Individualized decision between patient and health care provider in men between ages of 53-66   Medicare will cover every 12 months beginning on the day after your 50th birthday PSA: No results in last 5 years           Hepatitis C Screening Once for adults born between 1945 and 1965  More frequently in patients at high risk for Hepatitis C Hep C Antibody: 04/02/2018        Diabetes Screening 1-2 times per year if you're at risk for diabetes or have pre-diabetes Fasting glucose: 150 mg/dL (11/15/2023)  A1C: 8.0 % (11/15/2023)      Cholesterol Screening Once every 5 years if you don't have a lipid disorder. May order more often based on risk factors. Lipid panel: 11/15/2023         Other Preventive Screenings Covered by Medicare:  Abdominal Aortic Aneurysm (AAA) Screening: covered once if your at risk. You're considered to be at risk if you have a family history of AAA or a male between the age of 70-76 who smoking at least 100 cigarettes in your lifetime. Lung Cancer Screening: covers low dose CT scan once per year if you meet all of the following conditions: (1) Age 48-67; (2) No signs or symptoms of lung cancer; (3) Current smoker or have quit smoking within the last 15 years; (4) You have a tobacco smoking history of at least 20 pack years (packs per day x number of years you smoked); (5) You get a written order from a healthcare provider. Glaucoma Screening: covered annually if you're considered high risk: (1) You have diabetes OR (2) Family history of glaucoma OR (3)  aged 48 and older OR (3)  American aged 72 and older  Osteoporosis Screening: covered every 2 years if you meet one of the following conditions: (1) Have a vertebral abnormality; (2) On glucocorticoid therapy for more than 3 months; (3) Have primary hyperparathyroidism; (4) On osteoporosis medications and need to assess response to drug therapy. HIV Screening: covered annually if you're between the age of 14-79. Also covered annually if you are younger than 13 and older than 72 with risk factors for HIV infection. For pregnant patients, it is covered up to 3 times per pregnancy.     Immunizations:  Immunization Recommendations   Influenza Vaccine Annual influenza vaccination during flu season is recommended for all persons aged >= 6 months who do not have contraindications   Pneumococcal Vaccine   * Pneumococcal conjugate vaccine = PCV13 (Prevnar 13), PCV15 (Vaxneuvance), PCV20 (Prevnar 20)  * Pneumococcal polysaccharide vaccine = PPSV23 (Pneumovax) Adults 64-48 yo with certain risk factors or if 69+ yo  If never received any pneumonia vaccine: recommend Prevnar 21 (PCV20)  Give PCV20 if previously received 1 dose of PCV13 or PPSV23   Hepatitis B Vaccine 3 dose series if at intermediate or high risk (ex: diabetes, end stage renal disease, liver disease)   Respiratory syncytial virus (RSV) Vaccine - COVERED BY MEDICARE PART D  * RSVPreF3 (Arexvy) CDC recommends that adults 61years of age and older may receive a single dose of RSV vaccine using shared clinical decision-making (SCDM)   Tetanus (Td) Vaccine - COST NOT COVERED BY MEDICARE PART B Following completion of primary series, a booster dose should be given every 10 years to maintain immunity against tetanus. Td may also be given as tetanus wound prophylaxis. Tdap Vaccine - COST NOT COVERED BY MEDICARE PART B Recommended at least once for all adults. For pregnant patients, recommended with each pregnancy. Shingles Vaccine (Shingrix) - COST NOT COVERED BY MEDICARE PART B  2 shot series recommended in those 19 years and older who have or will have weakened immune systems or those 50 years and older     Health Maintenance Due:      Topic Date Due   • Colorectal Cancer Screening  09/12/2022   • Hepatitis C Screening  Completed     Immunizations Due:      Topic Date Due   • COVID-19 Vaccine (3 - Moderna series) 04/27/2021   • Influenza Vaccine (1) 09/01/2023     Advance Directives   What are advance directives? Advance directives are legal documents that state your wishes and plans for medical care. These plans are made ahead of time in case you lose your ability to make decisions for yourself. Advance directives can apply to any medical decision, such as the treatments you want, and if you want to donate organs. What are the types of advance directives? There are many types of advance directives, and each state has rules about how to use them. You may choose a combination of any of the following:  Living will: This is a written record of the treatment you want.  You can also choose which treatments you do not want, which to limit, and which to stop at a certain time. This includes surgery, medicine, IV fluid, and tube feedings. Durable power of  for [facility]): This is a written record that states who you want to make healthcare choices for you when you are unable to make them for yourself. This person, called a proxy, is usually a family member or a friend. You may choose more than 1 proxy. Do not resuscitate (DNR) order:  A DNR order is used in case your heart stops beating or you stop breathing. It is a request not to have certain forms of treatment, such as CPR. A DNR order may be included in other types of advance directives. Medical directive: This covers the care that you want if you are in a coma, near death, or unable to make decisions for yourself. You can list the treatments you want for each condition. Treatment may include pain medicine, surgery, blood transfusions, dialysis, IV or tube feedings, and a ventilator (breathing machine). Values history: This document has questions about your views, beliefs, and how you feel and think about life. This information can help others choose the care that you would choose. Why are advance directives important? An advance directive helps you control your care. Although spoken wishes may be used, it is better to have your wishes written down. Spoken wishes can be misunderstood, or not followed. Treatments may be given even if you do not want them. An advance directive may make it easier for your family to make difficult choices about your care. Weight Management   Why it is important to manage your weight:  Being overweight increases your risk of health conditions such as heart disease, high blood pressure, type 2 diabetes, and certain types of cancer. It can also increase your risk for osteoarthritis, sleep apnea, and other respiratory problems. Aim for a slow, steady weight loss.  Even a small amount of weight loss can lower your risk of health problems. How to lose weight safely:  A safe and healthy way to lose weight is to eat fewer calories and get regular exercise. You can lose up about 1 pound a week by decreasing the number of calories you eat by 500 calories each day. Healthy meal plan for weight management:  A healthy meal plan includes a variety of foods, contains fewer calories, and helps you stay healthy. A healthy meal plan includes the following:  Eat whole-grain foods more often. A healthy meal plan should contain fiber. Fiber is the part of grains, fruits, and vegetables that is not broken down by your body. Whole-grain foods are healthy and provide extra fiber in your diet. Some examples of whole-grain foods are whole-wheat breads and pastas, oatmeal, brown rice, and bulgur. Eat a variety of vegetables every day. Include dark, leafy greens such as spinach, kale, marcin greens, and mustard greens. Eat yellow and orange vegetables such as carrots, sweet potatoes, and winter squash. Eat a variety of fruits every day. Choose fresh or canned fruit (canned in its own juice or light syrup) instead of juice. Fruit juice has very little or no fiber. Eat low-fat dairy foods. Drink fat-free (skim) milk or 1% milk. Eat fat-free yogurt and low-fat cottage cheese. Try low-fat cheeses such as mozzarella and other reduced-fat cheeses. Choose meat and other protein foods that are low in fat. Choose beans or other legumes such as split peas or lentils. Choose fish, skinless poultry (chicken or turkey), or lean cuts of red meat (beef or pork). Before you cook meat or poultry, cut off any visible fat. Use less fat and oil. Try baking foods instead of frying them. Add less fat, such as margarine, sour cream, regular salad dressing and mayonnaise to foods. Eat fewer high-fat foods. Some examples of high-fat foods include french fries, doughnuts, ice cream, and cakes. Eat fewer sweets.   Limit foods and drinks that are high in sugar. This includes candy, cookies, regular soda, and sweetened drinks. Exercise:  Exercise at least 30 minutes per day on most days of the week. Some examples of exercise include walking, biking, dancing, and swimming. You can also fit in more physical activity by taking the stairs instead of the elevator or parking farther away from stores. Ask your healthcare provider about the best exercise plan for you. © Copyright [employer] 2018 Information is for End User's use only and may not be sold, redistributed or otherwise used for commercial purposes.  All illustrations and images included in CareNotes® are the copyrighted property of A.D.A.M., Inc. or  [address]

## 2023-11-27 NOTE — ASSESSMENT & PLAN NOTE
I prescribed him a rolling as he is having some issues with his gait. He has a persistent left foot drop. He is about to start with physical therapy. He is following with orthopedics as well.

## 2023-11-28 ENCOUNTER — TELEPHONE (OUTPATIENT)
Dept: CARDIOLOGY CLINIC | Facility: CLINIC | Age: 83
End: 2023-11-28

## 2023-11-28 NOTE — TELEPHONE ENCOUNTER
PCP detected afib yesterday during the office visit and started pt on Eliquis and ordered a CT scan for 12/9/23. Pt concerned and anxious for an appt and asking to overbook at any location after the CT scan. Your thoughts? Should I schedule with an AP?

## 2023-11-28 NOTE — TELEPHONE ENCOUNTER
Upon review of the In Basket request we were able to locate, review, and update the patient chart as requested for Diabetic Foot Exam.    Any additional questions or concerns should be emailed to the Practice Liaisons via the appropriate education email address, please do not reply via In Basket.     Thank you  Kenzie Pro MA

## 2023-11-29 ENCOUNTER — OFFICE VISIT (OUTPATIENT)
Dept: CARDIOLOGY CLINIC | Facility: CLINIC | Age: 83
End: 2023-11-29
Payer: MEDICARE

## 2023-11-29 VITALS
DIASTOLIC BLOOD PRESSURE: 84 MMHG | WEIGHT: 206 LBS | SYSTOLIC BLOOD PRESSURE: 124 MMHG | BODY MASS INDEX: 32.26 KG/M2 | HEART RATE: 75 BPM

## 2023-11-29 DIAGNOSIS — I48.91 ATRIAL FIBRILLATION, UNSPECIFIED TYPE (HCC): Primary | ICD-10-CM

## 2023-11-29 PROCEDURE — 93000 ELECTROCARDIOGRAM COMPLETE: CPT | Performed by: NURSE PRACTITIONER

## 2023-11-29 PROCEDURE — 99214 OFFICE O/P EST MOD 30 MIN: CPT | Performed by: NURSE PRACTITIONER

## 2023-11-29 NOTE — PROGRESS NOTES
Cardiology Office Follow Up  Alessia Taylor  1940  [id number]      ASSESSMENT:  Atrial fibrillation/atrial flutter, new diagnosis  Hypertension  Diabetes mellitus type II  Dilated ascending thoracic aorta  Right renal mass with history of partial nephrectomy  Diabetes mellitus  Dyslipidemia  History of tobacco use  Lung nodules   Left foot drop  Recent mechanical fall    PLAN:  Currently on amlodipine 5 mg daily, losartan-hydrochlorothiazide 100-25 mg daily. Continue statin therapy with atorvastatin 40 mg daily. In light of new atrial fibrillation diagnosis, PCP had initiated Eliquis 5 mg twice daily > agree with [facility] administration. Educated patient and wife on [facility]. We will check an echocardiogram to assess cardiac structure and function. We will check 2-week ZIO monitor assess for arrhythmia burden. Will review monitor to discern average heart rate, lowest heart rate and maximal heart rate to determine if AV annamaria blocker therapy is warranted. Lab studies have been ordered by PCP including thyroid panel and CMP, CBC. Patient has a CT scan in place for 12/9/23. Patient will follow up with primary cardiologist following testing. Interval History/ HPI:   57-year-old male who follows with Dr. Patricia Montague, presents for follow-up. Patient was seen on the 27th of this month by his PCP for a follow-up for his chronic health issues. He has been dealing with a left foot drop and had been utilizing a walker. Fortunately, patient did sustain a fall last month. ECG during visit had revealed new diagnosis of atrial fibrillation. Upon office visit, patient is without chest pain, dizziness, lightheadedness, syncope, presyncope, palpitations. Patient is unaware of his arrhythmia. He states that he does snore, however does not wish to pursue sleep medicine evaluation at this time. Patient does not endorse caffeine intake, tobacco use, alcohol use.      Vitals:  /84 (BP Location: Right arm, Patient Position: Sitting, Cuff Size: Adult)   Pulse 75   Wt 93.4 kg (206 lb)   BMI 32.26 kg/m²      Past Medical History:   Diagnosis Date    Arthritis     Bladder mass 2017    Follows with Urology    Cancer [facility])     right kidney    Cardiac aneurysm     Chronic pain disorder     Diabetes mellitus (HCC)     NIDDM    Erectile dysfunction of non-organic origin     Glaucoma     Hx of bleeding disorder     in urine    Hyperlipidemia     Hypertension     Meniscus tear     Left knee    Urinary tract infection with hematuria     last assessed 2017    Wears glasses     Wears partial dentures     upper partial     Social History     Socioeconomic History    Marital status: /Civil Union     Spouse name: Not on file    Number of children: Not on file    Years of education: Not on file    Highest education level: Not on file   Occupational History    Occupation:  in [place] transit   Tobacco Use    Smoking status: Former     Types: Cigarettes     Quit date: 1985     Years since quittin.2    Smokeless tobacco: Never   Vaping Use    Vaping Use: Never used   Substance and Sexual Activity    Alcohol use: Not Currently     Comment: rarely    Drug use: No    Sexual activity: Not on file   Other Topics Concern    Not on file   Social History Narrative    Does not consume caffeine        Never drank alcohol per [employer]     Social Determinants of Health     Financial Resource Strain: Low Risk  (2023)    Overall Financial Resource Strain (CARDIA)     Difficulty of Paying Living Expenses: Not hard at all   Food Insecurity: Not on file   Transportation Needs: No Transportation Needs (2023)    PRAPARE - Transportation     Lack of Transportation (Medical): No     Lack of Transportation (Non-Medical):  No   Physical Activity: Not on file   Stress: Not on file   Social Connections: Not on file   Intimate Partner Violence: Not on file   Housing Stability: Not on file      Family History   Problem Relation Age of Onset Other Mother         malaria    Diabetes Sister     Hypertension Sister      Past Surgical History:   Procedure Laterality Date    CATARACT EXTRACTION      COLONOSCOPY      CYSTOSCOPY      onset 07/21/2017    DENTAL SURGERY      EYE SURGERY Bilateral     stents    EYE SURGERY Bilateral     stents in both eyes     NEPHRECTOMY LAPAROSCOPIC Right 9/14/2017    Procedure: NEPHRECTOMY PARTIAL LAPAROSCOPIC W ROBOTICS ,;  Surgeon: Xavier Maradiaga MD;  Location: [facility];  Service: Urology    NERVE BLOCK      transforaminal epidural lumbar    WISDOM TOOTH EXTRACTION      as well as broken neighboring tooth       Current Outpatient Medications:     Accu-Chek FastClix Lancets MISC, Use in the morning Use twice daily, Disp: 100 each, Rfl: 3    amLODIPine (NORVASC) 5 mg tablet, TAKE 1 TABLET DAILY, Disp: 90 tablet, Rfl: 3    apixaban (ELIQUIS) 5 mg, Take 1 tablet (5 mg total) by mouth 2 (two) times a day, Disp: 180 tablet, Rfl: 3    atorvastatin (LIPITOR) 40 mg tablet, Take 1 tablet (40 mg total) by mouth daily, Disp: 90 tablet, Rfl: 1    Blood Glucose Monitoring Suppl (ACCU-CHEK OMERO PLUS) w/Device KIT, Use, Disp: , Rfl:     Calcium Carb-Cholecalciferol 600-200 MG-UNIT TABS, , Disp: , Rfl:     Cholecalciferol 50 MCG (2000 UT) TABS, , Disp: , Rfl:     Cyanocobalamin (VITAMIN B 12 PO), Take by mouth in the morning, Disp: , Rfl:     Empagliflozin (Jardiance) 10 MG TABS tablet, TAKE 1 TABLET (10 MG TOTAL) BY MOUTH IN THE MORNING, Disp: 90 tablet, Rfl: 3    glucose blood (Accu-Chek Omero Plus) test strip, Use as instructed, Disp: 100 strip, Rfl: 0    glucose blood (Accu-Chek Guide) test strip, Use twice daily, Disp: 100 strip, Rfl: 1    latanoprost (XALATAN) 0.005 % ophthalmic solution, , Disp: , Rfl:     losartan-hydrochlorothiazide (HYZAAR) 100-25 MG per tablet, TAKE 1 TABLET DAILY, Disp: 90 tablet, Rfl: 3    MAGNESIUM GLUCONATE PO, Take 400 mg by mouth, Disp: , Rfl:     Polyethyl Glycol-Propyl Glycol (SYSTANE OP), Apply to eye 1 drop each eye twice a day, Disp: , Rfl:     Results for orders placed or performed in visit on 11/29/23   POCT ECG    Narrative    Atrial fibrillation/flutter with a variable ventricular response at a rate of 75 bpm.  Abnormal EKG. Review of Systems:  Review of Systems   Constitutional:  Negative for activity change and appetite change. Respiratory:  Negative for chest tightness, shortness of breath and wheezing. Cardiovascular:  Negative for chest pain, palpitations and leg swelling. Neurological:  Negative for dizziness, syncope, weakness and light-headedness. Psychiatric/Behavioral:  Negative for confusion. Physical Exam:  Physical Exam  Constitutional:       Appearance: He is not ill-appearing. Cardiovascular:      Rate and Rhythm: Normal rate. Rhythm irregular. Pulses: Normal pulses. Pulmonary:      Effort: Pulmonary effort is normal. No respiratory distress. Abdominal:      General: Bowel sounds are normal. There is no distension. Musculoskeletal:         General: No swelling. Right lower leg: No edema. Left lower leg: No edema. Skin:     General: Skin is warm. Neurological:      General: No focal deficit present. Mental Status: He is alert. Mental status is at baseline. Psychiatric:         Mood and Affect: Mood normal.         Thought Content: Thought content normal.         This note was completed in part utilizing [employer] Direct Software. Grammatical errors, random word insertions, spelling mistakes, and incomplete sentences can be an occasional consequence of this system secondary to software limitations, ambient noise, and hardware issues. If you have any questions or concerns about the content, text, or information contained within the body of this dictation, please contact the provider for clarification.

## 2023-12-01 ENCOUNTER — ANTICOAG VISIT (OUTPATIENT)
Dept: CARDIOLOGY CLINIC | Facility: CLINIC | Age: 83
End: 2023-12-01

## 2023-12-01 ENCOUNTER — EVALUATION (OUTPATIENT)
Dept: PHYSICAL THERAPY | Facility: MEDICAL CENTER | Age: 83
End: 2023-12-01
Payer: MEDICARE

## 2023-12-01 ENCOUNTER — TELEPHONE (OUTPATIENT)
Dept: CARDIOLOGY CLINIC | Facility: CLINIC | Age: 83
End: 2023-12-01

## 2023-12-01 DIAGNOSIS — M48.061 SPINAL STENOSIS OF LUMBAR REGION, UNSPECIFIED WHETHER NEUROGENIC CLAUDICATION PRESENT: ICD-10-CM

## 2023-12-01 DIAGNOSIS — M21.372 LEFT FOOT DROP: Primary | ICD-10-CM

## 2023-12-01 PROCEDURE — 97161 PT EVAL LOW COMPLEX 20 MIN: CPT | Performed by: PHYSICAL THERAPIST

## 2023-12-01 NOTE — TELEPHONE ENCOUNTER
Pt scheduled for CAT scan and is wearing ZIO wife conerned if okay due to CAT scan told if interfers with testing can remove the day of scan and return to [facility]. CAT  not til 12/8. She understands.

## 2023-12-01 NOTE — TELEPHONE ENCOUNTER
Jonny Jordan, but [facility] calling for [employer]. He has an appointment for a CAT scan on December 9th, but then on Thursday he had a monitor put on him Zio monitor. So I don't know if Doctor Bay Raygoza or Joya Mari can get back on this and tell us if we should take that monitor off because it will be only on for a week or cancel the CAT scan. So if it's possible, please get back to me for this. It's [phone]. So we can get a message to either one of them doctors and then we can know about the CAT scan because of the money is only going to be on the week. OK. Thank you for your help. Please get back to me.  Thank you

## 2023-12-01 NOTE — PROGRESS NOTES
PT Evaluation     Today's date: 2023  Patient name: Devon Carrera  : 1940  MRN: [id number]  Referring provider: Jody Fernandes  Dx:   Encounter Diagnosis     ICD-10-CM    1. Left foot drop  M21.372 Ambulatory referral to Physical Therapy      2. Spinal stenosis of lumbar region, unspecified whether neurogenic claudication present  M48.061 Ambulatory referral to Physical Therapy                     Assessment  Assessment details: Devon Carrera is a 82 y.o. male presents with Left foot drop 2* Spinal stenosis of lumbar region. Devon Carrera has the below listed impairments and will benefit from skilled PT to improve deficits to return to prior level of function. Impairments: abnormal muscle firing, abnormal or restricted ROM, impaired physical strength and pain with function  Understanding of Dx/Px/POC: good   Prognosis: good    Plan  Patient would benefit from: skilled PT  Referral necessary: No  Planned therapy interventions: home exercise program, manual therapy, neuromuscular re-education, patient education, strengthening, stretching and balance  Frequency: 1x week  Duration in weeks: 8  Treatment plan discussed with: patient        Subjective Evaluation    History of Present Illness  Mechanism of injury: Consuelo Painter presents with acute foot drop that started on 10/27. He was diagnosed with lumbar degenerative disease and spinal stenosis. He has been under the care of Dr. Zena Velasquez. He was referred to PT and given a referral for AFO for L foot. He was fit for the AFO but has not received it yet. He denies LBP, denies bowel/bladder changes, denies R LE symptoms. He has numbness in L LE from below the knee throughout LE. Currently ambulating with wheeled walker to prevent falling.      Lumbar Spine X-rays from 11/10/2023: multi level lumbar spondylosis with loss of disc height, osteophyte formation and facet hypertrophy, L4-5 spondylolisthesis, no appreciated lytic/blastic lesions, no obvious instability     Lumbar Spine MRI from 11/10/2023: multi level lumbar disc degeneration with disc desiccation, loss of disc height, facet and ligamentum hypertrophy, L3-4 and L4-5 central, lateral recess, foraminal stenosis           Patient Goals  Patient goals for therapy: increased strength          Objective     Neurological Testing     Sensation     Lumbar   Left   Diminished: light touch    Reflexes   Left   Patellar (L4): trace (1+)  Achilles (S1): trace (1+)  Clonus sign: negative    Right   Patellar (L4): normal (2+)  Achilles (S1): normal (2+)  Clonus sign: negative    Active Range of Motion     Lumbar   Flexion:  WFL  Extension:  WFL  Left lateral flexion:  WFL  Right lateral flexion:  WFL    Strength/Myotome Testing     Left Hip   Planes of Motion   Flexion: 5    Right Hip   Planes of Motion   Flexion: 5    Left Knee   Flexion: 5  Extension: 5    Right Knee   Flexion: 5  Extension: 5    Left Ankle/Foot   Dorsiflexion: 1  Plantar flexion: 4  Inversion: 2  Eversion: 2  Great toe extension: 1    Right Ankle/Foot   Dorsiflexion: 5  Plantar flexion: 5  Inversion: 5  Eversion: 5  Great toe extension: 5    Tests     Lumbar     Left   Negative passive SLR. Right   Negative passive SLR.      General Comments:      Lumbar Comments  Deferred mobility testing 2* patient currently wearing holter montior             Precautions: A-fib, ascending aortic aneurysm      Manuals                                                                 Neuro Re-Ed                                                                                                        Ther Ex                                                                                                                     Ther Activity                                       Gait Training                                       Modalities

## 2023-12-01 NOTE — PROGRESS NOTES
Wife called patient wearing a zio and has CT scan next Friday. ..told her if necessary can remove and return to [facility] day of scan. NOT ANTICOAGULATION EVENT. ... just phone call

## 2023-12-05 ENCOUNTER — TELEPHONE (OUTPATIENT)
Age: 83
End: 2023-12-05

## 2023-12-05 NOTE — TELEPHONE ENCOUNTER
Caller: Spouse     Doctor/Office: Chaka HOROWITZ#: [phone]      What needs to be faxed: Standard written order for brace    ATTN to: Carlton Jones     Fax#: [phone]

## 2023-12-07 NOTE — TELEPHONE ENCOUNTER
Caller: [facility]    Doctor: Pattie Gamez    Reason for call: Calling back to clarify misunderstanding. They are anticipating the fax of the completed Standard Written Order form bearing the Ezequielmaurilio Huynh, scanned onto patient chart 12/4/23. Confirmed for caller document had been received, but that documents can take up to 14 days to process and send out.     Fax#: [phone]    Call back#: [phone]

## 2023-12-07 NOTE — TELEPHONE ENCOUNTER
Caller: Eileen/Brock Leyva    Doctor: [facility]    Reason for call: Questioned if order was faxed? Advised per note-order faxed twice to [phone]. Please use alternative fax#[phone] attn: Eileen    Call back#: [phone]

## 2023-12-09 ENCOUNTER — HOSPITAL ENCOUNTER (OUTPATIENT)
Dept: CT IMAGING | Facility: HOSPITAL | Age: 83
Discharge: HOME | End: 2023-12-09
Payer: MEDICARE

## 2023-12-09 ENCOUNTER — APPOINTMENT (EMERGENCY)
Dept: RADIOLOGY | Facility: HOSPITAL | Age: 83
End: 2023-12-09
Payer: MEDICARE

## 2023-12-09 ENCOUNTER — APPOINTMENT (EMERGENCY)
Dept: CT IMAGING | Facility: HOSPITAL | Age: 83
End: 2023-12-09
Payer: MEDICARE

## 2023-12-09 ENCOUNTER — HOSPITAL ENCOUNTER (EMERGENCY)
Facility: HOSPITAL | Age: 83
Discharge: HOME | End: 2023-12-09
Attending: EMERGENCY MEDICINE
Payer: MEDICARE

## 2023-12-09 VITALS
RESPIRATION RATE: 17 BRPM | HEART RATE: 68 BPM | DIASTOLIC BLOOD PRESSURE: 98 MMHG | TEMPERATURE: 97.5 F | OXYGEN SATURATION: 96 % | SYSTOLIC BLOOD PRESSURE: 168 MMHG

## 2023-12-09 DIAGNOSIS — I71.21 ANEURYSM OF ASCENDING AORTA WITHOUT RUPTURE (HCC): ICD-10-CM

## 2023-12-09 DIAGNOSIS — M25.471 RIGHT ANKLE SWELLING: Primary | ICD-10-CM

## 2023-12-09 DIAGNOSIS — Z79.01 ANTICOAGULATED: ICD-10-CM
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
## 2023-12-09 DIAGNOSIS — R91.8 LUNG NODULES: ICD-10-CM

## 2023-12-09 DIAGNOSIS — R60.0 LOCALIZED EDEMA: ICD-10-CM

## 2023-12-09 LAB
ANION GAP SERPL CALCULATED.3IONS-SCNC: 7 MMOL/L
APPEARANCE FLD: ABNORMAL
BASOPHILS # BLD AUTO: 0.03 THOUSANDS/ÂΜL (ref 0–0.1)
BASOPHILS NFR BLD AUTO: 1 % (ref 0–1)
BUN SERPL-MCNC: 29 MG/DL (ref 5–25)
CALCIUM SERPL-MCNC: 8.9 MG/DL (ref 8.4–10.2)
CHLORIDE SERPL-SCNC: 102 MMOL/L (ref 96–108)
CO2 SERPL-SCNC: 27 MMOL/L (ref 21–32)
COLOR FLD: ABNORMAL
CREAT SERPL-MCNC: 1 MG/DL (ref 0.6–1.3)
EOSINOPHIL # BLD AUTO: 0.07 THOUSAND/ÂΜL (ref 0–0.61)
EOSINOPHIL NFR BLD AUTO: 1 % (ref 0–6)
EOSINOPHIL NFR SNV MANUAL: 1 %
ERYTHROCYTE [DISTWIDTH] IN BLOOD BY AUTOMATED COUNT: 13.6 % (ref 11.6–15.1)
ERYTHROCYTE [SEDIMENTATION RATE] IN BLOOD: 34 MM/HOUR (ref 0–19)
GFR SERPL CREATININE-BSD FRML MDRD: 70 ML/MIN/1.73SQ M
GLUCOSE SERPL-MCNC: 172 MG/DL (ref 65–140)
HCT VFR BLD AUTO: 44.5 % (ref 36.5–49.3)
HGB BLD-MCNC: 14.9 G/DL (ref 12–17)
IMM GRANULOCYTES # BLD AUTO: 0.04 THOUSAND/UL (ref 0–0.2)
IMM GRANULOCYTES NFR BLD AUTO: 1 % (ref 0–2)
LYMPHOCYTES # BLD AUTO: 1.46 THOUSANDS/ÂΜL (ref 0.6–4.47)
LYMPHOCYTES # SNV MANUAL: 36 %
LYMPHOCYTES NFR BLD AUTO: 25 % (ref 14–44)
MCH RBC QN AUTO: 29 PG (ref 26.8–34.3)
MCHC RBC AUTO-ENTMCNC: 33.5 G/DL (ref 31.4–37.4)
MCV RBC AUTO: 87 FL (ref 82–98)
MONOCYTES # BLD AUTO: 0.61 THOUSAND/ÂΜL (ref 0.17–1.22)
MONOCYTES NFR BLD AUTO: 11 % (ref 4–12)
NEUTROPHILS # BLD AUTO: 3.6 THOUSANDS/ÂΜL (ref 1.85–7.62)
NEUTROPHILS NFR SNV MANUAL: 63 %
NEUTS SEG NFR BLD AUTO: 61 % (ref 43–75)
NRBC BLD AUTO-RTO: 0 /100 WBCS
PLATELET # BLD AUTO: 183 THOUSANDS/UL (ref 149–390)
PMV BLD AUTO: 10.2 FL (ref 8.9–12.7)
POTASSIUM SERPL-SCNC: 3.6 MMOL/L (ref 3.5–5.3)
RBC # BLD AUTO: 5.13 MILLION/UL (ref 3.88–5.62)
SITE: ABNORMAL
SODIUM SERPL-SCNC: 136 MMOL/L (ref 135–147)
TOTAL CELLS COUNTED SPEC: 100
URATE SERPL-MCNC: 3.9 MG/DL (ref 3.5–8.5)
WBC # BLD AUTO: 5.81 THOUSAND/UL (ref 4.31–10.16)
WBC # FLD MANUAL: 224 /UL (ref 0–200)

## 2023-12-09 PROCEDURE — 89060 EXAM SYNOVIAL FLUID CRYSTALS: CPT

## 2023-12-09 PROCEDURE — 73610 X-RAY EXAM OF ANKLE: CPT

## 2023-12-09 PROCEDURE — 87070 CULTURE OTHR SPECIMN AEROBIC: CPT

## 2023-12-09 PROCEDURE — G1004 CDSM NDSC: HCPCS

## 2023-12-09 PROCEDURE — 85652 RBC SED RATE AUTOMATED: CPT

## 2023-12-09 PROCEDURE — 87205 SMEAR GRAM STAIN: CPT

## 2023-12-09 PROCEDURE — 84550 ASSAY OF BLOOD/URIC ACID: CPT

## 2023-12-09 PROCEDURE — 80048 BASIC METABOLIC PNL TOTAL CA: CPT

## 2023-12-09 PROCEDURE — 70450 CT HEAD/BRAIN W/O DYE: CPT

## 2023-12-09 PROCEDURE — 71250 CT THORAX DX C-: CPT

## 2023-12-09 PROCEDURE — 89051 BODY FLUID CELL COUNT: CPT

## 2023-12-09 PROCEDURE — 99284 EMERGENCY DEPT VISIT MOD MDM: CPT | Performed by: EMERGENCY MEDICINE

## 2023-12-09 PROCEDURE — 99284 EMERGENCY DEPT VISIT MOD MDM: CPT

## 2023-12-09 PROCEDURE — 36415 COLL VENOUS BLD VENIPUNCTURE: CPT

## 2023-12-09 PROCEDURE — 85025 COMPLETE CBC W/AUTO DIFF WBC: CPT

## 2023-12-09 RX ORDER — LIDOCAINE HYDROCHLORIDE 10 MG/ML
10 INJECTION, SOLUTION EPIDURAL; INFILTRATION; INTRACAUDAL; PERINEURAL ONCE
Status: COMPLETED | OUTPATIENT
Start: 2023-12-09 | End: 2023-12-09

## 2023-12-09 RX ADMIN — LIDOCAINE HYDROCHLORIDE 10 ML: 10 INJECTION, SOLUTION EPIDURAL; INFILTRATION; INTRACAUDAL at 19:21

## 2023-12-09 NOTE — ED ATTENDING ATTESTATION
12/9/2023  IGauri DO, saw and evaluated the patient. I have discussed the patient with the resident/non-physician practitioner and agree with the resident's/non-physician practitioner's findings, Plan of Care, and MDM as documented in the resident's/non-physician practitioner's note, except where noted. All available labs and Radiology studies were reviewed. I was present for key portions of any procedure(s) performed by the resident/non-physician practitioner and I was immediately available to provide assistance. At this point I agree with the current assessment done in the Emergency Department. I have conducted an independent evaluation of this patient a history and physical is as follows:    ED Course     82 y.o. M w/h/o PAF (on Eliquis), DM, HTN, HLD p/w right ankle swelling x 3 days. Swelling is worsening. Feels warm. Has pain from ankle up calf with redness. Denies trauma/injury. Ambulatory without difficulty. Right LLE pitting edema. No pain with ROM of knee/ankle. Pulses intact. Leah Gaspar in waiting room while stepping on scale. Fell on buttocks and hit head. No LOC. Plan: 1. RLE swelling - Labs, xray to r/o occult fx, LE duplex to r/o DVT, podiatry consult. 2. Fall - Head CT to r/o ICH.     Critical Care Time  Procedures

## 2023-12-09 NOTE — ED PROVIDER NOTES
History  Chief Complaint   Patient presents with    Leg Swelling     Pt reports R leg swelling. Denies SOB or heart failure. Hx afib. Fall     Pt experienced mechanical fall walking in from waiting room with walker. -headstrike. Pt fell on bottom and denies pain anywhere. +thinners     29-year-old male with history of paroxysmal A-fib on Eliquis and diabetes type 2 presents with atraumatic right ankle edema worsening over the past 3 days. Patient denies any pain in the joint, dyspnea, fever, known trauma, history of similar symptoms, or missed doses of Eliquis. Patient suffered a mechanical fall from standing while in triage. Lost his balance while getting off the scale falling onto his buttocks and lightly striking the back of his head on the window. Denies loss of consciousness. Patient has no head pain, neck pain, or neurosymptoms. Prior to Admission Medications   Prescriptions Last Dose Informant Patient Reported? Taking?    Accu-Chek FastClix Lancets MISC   No No   Sig: Use in the morning Use twice daily   Blood Glucose Monitoring Suppl (ACCU-CHEK OMERO PLUS) w/Device KIT  Self Yes No   Sig: Use   Calcium Carb-Cholecalciferol 600-200 MG-UNIT TABS  Self Yes No   Cholecalciferol 50 MCG (2000 UT) TABS  Self Yes No   Cyanocobalamin (VITAMIN B 12 PO)  Self Yes No   Sig: Take by mouth in the morning   Empagliflozin (Jardiance) 10 MG TABS tablet   No No   Sig: TAKE 1 TABLET (10 MG TOTAL) BY MOUTH IN THE MORNING   MAGNESIUM GLUCONATE PO  Self Yes No   Sig: Take 400 mg by mouth   Polyethyl Glycol-Propyl Glycol (SYSTANE OP)  Self Yes No   Sig: Apply to eye 1 drop each eye twice a day   amLODIPine (NORVASC) 5 mg tablet   No No   Sig: TAKE 1 TABLET DAILY   apixaban (ELIQUIS) 5 mg   No No   Sig: Take 1 tablet (5 mg total) by mouth 2 (two) times a day   atorvastatin (LIPITOR) 40 mg tablet   No No   Sig: Take 1 tablet (40 mg total) by mouth daily   glucose blood (Accu-Chek Omero Plus) test strip   No No Sig: Use as instructed   glucose blood (Accu-Chek Guide) test strip   No No   Sig: Use twice daily   latanoprost (XALATAN) 0.005 % ophthalmic solution  Self Yes No   losartan-hydrochlorothiazide (HYZAAR) 100-25 MG per tablet   No No   Sig: TAKE 1 TABLET DAILY      Facility-Administered Medications: None       Past Medical History:   Diagnosis Date    Arthritis     Bladder mass 2017    Follows with Urology    Cancer [facility])     right kidney    Cardiac aneurysm     Chronic pain disorder     Diabetes mellitus (HCC)     NIDDM    Erectile dysfunction of non-organic origin     Glaucoma     Hx of bleeding disorder     in urine    Hyperlipidemia     Hypertension     Meniscus tear     Left knee    Urinary tract infection with hematuria     last assessed 2017    Wears glasses     Wears partial dentures     upper partial       Past Surgical History:   Procedure Laterality Date    CATARACT EXTRACTION      COLONOSCOPY      CYSTOSCOPY      onset 2017    DENTAL SURGERY      EYE SURGERY Bilateral     stents    EYE SURGERY Bilateral     stents in both eyes     NEPHRECTOMY LAPAROSCOPIC Right 2017    Procedure: NEPHRECTOMY PARTIAL LAPAROSCOPIC W ROBOTICS ,;  Surgeon: Nati Patel MD;  Location: [facility];  Service: Urology    NERVE BLOCK      transforaminal epidural lumbar    WISDOM TOOTH EXTRACTION      as well as broken neighboring tooth       Family History   Problem Relation Age of Onset    Other Mother         malaria    Diabetes Sister     Hypertension Sister      I have reviewed and agree with the history as documented.     E-Cigarette/Vaping    E-Cigarette Use Never User      E-Cigarette/Vaping Substances    Nicotine No     THC No     CBD No     Flavoring No     Other No     Unknown No      Social History     Tobacco Use    Smoking status: Former     Types: Cigarettes     Quit date: 1985     Years since quittin.2    Smokeless tobacco: Never   Vaping Use    Vaping Use: Never used   Substance Use Topics    Alcohol use: Not Currently     Comment: rarely    Drug use: No        Review of Systems   Constitutional:  Negative for chills and fever. HENT:  Negative for ear pain and sore throat. Eyes:  Negative for pain and visual disturbance. Respiratory:  Negative for cough and shortness of breath. Cardiovascular:  Negative for chest pain and palpitations. Gastrointestinal:  Negative for abdominal pain and vomiting. Genitourinary:  Negative for dysuria and hematuria. Musculoskeletal:  Positive for joint swelling. Negative for arthralgias and back pain. Skin:  Negative for color change and rash. Neurological:  Negative for seizures and syncope. All other systems reviewed and are negative. Physical Exam  ED Triage Vitals   Temperature Pulse Respirations Blood Pressure SpO2   12/09/23 1723 12/09/23 1723 12/09/23 1723 12/09/23 1723 12/09/23 1723   97.5 °F (36.4 °C) 68 17 142/68 94 %      Temp Source Heart Rate Source Patient Position - Orthostatic VS BP Location FiO2 (%)   12/09/23 1723 12/09/23 1723 12/09/23 1723 12/09/23 1723 --   Oral Monitor Sitting Right arm       Pain Score       12/09/23 2004       No Pain             Orthostatic Vital Signs  Vitals:    12/09/23 1723 12/09/23 2004   BP: 142/68 168/98   Pulse: 68 68   Patient Position - Orthostatic VS: Sitting Lying       Physical Exam  Vitals and nursing note reviewed. Constitutional:       General: He is not in acute distress. Appearance: Normal appearance. He is well-developed and normal weight. He is not ill-appearing, toxic-appearing or diaphoretic. HENT:      Head: Normocephalic and atraumatic. Right Ear: External ear normal.      Left Ear: External ear normal.      Nose: Nose normal.      Mouth/Throat:      Mouth: Mucous membranes are moist.   Eyes:      General:         Right eye: No discharge. Left eye: No discharge. Extraocular Movements: Extraocular movements intact.       Conjunctiva/sclera: Conjunctivae normal.      Pupils: Pupils are equal, round, and reactive to light. Cardiovascular:      Rate and Rhythm: Normal rate and regular rhythm. Pulses: Normal pulses. Heart sounds: No murmur heard. Pulmonary:      Effort: Pulmonary effort is normal. No respiratory distress. Breath sounds: Normal breath sounds. Abdominal:      General: Abdomen is flat. There is no distension. Palpations: Abdomen is soft. Tenderness: There is no abdominal tenderness. Musculoskeletal:         General: Swelling and tenderness present. Cervical back: Normal range of motion and neck supple. No rigidity or tenderness. Comments: Right ankle: Generalized edema right lower leg with mild diffuse redness. Generalized ankle tenderness. No pain with passive range of motion. Skin:     General: Skin is warm and dry. Capillary Refill: Capillary refill takes less than 2 seconds. Findings: Erythema present. Neurological:      Mental Status: He is alert and oriented to person, place, and time. Sensory: No sensory deficit.    Psychiatric:         Mood and Affect: Mood normal.         Behavior: Behavior normal.         ED Medications  Medications   lidocaine (PF) (XYLOCAINE-MPF) 1 % injection 10 mL (10 mL Infiltration Given by Other 12/9/23 1921)       Diagnostic Studies  Results Reviewed       Procedure Component Value Units Date/Time    Synovial Fluid Diff [[id number]] Collected: 12/09/23 2017    Lab Status: Final result Specimen: Synovial Fluid from Joint, Other Updated: 12/09/23 2205     Total Counted 100     Neutrophil % Synovial 63 %      Lymph % Synovial 36 %      Eosinophil % Synovial 1 %     Synovial fluid white cell count w/ diff [[id number]]  (Abnormal) Collected: 12/09/23 2017    Lab Status: Final result Specimen: Synovial Fluid from Joint, Other Updated: 12/09/23 2136     Site Synovial Fluid, Joint     Color, Fluid Red     Clarity, Fluid Turbid     WBC, Fluid 224 /ul Body fluid culture and Gram stain [[id number]] Collected: 12/09/23 2017    Lab Status: In process Specimen: Body Fluid from Other Updated: 12/09/23 2045    Synovial fluid, crystal [[id number]] Collected: 12/09/23 2017    Lab Status: In process Specimen: Synovial Fluid from Joint, Other Updated: 12/09/23 2042    Sedimentation rate, automated [[id number]]  (Abnormal) Collected: 12/09/23 1807    Lab Status: Final result Specimen: Blood from Arm, Right Updated: 12/09/23 1835     Sed Rate 34 mm/hour     Basic metabolic panel [[id number]]  (Abnormal) Collected: 12/09/23 1807    Lab Status: Final result Specimen: Blood from Arm, Right Updated: 12/09/23 1828     Sodium 136 mmol/L      Potassium 3.6 mmol/L      Chloride 102 mmol/L      CO2 27 mmol/L      ANION GAP 7 mmol/L      BUN 29 mg/dL      Creatinine 1.00 mg/dL      Glucose 172 mg/dL      Calcium 8.9 mg/dL      eGFR 70 ml/min/1.73sq m     Narrative:      [place] guidelines for Chronic Kidney Disease (CKD):     Stage 1 with normal or high GFR (GFR > 90 mL/min/1.73 square meters)    Stage 2 Mild CKD (GFR = 60-89 mL/min/1.73 square meters)    Stage 3A Moderate CKD (GFR = 45-59 mL/min/1.73 square meters)    Stage 3B Moderate CKD (GFR = 30-44 mL/min/1.73 square meters)    Stage 4 Severe CKD (GFR = 15-29 mL/min/1.73 square meters)    Stage 5 End Stage CKD (GFR <15 mL/min/1.73 square meters)  Note: GFR calculation is accurate only with a steady state creatinine    Uric acid [[id number]]  (Normal) Collected: 12/09/23 1807    Lab Status: Final result Specimen: Blood from Arm, Right Updated: 12/09/23 1828     Uric Acid 3.9 mg/dL     Narrative:      N-acetyl-p-benzoquinone imine (metabolite of Acetaminophen) will generate erroneously low results in samples for patients that have taken an overdose of Acetaminophen.     CBC and differential [[id number]] Collected: 12/09/23 1807    Lab Status: Final result Specimen: Blood from Arm, Right Updated: 12/09/23 1814 WBC 5.81 Thousand/uL      RBC 5.13 Million/uL      Hemoglobin 14.9 g/dL      Hematocrit 44.5 %      MCV 87 fL      MCH 29.0 pg      MCHC 33.5 g/dL      RDW 13.6 %      MPV 10.2 fL      Platelets 560 Thousands/uL      nRBC 0 /100 WBCs      Neutrophils Relative 61 %      Immat GRANS % 1 %      Lymphocytes Relative 25 %      Monocytes Relative 11 %      Eosinophils Relative 1 %      Basophils Relative 1 %      Neutrophils Absolute 3.60 Thousands/µL      Immature Grans Absolute 0.04 Thousand/uL      Lymphocytes Absolute 1.46 Thousands/µL      Monocytes Absolute 0.61 Thousand/µL      Eosinophils Absolute 0.07 Thousand/µL      Basophils Absolute 0.03 Thousands/µL                    CT head wo contrast   Final Result by Michelle Johnson MD (12/09 1955)      No acute intracranial abnormality. Workstation performed: [id number]         XR ankle 3+ views RIGHT   ED Interpretation by Kamini Magana MD (12/09 1838)   No acute fractures      VAS lower limb venous duplex study, unilateral/limited    (Results Pending)   VAS lower limb venous duplex study, unilateral/limited    (Results Pending)         Procedures  Procedures      ED Course                                       Medical Decision Making  82-year-old male presents with atraumatic edematous right ankle joint. Differential diagnosis includes DVT, septic arthritis, occult fracture, ligament injury, cellulitis. Plan: X-ray, duplex DVT ultrasound, podiatry consult    Patient did not receive duplex DVT ultrasound prior to tech leaving. Ultrasound ordered outpatient. Patient is already anticoagulated on Eliquis. Labs not suggestive of infection. Patient given return precautions. Patient follows closely with a podiatrist.    Amount and/or Complexity of Data Reviewed  Labs: ordered. Radiology: ordered and independent interpretation performed.           Disposition  Final diagnoses:   Right ankle swelling   Fall from standing, initial encounter Anticoagulated   Localized edema     Time reflects when diagnosis was documented in both MDM as applicable and the Disposition within this note       Time User Action Codes Description Comment    12/9/2023  6:08 PM Antonio Berry Right ankle swelling     12/9/2023  8:01 PM Yajaira Mcadams [W19. XXXA] Fall from standing, initial encounter     12/9/2023  8:02 PM Misbah Kenney [Z79.01] Anticoagulated     12/9/2023  8:22 PM Yajaira Mcadams [R60.0] Localized edema           ED Disposition       ED Disposition   Discharge    Condition   Stable    Date/Time   Sat Dec 9, 2023  8:23 PM    Comment   Frida Kind discharge to home/self care.                    Follow-up Information    None         Discharge Medication List as of 12/9/2023  9:48 PM        CONTINUE these medications which have NOT CHANGED    Details   Accu-Chek FastClix Lancets MISC Use in the morning Use twice daily, Starting Mon 11/27/2023, Normal      amLODIPine (NORVASC) 5 mg tablet TAKE 1 TABLET DAILY, Normal      apixaban (ELIQUIS) 5 mg Take 1 tablet (5 mg total) by mouth 2 (two) times a day, Starting Mon 11/27/2023, Until Thu 11/21/2024, Normal      atorvastatin (LIPITOR) 40 mg tablet Take 1 tablet (40 mg total) by mouth daily, Starting Fri 11/17/2023, Normal      Blood Glucose Monitoring Suppl (ACCU-CHEK CRISTA PLUS) w/Device KIT Use, Starting Wed 10/2/2013, Historical Med      Calcium Carb-Cholecalciferol 600-200 MG-UNIT TABS Historical Med      Cholecalciferol 50 MCG (2000 UT) TABS Historical Med      Cyanocobalamin (VITAMIN B 12 PO) Take by mouth in the morning, Historical Med      Empagliflozin (Jardiance) 10 MG TABS tablet TAKE 1 TABLET (10 MG TOTAL) BY MOUTH IN THE MORNING, Starting Mon 10/30/2023, Normal      !! glucose blood (Accu-Chek Crista Plus) test strip Use as instructed, Normal      !! glucose blood (Accu-Chek Guide) test strip Use twice daily, Normal      latanoprost (XALATAN) 0.005 % ophthalmic solution Historical Med losartan-hydrochlorothiazide (HYZAAR) 100-25 MG per tablet TAKE 1 TABLET DAILY, Normal      MAGNESIUM GLUCONATE PO Take 400 mg by mouth, Historical Med      Polyethyl Glycol-Propyl Glycol (SYSTANE OP) Apply to eye 1 drop each eye twice a day, Historical Med       !! - Potential duplicate medications found. Please discuss with provider. Outpatient Discharge Orders   VAS lower limb venous duplex study, unilateral/limited   Standing Status: Future Standing Exp. Date: 12/09/27       PDMP Review       None             ED Provider  Attending physically available and evaluated Davis HernandezKath I managed the patient along with the ED Attending.     Electronically Signed by           Rosalio Bustamante MD  12/09/23 9804

## 2023-12-10 LAB — CRYSTALS SNV QL MICRO: NORMAL

## 2023-12-10 NOTE — CONSULTS
Podiatry - Consultation    Patient Information:   Pineda Arechiga 82 y.o. male MRN: [id number]  Unit/Bed#: ED-16 Encounter: [id number]  PCP: Rhea Mota MD  Date of Admission:  12/9/2023  Date of Consultation: 12/10/23  Requesting Physician: No att. providers found      ASSESSMENT:    Pineda Arechiga is a 82 y.o. male with:    Right ankle pain/edema  Type 2 diabetes mellitus   Left drop foot  Lumbar spinal stenosis     PLAN:    Patient seen and evaluated in ED for right ankle pain/edema likely secondary to overuse related to Left drop foot. Unlikely infectious origin, and low suspicion of DVT. Patient is stable for discharge and follow up outpatient. Right ankle arthrocentesis performed at bedside, approximately 3ml of serosanguinous fluid extracted and sent for pathological examination (see procedure note). Septic joint ruled out based on clinical findings and joint aspirate. Radiographs of right ankle reviewed and per my personal read there are no obvious signs of fracture or dislocation. Mild arthritic changes to ankle joint. Labs reviewed, no acute leukocytosis with mildly elevated ESR (34). Recommend use of OTC Tylenol and Ibuprofen for relief of pain and inflammation. Discussed use of walker for ambulation and avoiding excess weightbearing on the RLE. Local wound care consisting of Betadine DSD. Elevation and offloading on green foam wedges or pillows when non-ambulatory. Rest of care per primary team.  Will discuss this plan with my attending and update as needed. Procedure: Ankle Arthrocentesis (Right)   - Verbal consent obtained from patient after procedure explained and all questions/concerns addressed. - Local anesthesia with 5ml of 1% Lidocaine plain.  - Anterior ankle sterilely prepped with chlorhexidine scrub. Utilizing a 20cc syringe and 18-gauge needle, anterior ankle joint was accessed and fluid was drawn out.  Approximately 3ml of serosanguinous fluid was extracted and transferred to a sterile specimen container to be sent for pathological examination. No purulent fluid was encountered. Needle and syringe were then removed and site was dressed with 4x4 gauze and Kerlix. - Patient tolerated the procedure well without any immediate complications. Weightbearing status: Weightbearing as tolerated    SUBJECTIVE:    History of Present Illness:    Frederik Litten is a 82 y.o. male who is originally admitted 12/9/2023 due to pain and swelling to the right ankle. Patient has an extensive past medical history including essential hypertension, chronic lumbar radiculopathy, type 2 diabetes mellitus, glaucoma, knee osteoarthritis, osteopenia, bilateral hearing loss, thrombocytopenia, hypercholesterolemia, obesity, and left drop foot. We are consulted for evaluation of right ankle joint. Patient states that approximately 3 days ago he noticed that his ankle was becoming swollen and painful. He denies trauma or inciting event leading to his ankle pain. He also denies any open wounds on his lower extremities, or recent illness. He states that a couple of months ago while getting a knee injection, he experienced weakness in his left foot, and eventual drop foot likely related to his lumbar radiculopathy. He states that since then he has difficulty ambulating and relies on a walker. He states that he does get mild discomfort on his right lower extremity as he favors that in order to stand and ambulate. He denies fever, chills, nausea, vomiting, and shortness of breath at this time. Review of Systems:    Constitutional: Negative. HENT: Negative. Eyes: Negative. Respiratory: Negative. Cardiovascular: Negative. Gastrointestinal: Negative. Musculoskeletal: Right ankle pain   Skin: Redness to right ankle    Neurological: Negative    Psych: Negative.      Past Medical and Surgical History:     Past Medical History:   Diagnosis Date    Arthritis     Bladder mass 6/28/2017 Follows with Urology    Cancer [facility])     right kidney    Cardiac aneurysm     Chronic pain disorder     Diabetes mellitus (HCC)     NIDDM    Erectile dysfunction of non-organic origin     Glaucoma     Hx of bleeding disorder     in urine    Hyperlipidemia     Hypertension     Meniscus tear     Left knee    Urinary tract infection with hematuria     last assessed 2017    Wears glasses     Wears partial dentures     upper partial       Past Surgical History:   Procedure Laterality Date    CATARACT EXTRACTION      COLONOSCOPY      CYSTOSCOPY      onset 2017    DENTAL SURGERY      EYE SURGERY Bilateral     stents    EYE SURGERY Bilateral     stents in both eyes     NEPHRECTOMY LAPAROSCOPIC Right 2017    Procedure: [address] ,;  Surgeon: Jaclyn Avilez MD;  Location: [facility];  Service: Urology    NERVE BLOCK      transforaminal epidural lumbar    WISDOM TOOTH EXTRACTION      as well as broken neighboring tooth       Meds/Allergies:    (Not in a hospital admission)      No Known Allergies    Social History:     Marital Status: /Civil Union    Substance Use History:   Social History     Substance and Sexual Activity   Alcohol Use Not Currently    Comment: rarely     Social History     Tobacco Use   Smoking Status Former    Types: Cigarettes    Quit date: 1985    Years since quittin.2   Smokeless Tobacco Never     Social History     Substance and Sexual Activity   Drug Use No       Family History:    Family History   Problem Relation Age of Onset    Other Mother         malaria    Diabetes Sister     Hypertension Sister          OBJECTIVE:    Vitals:   Blood Pressure: 168/98 (23)  Pulse: 68 (23)  Temperature: 97.5 °F (36.4 °C) (23)  Temp Source: Oral (23)  Respirations: 17 (23)  SpO2: 96 % (23)    Physical Exam:    General Appearance: Alert, cooperative, no distress.   HEENT: Head normocephalic, atraumatic, without obvious abnormality. Heart: Normal rate and rhythm. Lungs: Non-labored breathing. No respiratory distress. Abdomen: Without distension. Psychiatric: AAOx3  Lower Extremity:    Vascular:   DP: Right: 1+ Left: 1+  PT: Right: 1+ Left: 1+  CRT < 3 seconds at the digits. +2/4 edema noted at right ankle. Pedal hair is absent. Skin temperature is WNL bilaterally. Musculoskeletal:  MMT is 5/5 in all muscle compartments bilaterally. ROM at the 1st MPJ and ankle joint are decreased bilaterally with the leg extended. Pain on palpation of right ankle, and with ROM. Weakness in DF at the left ankle. Dermatological:  - Mild erythema anterior right ankle. - No open wounds or lesions present.   - Skin is warm and dry. - No interspace maceration bilaterally. Neurological:  Gross sensation is intact. Light touch is diminished. Protective sensation is diminished. Additional data:     Lab Results: I have personally reviewed pertinent labs including:    Results from last 7 days   Lab Units 12/09/23  1807   WBC Thousand/uL 5.81   HEMOGLOBIN g/dL 14.9   HEMATOCRIT % 44.5   PLATELETS Thousands/uL 183   NEUTROS PCT % 61   LYMPHS PCT % 25   MONOS PCT % 11   EOS PCT % 1     Results from last 7 days   Lab Units 12/09/23  1807   POTASSIUM mmol/L 3.6   CHLORIDE mmol/L 102   CO2 mmol/L 27   BUN mg/dL 29*   CREATININE mg/dL 1.00   CALCIUM mg/dL 8.9           Cultures: I have personally reviewed pertinent cultures including:    Results from last 7 days   Lab Units 12/09/23 2017   GRAM STAIN RESULT  1+ Polys  No bacteria seen           Imaging: I have personally reviewed pertinent reports in PACS. EKG, Pathology, and Other Studies: I have personally reviewed pertinent reports. Time Spent for Care: 30 minutes. More than 50% of total time spent on counseling and coordination of care as described above.       ** Please Note: Portions of the record may have been created with voice recognition software. Occasional wrong word or "sound a like" substitutions may have occurred due to the inherent limitations of voice recognition software. Read the chart carefully and recognize, using context, where substitutions have occurred.  **

## 2023-12-10 NOTE — DISCHARGE INSTRUCTIONS
Continue to take your Eliquis as directed. You will receive a phone call from the ultrasound office to schedule your ultrasound. Return to ER for any shortness of breath or worsening swelling or redness of your right ankle.

## 2023-12-11 ENCOUNTER — HOSPITAL ENCOUNTER (OUTPATIENT)
Dept: NON INVASIVE DIAGNOSTICS | Facility: HOSPITAL | Age: 83
Discharge: HOME | End: 2023-12-11
Attending: EMERGENCY MEDICINE
Payer: MEDICARE

## 2023-12-11 ENCOUNTER — APPOINTMENT (OUTPATIENT)
Dept: PHYSICAL THERAPY | Facility: MEDICAL CENTER | Age: 83
End: 2023-12-11
Payer: MEDICARE

## 2023-12-11 DIAGNOSIS — R60.0 LOCALIZED EDEMA: ICD-10-CM

## 2023-12-11 DIAGNOSIS — M25.471 RIGHT ANKLE SWELLING: ICD-10-CM

## 2023-12-11 PROCEDURE — 93971 EXTREMITY STUDY: CPT

## 2023-12-11 PROCEDURE — 93971 EXTREMITY STUDY: CPT | Performed by: SURGERY

## 2023-12-13 LAB
BACTERIA SPEC BFLD CULT: NO GROWTH
GRAM STN SPEC: NORMAL
GRAM STN SPEC: NORMAL

## 2023-12-18 ENCOUNTER — APPOINTMENT (OUTPATIENT)
Dept: PHYSICAL THERAPY | Facility: MEDICAL CENTER | Age: 83
End: 2023-12-18
Payer: MEDICARE

## 2023-12-19 ENCOUNTER — HOSPITAL ENCOUNTER (OUTPATIENT)
Dept: NON INVASIVE DIAGNOSTICS | Facility: CLINIC | Age: 83
Discharge: HOME | End: 2023-12-19
Payer: MEDICARE

## 2023-12-19 ENCOUNTER — CLINICAL SUPPORT (OUTPATIENT)
Dept: CARDIOLOGY CLINIC | Facility: CLINIC | Age: 83
End: 2023-12-19
Payer: MEDICARE

## 2023-12-19 VITALS
BODY MASS INDEX: 32.33 KG/M2 | HEART RATE: 90 BPM | HEIGHT: 67 IN | SYSTOLIC BLOOD PRESSURE: 168 MMHG | DIASTOLIC BLOOD PRESSURE: 98 MMHG | WEIGHT: 206 LBS

## 2023-12-19 DIAGNOSIS — I48.91 ATRIAL FIBRILLATION, UNSPECIFIED TYPE (HCC): ICD-10-CM

## 2023-12-19 LAB
AORTIC ROOT: 2.7 CM
APICAL FOUR CHAMBER EJECTION FRACTION: 51 %
AV REGURGITATION PRESSURE HALF TIME: 729 MS
FRACTIONAL SHORTENING: 30 (ref 28–44)
INTERVENTRICULAR SEPTUM IN DIASTOLE (PARASTERNAL SHORT AXIS VIEW): 1.4 CM
INTERVENTRICULAR SEPTUM: 1.4 CM (ref 0.6–1.1)
LAAS-AP2: 22.9 CM2
LAAS-AP4: 24.9 CM2
LEFT ATRIUM AREA SYSTOLE SINGLE PLANE A4C: 24.1 CM2
LEFT ATRIUM SIZE: 4 CM
LEFT ATRIUM VOLUME (MOD BIPLANE): 75 ML
LEFT ATRIUM VOLUME INDEX (MOD BIPLANE): 36.6 ML/M2
LEFT INTERNAL DIMENSION IN SYSTOLE: 1.4 CM (ref 2.1–4)
LEFT VENTRICULAR INTERNAL DIMENSION IN DIASTOLE: 2 CM (ref 3.5–6)
LEFT VENTRICULAR POSTERIOR WALL IN END DIASTOLE: 1.4 CM
LEFT VENTRICULAR STROKE VOLUME: 7 ML
LVSV (TEICH): 7 ML
MV E'TISSUE VEL-SEP: 4 CM/S
RA PRESSURE ESTIMATED: 10 MMHG
RIGHT ATRIUM AREA SYSTOLE A4C: 14.4 CM2
RIGHT VENTRICLE ID DIMENSION: 3.1 CM
RV PSP: 30 MMHG
SL CV AV DECELERATION TIME RETROGRADE: 2514 MS
SL CV AV PEAK GRADIENT RETROGRADE: 41 MMHG
SL CV LEFT ATRIUM LENGTH A2C: 6.3 CM
SL CV LV EF: 55
SL CV PED ECHO LEFT VENTRICLE DIASTOLIC VOLUME (MOD BIPLANE) 2D: 12 ML
SL CV PED ECHO LEFT VENTRICLE SYSTOLIC VOLUME (MOD BIPLANE) 2D: 5 ML
TR MAX PG: 20 MMHG
TR PEAK VELOCITY: 2.2 M/S
TRICUSPID ANNULAR PLANE SYSTOLIC EXCURSION: 1.6 CM
TRICUSPID VALVE PEAK REGURGITATION VELOCITY: 2.24 M/S

## 2023-12-19 PROCEDURE — 93248 EXT ECG>7D<15D REV&INTERPJ: CPT | Performed by: INTERNAL MEDICINE

## 2023-12-19 PROCEDURE — 93306 TTE W/DOPPLER COMPLETE: CPT | Performed by: INTERNAL MEDICINE

## 2023-12-19 PROCEDURE — 93306 TTE W/DOPPLER COMPLETE: CPT

## 2024-01-05 ENCOUNTER — EVALUATION (OUTPATIENT)
Dept: PHYSICAL THERAPY | Facility: MEDICAL CENTER | Age: 84
End: 2024-01-05
Payer: COMMERCIAL

## 2024-01-05 DIAGNOSIS — M21.372 LEFT FOOT DROP: Primary | ICD-10-CM

## 2024-01-05 PROCEDURE — 97164 PT RE-EVAL EST PLAN CARE: CPT | Performed by: PHYSICAL THERAPIST

## 2024-01-05 PROCEDURE — 97110 THERAPEUTIC EXERCISES: CPT | Performed by: PHYSICAL THERAPIST

## 2024-01-05 NOTE — PROGRESS NOTES
PT Evaluation     Today's date: 2024  Patient name: Demarcus Hammond  : 1940  MRN: [id number]  Referring provider: Radha Anthony  Dx:   Encounter Diagnosis     ICD-10-CM    1. Left foot drop  M21.372                        Assessment  Assessment details: Demarcus Hammond is a 83 y.o. male presents with Left foot drop 2*. Demarcus Hammond has the below listed impairments and will benefit from skilled PT to improve deficits to return to prior level of function.   Impairments: abnormal muscle firing, abnormal or restricted ROM, impaired physical strength and pain with function  Understanding of Dx/Px/POC: good   Prognosis: good    Goals  Impairment Goals  - Increase strength equal to contralateral side    Functional Goals  - Patient will be independent with comprehensive HEP  - Patient will ambulate at PLOF          Plan  Patient would benefit from: skilled PT  Referral necessary: No  Planned therapy interventions: home exercise program, manual therapy, neuromuscular re-education, patient education, strengthening, stretching and balance  Frequency: 2x week  Duration in weeks: 8  Treatment plan discussed with: patient        Subjective Evaluation    History of Present Illness  Mechanism of injury: Demarcus presents with acute foot drop that started on 10/27.   He was diagnosed with lumbar degenerative disease and spinal stenosis. He has been under the care of Dr. Null. He was referred to PT and given a referral for AFO for L foot. He was fit for the AFO but has not received it yet. He denies LBP, denies bowel/bladder changes, denies R LE symptoms. He has numbness in L LE from below the knee throughout LE. Currently ambulating with wheeled walker to prevent falling.     Update 24: Demarcus has been ambulating with AFO on L for about 2 weeks. He notes improved stability with using it. He continues with B LE swelling, US negative for thrombus. He has not received results of echo or holter monitor. He has  follow-up with cardiology on 1/12/24.     Lumbar Spine X-rays from 11/10/2023: multi level lumbar spondylosis with loss of disc height, osteophyte formation and facet hypertrophy, L4-5 spondylolisthesis, no appreciated lytic/blastic lesions, no obvious instability     Lumbar Spine MRI from 11/10/2023: multi level lumbar disc degeneration with disc desiccation, loss of disc height, facet and ligamentum hypertrophy, L3-4 and L4-5 central, lateral recess, foraminal stenosis           Patient Goals  Patient goals for therapy: increased strength          Objective     Neurological Testing     Sensation     Lumbar   Left   Diminished: light touch    Reflexes   Left   Patellar (L4): trace (1+)  Achilles (S1): trace (1+)  Clonus sign: negative    Right   Patellar (L4): normal (2+)  Achilles (S1): normal (2+)  Clonus sign: negative    Active Range of Motion     Lumbar   Flexion:  WFL  Extension:  WFL  Left lateral flexion:  WFL  Right lateral flexion:  WFL    Strength/Myotome Testing     Left Hip   Planes of Motion   Flexion: 5    Right Hip   Planes of Motion   Flexion: 5    Left Knee   Flexion: 5  Extension: 5    Right Knee   Flexion: 5  Extension: 5    Left Ankle/Foot   Dorsiflexion: 1  Plantar flexion: 4  Inversion: 2  Eversion: 2  Great toe extension: 1    Right Ankle/Foot   Dorsiflexion: 5  Plantar flexion: 5  Inversion: 5  Eversion: 5  Great toe extension: 5    Tests     Lumbar     Left   Negative passive SLR.     Right   Negative passive SLR.              Precautions: A-fib, ascending aortic aneurysm      Manuals 1/5            / 80                                                   Neuro Re-Ed                                                                                                        Ther Ex             SLR L 2x10            Heel slides L 2x10            SAQ 2x10            LAQ 2x10            Ankle PF/DF w/ assist 20            Ankle In/EV w/ assist 20            Toe curls 20                          Ther Activity                                       Gait Training                                       Modalities

## 2024-01-08 ENCOUNTER — OFFICE VISIT (OUTPATIENT)
Dept: PHYSICAL THERAPY | Facility: MEDICAL CENTER | Age: 84
End: 2024-01-08
Payer: COMMERCIAL

## 2024-01-08 DIAGNOSIS — M21.372 LEFT FOOT DROP: Primary | ICD-10-CM

## 2024-01-08 PROCEDURE — 97110 THERAPEUTIC EXERCISES: CPT | Performed by: PHYSICAL THERAPIST

## 2024-01-08 NOTE — PROGRESS NOTES
Daily Note     Today's date: 2024  Patient name: Demarcus Hammond  : 1940  MRN: [id number]  Referring provider: Radha Anthony*  Dx:   Encounter Diagnosis     ICD-10-CM    1. Left foot drop  M21.372                      Subjective: Demarcus reports he feels good today.       Objective: See treatment diary below      Assessment: Tolerated treatment well. Patient exhibited good technique with therapeutic exercises      Plan: Progress treatment as tolerated.       Precautions: A-fib, ascending aortic aneurysm      Manuals            / 80                                                   Neuro Re-Ed                                                                                                        Ther Ex             SLR L 2x10 2x15           Heel slides L 2x10 2x15           SAQ 2x10 2x15           LAQ 2x10 2x15           Ankle PF/DF w/ assist 20 30           Ankle In/EV w/ assist 20 30           Toe curls 20 30           Hip add ball sq  2x15           Seated march  10           Ther Activity                                       Gait Training                                       Modalities

## 2024-01-10 NOTE — PROGRESS NOTES
Cardiology Outpatient Progress Note - Demarcus Hammond 83 y.o. male MRN: [id number]    @ Encounter: [id number]      Patient Active Problem List    Diagnosis Date Noted    Neuropathy 11/10/2023    Left foot drop 11/10/2023    Bilateral primary osteoarthritis of knee 11/10/2023    Primary osteoarthritis of both knees 09/30/2022    Imbalance 09/07/2022    Chronic pain of both knees 11/05/2021    Obesity, morbid (HCC)     Abnormal findings on diagnostic imaging of skull and head, not elsewhere classified 09/09/2020    Colon polyp 10/05/2018    Bilateral hearing loss 10/05/2018    Thrombocytopenia (HCC) 10/05/2018    Aneurysm of ascending aorta without rupture ([facility]) 03/12/2018    Chronic atrial fibrillation ([facility]) 08/29/2017    Renal cell carcinoma of right kidney ([facility]) 07/21/2017    Acute meniscal tear of left knee 05/02/2017    Primary localized osteoarthritis of left knee 05/02/2017    Chronic pain disorder 01/04/2016    Back pain, thoracic 08/20/2015    Lung nodules 07/29/2015    Knee osteoarthritis 09/09/2014    Chronic lumbar radiculopathy 06/17/2014    Spinal stenosis of lumbar region with neurogenic claudication. 07/30/2013    Osteopenia 07/09/2012    Erectile dysfunction of non-organic origin 06/15/2012    Type 2 diabetes mellitus with diabetic polyneuropathy ([facility]) 05/16/2012    Glaucoma 12/17/2008    Essential hypertension 02/06/2008    Pure hypercholesterolemia 02/06/2008       Assessment:  # atrial flutter  AC: Eliquis 5 mg   Rate:     #  Dilated ascending thoracic aorta  CT chest 12/9/23: aorta 44 mm  CT Chest 12/6/22: ascending aorta 44 mm  CT Chest 2/23/21: dilated ascending thoracic aorta 4.4 cm, no change  CT Chest 6/15/19: ascending aorta 4.5 cm    # HTN- BP at goal with SBP < 130 mmHg  Losartan/ hctz 100/25 mg daily, amlodipine 5 mg     Echo 5/8/18:   EF: 60%; aorta 4.3 cm    # Coronary eval  Stress test 8/31/17: normal  EKG today: SR, first degree AV block    # 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    # DM- metformin, Januvia  HgA1C 11/15/23: 8%    # dyslipidemia- atorvastatin 40 mg   11/15/23: LDL 98, HDL 56  11/14/22: LDL 79, HDL 59  5/2/22: LDL 85, HDL 62  10/8/21: LDL 83, HDL 54    # ED- Cialis prn    # left foot drop, uses a walker    TODAY'S PLAN:  BP pretty much at goal for dilated aorta  Aorta size, no change on follow up CT 12/9/23  Eliquis for aflutter  Does not need ppm yet   If heart rate goes up then would need to add AV annamaria blocker and may need PPM as had pauses  Continue atorvastatin 40 mg daily  Pt is going to PT  HgA1C at about goal on therapy    HPI:   82 yo male who had initially presented for pre-op clearance for right nephrectomy for renal mass for RCC. No cardiac history. He had DM, hyperlipidemia, former smoker over 30 years ago. He had stress in past for screening not for CP. He does bowling but no true exercise. No chest pain or SOB. No edema.     Interval History:   CT Chest 12/6/22: ascending aorta 44 mm  No new symptoms, feels well, no chest pain or shortness of breath  Walking  Going to PT to help with foot    Echo 12/19/23:  LVEF: 55%  RV: normal  Mild MR, mild TR    Review of Systems   Constitutional:  Negative for activity change, appetite change, fatigue and unexpected weight change.   HENT:  Negative for congestion and nosebleeds.    Eyes: Negative.    Respiratory:  Negative for cough, chest tightness and shortness of breath.    Cardiovascular:  Negative for chest pain, palpitations and leg swelling.   Gastrointestinal:  Negative for abdominal distention.   Endocrine: Negative.    Genitourinary: Negative.    Musculoskeletal: Negative.    Skin: Negative.    Neurological:  Negative for dizziness, syncope and weakness.   Hematological: Negative.    Psychiatric/Behavioral: Negative.         Past Medical History:   Diagnosis Date    Arthritis     Bladder mass 6/28/2017    Follows with Urology    Cancer (HCC)     right kidney    Cardiac aneurysm     Chronic pain  disorder     Diabetes mellitus (HCC)     NIDDM    Erectile dysfunction of non-organic origin     Glaucoma     Hx of bleeding disorder     in urine    Hyperlipidemia     Hypertension     Meniscus tear     Left knee    Urinary tract infection with hematuria     last assessed 05/30/2017    Wears glasses     Wears partial dentures     upper partial       No Known Allergies  .    Current Outpatient Medications:     Accu-Chek FastClix Lancets MISC, Use in the morning Use twice daily, Disp: 100 each, Rfl: 3    amLODIPine (NORVASC) 5 mg tablet, TAKE 1 TABLET DAILY, Disp: 90 tablet, Rfl: 3    apixaban (ELIQUIS) 5 mg, Take 1 tablet (5 mg total) by mouth 2 (two) times a day, Disp: 180 tablet, Rfl: 3    atorvastatin (LIPITOR) 40 mg tablet, Take 1 tablet (40 mg total) by mouth daily, Disp: 90 tablet, Rfl: 1    Blood Glucose Monitoring Suppl (ACCU-CHEK OMERO PLUS) w/Device KIT, Use, Disp: , Rfl:     Calcium Carb-Cholecalciferol 600-200 MG-UNIT TABS, , Disp: , Rfl:     Cholecalciferol 50 MCG (2000 UT) TABS, , Disp: , Rfl:     Cyanocobalamin (VITAMIN B 12 PO), Take by mouth in the morning, Disp: , Rfl:     Empagliflozin (Jardiance) 10 MG TABS tablet, TAKE 1 TABLET (10 MG TOTAL) BY MOUTH IN THE MORNING, Disp: 90 tablet, Rfl: 3    glucose blood (Accu-Chek Omero Plus) test strip, Use as instructed, Disp: 100 strip, Rfl: 0    glucose blood (Accu-Chek Guide) test strip, Use twice daily, Disp: 100 strip, Rfl: 1    latanoprost (XALATAN) 0.005 % ophthalmic solution, , Disp: , Rfl:     losartan-hydrochlorothiazide (HYZAAR) 100-25 MG per tablet, TAKE 1 TABLET DAILY, Disp: 90 tablet, Rfl: 3    MAGNESIUM GLUCONATE PO, Take 400 mg by mouth, Disp: , Rfl:     Polyethyl Glycol-Propyl Glycol (SYSTANE OP), Apply to eye 1 drop each eye twice a day, Disp: , Rfl:     Social History     Socioeconomic History    Marital status: /Civil Union     Spouse name: Not on file    Number of children: Not on file    Years of education: Not on file     Highest education level: Not on file   Occupational History    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   Tobacco Use    Smoking status: Former     Current packs/day: 0.00     Types: Cigarettes     Quit date: 1985     Years since quittin.3    Smokeless tobacco: Never   Vaping Use    Vaping status: Never Used   Substance and Sexual Activity    Alcohol use: Not Currently     Comment: rarely    Drug use: No    Sexual activity: Not on file   Other Topics Concern    Not on file   Social History Narrative    Does not consume caffeine        Never drank alcohol per [employer]     Social Determinants of Health     Financial Resource Strain: Low Risk  (2023)    Overall Financial Resource Strain (CARDIA)     Difficulty of Paying Living Expenses: Not hard at all   Food Insecurity: Not on file   Transportation Needs: No Transportation Needs (2023)    PRAPARE - Transportation     Lack of Transportation (Medical): No     Lack of Transportation (Non-Medical): No   Physical Activity: Not on file   Stress: Not on file   Social Connections: Not on file   Intimate Partner Violence: Not on file   Housing Stability: Not on file       Family History   Problem Relation Age of Onset    Other Mother         malaria    Diabetes Sister     Hypertension Sister        Physical Exam:    Vitals: There were no vitals taken for this visit., There is no height or weight on file to calculate BMI.,   Wt Readings from Last 3 Encounters:   23 93.4 kg (206 lb)   23 93.4 kg (206 lb)   23 93 kg (205 lb)         Physical Exam:    Physical Exam  Constitutional:       Appearance: He is well-developed. He is not diaphoretic.   HENT:      Head: Normocephalic and atraumatic.   Eyes:      Pupils: Pupils are equal, round, and reactive to light.   Neck:      Vascular: No JVD.   Cardiovascular:      Rate and Rhythm: Normal rate and regular rhythm.      Heart sounds: Normal heart sounds. No murmur heard.  Pulmonary:      Effort:  "Pulmonary effort is normal.      Breath sounds: Normal breath sounds. No rales.   Abdominal:      General: Bowel sounds are normal. There is no distension.      Palpations: Abdomen is soft.   Musculoskeletal:         General: Normal range of motion.      Cervical back: Normal range of motion.   Skin:     General: Skin is warm and dry.   Neurological:      Mental Status: He is alert and oriented to person, place, and time.       Labs & Results:    Lab Results   Component Value Date    GLUCOSE 276 (H) 07/29/2015    CALCIUM 8.9 12/09/2023     07/29/2015    K 3.6 12/09/2023    CO2 27 12/09/2023     12/09/2023    BUN 29 (H) 12/09/2023    CREATININE 1.00 12/09/2023     Lab Results   Component Value Date    WBC 5.81 12/09/2023    HGB 14.9 12/09/2023    HCT 44.5 12/09/2023    MCV 87 12/09/2023     12/09/2023     No results found for: \"BNP\"   Lab Results   Component Value Date    CHOL 173 01/20/2014     Lab Results   Component Value Date    HDL 56 11/15/2023    HDL 56 03/14/2023    HDL 59 11/14/2022     Lab Results   Component Value Date    LDLCALC 98 11/15/2023    LDLCALC 71 03/14/2023    LDLCALC 79 11/14/2022     Lab Results   Component Value Date    TRIG 51 11/15/2023    TRIG 41 03/14/2023    TRIG 50 11/14/2022     No results found for: \"CHOLHDL\"     EKG personally reviewed by Angelo Preston.     Counseling / Coordination of Care  Time spent today 25 minutes.  Greater than 50% of total time was spent with the patient and / or family counseling and / or coordination of care.  We discussed diagnoses, most recent studies, tests and any changes in treatment plan  Thank you for the opportunity to participate in the care of this patient.    ANGELO PRESTON D.O.  DIRECTOR OF HEART FAILURE/ PULMONARY HYPERTENSION  MEDICAL DIRECTOR OF LVAD PROGRAM  [facility]    "

## 2024-01-12 ENCOUNTER — OFFICE VISIT (OUTPATIENT)
Dept: CARDIOLOGY CLINIC | Facility: CLINIC | Age: 84
End: 2024-01-12
Payer: COMMERCIAL

## 2024-01-12 VITALS
DIASTOLIC BLOOD PRESSURE: 70 MMHG | WEIGHT: 206.8 LBS | SYSTOLIC BLOOD PRESSURE: 115 MMHG | BODY MASS INDEX: 32.39 KG/M2 | HEART RATE: 50 BPM

## 2024-01-12 DIAGNOSIS — I77.810 ASCENDING AORTA DILATION (HCC): ICD-10-CM

## 2024-01-12 DIAGNOSIS — I48.20 CHRONIC ATRIAL FIBRILLATION (HCC): Primary | ICD-10-CM

## 2024-01-12 DIAGNOSIS — I10 ESSENTIAL HYPERTENSION: ICD-10-CM

## 2024-01-12 PROCEDURE — 99214 OFFICE O/P EST MOD 30 MIN: CPT | Performed by: INTERNAL MEDICINE

## 2024-01-18 ENCOUNTER — OFFICE VISIT (OUTPATIENT)
Dept: PHYSICAL THERAPY | Facility: MEDICAL CENTER | Age: 84
End: 2024-01-18
Payer: COMMERCIAL

## 2024-01-18 DIAGNOSIS — M21.372 LEFT FOOT DROP: Primary | ICD-10-CM

## 2024-01-18 PROCEDURE — 97110 THERAPEUTIC EXERCISES: CPT | Performed by: PHYSICAL THERAPIST

## 2024-01-18 NOTE — PROGRESS NOTES
Daily Note     Today's date: 2024  Patient name: Demarcus Hammond  : 1940  MRN: [id number]  Referring provider: Radha Anthony*  Dx:   Encounter Diagnosis     ICD-10-CM    1. Left foot drop  M21.372                      Subjective: Demarcus reports he has been feeling good and thinks he can move his foot a little more      Objective: See treatment diary below      Assessment: Tolerated treatment well. Patient exhibited good technique with therapeutic exercises      Plan: Progress treatment as tolerated.       Precautions: A-fib, ascending aortic aneurysm      Manuals           / 80                                                   Neuro Re-Ed                                                                                                        Ther Ex             SLR L 2x10 2x15 2x15          Heel slides L 2x10 2x15 2x15          SAQ 2x10 2x15 2x15          LAQ 2x10 2x15 2x15          Ankle PF/DF w/ assist 20 30 30          Ankle In/EV w/ assist 20 30 30          Toe curls 20 30 30          Hip add ball sq  2x15 2x15          Seated march  10 20          Stand hip abd   L 15          Stand HS curl   L 15          Ther Activity                                       Gait Training                                       Modalities

## 2024-01-19 ENCOUNTER — APPOINTMENT (OUTPATIENT)
Dept: PHYSICAL THERAPY | Facility: MEDICAL CENTER | Age: 84
End: 2024-01-19
Payer: COMMERCIAL

## 2024-01-26 ENCOUNTER — OFFICE VISIT (OUTPATIENT)
Dept: PHYSICAL THERAPY | Facility: MEDICAL CENTER | Age: 84
End: 2024-01-26
Payer: COMMERCIAL

## 2024-01-26 DIAGNOSIS — M21.372 LEFT FOOT DROP: Primary | ICD-10-CM

## 2024-01-26 PROCEDURE — 97110 THERAPEUTIC EXERCISES: CPT | Performed by: PHYSICAL THERAPIST

## 2024-01-26 NOTE — PROGRESS NOTES
Daily Note     Today's date: 2024  Patient name: Demarcus Hammond  : 1940  MRN: [id number]  Referring provider: Radha Anthony*  Dx:   Encounter Diagnosis     ICD-10-CM    1. Left foot drop  M21.372                      Subjective: Demarcus reports he feels like he is making improvement and able to move his toes more      Objective: See treatment diary below      Assessment: Tolerated treatment well. Patient exhibited good technique with therapeutic exercises      Plan: Progress treatment as tolerated.       Precautions: A-fib, ascending aortic aneurysm      Manuals          / 80                                                   Neuro Re-Ed                                                                                                        Ther Ex             SLR L 2x10 2x15 2x15 30         Heel slides L 2x10 2x15 2x15 30         SAQ 2x10 2x15 2x15 30         LAQ 2x10 2x15 2x15 30         Ankle PF/DF w/ assist 20 30 30 30         Ankle In/EV w/ assist 20 30 30 30         Toe curls 20 30 30 30         Hip add ball sq  2x15 2x15 30         Seated march  10 20 30         Stand hip abd   L 15 30         Stand HS curl   L 15 30         Ther Activity                                       Gait Training                                       Modalities

## 2024-01-29 ENCOUNTER — OFFICE VISIT (OUTPATIENT)
Dept: PHYSICAL THERAPY | Facility: MEDICAL CENTER | Age: 84
End: 2024-01-29
Payer: COMMERCIAL

## 2024-01-29 DIAGNOSIS — M21.372 LEFT FOOT DROP: Primary | ICD-10-CM

## 2024-01-29 PROCEDURE — 97110 THERAPEUTIC EXERCISES: CPT | Performed by: PHYSICAL THERAPIST

## 2024-01-29 NOTE — PROGRESS NOTES
Daily Note     Today's date: 2024  Patient name: Demarcus Hammond  : 1940  MRN: [id number]  Referring provider: Radha Anthony  Dx:   Encounter Diagnosis     ICD-10-CM    1. Left foot drop  M21.372                      Subjective: Demarcus reports he has walked very short distances in the house without his walker and he feels more steady      Objective: See treatment diary below      Assessment: Tolerated treatment well. Patient exhibited good technique with therapeutic exercises      Plan: Progress treatment as tolerated.       Precautions: A-fib, ascending aortic aneurysm      Manuals         / 80                                                   Neuro Re-Ed                                                                                                        Ther Ex             SLR L 2x10 2x15 2x15 30 30        Heel slides L 2x10 2x15 2x15 30 30        SAQ 2x10 2x15 2x15 30 30        LAQ 2x10 2x15 2x15 30 30        Ankle PF/DF w/ assist 20 30 30 30 30        Ankle In/EV w/ assist 20 30 30 30 30        Toe curls 20 30 30 30 30        Hip add ball sq  2x15 2x15 30 30        Seated march  10 20 30 30        Stand hip abd   L 15 30 20ea        Stand HS curl   L 15 30 20ea        Stand march     20ea        Side stepping     NV        Ther Activity                                       Gait Training                                       Modalities

## 2024-02-02 ENCOUNTER — OFFICE VISIT (OUTPATIENT)
Dept: PHYSICAL THERAPY | Facility: MEDICAL CENTER | Age: 84
End: 2024-02-02
Payer: COMMERCIAL

## 2024-02-02 DIAGNOSIS — M21.372 LEFT FOOT DROP: Primary | ICD-10-CM

## 2024-02-02 PROCEDURE — 97110 THERAPEUTIC EXERCISES: CPT | Performed by: PHYSICAL THERAPIST

## 2024-02-02 NOTE — PROGRESS NOTES
Daily Note     Today's date: 2024  Patient name: Demarcus Hammond  : 1940  MRN: [id number]  Referring provider: Radha Anthony*  Dx:   Encounter Diagnosis     ICD-10-CM    1. Left foot drop  M21.372                      Subjective: Demarcus reports he feels like sometimes he can move his toes a little more      Objective: See treatment diary below      Assessment: Tolerated treatment well. Patient demonstrated fatigue post treatment      Plan: Progress treatment as tolerated.       Precautions: A-fib, ascending aortic aneurysm      Manuals        / 80                                                   Neuro Re-Ed                                                                                                        Ther Ex             SLR L 2x10 2x15 2x15 30 30 30       Heel slides L 2x10 2x15 2x15 30 30 30       SAQ 2x10 2x15 2x15 30 30 30       LAQ 2x10 2x15 2x15 30 30 30       Ankle PF/DF w/ assist 20 30 30 30 30 30       Ankle In/EV w/ assist 20 30 30 30 30 30       Toe curls 20 30 30 30 30 30       Hip add ball sq  2x15 2x15 30 30 30       Seated march  10 20 30 30 30       Stand hip abd   L 15 30 20ea 30       Stand HS curl   L 15 30 20ea 30       Stand march     20ea 30       Side stepping     NV 10'x8       Ankle circles      30       Ther Activity                                       Gait Training             Ambulating with SPC      20'x4                    Modalities

## 2024-02-05 ENCOUNTER — OFFICE VISIT (OUTPATIENT)
Dept: PHYSICAL THERAPY | Facility: MEDICAL CENTER | Age: 84
End: 2024-02-05
Payer: COMMERCIAL

## 2024-02-05 DIAGNOSIS — M21.372 LEFT FOOT DROP: Primary | ICD-10-CM

## 2024-02-05 PROCEDURE — 97116 GAIT TRAINING THERAPY: CPT | Performed by: PHYSICAL THERAPIST

## 2024-02-05 PROCEDURE — 97110 THERAPEUTIC EXERCISES: CPT | Performed by: PHYSICAL THERAPIST

## 2024-02-05 NOTE — PROGRESS NOTES
Daily Note     Today's date: 2024  Patient name: Demarcus Hammond  : 1940  MRN: [id number]  Referring provider: Radha Anthony  Dx:   Encounter Diagnosis     ICD-10-CM    1. Left foot drop  M21.372                      Subjective: Demarcus reports he has been doing ok. He is still hopeful to return to bowling in the future.       Objective: See treatment diary below      Assessment: Tolerated treatment well. Patient exhibited good technique with therapeutic exercises. VC's for proper placement of SPC with ambulation.       Plan: Progress treatment as tolerated.       Precautions: A-fib, ascending aortic aneurysm      Manuals       / 80                                                   Neuro Re-Ed                                                                                                        Ther Ex             SLR L 2x10 2x15 2x15 30 30 30 30      Heel slides L 2x10 2x15 2x15 30 30 30 30      SAQ 2x10 2x15 2x15 30 30 30 30      LAQ 2x10 2x15 2x15 30 30 30 30      Ankle PF/DF w/ assist 20 30 30 30 30 30 30      Ankle In/EV w/ assist 20 30 30 30 30 30 30      Toe curls 20 30 30 30 30 30 30      Hip add ball sq  2x15 2x15 30 30 30 30      Seated march  10 20 30 30 30 30      Stand hip abd   L 15 30 20ea 30 30      Stand HS curl   L 15 30 20ea 30 30      Stand march     20ea 30 30      Side stepping     NV 10'x8 10'8      Ankle circles      30 30      Ther Activity                                       Gait Training             Ambulating with SPC      20'x4 20'x4                   Modalities

## 2024-02-09 ENCOUNTER — OFFICE VISIT (OUTPATIENT)
Dept: PHYSICAL THERAPY | Facility: MEDICAL CENTER | Age: 84
End: 2024-02-09
Payer: COMMERCIAL

## 2024-02-09 DIAGNOSIS — M21.372 LEFT FOOT DROP: Primary | ICD-10-CM

## 2024-02-09 PROCEDURE — 97110 THERAPEUTIC EXERCISES: CPT | Performed by: PHYSICAL THERAPIST

## 2024-02-09 PROCEDURE — 97116 GAIT TRAINING THERAPY: CPT | Performed by: PHYSICAL THERAPIST

## 2024-02-09 NOTE — PROGRESS NOTES
Daily Note     Today's date: 2024  Patient name: Demarcus Hammond  : 1940  MRN: [id number]  Referring provider: Radha Anthony  Dx:   Encounter Diagnosis     ICD-10-CM    1. Left foot drop  M21.372                      Subjective: Demarcus reports he has been doing his exercises daily      Objective: See treatment diary below      Assessment: Tolerated treatment well. Patient exhibited good technique with therapeutic exercises. Patient had 3 minor LOB with ambulation with SPC      Plan: Progress treatment as tolerated.       Precautions: A-fib, ascending aortic aneurysm      Manuals      / 80                                                   Neuro Re-Ed             rhomberg                                                                                           Ther Ex             SLR L 2x10 2x15 2x15 30 30 30 30 30     Heel slides L 2x10 2x15 2x15 30 30 30 30 30     SAQ 2x10 2x15 2x15 30 30 30 30 30     LAQ 2x10 2x15 2x15 30 30 30 30 30     Ankle PF/DF w/ assist 20 30 30 30 30 30 30 30     Ankle In/EV w/ assist 20 30 30 30 30 30 30 30     Toe curls 20 30 30 30 30 30 30 30     Hip add ball sq  2x15 2x15 30 30 30 30 30     Seated march  10 20 30 30 30 30 30     Stand hip abd   L 15 30 20ea 30 30 30     Stand HS curl   L 15 30 20ea 30 30 30     Stand march     20ea 30 30 30     Side stepping     NV 10'x8 10'8 10'x8     Ankle circles      30 30 30     Ther Activity                                       Gait Training             Ambulating with SPC      20'x4 20'x4 20'x4                  Modalities

## 2024-02-12 ENCOUNTER — OFFICE VISIT (OUTPATIENT)
Dept: PHYSICAL THERAPY | Facility: MEDICAL CENTER | Age: 84
End: 2024-02-12
Payer: COMMERCIAL

## 2024-02-12 DIAGNOSIS — M21.372 LEFT FOOT DROP: Primary | ICD-10-CM

## 2024-02-12 PROCEDURE — 97110 THERAPEUTIC EXERCISES: CPT | Performed by: PHYSICAL THERAPIST

## 2024-02-12 PROCEDURE — 97116 GAIT TRAINING THERAPY: CPT | Performed by: PHYSICAL THERAPIST

## 2024-02-12 NOTE — PROGRESS NOTES
Daily Note     Today's date: 2024  Patient name: Demarcus Hammond  : 1940  MRN: [id number]  Referring provider: Radha Anthony  Dx:   Encounter Diagnosis     ICD-10-CM    1. Left foot drop  M21.372                      Subjective: Demarcus reports he has been continuing to walk with his walker      Objective: See treatment diary below      Assessment: Tolerated treatment well. Patient demonstrated fatigue post treatment      Plan: Progress treatment as tolerated.       Precautions: A-fib, ascending aortic aneurysm      Manuals     / 80                                                   Neuro Re-Ed             rhomberg                                                                                           Ther Ex             SLR L 2x10 2x15 2x15 30 30 30 30 30 30    Heel slides L 2x10 2x15 2x15 30 30 30 30 30 30    SAQ 2x10 2x15 2x15 30 30 30 30 30 30    LAQ 2x10 2x15 2x15 30 30 30 30 30 30    Ankle PF/DF w/ assist 20 30 30 30 30 30 30 30 30    Ankle In/EV w/ assist 20 30 30 30 30 30 30 30 30    Toe curls 20 30 30 30 30 30 30 30 30    Hip add ball sq  2x15 2x15 30 30 30 30 30 30    Seated march  10 20 30 30 30 30 30 30    Stand hip abd   L 15 30 20ea 30 30 30 30    Stand HS curl   L 15 30 20ea 30 30 30 30    Stand march     20ea 30 30 30 30    Side stepping     NV 10'x8 10'8 10'x8 30    Ankle circles      30 30 30 30    Supine hip abd TB         Blue 30    Ther Activity                                       Gait Training             Ambulating with SPC      20'x4 20'x4 20'x4 30'x4                 Modalities

## 2024-02-16 ENCOUNTER — OFFICE VISIT (OUTPATIENT)
Dept: PHYSICAL THERAPY | Facility: MEDICAL CENTER | Age: 84
End: 2024-02-16
Payer: COMMERCIAL

## 2024-02-16 DIAGNOSIS — M21.372 LEFT FOOT DROP: Primary | ICD-10-CM

## 2024-02-16 PROCEDURE — 97110 THERAPEUTIC EXERCISES: CPT | Performed by: PHYSICAL THERAPIST

## 2024-02-16 PROCEDURE — 97116 GAIT TRAINING THERAPY: CPT | Performed by: PHYSICAL THERAPIST

## 2024-02-16 PROCEDURE — 97112 NEUROMUSCULAR REEDUCATION: CPT | Performed by: PHYSICAL THERAPIST

## 2024-02-16 NOTE — PROGRESS NOTES
"Daily Note     Today's date: 2024  Patient name: Demarcus Hammond  : 1940  MRN: [id number]  Referring provider: Radha Anthony  Dx:   Encounter Diagnosis     ICD-10-CM    1. Left foot drop  M21.372                      Subjective: Demarcus reports he has been doing well      Objective: See treatment diary below      Assessment: Tolerated treatment well. Patient  challenged with maintaining COM during rhomberg airex      Plan: Progress treatment as tolerated.       Precautions: A-fib, ascending aortic aneurysm      Manuals    / 80                                                   Neuro Re-Ed             Rhomberg airex          30\"x3   Sharpened rhomberg          30\" ea                                                                    Ther Ex             SLR L 2x10 2x15 2x15 30 30 30 30 30 30 30   Heel slides L 2x10 2x15 2x15 30 30 30 30 30 30 30   SAQ 2x10 2x15 2x15 30 30 30 30 30 30 30   LAQ 2x10 2x15 2x15 30 30 30 30 30 30 30   Ankle PF/DF w/ assist 20 30 30 30 30 30 30 30 30 30   Ankle In/EV w/ assist 20 30 30 30 30 30 30 30 30 30   Toe curls 20 30 30 30 30 30 30 30 30 30   Hip add ball sq  2x15 2x15 30 30 30 30 30 30 30   Seated march  10 20 30 30 30 30 30 30 30   Stand hip abd   L 15 30 20ea 30 30 30 30 30   Stand HS curl   L 15 30 20ea 30 30 30 30 30   Stand march     20ea 30 30 30 30 30   Side stepping     NV 10'x8 10'8 10'x8 30 30   Ankle circles      30 30 30 30 30   Supine hip abd TB         Blue 30 Bl 30   Ther Activity                                       Gait Training             Ambulating with SPC      20'x4 20'x4 20'x4 30'x4 100'                Modalities                                                            "

## 2024-02-19 ENCOUNTER — OFFICE VISIT (OUTPATIENT)
Dept: PHYSICAL THERAPY | Facility: MEDICAL CENTER | Age: 84
End: 2024-02-19
Payer: COMMERCIAL

## 2024-02-19 DIAGNOSIS — M21.372 LEFT FOOT DROP: Primary | ICD-10-CM

## 2024-02-19 PROCEDURE — 97110 THERAPEUTIC EXERCISES: CPT | Performed by: PHYSICAL THERAPIST

## 2024-02-19 PROCEDURE — 97116 GAIT TRAINING THERAPY: CPT | Performed by: PHYSICAL THERAPIST

## 2024-02-19 PROCEDURE — 97112 NEUROMUSCULAR REEDUCATION: CPT | Performed by: PHYSICAL THERAPIST

## 2024-02-19 NOTE — PROGRESS NOTES
"Daily Note     Today's date: 2024  Patient name: Demarcus Hammond  : 1940  MRN: [id number]  Referring provider: Radha Anthony  Dx:   Encounter Diagnosis     ICD-10-CM    1. Left foot drop  M21.372                      Subjective: Demarcus reports he feels pretty good today      Objective: See treatment diary below      Assessment: Tolerated treatment well. Patient  had improved balance with sharpened tandem today. Ambulated at inc speed with SPC      Plan: Progress note during next visit.      Precautions: A-fib, ascending aortic aneurysm      Manuals    BP                                                    Neuro Re-Ed             Rhomberg airex 30\"x3         30\"x3   Sharpened rhomberg 30\"ea         30\" ea                                                                    Ther Ex             SLR L 30 2x15 2x15 30 30 30 30 30 30 30   Heel slides L 30 2x15 2x15 30 30 30 30 30 30 30   SAQ 30 2x15 2x15 30 30 30 30 30 30 30   LAQ 30 2x15 2x15 30 30 30 30 30 30 30   Ankle PF/DF w/ assist 30 30 30 30 30 30 30 30 30 30   Ankle In/EV w/ assist 30 30 30 30 30 30 30 30 30 30   Toe curls 30 30 30 30 30 30 30 30 30 30   Hip add ball sq 30 2x15 2x15 30 30 30 30 30 30 30   Seated  10 20 30 30 30 30 30 30 30   Stand hip abd 30  L 15 30 20ea 30 30 30 30 30   Stand HS curl 30  L 15 30 20ea 30 30 30 30 30   Stand     20ea 30 30 30 30 30   Side stepping 20'x8    NV 10'x8 10'8 10'x8 30 30   Ankle circles 30     30 30 30 30 30   Supine hip abd TB Bl 30        Blue 30 Bl 30   Ther Activity                                       Gait Training             Ambulating with '     20'x4 20'x4 20'x4 30'x4 100'                Modalities                                                              "

## 2024-02-23 ENCOUNTER — OFFICE VISIT (OUTPATIENT)
Dept: PHYSICAL THERAPY | Facility: MEDICAL CENTER | Age: 84
End: 2024-02-23
Payer: COMMERCIAL

## 2024-02-23 DIAGNOSIS — M21.372 LEFT FOOT DROP: Primary | ICD-10-CM

## 2024-02-23 PROCEDURE — 97112 NEUROMUSCULAR REEDUCATION: CPT | Performed by: PHYSICAL THERAPIST

## 2024-02-23 PROCEDURE — 97530 THERAPEUTIC ACTIVITIES: CPT | Performed by: PHYSICAL THERAPIST

## 2024-02-23 PROCEDURE — 97110 THERAPEUTIC EXERCISES: CPT | Performed by: PHYSICAL THERAPIST

## 2024-02-23 NOTE — PROGRESS NOTES
"Daily Note     Today's date: 2024  Patient name: Demarcus Hammond  : 1940  MRN: [id number]  Referring provider: Radha Anthony  Dx:   Encounter Diagnosis     ICD-10-CM    1. Left foot drop  M21.372                      Subjective: Demarcus reports he has been walking well with his SPC at home      Objective: See treatment diary below      Assessment: Tolerated treatment well. Patient exhibited good technique with therapeutic exercises      Plan: Progress treatment as tolerated.       Precautions: A-fib, ascending aortic aneurysm      Manuals    BP                                                    Neuro Re-Ed             Rhomberg airex 30\"x3 30\"x3        30\"x3   Sharpened rhomberg 30\"ea 30\"ea        30\" ea                                                                    Ther Ex             SLR L 30 2x15 2x15 30 30 30 30 30 30 30   Heel slides L 30 2x15 2x15 30 30 30 30 30 30 30   SAQ 30 2x15 2x15 30 30 30 30 30 30 30   LAQ 30 2x15 2x15 30 30 30 30 30 30 30   Ankle PF/DF w/ assist 30 30 30 30 30 30 30 30 30 30   Ankle In/EV w/ assist 30 30 30 30 30 30 30 30 30 30   Toe curls 30 30 30 30 30 30 30 30 30 30   Hip add ball sq 30 2x15 2x15 30 30 30 30 30 30 30   Seated march 30 30 20 30 30 30 30 30 30 30   Stand hip abd 30 30 L 15 30 20ea 30 30 30 30 30   Stand HS curl 30 30 L 15 30 20ea 30 30 30 30 30   Stand march 30 30   20ea 30 30 30 30 30   Side stepping 20'x8 20'x8   NV 10'x8 10'8 10'x8 30 30   Ankle circles 30 30    30 30 30 30 30   Supine hip abd TB Bl 30 Bl 30       Blue 30 Bl 30   Ther Activity                                       Gait Training             Ambulating with ' 150'    20'x4 20'x4 20'x4 30'x4 100'                Modalities                                                                "

## 2024-02-25 ENCOUNTER — RA CDI HCC (OUTPATIENT)
Dept: OTHER | Facility: HOSPITAL | Age: 84
End: 2024-02-25

## 2024-02-25 NOTE — PROGRESS NOTES
E11.65  HCC coding opportunities          Chart Reviewed number of suggestions sent to Provider: 1     Patients Insurance     Medicare Insurance: [employer]

## 2024-02-26 ENCOUNTER — OFFICE VISIT (OUTPATIENT)
Dept: PHYSICAL THERAPY | Facility: MEDICAL CENTER | Age: 84
End: 2024-02-26
Payer: COMMERCIAL

## 2024-02-26 ENCOUNTER — APPOINTMENT (OUTPATIENT)
Dept: LAB | Facility: CLINIC | Age: 84
End: 2024-02-26
Payer: COMMERCIAL

## 2024-02-26 DIAGNOSIS — E11.9 TYPE 2 DIABETES MELLITUS WITHOUT COMPLICATION, WITHOUT LONG-TERM CURRENT USE OF INSULIN (HCC): ICD-10-CM

## 2024-02-26 DIAGNOSIS — M21.372 LEFT FOOT DROP: Primary | ICD-10-CM

## 2024-02-26 DIAGNOSIS — E11.42 TYPE 2 DIABETES MELLITUS WITH DIABETIC POLYNEUROPATHY, WITHOUT LONG-TERM CURRENT USE OF INSULIN (HCC): ICD-10-CM

## 2024-02-26 DIAGNOSIS — I48.20 CHRONIC ATRIAL FIBRILLATION (HCC): ICD-10-CM

## 2024-02-26 DIAGNOSIS — D69.6 THROMBOCYTOPENIA (HCC): ICD-10-CM

## 2024-02-26 DIAGNOSIS — C64.1 RENAL CELL CARCINOMA OF RIGHT KIDNEY (HCC): ICD-10-CM

## 2024-02-26 LAB
ALBUMIN SERPL BCP-MCNC: 3.9 G/DL (ref 3.5–5)
ALP SERPL-CCNC: 67 U/L (ref 34–104)
ALT SERPL W P-5'-P-CCNC: 18 U/L (ref 7–52)
ANION GAP SERPL CALCULATED.3IONS-SCNC: 9 MMOL/L
AST SERPL W P-5'-P-CCNC: 15 U/L (ref 13–39)
BASOPHILS # BLD AUTO: 0.04 THOUSANDS/ÂΜL (ref 0–0.1)
BASOPHILS NFR BLD AUTO: 1 % (ref 0–1)
BILIRUB SERPL-MCNC: 0.85 MG/DL (ref 0.2–1)
BUN SERPL-MCNC: 21 MG/DL (ref 5–25)
CALCIUM SERPL-MCNC: 9.6 MG/DL (ref 8.4–10.2)
CHLORIDE SERPL-SCNC: 105 MMOL/L (ref 96–108)
CO2 SERPL-SCNC: 26 MMOL/L (ref 21–32)
CREAT SERPL-MCNC: 0.89 MG/DL (ref 0.6–1.3)
EOSINOPHIL # BLD AUTO: 0.15 THOUSAND/ÂΜL (ref 0–0.61)
EOSINOPHIL NFR BLD AUTO: 3 % (ref 0–6)
ERYTHROCYTE [DISTWIDTH] IN BLOOD BY AUTOMATED COUNT: 13.9 % (ref 11.6–15.1)
EST. AVERAGE GLUCOSE BLD GHB EST-MCNC: 169 MG/DL
GFR SERPL CREATININE-BSD FRML MDRD: 79 ML/MIN/1.73SQ M
GLUCOSE P FAST SERPL-MCNC: 132 MG/DL (ref 65–99)
HBA1C MFR BLD: 7.5 %
HCT VFR BLD AUTO: 44.3 % (ref 36.5–49.3)
HGB BLD-MCNC: 14.7 G/DL (ref 12–17)
IMM GRANULOCYTES # BLD AUTO: 0.02 THOUSAND/UL (ref 0–0.2)
IMM GRANULOCYTES NFR BLD AUTO: 0 % (ref 0–2)
LYMPHOCYTES # BLD AUTO: 1.34 THOUSANDS/ÂΜL (ref 0.6–4.47)
LYMPHOCYTES NFR BLD AUTO: 28 % (ref 14–44)
MCH RBC QN AUTO: 29.3 PG (ref 26.8–34.3)
MCHC RBC AUTO-ENTMCNC: 33.2 G/DL (ref 31.4–37.4)
MCV RBC AUTO: 88 FL (ref 82–98)
MONOCYTES # BLD AUTO: 0.4 THOUSAND/ÂΜL (ref 0.17–1.22)
MONOCYTES NFR BLD AUTO: 8 % (ref 4–12)
NEUTROPHILS # BLD AUTO: 2.84 THOUSANDS/ÂΜL (ref 1.85–7.62)
NEUTS SEG NFR BLD AUTO: 60 % (ref 43–75)
NRBC BLD AUTO-RTO: 0 /100 WBCS
PLATELET # BLD AUTO: 167 THOUSANDS/UL (ref 149–390)
PMV BLD AUTO: 11.8 FL (ref 8.9–12.7)
POTASSIUM SERPL-SCNC: 3.7 MMOL/L (ref 3.5–5.3)
PROT SERPL-MCNC: 6.2 G/DL (ref 6.4–8.4)
RBC # BLD AUTO: 5.01 MILLION/UL (ref 3.88–5.62)
SODIUM SERPL-SCNC: 140 MMOL/L (ref 135–147)
TSH SERPL DL<=0.05 MIU/L-ACNC: 2.36 UIU/ML (ref 0.45–4.5)
WBC # BLD AUTO: 4.79 THOUSAND/UL (ref 4.31–10.16)

## 2024-02-26 PROCEDURE — 85025 COMPLETE CBC W/AUTO DIFF WBC: CPT

## 2024-02-26 PROCEDURE — 97110 THERAPEUTIC EXERCISES: CPT | Performed by: PHYSICAL THERAPIST

## 2024-02-26 PROCEDURE — 97116 GAIT TRAINING THERAPY: CPT | Performed by: PHYSICAL THERAPIST

## 2024-02-26 PROCEDURE — 83036 HEMOGLOBIN GLYCOSYLATED A1C: CPT

## 2024-02-26 PROCEDURE — 97112 NEUROMUSCULAR REEDUCATION: CPT | Performed by: PHYSICAL THERAPIST

## 2024-02-26 PROCEDURE — 80053 COMPREHEN METABOLIC PANEL: CPT

## 2024-02-26 PROCEDURE — 36415 COLL VENOUS BLD VENIPUNCTURE: CPT

## 2024-02-26 PROCEDURE — 84443 ASSAY THYROID STIM HORMONE: CPT

## 2024-02-26 NOTE — PROGRESS NOTES
"Daily Note     Today's date: 2024  Patient name: Demarcus Hammond  : 1940  MRN: [id number]  Referring provider: Radha Anthony  Dx:   Encounter Diagnosis     ICD-10-CM    1. Left foot drop  M21.372                      Subjective: Demarcus reports he has been walking around the house with his SPC at times      Objective: See treatment diary below      Assessment: Tolerated treatment well. Patient exhibited good technique with therapeutic exercises      Plan: Potential discharge next visit.     Precautions: A-fib, ascending aortic aneurysm      Manuals    BP                                                    Neuro Re-Ed             Rhomberg airex 30\"x3 30\"x3 30\"x3       30\"x3   Sharpened rhomberg 30\"ea 30\"ea 30\"ea       30\" ea                                                                    Ther Ex             SLR L 30 2x15 2x15 30 30 30 30 30 30 30   Heel slides L 30 2x15 2x15 30 30 30 30 30 30 30   SAQ 30 2x15 2x15 30 30 30 30 30 30 30   LAQ 30 2x15 2x15 30 30 30 30 30 30 30   Ankle PF/DF w/ assist 30 30 30 30 30 30 30 30 30 30   Ankle In/EV w/ assist 30 30 30 30 30 30 30 30 30 30   Toe curls 30 30 30 30 30 30 30 30 30 30   Hip add ball sq 30 2x15 2x15 30 30 30 30 30 30 30   Seated march 30 30 30 30 30 30 30 30 30 30   Stand hip abd 30 30 L30  30 20ea 30 30 30 30 30   Stand HS curl 30 30 L 30 30 20ea 30 30 30 30 30   Stand march 30 30 30  20ea 30 30 30 30 30   Side stepping 20'x8 20'x8 20'x8  NV 10'x8 10'8 10'x8 30 30   Ankle circles 30 30 30   30 30 30 30 30   Supine hip abd TB Bl 30 Bl 30 Bl 30      Blue 30 Bl 30   Ther Activity                                       Gait Training             Ambulating with ' 150' 150'   20'x4 20'x4 20'x4 30'x4 100'                Modalities                                                                  "

## 2024-03-01 ENCOUNTER — OFFICE VISIT (OUTPATIENT)
Dept: PHYSICAL THERAPY | Facility: MEDICAL CENTER | Age: 84
End: 2024-03-01
Payer: COMMERCIAL

## 2024-03-01 DIAGNOSIS — M21.372 LEFT FOOT DROP: Primary | ICD-10-CM

## 2024-03-01 PROCEDURE — 97112 NEUROMUSCULAR REEDUCATION: CPT | Performed by: PHYSICAL THERAPIST

## 2024-03-01 PROCEDURE — 97110 THERAPEUTIC EXERCISES: CPT | Performed by: PHYSICAL THERAPIST

## 2024-03-01 PROCEDURE — 97116 GAIT TRAINING THERAPY: CPT | Performed by: PHYSICAL THERAPIST

## 2024-03-01 NOTE — PROGRESS NOTES
"Daily Note     Today's date: 3/1/2024  Patient name: Demarcus Hammond  : 1940  MRN: [id number]  Referring provider: Radha Anthony*  Dx:   Encounter Diagnosis     ICD-10-CM    1. Left foot drop  M21.372                      Subjective: Demarcus reports he continues to use walker for community ambulation and SPC at home.       Objective: See treatment diary below Great toe extension strength 3/5      Assessment: Tolerated treatment well. Patient exhibited good technique with therapeutic exercises      Plan:  Demarcus has been compliant with attending PT and daily HEP and his strength has improved. Discussed continuing to ambulate with walker for community ambulation and SPC at home, continue to wear AFO to prevent future falls. He returns to Dr. Chavez in 1 week for follow-up visit.      Precautions: A-fib, ascending aortic aneurysm      Manuals 2/19 2/23 2/26 3/1 1/29 2/2 2/5 2/9 2/12 2/16   BP                                                    Neuro Re-Ed             Rhomberg airex 30\"x3 30\"x3 30\"x3 30\"x3      30\"x3   Sharpened rhomberg 30\"ea 30\"ea 30\"ea 30\"ea      30\" ea                                                                    Ther Ex             SLR L 30 2x15 2x15 30 30 30 30 30 30 30   Heel slides L 30 2x15 2x15 30 30 30 30 30 30 30   SAQ 30 2x15 2x15 30 30 30 30 30 30 30   LAQ 30 2x15 2x15 30 30 30 30 30 30 30   Ankle PF/DF w/ assist 30 30 30 30 30 30 30 30 30 30   Ankle In/EV w/ assist 30 30 30 30 30 30 30 30 30 30   Toe curls 30 30 30 30 30 30 30 30 30 30   Hip add ball sq 30 2x15 2x15 30 30 30 30 30 30 30   Seated march 30 30 30 30 30 30 30 30 30 30   Stand hip abd 30 30 L30  30 20ea 30 30 30 30 30   Stand HS curl 30 30 L 30 30 20ea 30 30 30 30 30   Stand march 30 30 30 30 20ea 30 30 30 30 30   Side stepping 20'x8 20'x8 20'x8 20'x8 NV 10'x8 10'8 10'x8 30 30   Ankle circles 30 30 30 30  30 30 30 30 30   Supine hip abd TB Bl 30 Bl 30 Bl 30 Bl 30     Blue 30 Bl 30   Ther Activity           "                             Gait Training             Ambulating with ' 150' 150'   20'x4 20'x4 20'x4 30'x4 100'                Modalities

## 2024-03-08 ENCOUNTER — OFFICE VISIT (OUTPATIENT)
Dept: FAMILY MEDICINE CLINIC | Facility: CLINIC | Age: 84
End: 2024-03-08
Payer: MEDICARE

## 2024-03-08 VITALS
DIASTOLIC BLOOD PRESSURE: 72 MMHG | WEIGHT: 210 LBS | BODY MASS INDEX: 32.89 KG/M2 | OXYGEN SATURATION: 99 % | HEART RATE: 68 BPM | SYSTOLIC BLOOD PRESSURE: 114 MMHG

## 2024-03-08 DIAGNOSIS — C64.1 RENAL CELL CARCINOMA OF RIGHT KIDNEY (HCC): ICD-10-CM

## 2024-03-08 DIAGNOSIS — E66.01 OBESITY, MORBID (HCC): ICD-10-CM

## 2024-03-08 DIAGNOSIS — R91.8 LUNG NODULES: Primary | ICD-10-CM

## 2024-03-08 DIAGNOSIS — D69.6 THROMBOCYTOPENIA (HCC): ICD-10-CM

## 2024-03-08 DIAGNOSIS — M21.372 LEFT FOOT DROP: ICD-10-CM

## 2024-03-08 DIAGNOSIS — E11.42 TYPE 2 DIABETES MELLITUS WITH DIABETIC POLYNEUROPATHY, WITHOUT LONG-TERM CURRENT USE OF INSULIN (HCC): ICD-10-CM

## 2024-03-08 PROBLEM — G89.29 CHRONIC PAIN OF BOTH KNEES: Status: RESOLVED | Noted: 2021-11-05 | Resolved: 2024-03-08

## 2024-03-08 PROBLEM — M17.0 BILATERAL PRIMARY OSTEOARTHRITIS OF KNEE: Status: ACTIVE | Noted: 2017-05-02

## 2024-03-08 PROBLEM — M25.562 CHRONIC PAIN OF BOTH KNEES: Status: RESOLVED | Noted: 2021-11-05 | Resolved: 2024-03-08

## 2024-03-08 PROBLEM — M25.561 CHRONIC PAIN OF BOTH KNEES: Status: RESOLVED | Noted: 2021-11-05 | Resolved: 2024-03-08

## 2024-03-08 PROCEDURE — G2211 COMPLEX E/M VISIT ADD ON: HCPCS | Performed by: FAMILY MEDICINE

## 2024-03-08 PROCEDURE — 99214 OFFICE O/P EST MOD 30 MIN: CPT | Performed by: FAMILY MEDICINE

## 2024-03-08 NOTE — PROGRESS NOTES
Assessment/Plan:       Problem List Items Addressed This Visit        Endocrine    Type 2 diabetes mellitus with diabetic polyneuropathy (HCC)    Relevant Orders    CBC and differential    Hemoglobin A1C    Comprehensive metabolic panel    Lipid Panel with Direct LDL reflex       Respiratory    Lung nodules - Primary       Hematopoietic and Hemostatic    Thrombocytopenia (HCC)    Relevant Orders    CBC and differential       Genitourinary    Renal cell carcinoma of right kidney (HCC)    Relevant Orders    CBC and differential    Comprehensive metabolic panel       Other    Obesity, morbid (HCC)    Relevant Orders    CBC and differential    Left foot drop     He is wearing a brace and underwent physical therapy.  He is utilizing a cane or walker and getting around okay.  I would encourage using the brace whenever ambulatory to help prevent falls.              Subjective:      Patient ID: Demarcus Hammond is a 84 y.o. male.    Patient presents today for follow-up of chronic health issues.  He is undergoing physical therapy and doing quite well in regards to his foot drop.  Thankfully, he had no further falls.  He is utilizing a walker or a cane at home.  He has noted some mild lower extremity swelling but denies any chest pain, shortness of breath or palpitation.  He is having no orthopnea.  A1c has improved.        The following portions of the patient's history were reviewed and updated as appropriate: allergies, current medications, past family history, past medical history, past social history, past surgical history and problem list.      Current Outpatient Medications:   •  Accu-Chek FastClix Lancets MISC, Use in the morning Use twice daily, Disp: 100 each, Rfl: 3  •  amLODIPine (NORVASC) 5 mg tablet, TAKE 1 TABLET DAILY, Disp: 90 tablet, Rfl: 3  •  apixaban (ELIQUIS) 5 mg, Take 1 tablet (5 mg total) by mouth 2 (two) times a day, Disp: 180 tablet, Rfl: 3  •  atorvastatin (LIPITOR) 40 mg tablet, Take 1 tablet (40 mg  total) by mouth daily, Disp: 90 tablet, Rfl: 1  •  Blood Glucose Monitoring Suppl (ACCU-CHEK OMERO PLUS) w/Device KIT, Use, Disp: , Rfl:   •  Calcium Carb-Cholecalciferol 600-200 MG-UNIT TABS, , Disp: , Rfl:   •  Cholecalciferol 50 MCG (2000 UT) TABS, , Disp: , Rfl:   •  Cyanocobalamin (VITAMIN B 12 PO), Take by mouth in the morning, Disp: , Rfl:   •  Empagliflozin (Jardiance) 10 MG TABS tablet, TAKE 1 TABLET (10 MG TOTAL) BY MOUTH IN THE MORNING, Disp: 90 tablet, Rfl: 3  •  glucose blood (Accu-Chek Omero Plus) test strip, Use as instructed, Disp: 100 strip, Rfl: 0  •  glucose blood (Accu-Chek Guide) test strip, Use twice daily, Disp: 100 strip, Rfl: 1  •  latanoprost (XALATAN) 0.005 % ophthalmic solution, , Disp: , Rfl:   •  losartan-hydrochlorothiazide (HYZAAR) 100-25 MG per tablet, TAKE 1 TABLET DAILY, Disp: 90 tablet, Rfl: 3  •  MAGNESIUM GLUCONATE PO, Take 400 mg by mouth, Disp: , Rfl:   •  Polyethyl Glycol-Propyl Glycol (SYSTANE OP), Apply to eye 1 drop each eye twice a day, Disp: , Rfl:      Review of Systems   Constitutional:  Negative for fatigue.   HENT:  Negative for trouble swallowing.    Respiratory:  Negative for chest tightness, shortness of breath and wheezing.    Cardiovascular:  Negative for chest pain, palpitations and leg swelling.   Gastrointestinal:  Negative for abdominal pain, constipation and diarrhea.   Endocrine: Negative for polyuria.   Genitourinary:  Negative for difficulty urinating and flank pain.   Musculoskeletal:  Negative for arthralgias and myalgias.   Skin:  Negative for rash.   Psychiatric/Behavioral:  Negative for sleep disturbance.          Objective:      /72 (BP Location: Left arm, Patient Position: Sitting, Cuff Size: Large)   Pulse 68   Wt 95.3 kg (210 lb)   SpO2 99%   BMI 32.89 kg/m²          Physical Exam  Vitals reviewed.   Constitutional:       General: He is not in acute distress.     Appearance: He is well-developed. He is obese. He is not diaphoretic.    HENT:      Head: Normocephalic.   Eyes:      General:         Right eye: No discharge.         Left eye: No discharge.      Pupils: Pupils are equal, round, and reactive to light.   Neck:      Thyroid: No thyromegaly.      Trachea: No tracheal deviation.   Cardiovascular:      Rate and Rhythm: Normal rate and regular rhythm.      Heart sounds: Normal heart sounds. No murmur heard.  Pulmonary:      Effort: Pulmonary effort is normal. No respiratory distress.      Breath sounds: No wheezing or rales.   Abdominal:      General: There is no distension.      Palpations: Abdomen is soft.      Tenderness: There is no abdominal tenderness.   Musculoskeletal:         General: Normal range of motion.      Right lower leg: No edema.      Left lower leg: No edema.      Comments: Left foot is brace due to foot drop   Lymphadenopathy:      Cervical: No cervical adenopathy.   Skin:     General: Skin is warm.      Findings: No erythema.   Neurological:      General: No focal deficit present.      Mental Status: He is alert and oriented to person, place, and time.      Gait: Gait normal.   Psychiatric:         Thought Content: Thought content normal.         Judgment: Judgment normal.           Link Willson Jr, MD

## 2024-03-08 NOTE — ASSESSMENT & PLAN NOTE
Patient is doing quite well.  Blood pressure today was on the lower side which is not necessarily typical for him.  Continue current regimen but consider decreasing his amlodipine.  He is getting a mild amount of lower extremity swelling, especially on the left where he is wearing his foot drop brace.

## 2024-03-08 NOTE — ASSESSMENT & PLAN NOTE
He is wearing a brace and underwent physical therapy.  He is utilizing a cane or walker and getting around okay.  I would encourage using the brace whenever ambulatory to help prevent falls.

## 2024-03-10 DIAGNOSIS — E11.9 TYPE 2 DIABETES MELLITUS WITHOUT COMPLICATION, WITHOUT LONG-TERM CURRENT USE OF INSULIN (HCC): ICD-10-CM

## 2024-03-10 RX ORDER — BLOOD SUGAR DIAGNOSTIC
STRIP MISCELLANEOUS
Qty: 100 STRIP | Refills: 1 | Status: SHIPPED | OUTPATIENT
Start: 2024-03-10

## 2024-03-13 ENCOUNTER — TELEPHONE (OUTPATIENT)
Age: 84
End: 2024-03-13

## 2024-03-13 NOTE — TELEPHONE ENCOUNTER
Patient's spouse called to follow up from recent OV on 3/8.  She wanted to verify that Dr. Chavez wants Demarcus to have the ordered blood work drawn, even though he just had it on 2/26.  I advised he would want it done closer to his follow up scheduled for 5/31.  She still asked for a call from office to verify.  She gave permission to leave a voicemail, stated she doesn't like messages in the portal.    [phone]    Thank you!

## 2024-04-23 NOTE — PROGRESS NOTES
Cardiology Outpatient Progress Note - Demarcus Hammond 84 y.o. male MRN: [id number]    @ Encounter: [id number]      Patient Active Problem List    Diagnosis Date Noted    Neuropathy 11/10/2023    Left foot drop 11/10/2023    Imbalance 09/07/2022    Obesity, morbid (HCC)     Abnormal findings on diagnostic imaging of skull and head, not elsewhere classified 09/09/2020    Colon polyp 10/05/2018    Bilateral hearing loss 10/05/2018    Thrombocytopenia (HCC) 10/05/2018    Aneurysm of ascending aorta without rupture (HCC) 03/12/2018    Chronic atrial fibrillation (HCC) 08/29/2017    Renal cell carcinoma of right kidney (HCC) 07/21/2017    Acute meniscal tear of left knee 05/02/2017    Bilateral primary osteoarthritis of knee 05/02/2017    Back pain, thoracic 08/20/2015    Lung nodules 07/29/2015    Chronic lumbar radiculopathy 06/17/2014    Spinal stenosis of lumbar region with neurogenic claudication. 07/30/2013    Osteopenia 07/09/2012    Erectile dysfunction of non-organic origin 06/15/2012    Type 2 diabetes mellitus with diabetic polyneuropathy (HCC) 05/16/2012    Glaucoma 12/17/2008    Essential hypertension 02/06/2008    Pure hypercholesterolemia 02/06/2008       Assessment:  # atrial flutter  AC: Eliquis 5 mg   Rate:     #  Dilated ascending thoracic aorta  CT chest 12/9/23: aorta 44 mm  CT Chest 12/6/22: ascending aorta 44 mm  CT Chest 2/23/21: dilated ascending thoracic aorta 4.4 cm, no change  CT Chest 6/15/19: ascending aorta 4.5 cm    # HTN- BP at goal with SBP < 130 mmHg  Losartan/ hctz 100/25 mg daily, amlodipine 5 mg     Studies- personally reviewed by me  Echo 12/19/23:  LVEF: 55%, 1.4 cm  RV: normal  Mild MR, TR    Echo 5/8/18:   EF: 60%; aorta 4.3 cm    # Coronary eval  Stress test 8/31/17: normal  EKG today: SR, first degree AV block    # 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    # DM- metformin, Januvia  HgA1C 2/26/24: 7.5%  HgA1C 11/15/23: 8%    # dyslipidemia- atorvastatin 40 mg    11/15/23: LDL 98, HDL 56  11/14/22: LDL 79, HDL 59  5/2/22: LDL 85, HDL 62  10/8/21: LDL 83, HDL 54    # ED- Cialis prn    # left foot drop, uses a walker    TODAY'S PLAN:  Prn lasix written for occasional edema  BP pretty much at goal for dilated aorta  Aorta size, no change on follow up CT 12/9/23  Eliquis for aflutter  Does not need ppm yet   If heart rate goes up then would need to add AV annamaria blocker and may need PPM as had pauses  Continue atorvastatin 40 mg daily  Pt is going to PT  HgA1C at about goal on therapy    HPI:   85 yo male who had initially presented for pre-op clearance for right nephrectomy for renal mass for RCC. No cardiac history. He had DM, hyperlipidemia, former smoker over 30 years ago. He had stress in past for screening not for CP. He does bowling but no true exercise. No chest pain or SOB. No edema.     Interval History:   Echo 12/19/23:  LVEF: 55%, 1.4 cm  RV: normal  Mild MR, TR    No lightheadedness  No new heart concerns    Review of Systems   Constitutional:  Negative for activity change, appetite change, fatigue and unexpected weight change.   HENT:  Negative for congestion and nosebleeds.    Eyes: Negative.    Respiratory:  Negative for cough, chest tightness and shortness of breath.    Cardiovascular:  Negative for chest pain, palpitations and leg swelling.   Gastrointestinal:  Negative for abdominal distention.   Endocrine: Negative.    Genitourinary: Negative.    Musculoskeletal: Negative.    Skin: Negative.    Neurological:  Negative for dizziness, syncope and weakness.   Hematological: Negative.    Psychiatric/Behavioral: Negative.         Past Medical History:   Diagnosis Date    Arthritis     Bladder mass 6/28/2017    Follows with Urology    Cancer (HCC)     right kidney    Cardiac aneurysm     Chronic pain disorder     Diabetes mellitus (HCC)     NIDDM    Erectile dysfunction of non-organic origin     Glaucoma     Hx of bleeding disorder     in urine    Hyperlipidemia      Hypertension     Meniscus tear     Left knee    Urinary tract infection with hematuria     last assessed 05/30/2017    Wears glasses     Wears partial dentures     upper partial       No Known Allergies  .    Current Outpatient Medications:     Accu-Chek FastClix Lancets MISC, Use in the morning Use twice daily, Disp: 100 each, Rfl: 3    amLODIPine (NORVASC) 5 mg tablet, TAKE 1 TABLET DAILY, Disp: 90 tablet, Rfl: 3    apixaban (ELIQUIS) 5 mg, Take 1 tablet (5 mg total) by mouth 2 (two) times a day, Disp: 180 tablet, Rfl: 3    atorvastatin (LIPITOR) 40 mg tablet, Take 1 tablet (40 mg total) by mouth daily, Disp: 90 tablet, Rfl: 1    Blood Glucose Monitoring Suppl (ACCU-CHEK OMERO PLUS) w/Device KIT, Use, Disp: , Rfl:     Calcium Carb-Cholecalciferol 600-200 MG-UNIT TABS, , Disp: , Rfl:     Cholecalciferol 50 MCG (2000 UT) TABS, , Disp: , Rfl:     Cyanocobalamin (VITAMIN B 12 PO), Take by mouth in the morning, Disp: , Rfl:     Empagliflozin (Jardiance) 10 MG TABS tablet, TAKE 1 TABLET (10 MG TOTAL) BY MOUTH IN THE MORNING, Disp: 90 tablet, Rfl: 3    glucose blood (Accu-Chek Omero Plus) test strip, Use as instructed, Disp: 100 strip, Rfl: 0    glucose blood (Accu-Chek Guide) test strip, USE TWICE DAILY AS DIRECTED, Disp: 100 strip, Rfl: 1    latanoprost (XALATAN) 0.005 % ophthalmic solution, , Disp: , Rfl:     losartan-hydrochlorothiazide (HYZAAR) 100-25 MG per tablet, TAKE 1 TABLET DAILY, Disp: 90 tablet, Rfl: 3    MAGNESIUM GLUCONATE PO, Take 400 mg by mouth, Disp: , Rfl:     Polyethyl Glycol-Propyl Glycol (SYSTANE OP), Apply to eye 1 drop each eye twice a day, Disp: , Rfl:     Social History     Socioeconomic History    Marital status: /Civil Union     Spouse name: Not on file    Number of children: Not on file    Years of education: Not on file    Highest education level: Not on file   Occupational History    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   Tobacco Use    Smoking status: Former     Current  "packs/day: 0.00     Types: Cigarettes     Quit date: 1985     Years since quittin.6    Smokeless tobacco: Never   Vaping Use    Vaping status: Never Used   Substance and Sexual Activity    Alcohol use: Not Currently     Comment: rarely    Drug use: No    Sexual activity: Not on file   Other Topics Concern    Not on file   Social History Narrative    Does not consume caffeine        Never drank alcohol per [employer]     Social Determinants of Health     Financial Resource Strain: Low Risk  (2023)    Overall Financial Resource Strain (CARDIA)     Difficulty of Paying Living Expenses: Not hard at all   Food Insecurity: Not on file   Transportation Needs: No Transportation Needs (2023)    PRAPARE - Transportation     Lack of Transportation (Medical): No     Lack of Transportation (Non-Medical): No   Physical Activity: Not on file   Stress: Not on file   Social Connections: Not on file   Intimate Partner Violence: Not on file   Housing Stability: Not on file       Family History   Problem Relation Age of Onset    Other Mother         malaria    Diabetes Sister     Hypertension Sister        Physical Exam:    Vitals: Blood pressure 110/62, pulse 65, height 5' 7\" (1.702 m), weight 96.6 kg (213 lb), SpO2 97%., Body mass index is 33.36 kg/m².,   Wt Readings from Last 3 Encounters:   24 96.6 kg (213 lb)   24 95.3 kg (210 lb)   24 94.8 kg (209 lb)         Physical Exam:    Physical Exam  Constitutional:       Appearance: He is well-developed. He is not diaphoretic.   HENT:      Head: Normocephalic and atraumatic.   Eyes:      Pupils: Pupils are equal, round, and reactive to light.   Neck:      Vascular: No JVD.   Cardiovascular:      Rate and Rhythm: Normal rate and regular rhythm.      Heart sounds: Normal heart sounds. No murmur heard.  Pulmonary:      Effort: Pulmonary effort is normal.      Breath sounds: Normal breath sounds. No rales.   Abdominal:      General: Bowel sounds are " "normal. There is no distension.      Palpations: Abdomen is soft.   Musculoskeletal:         General: Normal range of motion.      Cervical back: Normal range of motion.   Skin:     General: Skin is warm and dry.   Neurological:      Mental Status: He is alert and oriented to person, place, and time.       Labs & Results:    Lab Results   Component Value Date    GLUCOSE 276 (H) 07/29/2015    CALCIUM 9.6 02/26/2024     07/29/2015    K 3.7 02/26/2024    CO2 26 02/26/2024     02/26/2024    BUN 21 02/26/2024    CREATININE 0.89 02/26/2024     Lab Results   Component Value Date    WBC 4.79 02/26/2024    HGB 14.7 02/26/2024    HCT 44.3 02/26/2024    MCV 88 02/26/2024     02/26/2024     No results found for: \"BNP\"   Lab Results   Component Value Date    CHOL 173 01/20/2014     Lab Results   Component Value Date    HDL 56 11/15/2023    HDL 56 03/14/2023    HDL 59 11/14/2022     Lab Results   Component Value Date    LDLCALC 98 11/15/2023    LDLCALC 71 03/14/2023    LDLCALC 79 11/14/2022     Lab Results   Component Value Date    TRIG 51 11/15/2023    TRIG 41 03/14/2023    TRIG 50 11/14/2022     No results found for: \"CHOLHDL\"     EKG personally reviewed by Angelo Preston.     Counseling / Coordination of Care  Time spent today 25 minutes.  Greater than 50% of total time was spent with the patient and / or family counseling and / or coordination of care.  We discussed diagnoses, most recent studies, tests and any changes in treatment plan  Thank you for the opportunity to participate in the care of this patient.    ANGELO PRESTON D.O.  DIRECTOR OF HEART FAILURE/ PULMONARY HYPERTENSION  MEDICAL DIRECTOR OF LVAD PROGRAM  [facility]    "

## 2024-04-25 ENCOUNTER — OFFICE VISIT (OUTPATIENT)
Dept: CARDIOLOGY CLINIC | Facility: CLINIC | Age: 84
End: 2024-04-25
Payer: COMMERCIAL

## 2024-04-25 VITALS
DIASTOLIC BLOOD PRESSURE: 62 MMHG | HEIGHT: 67 IN | WEIGHT: 213 LBS | HEART RATE: 65 BPM | SYSTOLIC BLOOD PRESSURE: 110 MMHG | OXYGEN SATURATION: 97 % | BODY MASS INDEX: 33.43 KG/M2

## 2024-04-25 DIAGNOSIS — R60.0 EDEMA OF BOTH LEGS: ICD-10-CM

## 2024-04-25 DIAGNOSIS — E78.00 PURE HYPERCHOLESTEROLEMIA: ICD-10-CM

## 2024-04-25 DIAGNOSIS — I48.20 CHRONIC ATRIAL FIBRILLATION (HCC): Primary | ICD-10-CM

## 2024-04-25 DIAGNOSIS — I71.21 ANEURYSM OF ASCENDING AORTA WITHOUT RUPTURE (HCC): ICD-10-CM

## 2024-04-25 DIAGNOSIS — I10 ESSENTIAL HYPERTENSION: ICD-10-CM

## 2024-04-25 PROCEDURE — 99214 OFFICE O/P EST MOD 30 MIN: CPT | Performed by: INTERNAL MEDICINE

## 2024-04-25 RX ORDER — FUROSEMIDE 20 MG/1
20 TABLET ORAL AS NEEDED
Qty: 30 TABLET | Refills: 3 | Status: SHIPPED | OUTPATIENT
Start: 2024-04-25

## 2024-05-14 DIAGNOSIS — E78.00 PURE HYPERCHOLESTEROLEMIA: ICD-10-CM

## 2024-05-15 RX ORDER — ATORVASTATIN CALCIUM 40 MG/1
40 TABLET, FILM COATED ORAL DAILY
Qty: 90 TABLET | Refills: 1 | Status: SHIPPED | OUTPATIENT
Start: 2024-05-15

## 2024-05-17 DIAGNOSIS — R60.0 EDEMA OF BOTH LEGS: ICD-10-CM

## 2024-05-17 DIAGNOSIS — I48.20 CHRONIC ATRIAL FIBRILLATION (HCC): ICD-10-CM

## 2024-05-17 DIAGNOSIS — I10 ESSENTIAL HYPERTENSION: ICD-10-CM

## 2024-05-17 RX ORDER — FUROSEMIDE 20 MG/1
20 TABLET ORAL AS NEEDED
Qty: 90 TABLET | Refills: 1 | Status: SHIPPED | OUTPATIENT
Start: 2024-05-17

## 2024-05-23 ENCOUNTER — RA CDI HCC (OUTPATIENT)
Dept: OTHER | Facility: HOSPITAL | Age: 84
End: 2024-05-23

## 2024-05-23 NOTE — PROGRESS NOTES
E11.65-- A1c values  HCC coding opportunities          Chart Reviewed number of suggestions sent to Provider: 1     Patients Insurance     Medicare Insurance: [employer]vantage

## 2024-05-24 ENCOUNTER — APPOINTMENT (OUTPATIENT)
Dept: LAB | Facility: CLINIC | Age: 84
End: 2024-05-24
Payer: COMMERCIAL

## 2024-05-24 DIAGNOSIS — C64.1 RENAL CELL CARCINOMA OF RIGHT KIDNEY (HCC): ICD-10-CM

## 2024-05-24 DIAGNOSIS — E66.01 OBESITY, MORBID (HCC): ICD-10-CM

## 2024-05-24 DIAGNOSIS — D69.6 THROMBOCYTOPENIA (HCC): ICD-10-CM

## 2024-05-24 DIAGNOSIS — E11.42 TYPE 2 DIABETES MELLITUS WITH DIABETIC POLYNEUROPATHY, WITHOUT LONG-TERM CURRENT USE OF INSULIN (HCC): ICD-10-CM

## 2024-05-24 LAB
ALBUMIN SERPL BCP-MCNC: 3.9 G/DL (ref 3.5–5)
ALP SERPL-CCNC: 88 U/L (ref 34–104)
ALT SERPL W P-5'-P-CCNC: 20 U/L (ref 7–52)
ANION GAP SERPL CALCULATED.3IONS-SCNC: 10 MMOL/L (ref 4–13)
AST SERPL W P-5'-P-CCNC: 16 U/L (ref 13–39)
BASOPHILS # BLD AUTO: 0.03 THOUSANDS/ÂΜL (ref 0–0.1)
BASOPHILS NFR BLD AUTO: 1 % (ref 0–1)
BILIRUB SERPL-MCNC: 0.57 MG/DL (ref 0.2–1)
BUN SERPL-MCNC: 35 MG/DL (ref 5–25)
CALCIUM SERPL-MCNC: 8.7 MG/DL (ref 8.4–10.2)
CHLORIDE SERPL-SCNC: 101 MMOL/L (ref 96–108)
CHOLEST SERPL-MCNC: 151 MG/DL
CO2 SERPL-SCNC: 26 MMOL/L (ref 21–32)
CREAT SERPL-MCNC: 1.2 MG/DL (ref 0.6–1.3)
EOSINOPHIL # BLD AUTO: 0.16 THOUSAND/ÂΜL (ref 0–0.61)
EOSINOPHIL NFR BLD AUTO: 3 % (ref 0–6)
ERYTHROCYTE [DISTWIDTH] IN BLOOD BY AUTOMATED COUNT: 13.8 % (ref 11.6–15.1)
EST. AVERAGE GLUCOSE BLD GHB EST-MCNC: 169 MG/DL
GFR SERPL CREATININE-BSD FRML MDRD: 55 ML/MIN/1.73SQ M
GLUCOSE P FAST SERPL-MCNC: 161 MG/DL (ref 65–99)
HBA1C MFR BLD: 7.5 %
HCT VFR BLD AUTO: 45 % (ref 36.5–49.3)
HDLC SERPL-MCNC: 56 MG/DL
HGB BLD-MCNC: 14.5 G/DL (ref 12–17)
IMM GRANULOCYTES # BLD AUTO: 0.02 THOUSAND/UL (ref 0–0.2)
IMM GRANULOCYTES NFR BLD AUTO: 0 % (ref 0–2)
LDLC SERPL CALC-MCNC: 83 MG/DL (ref 0–100)
LYMPHOCYTES # BLD AUTO: 1.3 THOUSANDS/ÂΜL (ref 0.6–4.47)
LYMPHOCYTES NFR BLD AUTO: 27 % (ref 14–44)
MCH RBC QN AUTO: 29.1 PG (ref 26.8–34.3)
MCHC RBC AUTO-ENTMCNC: 32.2 G/DL (ref 31.4–37.4)
MCV RBC AUTO: 90 FL (ref 82–98)
MONOCYTES # BLD AUTO: 0.36 THOUSAND/ÂΜL (ref 0.17–1.22)
MONOCYTES NFR BLD AUTO: 7 % (ref 4–12)
NEUTROPHILS # BLD AUTO: 3.02 THOUSANDS/ÂΜL (ref 1.85–7.62)
NEUTS SEG NFR BLD AUTO: 62 % (ref 43–75)
NRBC BLD AUTO-RTO: 0 /100 WBCS
PLATELET # BLD AUTO: 157 THOUSANDS/UL (ref 149–390)
PMV BLD AUTO: 11.5 FL (ref 8.9–12.7)
POTASSIUM SERPL-SCNC: 3.9 MMOL/L (ref 3.5–5.3)
PROT SERPL-MCNC: 6.7 G/DL (ref 6.4–8.4)
RBC # BLD AUTO: 4.99 MILLION/UL (ref 3.88–5.62)
SODIUM SERPL-SCNC: 137 MMOL/L (ref 135–147)
TRIGL SERPL-MCNC: 61 MG/DL
WBC # BLD AUTO: 4.89 THOUSAND/UL (ref 4.31–10.16)

## 2024-05-24 PROCEDURE — 80061 LIPID PANEL: CPT

## 2024-05-24 PROCEDURE — 36415 COLL VENOUS BLD VENIPUNCTURE: CPT

## 2024-05-24 PROCEDURE — 80053 COMPREHEN METABOLIC PANEL: CPT

## 2024-05-24 PROCEDURE — 83036 HEMOGLOBIN GLYCOSYLATED A1C: CPT

## 2024-05-24 PROCEDURE — 85025 COMPLETE CBC W/AUTO DIFF WBC: CPT

## 2024-05-31 ENCOUNTER — OFFICE VISIT (OUTPATIENT)
Dept: FAMILY MEDICINE CLINIC | Facility: CLINIC | Age: 84
End: 2024-05-31
Payer: COMMERCIAL

## 2024-05-31 VITALS
WEIGHT: 217.4 LBS | DIASTOLIC BLOOD PRESSURE: 72 MMHG | HEART RATE: 60 BPM | SYSTOLIC BLOOD PRESSURE: 134 MMHG | BODY MASS INDEX: 34.05 KG/M2 | OXYGEN SATURATION: 96 %

## 2024-05-31 DIAGNOSIS — E78.00 PURE HYPERCHOLESTEROLEMIA: ICD-10-CM

## 2024-05-31 DIAGNOSIS — R91.8 LUNG NODULES: ICD-10-CM

## 2024-05-31 DIAGNOSIS — I10 ESSENTIAL HYPERTENSION: Primary | ICD-10-CM

## 2024-05-31 DIAGNOSIS — I71.21 ANEURYSM OF ASCENDING AORTA WITHOUT RUPTURE (HCC): ICD-10-CM

## 2024-05-31 DIAGNOSIS — M54.16 CHRONIC LUMBAR RADICULOPATHY: ICD-10-CM

## 2024-05-31 DIAGNOSIS — I48.20 CHRONIC ATRIAL FIBRILLATION (HCC): ICD-10-CM

## 2024-05-31 DIAGNOSIS — D69.6 THROMBOCYTOPENIA (HCC): ICD-10-CM

## 2024-05-31 DIAGNOSIS — E11.42 TYPE 2 DIABETES MELLITUS WITH DIABETIC POLYNEUROPATHY, WITHOUT LONG-TERM CURRENT USE OF INSULIN (HCC): ICD-10-CM

## 2024-05-31 DIAGNOSIS — E66.01 OBESITY, MORBID (HCC): ICD-10-CM

## 2024-05-31 DIAGNOSIS — C64.1 RENAL CELL CARCINOMA OF RIGHT KIDNEY (HCC): ICD-10-CM

## 2024-05-31 PROBLEM — G62.9 NEUROPATHY: Status: RESOLVED | Noted: 2023-11-10 | Resolved: 2024-05-31

## 2024-05-31 PROCEDURE — 99214 OFFICE O/P EST MOD 30 MIN: CPT | Performed by: FAMILY MEDICINE

## 2024-05-31 PROCEDURE — G2211 COMPLEX E/M VISIT ADD ON: HCPCS | Performed by: FAMILY MEDICINE

## 2024-05-31 NOTE — ASSESSMENT & PLAN NOTE
He seems to be in sinus rhythm today.  Continue routine follow-up with cardiology.  They recently have given him Lasix to use as needed.  Continue anticoagulation.

## 2024-05-31 NOTE — ASSESSMENT & PLAN NOTE
He has some ongoing gait dysfunction with some chronic low back pain.  He is using a walker intermittently at this time.

## 2024-05-31 NOTE — ASSESSMENT & PLAN NOTE
Lab Results   Component Value Date    HGBA1C 7.5 (H) 05/24/2024   Continue Jardiance.  A1c is well-controlled.

## 2024-05-31 NOTE — PROGRESS NOTES
Ambulatory Visit  Name: Demarcus Hammond      : 1940      MRN: [id number]  Encounter Provider: Link Willson Jr, MD  Encounter Date: 2024   Encounter department: [facility]    Assessment & Plan   1. Essential hypertension  Assessment & Plan:  Well-controlled at this time.  Orders:  -     CBC and differential; Future; Expected date: 10/31/2024  -     Comprehensive metabolic panel; Future; Expected date: 10/31/2024  2. Chronic atrial fibrillation (HCC)  Assessment & Plan:  He seems to be in sinus rhythm today.  Continue routine follow-up with cardiology.  They recently have given him Lasix to use as needed.  Continue anticoagulation.  3. Aneurysm of ascending aorta without rupture (HCC)  Assessment & Plan:  Risk factors well-controlled at this time.  Consider imaging after his follow-up.  4. Type 2 diabetes mellitus with diabetic polyneuropathy, without long-term current use of insulin (HCC)  Assessment & Plan:    Lab Results   Component Value Date    HGBA1C 7.5 (H) 2024   Continue Jardiance.  A1c is well-controlled.  Orders:  -     Hemoglobin A1C; Future; Expected date: 10/31/2024  -     Albumin / creatinine urine ratio; Future; Expected date: 10/31/2024  5. Thrombocytopenia (HCC)  Assessment & Plan:  Check CBC prior to follow-up  Orders:  -     CBC and differential; Future; Expected date: 10/31/2024  -     Comprehensive metabolic panel; Future; Expected date: 10/31/2024  6. Pure hypercholesterolemia  -     Lipid Panel with Direct LDL reflex; Future; Expected date: 10/31/2024  7. Obesity, morbid (HCC)  -     CBC and differential; Future; Expected date: 10/31/2024  -     Comprehensive metabolic panel; Future; Expected date: 10/31/2024  -     Hemoglobin A1C; Future; Expected date: 10/31/2024  -     Lipid Panel with Direct LDL reflex; Future; Expected date: 10/31/2024  -     Albumin / creatinine urine ratio; Future; Expected date: 10/31/2024  8. Lung nodules  Assessment &  Plan:  CT in December looked okay.  9. Chronic lumbar radiculopathy  Assessment & Plan:  He has some ongoing gait dysfunction with some chronic low back pain.  He is using a walker intermittently at this time.  10. Renal cell carcinoma of right kidney (HCC)  Assessment & Plan:  Continue annual follow-up with urology.       History of Present Illness     Patient presents today for follow-up of chronic health issues.  Overall, is doing pretty well.  He is using a brace for his foot drop but is becoming annoyed by it.  Fortunately, he had no recent falls.  Labs look okay.  Diabetes well-controlled.  His history of A-fib and denies chest pain or palpitations.  Tolerating anticoagulation.  He does have some chronic lower extremity swelling and cardiology has given him furosemide to utilize as needed.        Review of Systems   Constitutional:  Negative for appetite change, chills, fatigue, fever and unexpected weight change.   HENT:  Negative for trouble swallowing.    Eyes:  Negative for visual disturbance.   Respiratory:  Negative for cough, chest tightness, shortness of breath and wheezing.    Cardiovascular:  Negative for chest pain, palpitations and leg swelling.   Gastrointestinal:  Negative for abdominal distention, abdominal pain, blood in stool, constipation and diarrhea.   Endocrine: Negative for polyuria.   Genitourinary:  Negative for difficulty urinating and flank pain.   Musculoskeletal:  Positive for back pain and gait problem. Negative for arthralgias and myalgias.   Skin:  Negative for rash.   Neurological:  Negative for dizziness and light-headedness.   Hematological:  Negative for adenopathy. Does not bruise/bleed easily.   Psychiatric/Behavioral:  Negative for dysphoric mood and sleep disturbance. The patient is not nervous/anxious.        Objective     /72 (BP Location: Left arm, Patient Position: Sitting, Cuff Size: Large)   Pulse 60   Wt 98.6 kg (217 lb 6.4 oz)   SpO2 96%   BMI 34.05  kg/m²     Physical Exam  Constitutional:       General: He is not in acute distress.     Appearance: Normal appearance. He is well-developed. He is obese. He is not diaphoretic.   HENT:      Head: Normocephalic.      Right Ear: External ear normal.      Left Ear: External ear normal.      Nose: Nose normal.   Eyes:      General:         Right eye: No discharge.         Left eye: No discharge.      Conjunctiva/sclera: Conjunctivae normal.      Pupils: Pupils are equal, round, and reactive to light.   Neck:      Thyroid: No thyromegaly.      Trachea: No tracheal deviation.   Cardiovascular:      Rate and Rhythm: Normal rate and regular rhythm.      Heart sounds: Normal heart sounds. No murmur heard.     No friction rub.   Pulmonary:      Effort: Pulmonary effort is normal. No respiratory distress.      Breath sounds: Normal breath sounds. No wheezing.   Chest:      Chest wall: No tenderness.   Abdominal:      General: There is no distension.      Palpations: There is no mass.      Tenderness: There is no abdominal tenderness. There is no guarding or rebound.      Hernia: No hernia is present.   Musculoskeletal:         General: No swelling or deformity.      Cervical back: Normal range of motion.      Right lower leg: Edema present.      Left lower leg: Edema (He has 1+ pitting edema bilateral lower extremities) present.      Comments: Left foot drop.   Skin:     Findings: No erythema or rash.   Neurological:      General: No focal deficit present.      Mental Status: He is alert.      Cranial Nerves: No cranial nerve deficit.      Coordination: Coordination normal.   Psychiatric:         Thought Content: Thought content normal.       Administrative Statements

## 2024-06-04 DIAGNOSIS — E11.9 TYPE 2 DIABETES MELLITUS WITHOUT COMPLICATION, WITHOUT LONG-TERM CURRENT USE OF INSULIN (HCC): ICD-10-CM

## 2024-06-04 RX ORDER — LANCETS
EACH MISCELLANEOUS
Qty: 100 EACH | Refills: 2 | Status: SHIPPED | OUTPATIENT
Start: 2024-06-04

## 2024-06-05 RX ORDER — BLOOD SUGAR DIAGNOSTIC
STRIP MISCELLANEOUS
Qty: 100 STRIP | Refills: 1 | Status: SHIPPED | OUTPATIENT
Start: 2024-06-05

## 2024-07-18 DIAGNOSIS — I10 ESSENTIAL HYPERTENSION: ICD-10-CM

## 2024-07-18 RX ORDER — LOSARTAN POTASSIUM AND HYDROCHLOROTHIAZIDE 25; 100 MG/1; MG/1
TABLET ORAL
Qty: 90 TABLET | Refills: 1 | Status: SHIPPED | OUTPATIENT
Start: 2024-07-18

## 2024-09-07 DIAGNOSIS — I48.20 CHRONIC ATRIAL FIBRILLATION (HCC): ICD-10-CM

## 2024-09-07 DIAGNOSIS — E11.9 TYPE 2 DIABETES MELLITUS WITHOUT COMPLICATION, WITHOUT LONG-TERM CURRENT USE OF INSULIN (HCC): ICD-10-CM

## 2024-09-08 DIAGNOSIS — I48.20 CHRONIC ATRIAL FIBRILLATION (HCC): ICD-10-CM

## 2024-09-08 DIAGNOSIS — R60.0 EDEMA OF BOTH LEGS: ICD-10-CM

## 2024-09-08 DIAGNOSIS — E11.42 TYPE 2 DIABETES MELLITUS WITH DIABETIC POLYNEUROPATHY, WITHOUT LONG-TERM CURRENT USE OF INSULIN (HCC): ICD-10-CM

## 2024-09-08 DIAGNOSIS — I10 ESSENTIAL HYPERTENSION: ICD-10-CM

## 2024-09-08 RX ORDER — BLOOD SUGAR DIAGNOSTIC
STRIP MISCELLANEOUS
Qty: 100 STRIP | Refills: 1 | Status: SHIPPED | OUTPATIENT
Start: 2024-09-08

## 2024-09-08 RX ORDER — APIXABAN 5 MG/1
5 TABLET, FILM COATED ORAL 2 TIMES DAILY
Qty: 180 TABLET | Refills: 1 | Status: SHIPPED | OUTPATIENT
Start: 2024-09-08

## 2024-09-09 RX ORDER — FUROSEMIDE 20 MG
20 TABLET ORAL AS NEEDED
Qty: 90 TABLET | Refills: 1 | Status: SHIPPED | OUTPATIENT
Start: 2024-09-09

## 2024-09-09 RX ORDER — EMPAGLIFLOZIN 10 MG/1
10 TABLET, FILM COATED ORAL EVERY MORNING
Qty: 90 TABLET | Refills: 1 | Status: SHIPPED | OUTPATIENT
Start: 2024-09-09

## 2024-09-25 ENCOUNTER — TELEPHONE (OUTPATIENT)
Dept: FAMILY MEDICINE CLINIC | Facility: CLINIC | Age: 84
End: 2024-09-25

## 2024-09-25 ENCOUNTER — TELEPHONE (OUTPATIENT)
Age: 84
End: 2024-09-25

## 2024-09-25 ENCOUNTER — TELEPHONE (OUTPATIENT)
Dept: ADMINISTRATIVE | Facility: OTHER | Age: 84
End: 2024-09-25

## 2024-09-25 NOTE — TELEPHONE ENCOUNTER
Warm transfer from [facility]. Spoke to spouse who was inquiring about the prescription for her hubby's shoes

## 2024-09-25 NOTE — TELEPHONE ENCOUNTER
----- Message from Silvano SIM sent at 9/25/2024 11:29 AM EDT -----  09/25/24 11:29 AM    Hello, our patient Demarcus Hammond has had Diabetic Foot Exam completed/performed. Please assist in updating the patient chart by pulling the Care Everywhere (CE) document. The date of service is 7/2024.     Thank you,  Silvano Henry MA  [facility]

## 2024-09-25 NOTE — TELEPHONE ENCOUNTER
Patient wife called in and stated that she dropped off paper work for the patients diabetic shoes on 9/20 to have it filled out by Dr. Willson - called over to the office and warm trans call.

## 2024-09-26 NOTE — TELEPHONE ENCOUNTER
Upon review of the In Basket request we were able to locate, review, and update the patient chart as requested for Diabetic Foot Exam.    Any additional questions or concerns should be emailed to the Practice Liaisons via the appropriate education email address, please do not reply via In Basket.    Thank you  Salima Lujan   [facility]

## 2024-09-27 DIAGNOSIS — I10 HTN (HYPERTENSION), BENIGN: ICD-10-CM

## 2024-09-27 RX ORDER — AMLODIPINE BESYLATE 5 MG/1
TABLET ORAL
Qty: 90 TABLET | Refills: 1 | Status: SHIPPED | OUTPATIENT
Start: 2024-09-27

## 2024-09-30 ENCOUNTER — TELEPHONE (OUTPATIENT)
Age: 84
End: 2024-09-30

## 2024-09-30 NOTE — TELEPHONE ENCOUNTER
Pt wife called in stating the podiatry forms received are incomplete so therefore they have returned back to the practice. Did warm transfer the call to practice clinical call was answered by Anastasia and she did check with Daisha and confirmed they have received back the form. And will be completed by Dr. Harveyorrdina tomorrow as he is not in today and will keep the patient wife informed by tmr once it is completed and sent over. Relayed the same message to her. Thanks

## 2024-10-01 NOTE — TELEPHONE ENCOUNTER
10/1 4pm: Spoke w/[facility] [place] [facility], who stated that they are having issues w/their fax machine, and requested that I try to fax them the paperwork tomorrow, 10/2. She was agreeable that I place the original paperwork in the mail to them, and continue to fax them tomorrow as well.

## 2024-10-01 NOTE — TELEPHONE ENCOUNTER
10/1 4:05pm: BARRY that I tried to fax the podiatry paperwork to [facility] all day today, but the fax will not go thru due to the company having issues with their fax. I have placed the paperwork in the  Out box to be mailed to [facility], and will continue to fax the paperwork tomorrow.

## 2024-10-16 ENCOUNTER — TELEPHONE (OUTPATIENT)
Age: 84
End: 2024-10-16

## 2024-10-16 NOTE — TELEPHONE ENCOUNTER
Pts wife called regarding the labs because they have an expected date 10/31 but she is a little worried if the insurance will cover it or not. Will contact insurance. FYI

## 2024-10-27 DIAGNOSIS — E11.9 TYPE 2 DIABETES MELLITUS WITHOUT COMPLICATION, WITHOUT LONG-TERM CURRENT USE OF INSULIN (HCC): ICD-10-CM

## 2024-10-28 RX ORDER — LANCETS
EACH MISCELLANEOUS
Qty: 100 EACH | Refills: 2 | Status: SHIPPED | OUTPATIENT
Start: 2024-10-28

## 2024-10-30 ENCOUNTER — LAB (OUTPATIENT)
Dept: LAB | Facility: CLINIC | Age: 84
End: 2024-10-30
Payer: COMMERCIAL

## 2024-10-30 DIAGNOSIS — E78.00 PURE HYPERCHOLESTEROLEMIA: ICD-10-CM

## 2024-10-30 DIAGNOSIS — E66.01 OBESITY, MORBID (HCC): ICD-10-CM

## 2024-10-30 DIAGNOSIS — D69.6 THROMBOCYTOPENIA (HCC): ICD-10-CM

## 2024-10-30 DIAGNOSIS — E11.42 TYPE 2 DIABETES MELLITUS WITH DIABETIC POLYNEUROPATHY, WITHOUT LONG-TERM CURRENT USE OF INSULIN (HCC): ICD-10-CM

## 2024-10-30 DIAGNOSIS — I10 ESSENTIAL HYPERTENSION: ICD-10-CM

## 2024-10-30 LAB
ALBUMIN SERPL BCG-MCNC: 3.9 G/DL (ref 3.5–5)
ALP SERPL-CCNC: 94 U/L (ref 34–104)
ALT SERPL W P-5'-P-CCNC: 14 U/L (ref 7–52)
ANION GAP SERPL CALCULATED.3IONS-SCNC: 10 MMOL/L (ref 4–13)
AST SERPL W P-5'-P-CCNC: 11 U/L (ref 13–39)
BASOPHILS # BLD AUTO: 0.04 THOUSANDS/ΜL (ref 0–0.1)
BASOPHILS NFR BLD AUTO: 1 % (ref 0–1)
BILIRUB SERPL-MCNC: 0.74 MG/DL (ref 0.2–1)
BUN SERPL-MCNC: 22 MG/DL (ref 5–25)
CALCIUM SERPL-MCNC: 8.9 MG/DL (ref 8.4–10.2)
CHLORIDE SERPL-SCNC: 101 MMOL/L (ref 96–108)
CHOLEST SERPL-MCNC: 159 MG/DL
CO2 SERPL-SCNC: 26 MMOL/L (ref 21–32)
CREAT SERPL-MCNC: 0.86 MG/DL (ref 0.6–1.3)
CREAT UR-MCNC: 78.2 MG/DL
EOSINOPHIL # BLD AUTO: 0.32 THOUSAND/ΜL (ref 0–0.61)
EOSINOPHIL NFR BLD AUTO: 6 % (ref 0–6)
ERYTHROCYTE [DISTWIDTH] IN BLOOD BY AUTOMATED COUNT: 13.5 % (ref 11.6–15.1)
EST. AVERAGE GLUCOSE BLD GHB EST-MCNC: 177 MG/DL
GFR SERPL CREATININE-BSD FRML MDRD: 80 ML/MIN/1.73SQ M
GLUCOSE P FAST SERPL-MCNC: 152 MG/DL (ref 65–99)
HBA1C MFR BLD: 7.8 %
HCT VFR BLD AUTO: 44.2 % (ref 36.5–49.3)
HDLC SERPL-MCNC: 54 MG/DL
HGB BLD-MCNC: 14.6 G/DL (ref 12–17)
IMM GRANULOCYTES # BLD AUTO: 0.02 THOUSAND/UL (ref 0–0.2)
IMM GRANULOCYTES NFR BLD AUTO: 0 % (ref 0–2)
LDLC SERPL CALC-MCNC: 91 MG/DL (ref 0–100)
LYMPHOCYTES # BLD AUTO: 1.14 THOUSANDS/ΜL (ref 0.6–4.47)
LYMPHOCYTES NFR BLD AUTO: 23 % (ref 14–44)
MCH RBC QN AUTO: 29 PG (ref 26.8–34.3)
MCHC RBC AUTO-ENTMCNC: 33 G/DL (ref 31.4–37.4)
MCV RBC AUTO: 88 FL (ref 82–98)
MICROALBUMIN UR-MCNC: <7 MG/L
MONOCYTES # BLD AUTO: 0.49 THOUSAND/ΜL (ref 0.17–1.22)
MONOCYTES NFR BLD AUTO: 10 % (ref 4–12)
NEUTROPHILS # BLD AUTO: 3.06 THOUSANDS/ΜL (ref 1.85–7.62)
NEUTS SEG NFR BLD AUTO: 60 % (ref 43–75)
NRBC BLD AUTO-RTO: 0 /100 WBCS
PLATELET # BLD AUTO: 169 THOUSANDS/UL (ref 149–390)
PMV BLD AUTO: 11.1 FL (ref 8.9–12.7)
POTASSIUM SERPL-SCNC: 3.5 MMOL/L (ref 3.5–5.3)
PROT SERPL-MCNC: 7.1 G/DL (ref 6.4–8.4)
RBC # BLD AUTO: 5.03 MILLION/UL (ref 3.88–5.62)
SODIUM SERPL-SCNC: 137 MMOL/L (ref 135–147)
TRIGL SERPL-MCNC: 68 MG/DL
WBC # BLD AUTO: 5.07 THOUSAND/UL (ref 4.31–10.16)

## 2024-10-30 PROCEDURE — 83036 HEMOGLOBIN GLYCOSYLATED A1C: CPT

## 2024-10-30 PROCEDURE — 85025 COMPLETE CBC W/AUTO DIFF WBC: CPT

## 2024-10-30 PROCEDURE — 80053 COMPREHEN METABOLIC PANEL: CPT

## 2024-10-30 PROCEDURE — 36415 COLL VENOUS BLD VENIPUNCTURE: CPT

## 2024-10-30 PROCEDURE — 80061 LIPID PANEL: CPT

## 2024-10-30 PROCEDURE — 82570 ASSAY OF URINE CREATININE: CPT

## 2024-10-30 PROCEDURE — 82043 UR ALBUMIN QUANTITATIVE: CPT

## 2024-11-25 ENCOUNTER — RA CDI HCC (OUTPATIENT)
Dept: OTHER | Facility: HOSPITAL | Age: 84
End: 2024-11-25

## 2024-11-25 NOTE — PROGRESS NOTES
E11.65  A1c values  HCC coding opportunities          Chart Reviewed number of suggestions sent to Provider: 1     Patients Insurance     Medicare Insurance: [employer] Advantage

## 2024-12-04 ENCOUNTER — OFFICE VISIT (OUTPATIENT)
Dept: FAMILY MEDICINE CLINIC | Facility: CLINIC | Age: 84
End: 2024-12-04
Payer: COMMERCIAL

## 2024-12-04 VITALS
DIASTOLIC BLOOD PRESSURE: 70 MMHG | RESPIRATION RATE: 12 BRPM | OXYGEN SATURATION: 96 % | SYSTOLIC BLOOD PRESSURE: 136 MMHG | HEART RATE: 60 BPM

## 2024-12-04 DIAGNOSIS — C64.1 RENAL CELL CARCINOMA OF RIGHT KIDNEY (HCC): ICD-10-CM

## 2024-12-04 DIAGNOSIS — M48.061 SPINAL STENOSIS OF LUMBAR REGION, UNSPECIFIED WHETHER NEUROGENIC CLAUDICATION PRESENT: ICD-10-CM

## 2024-12-04 DIAGNOSIS — E11.42 TYPE 2 DIABETES MELLITUS WITH DIABETIC POLYNEUROPATHY, WITHOUT LONG-TERM CURRENT USE OF INSULIN (HCC): ICD-10-CM

## 2024-12-04 DIAGNOSIS — M21.372 LEFT FOOT DROP: ICD-10-CM

## 2024-12-04 DIAGNOSIS — Z23 ENCOUNTER FOR IMMUNIZATION: ICD-10-CM

## 2024-12-04 DIAGNOSIS — B35.4 TINEA CORPORIS: ICD-10-CM

## 2024-12-04 DIAGNOSIS — D69.6 THROMBOCYTOPENIA (HCC): ICD-10-CM

## 2024-12-04 DIAGNOSIS — E66.01 OBESITY, MORBID (HCC): ICD-10-CM

## 2024-12-04 DIAGNOSIS — M17.0 BILATERAL PRIMARY OSTEOARTHRITIS OF KNEE: ICD-10-CM

## 2024-12-04 DIAGNOSIS — Z00.00 MEDICARE ANNUAL WELLNESS VISIT, SUBSEQUENT: Primary | ICD-10-CM

## 2024-12-04 DIAGNOSIS — I48.20 CHRONIC ATRIAL FIBRILLATION (HCC): ICD-10-CM

## 2024-12-04 DIAGNOSIS — I10 ESSENTIAL HYPERTENSION: ICD-10-CM

## 2024-12-04 DIAGNOSIS — I71.21 ANEURYSM OF ASCENDING AORTA WITHOUT RUPTURE (HCC): ICD-10-CM

## 2024-12-04 DIAGNOSIS — E78.00 PURE HYPERCHOLESTEROLEMIA: ICD-10-CM

## 2024-12-04 PROCEDURE — 90471 IMMUNIZATION ADMIN: CPT

## 2024-12-04 PROCEDURE — G0439 PPPS, SUBSEQ VISIT: HCPCS | Performed by: FAMILY MEDICINE

## 2024-12-04 PROCEDURE — 99214 OFFICE O/P EST MOD 30 MIN: CPT | Performed by: FAMILY MEDICINE

## 2024-12-04 PROCEDURE — 90662 IIV NO PRSV INCREASED AG IM: CPT

## 2024-12-04 RX ORDER — CLOTRIMAZOLE AND BETAMETHASONE DIPROPIONATE 10; .64 MG/G; MG/G
CREAM TOPICAL 2 TIMES DAILY
Qty: 45 G | Refills: 3 | Status: SHIPPED | OUTPATIENT
Start: 2024-12-04

## 2024-12-04 NOTE — ASSESSMENT & PLAN NOTE
Orders:    Comprehensive metabolic panel; Future    CBC and differential; Future    CT chest wo contrast; Future

## 2024-12-04 NOTE — ASSESSMENT & PLAN NOTE
He appears to have an area of tinea corporis on his low back.  I will send in Lotrisone which I think should be helpful.    Orders:    clotrimazole-betamethasone (LOTRISONE) 1-0.05 % cream; Apply topically 2 (two) times a day

## 2024-12-04 NOTE — ASSESSMENT & PLAN NOTE
Orders:    Hemoglobin A1C; Future    Comprehensive metabolic panel; Future    Lipid Panel with Direct LDL reflex; Future    CBC and differential; Future

## 2024-12-04 NOTE — PROGRESS NOTES
Name: Demarcus Hammond      : 1940      MRN: [id number]  Encounter Provider: Link Willson Jr, MD  Encounter Date: 2024   Encounter department: [facility]    Assessment & Plan  Medicare annual wellness visit, subsequent  He declines COVID booster.  He also declines Shingrix and Tdap.  He received a flu shot today.       Encounter for immunization    Orders:    influenza vaccine, high-dose, PF 0.5 mL (Fluzone High Dose)    Aneurysm of ascending aorta without rupture (HCC)    Orders:    Comprehensive metabolic panel; Future    CBC and differential; Future    CT chest wo contrast; Future    Chronic atrial fibrillation (HCC)    Orders:    Comprehensive metabolic panel; Future    CBC and differential; Future    Bilateral primary osteoarthritis of knee         Renal cell carcinoma of right kidney (HCC)    Orders:    Comprehensive metabolic panel; Future    CBC and differential; Future    Thrombocytopenia (HCC)    Orders:    Comprehensive metabolic panel; Future    CBC and differential; Future    Spinal stenosis of lumbar region with neurogenic claudication.         Left foot drop         Type 2 diabetes mellitus with diabetic polyneuropathy, without long-term current use of insulin (HCC)    Lab Results   Component Value Date    HGBA1C 7.8 (H) 10/30/2024       Orders:    Hemoglobin A1C; Future    Comprehensive metabolic panel; Future    Lipid Panel with Direct LDL reflex; Future    CBC and differential; Future    Essential hypertension    Orders:    Comprehensive metabolic panel; Future    Lipid Panel with Direct LDL reflex; Future    CBC and differential; Future    Pure hypercholesterolemia    Orders:    Comprehensive metabolic panel; Future    Lipid Panel with Direct LDL reflex; Future    Obesity, morbid (HCC)      Orders:    Hemoglobin A1C; Future    Comprehensive metabolic panel; Future    Lipid Panel with Direct LDL reflex; Future    CBC and differential; Future    Tinea  corporis  He appears to have an area of tinea corporis on his low back.  I will send in Lotrisone which I think should be helpful.    Orders:    clotrimazole-betamethasone (LOTRISONE) 1-0.05 % cream; Apply topically 2 (two) times a day       Preventive health issues were discussed with patient, and age appropriate screening tests were ordered as noted in patient's After Visit Summary. Personalized health advice and appropriate referrals for health education or preventive services given if needed, as noted in patient's After Visit Summary.    History of Present Illness     Patient has a for follow-up of chronic health issues as well as a Medicare wellness visit.  He feels pretty well overall.  He is limited by some chronic low back pain and bilateral knee pain but utilizes a rollator which has been helpful.  He is now back to bowling which has been quite helpful.  Has a history of type 2 diabetes and tries to watch his carbohydrates.  He denies any current problems with chest pain, shortness of breath or palpitations.  He notes an area of his lower back that is quite itchy and has a prominence to it.  He uses a Band-Aid to help prevent him from scratching it.       Patient Care Team:  Link Chavez Jr., MD as PCP - General  MD Angelo Morgan, MD Drea Mir, DO Tavo Ordoñez, DO Roger Durham, KODI Wolff MD (Ophthalmology)    Review of Systems   Constitutional:  Negative for appetite change, chills, fatigue, fever and unexpected weight change.   HENT:  Negative for trouble swallowing.    Eyes:  Negative for visual disturbance.   Respiratory:  Negative for cough, chest tightness, shortness of breath and wheezing.    Cardiovascular:  Negative for chest pain, palpitations and leg swelling.   Gastrointestinal:  Negative for abdominal distention, abdominal pain, blood in stool, constipation and diarrhea.   Endocrine: Negative for polyuria.    Genitourinary:  Negative for difficulty urinating and flank pain.   Musculoskeletal:  Positive for back pain and gait problem. Negative for arthralgias and myalgias.   Skin:  Negative for rash.   Neurological:  Negative for dizziness and light-headedness.   Hematological:  Negative for adenopathy. Does not bruise/bleed easily.   Psychiatric/Behavioral:  Negative for dysphoric mood and sleep disturbance. The patient is not nervous/anxious.      Medical History Reviewed by provider this encounter:       Annual Wellness Visit Questionnaire   Demarcus is here for his Subsequent Wellness visit.     Health Risk Assessment:   Patient rates overall health as very good. Patient feels that their physical health rating is same. Patient is very satisfied with their life. Eyesight was rated as same. Hearing was rated as same. Patient feels that their emotional and mental health rating is much better. Patients states they are never, rarely angry. Patient states they are never, rarely unusually tired/fatigued. Pain experienced in the last 7 days has been none. Patient states that he has experienced weight loss or gain in last 6 months.     Depression Screening:   PHQ-2 Score: 0  PHQ-9 Score: 0      Fall Risk Screening:   In the past year, patient has experienced: no history of falling in past year      Home Safety:  Patient has trouble with stairs inside or outside of their home. Patient has working smoke alarms and has no working carbon monoxide detector. Home safety hazards include: none.     Nutrition:   Current diet is Diabetic.     Medications:   Patient is currently taking over-the-counter supplements. OTC medications include: see medication list. Patient is able to manage medications.     Activities of Daily Living (ADLs)/Instrumental Activities of Daily Living (IADLs):   Walk and transfer into and out of bed and chair?: Yes  Dress and groom yourself?: Yes    Bathe or shower yourself?: Yes    Feed yourself? Yes  Do your  laundry/housekeeping?: Yes  Manage your money, pay your bills and track your expenses?: Yes  Make your own meals?: Yes    Do your own shopping?: Yes    Previous Hospitalizations:   Any hospitalizations or ED visits within the last 12 months?: No      Advance Care Planning:   Living will: No    Durable POA for healthcare: No    Advanced directive counseling given: Yes    ACP document given: Yes    End of Life Decisions reviewed with patient: Yes      PREVENTIVE SCREENINGS      Cardiovascular Screening:    General: Screening Not Indicated and History Lipid Disorder      Diabetes Screening:     General: Screening Not Indicated and History Diabetes      Prostate Cancer Screening:    General: Screening Not Indicated      Abdominal Aortic Aneurysm (AAA) Screening:    Risk factors include: tobacco use        Lung Cancer Screening:     General: Screening Not Indicated      Hepatitis C Screening:    General: Screening Current    Screening, Brief Intervention, and Referral to Treatment (SBIRT)    Screening  Typical number of drinks in a day: 0  Typical number of drinks in a week: 0  Interpretation: Low risk drinking behavior.    Social Drivers of Health     Financial Resource Strain: Low Risk  (11/27/2023)    Overall Financial Resource Strain (CARDIA)     Difficulty of Paying Living Expenses: Not hard at all   Food Insecurity: No Food Insecurity (12/4/2024)    Hunger Vital Sign     Worried About Running Out of Food in the Last Year: Never true     Ran Out of Food in the Last Year: Never true   Transportation Needs: No Transportation Needs (12/4/2024)    PRAPARE - Transportation     Lack of Transportation (Medical): No     Lack of Transportation (Non-Medical): No   Housing Stability: Low Risk  (12/4/2024)    Housing Stability Vital Sign     Unable to Pay for Housing in the Last Year: No     Number of Times Moved in the Last Year: 0     Homeless in the Last Year: No   Utilities: Not At Risk (12/4/2024)    [facility] Utilities      Threatened with loss of utilities: No     No results found.    Objective   /70 (BP Location: Left arm, Patient Position: Sitting, Cuff Size: Standard)   Pulse 60   Resp 12   SpO2 96%     Physical Exam  Constitutional:       General: He is not in acute distress.     Appearance: Normal appearance. He is well-developed. He is obese. He is not diaphoretic.   HENT:      Head: Normocephalic.      Right Ear: External ear normal.      Left Ear: External ear normal.      Nose: Nose normal.   Eyes:      General:         Right eye: No discharge.         Left eye: No discharge.      Conjunctiva/sclera: Conjunctivae normal.      Pupils: Pupils are equal, round, and reactive to light.   Neck:      Thyroid: No thyromegaly.      Trachea: No tracheal deviation.   Cardiovascular:      Rate and Rhythm: Normal rate and regular rhythm.      Heart sounds: Normal heart sounds. No murmur heard.     No friction rub.   Pulmonary:      Effort: Pulmonary effort is normal. No respiratory distress.      Breath sounds: Normal breath sounds. No wheezing.   Chest:      Chest wall: No tenderness.   Abdominal:      General: There is no distension.      Palpations: There is no mass.      Tenderness: There is no abdominal tenderness. There is no guarding or rebound.      Hernia: No hernia is present.   Musculoskeletal:         General: No swelling or deformity.      Cervical back: Normal range of motion.      Right lower leg: No edema.      Left lower leg: No edema.   Lymphadenopathy:      Cervical: No cervical adenopathy.   Skin:     Findings: Erythema and rash (He has what appears to be a small erythematous patch in his lower back consistent with tinea corporis.  He does have a benign-appearing mole in the same area.) present.   Neurological:      General: No focal deficit present.      Mental Status: He is alert.      Cranial Nerves: No cranial nerve deficit.      Coordination: Coordination normal.   Psychiatric:         Thought Content:  Thought content normal.

## 2024-12-04 NOTE — ASSESSMENT & PLAN NOTE
Lab Results   Component Value Date    HGBA1C 7.8 (H) 10/30/2024       Orders:    Hemoglobin A1C; Future    Comprehensive metabolic panel; Future    Lipid Panel with Direct LDL reflex; Future    CBC and differential; Future

## 2024-12-04 NOTE — ASSESSMENT & PLAN NOTE
Orders:    Comprehensive metabolic panel; Future    Lipid Panel with Direct LDL reflex; Future    CBC and differential; Future

## 2024-12-15 DIAGNOSIS — E11.9 TYPE 2 DIABETES MELLITUS WITHOUT COMPLICATION, WITHOUT LONG-TERM CURRENT USE OF INSULIN (HCC): ICD-10-CM

## 2024-12-17 ENCOUNTER — HOSPITAL ENCOUNTER (OUTPATIENT)
Dept: CT IMAGING | Facility: HOSPITAL | Age: 84
Discharge: HOME | End: 2024-12-17
Payer: COMMERCIAL

## 2024-12-17 DIAGNOSIS — I71.21 ANEURYSM OF ASCENDING AORTA WITHOUT RUPTURE (HCC): ICD-10-CM

## 2024-12-17 PROCEDURE — 71250 CT THORAX DX C-: CPT

## 2024-12-17 RX ORDER — BLOOD SUGAR DIAGNOSTIC
STRIP MISCELLANEOUS 2 TIMES DAILY
Qty: 100 STRIP | Refills: 1 | Status: SHIPPED | OUTPATIENT
Start: 2024-12-17

## 2024-12-26 ENCOUNTER — RESULTS FOLLOW-UP (OUTPATIENT)
Dept: FAMILY MEDICINE CLINIC | Facility: CLINIC | Age: 84
End: 2024-12-26

## 2025-01-07 DIAGNOSIS — E78.00 PURE HYPERCHOLESTEROLEMIA: ICD-10-CM

## 2025-01-08 DIAGNOSIS — I48.20 CHRONIC ATRIAL FIBRILLATION (HCC): ICD-10-CM

## 2025-01-08 RX ORDER — ATORVASTATIN CALCIUM 40 MG/1
40 TABLET, FILM COATED ORAL DAILY
Qty: 90 TABLET | Refills: 1 | Status: SHIPPED | OUTPATIENT
Start: 2025-01-08

## 2025-01-09 RX ORDER — APIXABAN 5 MG/1
5 TABLET, FILM COATED ORAL 2 TIMES DAILY
Qty: 180 TABLET | Refills: 1 | Status: SHIPPED | OUTPATIENT
Start: 2025-01-09

## 2025-01-18 DIAGNOSIS — I10 ESSENTIAL HYPERTENSION: ICD-10-CM

## 2025-01-20 RX ORDER — LOSARTAN POTASSIUM AND HYDROCHLOROTHIAZIDE 25; 100 MG/1; MG/1
1 TABLET ORAL DAILY
Qty: 90 TABLET | Refills: 1 | Status: SHIPPED | OUTPATIENT
Start: 2025-01-20

## 2025-01-20 NOTE — PROGRESS NOTES
Cardiology Outpatient Progress Note - Demarcus Hammond 85 y.o. male MRN: [id number]    @ Encounter: [id number]      Patient Active Problem List    Diagnosis Date Noted    Tinea corporis 12/04/2024    Left foot drop 11/10/2023    Imbalance 09/07/2022    Obesity, morbid (HCC)     Abnormal findings on diagnostic imaging of skull and head, not elsewhere classified 09/09/2020    Colon polyp 10/05/2018    Bilateral hearing loss 10/05/2018    Thrombocytopenia (HCC) 10/05/2018    Aneurysm of ascending aorta without rupture (HCC) 03/12/2018    Chronic atrial fibrillation (HCC) 08/29/2017    Renal cell carcinoma of right kidney (HCC) 07/21/2017    Acute meniscal tear of left knee 05/02/2017    Bilateral primary osteoarthritis of knee 05/02/2017    Back pain, thoracic 08/20/2015    Lung nodules 07/29/2015    Chronic lumbar radiculopathy 06/17/2014    Spinal stenosis of lumbar region with neurogenic claudication. 07/30/2013    Osteopenia 07/09/2012    Erectile dysfunction of non-organic origin 06/15/2012    Type 2 diabetes mellitus with diabetic polyneuropathy ([facility]) 05/16/2012    Glaucoma 12/17/2008    Essential hypertension 02/06/2008    Pure hypercholesterolemia 02/06/2008       Assessment:  # atrial flutter  AC: Eliquis 5 mg   Rate:     #  Dilated ascending thoracic aorta  CT Chest 12/17/24: aorta 44 mm  CT chest 12/9/23: aorta 44 mm  CT Chest 12/6/22: ascending aorta 44 mm    # HTN- BP at goal with SBP < 130 mmHg  Losartan/ hctz 100/25 mg daily, amlodipine 5 mg     Studies- personally reviewed by me  Echo 12/19/23:  LVEF: 55%, 1.4 cm  RV: normal  Mild MR, TR    Echo 5/8/18:   EF: 60%; aorta 4.3 cm    # Coronary eval  Stress test 8/31/17: normal  EKG today: SR, first degree AV block    # 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    # DM- metformin, Jardiance  10/30/24: 7.8%  HgA1C 2/26/24: 7.5%  HgA1C 11/15/23: 8%    # dyslipidemia- atorvastatin 40 mg   10/30/24: LDL 91, HDL 54  11/15/23: LDL 98, HDL  56  11/14/22: LDL 79, HDL 59  5/2/22: LDL 85, HDL 62  10/8/21: LDL 83, HDL 54    # ED- Cialis prn    # left foot drop, uses a walker    TODAY'S PLAN:  Prn lasix written for occasional edema  BP pretty much at goal for dilated aorta  Aorta size, no change on follow up CT 12/17/24  Eliquis for aflutter  Does not need ppm yet   If heart rate goes up then would need to add AV annamaria blocker and may need PPM as had pauses  Continue atorvastatin 40 mg daily  Pt is going to PT  HgA1C at about goal on therapy    HPI:   86 yo male who had initially presented for pre-op clearance for right nephrectomy for renal mass for RCC. No cardiac history. He had DM, hyperlipidemia, former smoker over 30 years ago. He had stress in past for screening not for CP. He does bowling but no true exercise. No chest pain or SOB. No edema.     Interval History:   CT chest 12/17/24: aorta stable at 44 mm  No falls  No pre syncope  No palpitations  Review of Systems   Constitutional:  Negative for activity change, appetite change, fatigue and unexpected weight change.   HENT:  Negative for congestion and nosebleeds.    Eyes: Negative.    Respiratory:  Negative for cough, chest tightness and shortness of breath.    Cardiovascular:  Negative for chest pain, palpitations and leg swelling.   Gastrointestinal:  Negative for abdominal distention.   Endocrine: Negative.    Genitourinary: Negative.    Musculoskeletal: Negative.    Skin: Negative.    Neurological:  Negative for dizziness, syncope and weakness.   Hematological: Negative.    Psychiatric/Behavioral: Negative.         Past Medical History:   Diagnosis Date    Arthritis     Bladder mass 6/28/2017    Follows with Urology    Cancer (HCC)     right kidney    Cardiac aneurysm     Chronic pain disorder     Diabetes mellitus (HCC)     NIDDM    Erectile dysfunction of non-organic origin     Glaucoma     Hx of bleeding disorder     in urine    Hyperlipidemia     Hypertension     Meniscus tear     Left  knee    Urinary tract infection with hematuria     last assessed 05/30/2017    Wears glasses     Wears partial dentures     upper partial       No Known Allergies  .    Current Outpatient Medications:     Accu-Chek Guide Test test strip, USE TWICE DAILY AS DIRECTED, Disp: 100 strip, Rfl: 1    Accu-Chek Softclix Lancets lancets, USE IN THE MORNING USE TWICE DAILY, Disp: 100 each, Rfl: 2    amLODIPine (NORVASC) 5 mg tablet, TAKE 1 TABLET DAILY, Disp: 90 tablet, Rfl: 1    atorvastatin (LIPITOR) 40 mg tablet, Take 1 tablet (40 mg total) by mouth daily, Disp: 90 tablet, Rfl: 1    Blood Glucose Monitoring Suppl (ACCU-CHEK OMERO PLUS) w/Device KIT, Use, Disp: , Rfl:     Calcium Carb-Cholecalciferol 600-200 MG-UNIT TABS, , Disp: , Rfl:     Cholecalciferol 50 MCG (2000 UT) TABS, , Disp: , Rfl:     clotrimazole-betamethasone (LOTRISONE) 1-0.05 % cream, Apply topically 2 (two) times a day, Disp: 45 g, Rfl: 3    Cyanocobalamin (VITAMIN B 12 PO), Take by mouth in the morning, Disp: , Rfl:     Eliquis 5 MG, TAKE 1 TABLET BY MOUTH TWICE A DAY, Disp: 180 tablet, Rfl: 1    Empagliflozin (Jardiance) 10 MG TABS tablet, TAKE 1 TABLET (10 MG TOTAL) BY MOUTH IN THE MORNING, Disp: 90 tablet, Rfl: 1    furosemide (LASIX) 20 mg tablet, TAKE 1 TABLET (20 MG TOTAL) BY MOUTH IF NEEDED (LOWER EXTREMITY EDEMA), Disp: 90 tablet, Rfl: 1    glucose blood (Accu-Chek Omero Plus) test strip, Use as instructed, Disp: 100 strip, Rfl: 0    latanoprost (XALATAN) 0.005 % ophthalmic solution, , Disp: , Rfl:     losartan-hydrochlorothiazide (HYZAAR) 100-25 MG per tablet, TAKE 1 TABLET DAILY, Disp: 90 tablet, Rfl: 1    MAGNESIUM GLUCONATE PO, Take 400 mg by mouth, Disp: , Rfl:     Polyethyl Glycol-Propyl Glycol (SYSTANE OP), Apply to eye 1 drop each eye twice a day, Disp: , Rfl:     Social History     Socioeconomic History    Marital status: /Civil Union     Spouse name: Not on file    Number of children: Not on file    Years of education: Not on file     Highest education level: Not on file   Occupational History    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   Tobacco Use    Smoking status: Former     Current packs/day: 0.00     Types: Cigarettes     Quit date: 1985     Years since quittin.4    Smokeless tobacco: Never   Vaping Use    Vaping status: Never Used   Substance and Sexual Activity    Alcohol use: Not Currently     Comment: rarely    Drug use: No    Sexual activity: Not on file   Other Topics Concern    Not on file   Social History Narrative    Does not consume caffeine        Never drank alcohol per [employer]     Social Drivers of Health     Financial Resource Strain: Low Risk  (2023)    Overall Financial Resource Strain (CARDIA)     Difficulty of Paying Living Expenses: Not hard at all   Food Insecurity: No Food Insecurity (2024)    Hunger Vital Sign     Worried About Running Out of Food in the Last Year: Never true     Ran Out of Food in the Last Year: Never true   Transportation Needs: No Transportation Needs (2024)    PRAPARE - Transportation     Lack of Transportation (Medical): No     Lack of Transportation (Non-Medical): No   Physical Activity: Not on file   Stress: Not on file   Social Connections: Not on file   Intimate Partner Violence: Not on file   Housing Stability: Low Risk  (2024)    Housing Stability Vital Sign     Unable to Pay for Housing in the Last Year: No     Number of Times Moved in the Last Year: 0     Homeless in the Last Year: No       Family History   Problem Relation Age of Onset    Other Mother         malaria    Diabetes Sister     Hypertension Sister        Physical Exam:    Vitals: There were no vitals taken for this visit., There is no height or weight on file to calculate BMI.,   Wt Readings from Last 3 Encounters:   24 98 kg (216 lb)   24 98.6 kg (217 lb 6.4 oz)   24 96.6 kg (213 lb)         Physical Exam:    Physical Exam  Constitutional:       Appearance: He is  "well-developed. He is not diaphoretic.   HENT:      Head: Normocephalic and atraumatic.   Eyes:      Pupils: Pupils are equal, round, and reactive to light.   Neck:      Vascular: No JVD.   Cardiovascular:      Rate and Rhythm: Normal rate and regular rhythm.      Heart sounds: Normal heart sounds. No murmur heard.  Pulmonary:      Effort: Pulmonary effort is normal.      Breath sounds: Normal breath sounds. No rales.   Abdominal:      General: Bowel sounds are normal. There is no distension.      Palpations: Abdomen is soft.   Musculoskeletal:         General: Normal range of motion.      Cervical back: Normal range of motion.   Skin:     General: Skin is warm and dry.   Neurological:      Mental Status: He is alert and oriented to person, place, and time.       Labs & Results:    Lab Results   Component Value Date    GLUCOSE 276 (H) 07/29/2015    CALCIUM 8.9 10/30/2024     07/29/2015    K 3.5 10/30/2024    CO2 26 10/30/2024     10/30/2024    BUN 22 10/30/2024    CREATININE 0.86 10/30/2024     Lab Results   Component Value Date    WBC 5.07 10/30/2024    HGB 14.6 10/30/2024    HCT 44.2 10/30/2024    MCV 88 10/30/2024     10/30/2024     No results found for: \"BNP\"   Lab Results   Component Value Date    CHOL 173 01/20/2014     Lab Results   Component Value Date    HDL 54 10/30/2024    HDL 56 05/24/2024    HDL 56 11/15/2023     Lab Results   Component Value Date    LDLCALC 91 10/30/2024    LDLCALC 83 05/24/2024    LDLCALC 98 11/15/2023     Lab Results   Component Value Date    TRIG 68 10/30/2024    TRIG 61 05/24/2024    TRIG 51 11/15/2023     No results found for: \"CHOLHDL\"     EKG personally reviewed by Angelo Benitez.     Counseling / Coordination of Care  Time spent today 25 minutes.  Greater than 50% of total time was spent with the patient and / or family counseling and / or coordination of care.  We discussed diagnoses, most recent studies, tests and any changes in treatment plan  Thank you for " the opportunity to participate in the care of this patient.    WINSOME PRESTON D.O.  DIRECTOR OF HEART FAILURE/ PULMONARY HYPERTENSION  MEDICAL DIRECTOR OF LVAD PROGRAM  [facility]

## 2025-01-22 ENCOUNTER — OFFICE VISIT (OUTPATIENT)
Dept: CARDIOLOGY CLINIC | Facility: CLINIC | Age: 85
End: 2025-01-22
Payer: COMMERCIAL

## 2025-01-22 VITALS
OXYGEN SATURATION: 99 % | DIASTOLIC BLOOD PRESSURE: 54 MMHG | SYSTOLIC BLOOD PRESSURE: 110 MMHG | BODY MASS INDEX: 33.43 KG/M2 | HEIGHT: 67 IN | WEIGHT: 213 LBS | HEART RATE: 64 BPM

## 2025-01-22 DIAGNOSIS — I10 ESSENTIAL HYPERTENSION: ICD-10-CM

## 2025-01-22 DIAGNOSIS — E66.01 OBESITY, MORBID (HCC): ICD-10-CM

## 2025-01-22 DIAGNOSIS — I71.21 ANEURYSM OF ASCENDING AORTA WITHOUT RUPTURE (HCC): ICD-10-CM

## 2025-01-22 DIAGNOSIS — I48.20 CHRONIC ATRIAL FIBRILLATION (HCC): Primary | ICD-10-CM

## 2025-01-22 DIAGNOSIS — I10 HTN (HYPERTENSION), BENIGN: ICD-10-CM

## 2025-01-22 DIAGNOSIS — E11.42 TYPE 2 DIABETES MELLITUS WITH DIABETIC POLYNEUROPATHY, WITHOUT LONG-TERM CURRENT USE OF INSULIN (HCC): ICD-10-CM

## 2025-01-22 PROCEDURE — 99214 OFFICE O/P EST MOD 30 MIN: CPT | Performed by: INTERNAL MEDICINE

## 2025-01-22 RX ORDER — AMLODIPINE BESYLATE 5 MG/1
5 TABLET ORAL DAILY
Qty: 90 TABLET | Refills: 3 | Status: SHIPPED | OUTPATIENT
Start: 2025-01-22

## 2025-01-22 NOTE — PATIENT INSTRUCTIONS
You look great     Go out for your Birthday!!    Let me know if you get lightheaded, I will order a patch

## 2025-04-12 DIAGNOSIS — I10 ESSENTIAL HYPERTENSION: ICD-10-CM

## 2025-04-12 DIAGNOSIS — I48.20 CHRONIC ATRIAL FIBRILLATION (HCC): ICD-10-CM

## 2025-04-12 DIAGNOSIS — E11.42 TYPE 2 DIABETES MELLITUS WITH DIABETIC POLYNEUROPATHY, WITHOUT LONG-TERM CURRENT USE OF INSULIN (HCC): ICD-10-CM

## 2025-04-12 DIAGNOSIS — E78.00 PURE HYPERCHOLESTEROLEMIA: ICD-10-CM

## 2025-04-12 DIAGNOSIS — R60.0 EDEMA OF BOTH LEGS: ICD-10-CM

## 2025-04-12 DIAGNOSIS — E11.9 TYPE 2 DIABETES MELLITUS WITHOUT COMPLICATION, WITHOUT LONG-TERM CURRENT USE OF INSULIN (HCC): ICD-10-CM

## 2025-04-13 RX ORDER — EMPAGLIFLOZIN 10 MG/1
10 TABLET, FILM COATED ORAL EVERY MORNING
Qty: 90 TABLET | Refills: 1 | Status: SHIPPED | OUTPATIENT
Start: 2025-04-13

## 2025-04-13 RX ORDER — BLOOD SUGAR DIAGNOSTIC
STRIP MISCELLANEOUS 2 TIMES DAILY
Qty: 100 STRIP | Refills: 1 | Status: SHIPPED | OUTPATIENT
Start: 2025-04-13

## 2025-04-13 RX ORDER — ATORVASTATIN CALCIUM 40 MG/1
40 TABLET, FILM COATED ORAL DAILY
Qty: 90 TABLET | Refills: 1 | Status: SHIPPED | OUTPATIENT
Start: 2025-04-13

## 2025-04-14 RX ORDER — FUROSEMIDE 20 MG/1
20 TABLET ORAL AS NEEDED
Qty: 90 TABLET | Refills: 1 | Status: SHIPPED | OUTPATIENT
Start: 2025-04-14

## 2025-04-15 DIAGNOSIS — B35.4 TINEA CORPORIS: ICD-10-CM

## 2025-04-16 RX ORDER — CLOTRIMAZOLE AND BETAMETHASONE DIPROPIONATE 10; .64 MG/G; MG/G
1 CREAM TOPICAL 2 TIMES DAILY
Qty: 45 G | Refills: 3 | Status: SHIPPED | OUTPATIENT
Start: 2025-04-16

## 2025-05-18 DIAGNOSIS — E11.9 TYPE 2 DIABETES MELLITUS WITHOUT COMPLICATION, WITHOUT LONG-TERM CURRENT USE OF INSULIN (HCC): ICD-10-CM

## 2025-05-18 RX ORDER — LANCETS
EACH MISCELLANEOUS
Qty: 100 EACH | Refills: 1 | Status: SHIPPED | OUTPATIENT
Start: 2025-05-18

## 2025-05-27 ENCOUNTER — RA CDI HCC (OUTPATIENT)
Dept: OTHER | Facility: HOSPITAL | Age: 85
End: 2025-05-27

## 2025-05-29 ENCOUNTER — APPOINTMENT (OUTPATIENT)
Dept: LAB | Facility: CLINIC | Age: 85
End: 2025-05-29
Payer: COMMERCIAL

## 2025-05-29 DIAGNOSIS — E78.00 PURE HYPERCHOLESTEROLEMIA: ICD-10-CM

## 2025-05-29 DIAGNOSIS — I48.20 CHRONIC ATRIAL FIBRILLATION (HCC): ICD-10-CM

## 2025-05-29 DIAGNOSIS — D69.6 THROMBOCYTOPENIA (HCC): ICD-10-CM

## 2025-05-29 DIAGNOSIS — C64.1 RENAL CELL CARCINOMA OF RIGHT KIDNEY (HCC): ICD-10-CM

## 2025-05-29 DIAGNOSIS — E11.42 TYPE 2 DIABETES MELLITUS WITH DIABETIC POLYNEUROPATHY, WITHOUT LONG-TERM CURRENT USE OF INSULIN (HCC): ICD-10-CM

## 2025-05-29 DIAGNOSIS — I71.21 ANEURYSM OF ASCENDING AORTA WITHOUT RUPTURE (HCC): ICD-10-CM

## 2025-05-29 DIAGNOSIS — E66.01 OBESITY, MORBID (HCC): ICD-10-CM

## 2025-05-29 DIAGNOSIS — I10 ESSENTIAL HYPERTENSION: ICD-10-CM

## 2025-05-29 LAB
ALBUMIN SERPL BCG-MCNC: 4.1 G/DL (ref 3.5–5)
ALP SERPL-CCNC: 90 U/L (ref 34–104)
ALT SERPL W P-5'-P-CCNC: 15 U/L (ref 7–52)
ANION GAP SERPL CALCULATED.3IONS-SCNC: 10 MMOL/L (ref 4–13)
AST SERPL W P-5'-P-CCNC: 14 U/L (ref 13–39)
BASOPHILS # BLD AUTO: 0.04 THOUSANDS/ÂΜL (ref 0–0.1)
BASOPHILS NFR BLD AUTO: 1 % (ref 0–1)
BILIRUB SERPL-MCNC: 0.69 MG/DL (ref 0.2–1)
BUN SERPL-MCNC: 28 MG/DL (ref 5–25)
CALCIUM SERPL-MCNC: 9.2 MG/DL (ref 8.4–10.2)
CHLORIDE SERPL-SCNC: 101 MMOL/L (ref 96–108)
CHOLEST SERPL-MCNC: 154 MG/DL (ref ?–200)
CO2 SERPL-SCNC: 28 MMOL/L (ref 21–32)
CREAT SERPL-MCNC: 1.04 MG/DL (ref 0.6–1.3)
EOSINOPHIL # BLD AUTO: 0.19 THOUSAND/ÂΜL (ref 0–0.61)
EOSINOPHIL NFR BLD AUTO: 4 % (ref 0–6)
ERYTHROCYTE [DISTWIDTH] IN BLOOD BY AUTOMATED COUNT: 13.5 % (ref 11.6–15.1)
EST. AVERAGE GLUCOSE BLD GHB EST-MCNC: 183 MG/DL
GFR SERPL CREATININE-BSD FRML MDRD: 66 ML/MIN/1.73SQ M
GLUCOSE P FAST SERPL-MCNC: 164 MG/DL (ref 65–99)
HBA1C MFR BLD: 8 %
HCT VFR BLD AUTO: 44.7 % (ref 36.5–49.3)
HDLC SERPL-MCNC: 54 MG/DL
HGB BLD-MCNC: 14.7 G/DL (ref 12–17)
IMM GRANULOCYTES # BLD AUTO: 0.02 THOUSAND/UL (ref 0–0.2)
IMM GRANULOCYTES NFR BLD AUTO: 0 % (ref 0–2)
LDLC SERPL CALC-MCNC: 88 MG/DL (ref 0–100)
LYMPHOCYTES # BLD AUTO: 1.5 THOUSANDS/ÂΜL (ref 0.6–4.47)
LYMPHOCYTES NFR BLD AUTO: 30 % (ref 14–44)
MCH RBC QN AUTO: 29.2 PG (ref 26.8–34.3)
MCHC RBC AUTO-ENTMCNC: 32.9 G/DL (ref 31.4–37.4)
MCV RBC AUTO: 89 FL (ref 82–98)
MONOCYTES # BLD AUTO: 0.44 THOUSAND/ÂΜL (ref 0.17–1.22)
MONOCYTES NFR BLD AUTO: 9 % (ref 4–12)
NEUTROPHILS # BLD AUTO: 2.74 THOUSANDS/ÂΜL (ref 1.85–7.62)
NEUTS SEG NFR BLD AUTO: 56 % (ref 43–75)
NRBC BLD AUTO-RTO: 0 /100 WBCS
PLATELET # BLD AUTO: 158 THOUSANDS/UL (ref 149–390)
PMV BLD AUTO: 11.6 FL (ref 8.9–12.7)
POTASSIUM SERPL-SCNC: 3.6 MMOL/L (ref 3.5–5.3)
PROT SERPL-MCNC: 6.6 G/DL (ref 6.4–8.4)
RBC # BLD AUTO: 5.03 MILLION/UL (ref 3.88–5.62)
SODIUM SERPL-SCNC: 139 MMOL/L (ref 135–147)
TRIGL SERPL-MCNC: 60 MG/DL (ref ?–150)
WBC # BLD AUTO: 4.93 THOUSAND/UL (ref 4.31–10.16)

## 2025-05-29 PROCEDURE — 85025 COMPLETE CBC W/AUTO DIFF WBC: CPT

## 2025-05-29 PROCEDURE — 80061 LIPID PANEL: CPT

## 2025-05-29 PROCEDURE — 36415 COLL VENOUS BLD VENIPUNCTURE: CPT

## 2025-05-29 PROCEDURE — 83036 HEMOGLOBIN GLYCOSYLATED A1C: CPT

## 2025-05-29 PROCEDURE — 80053 COMPREHEN METABOLIC PANEL: CPT

## 2025-06-04 ENCOUNTER — TELEPHONE (OUTPATIENT)
Age: 85
End: 2025-06-04

## 2025-06-04 ENCOUNTER — OFFICE VISIT (OUTPATIENT)
Dept: FAMILY MEDICINE CLINIC | Facility: CLINIC | Age: 85
End: 2025-06-04
Payer: COMMERCIAL

## 2025-06-04 VITALS
SYSTOLIC BLOOD PRESSURE: 132 MMHG | BODY MASS INDEX: 32.67 KG/M2 | HEART RATE: 64 BPM | HEIGHT: 68 IN | OXYGEN SATURATION: 96 % | DIASTOLIC BLOOD PRESSURE: 76 MMHG | WEIGHT: 215.6 LBS

## 2025-06-04 DIAGNOSIS — D69.6 THROMBOCYTOPENIA (HCC): ICD-10-CM

## 2025-06-04 DIAGNOSIS — C64.1 RENAL CELL CARCINOMA OF RIGHT KIDNEY (HCC): ICD-10-CM

## 2025-06-04 DIAGNOSIS — Z99.89 USES WALKER: ICD-10-CM

## 2025-06-04 DIAGNOSIS — M48.061 SPINAL STENOSIS OF LUMBAR REGION, UNSPECIFIED WHETHER NEUROGENIC CLAUDICATION PRESENT: ICD-10-CM

## 2025-06-04 DIAGNOSIS — R26.89 IMBALANCE: ICD-10-CM

## 2025-06-04 DIAGNOSIS — E11.42 TYPE 2 DIABETES MELLITUS WITH DIABETIC POLYNEUROPATHY, WITHOUT LONG-TERM CURRENT USE OF INSULIN (HCC): Primary | ICD-10-CM

## 2025-06-04 DIAGNOSIS — I10 ESSENTIAL HYPERTENSION: ICD-10-CM

## 2025-06-04 DIAGNOSIS — E66.01 OBESITY, MORBID (HCC): ICD-10-CM

## 2025-06-04 DIAGNOSIS — E78.00 PURE HYPERCHOLESTEROLEMIA: ICD-10-CM

## 2025-06-04 DIAGNOSIS — I48.20 CHRONIC ATRIAL FIBRILLATION (HCC): ICD-10-CM

## 2025-06-04 DIAGNOSIS — C64.1 RENAL CANCER, RIGHT (HCC): Primary | ICD-10-CM

## 2025-06-04 DIAGNOSIS — I71.21 ANEURYSM OF ASCENDING AORTA WITHOUT RUPTURE (HCC): ICD-10-CM

## 2025-06-04 PROCEDURE — 99214 OFFICE O/P EST MOD 30 MIN: CPT | Performed by: FAMILY MEDICINE

## 2025-06-04 PROCEDURE — G2211 COMPLEX E/M VISIT ADD ON: HCPCS | Performed by: FAMILY MEDICINE

## 2025-06-04 NOTE — ASSESSMENT & PLAN NOTE
A1c is trending upwards to 8.  He notes he has not really been watching his diet.  He declines diabetic education but is going to try some dietary interventions on his own.  Repeat labs with follow-up in 3 months  Lab Results   Component Value Date    HGBA1C 8.0 (H) 05/29/2025       Orders:    Hemoglobin A1C; Future    Comprehensive metabolic panel; Future    CBC and differential; Future    Lipid Panel with Direct LDL reflex; Future

## 2025-06-04 NOTE — ASSESSMENT & PLAN NOTE
Work on weight loss as this will be helpful for his type 2 diabetes.  Consider GLP-1 agonist but he has been resistant to injections.

## 2025-06-04 NOTE — TELEPHONE ENCOUNTER
----- Message from Mirella Pichardo PA-C sent at 6/4/2025 10:52 AM EDT -----  Thanks Dr Chavez i will get an updated order for him. he's completed nccn surveillance, but it doesn't hurt to do an US every few years past the 5 year [facility]- please let Manish know about the routine US this year. All priors BEVERLY s/p right partial NX 2017Tara  ----- Message -----  From: Link Chavez Jr., MD  Sent: 6/4/2025  10:32 AM EDT  To: LANDRY Foley,I saw this patient today.  You noted in your note on his last ultrasound to have a repeat ultrasound in 18 months, I was wondering if your team was planning on ordering this?Thank you,Danilo

## 2025-06-04 NOTE — PROGRESS NOTES
Name: Demarcus Hammond      : 1940      MRN: [id number]  Encounter Provider: Link Willson Jr, MD  Encounter Date: 2025   Encounter department: [facility]IMARY [facility]  :  Assessment & Plan  Type 2 diabetes mellitus with diabetic polyneuropathy, without long-term current use of insulin (HCC)  A1c is trending upwards to 8.  He notes he has not really been watching his diet.  He declines diabetic education but is going to try some dietary interventions on his own.  Repeat labs with follow-up in 3 months  Lab Results   Component Value Date    HGBA1C 8.0 (H) 2025       Orders:    Hemoglobin A1C; Future    Comprehensive metabolic panel; Future    CBC and differential; Future    Lipid Panel with Direct LDL reflex; Future    Essential hypertension  Controlled at this time  Orders:    Comprehensive metabolic panel; Future    CBC and differential; Future    Thrombocytopenia (HCC)  Continue monitor CBC  Orders:    CBC and differential; Future    Pure hypercholesterolemia  Monitor lipids  Orders:    Comprehensive metabolic panel; Future    Lipid Panel with Direct LDL reflex; Future    Chronic atrial fibrillation (HCC)  Continue close cardiology follow-up.  He does seem to be stable today.  He seems to be in sinus rhythm.  Orders:    Comprehensive metabolic panel; Future    CBC and differential; Future    Spinal stenosis of lumbar region with neurogenic claudication.  He is stable.  Is using a walker at all times now.       Imbalance  Continue with his walker use.       Obesity, morbid ([facility])    Work on weight loss as this will be helpful for his type 2 diabetes.  Consider GLP-1 agonist but he has been resistant to injections.       Uses walker  He has chronic gait dysfunction and is using a walker.  Fortunately, his had no falls.       Aneurysm of ascending aorta without rupture (HCC)  He ranjit clinically stable.       Renal cell carcinoma of right kidney (HCC)  I did reach out to urology as  "he seems to be due for some imaging.              History of Present Illness   Patient presents accompanied by his wife.  He feels he is doing pretty well overall.  Notes his diet has been relatively poor which may explain his A1c trending up.  He denies probs of chest pain, shortness of breath or palpitations.  He is following closely with cardiology.  He has a history of renal cell cancer and does seem to be due for some imaging.  He denies any hematuria or flank pain.      Review of Systems   Constitutional:  Negative for appetite change, chills, fatigue, fever and unexpected weight change.   HENT:  Negative for trouble swallowing.    Eyes:  Negative for visual disturbance.   Respiratory:  Negative for cough, chest tightness, shortness of breath and wheezing.    Cardiovascular:  Negative for chest pain, palpitations and leg swelling.   Gastrointestinal:  Negative for abdominal distention, abdominal pain, blood in stool, constipation and diarrhea.   Endocrine: Negative for polyuria.   Genitourinary:  Negative for difficulty urinating and flank pain.   Musculoskeletal:  Positive for back pain and gait problem. Negative for arthralgias and myalgias.   Skin:  Negative for rash.   Neurological:  Negative for dizziness and light-headedness.   Hematological:  Negative for adenopathy. Does not bruise/bleed easily.   Psychiatric/Behavioral:  Negative for dysphoric mood and sleep disturbance. The patient is not nervous/anxious.        Objective   /76   Pulse 64   Ht 5' 8\" (1.727 m)   Wt 97.8 kg (215 lb 9.6 oz)   SpO2 96%   BMI 32.78 kg/m²      Physical Exam  Constitutional:       General: He is not in acute distress.     Appearance: Normal appearance. He is well-developed. He is obese. He is not diaphoretic.   HENT:      Head: Normocephalic.      Right Ear: External ear normal.      Left Ear: External ear normal.      Nose: Nose normal.     Eyes:      General:         Right eye: No discharge.         Left eye: " No discharge.      Conjunctiva/sclera: Conjunctivae normal.      Pupils: Pupils are equal, round, and reactive to light.     Neck:      Thyroid: No thyromegaly.      Trachea: No tracheal deviation.     Cardiovascular:      Rate and Rhythm: Normal rate and regular rhythm.      Heart sounds: Normal heart sounds. No murmur heard.     No friction rub.   Pulmonary:      Effort: Pulmonary effort is normal. No respiratory distress.      Breath sounds: Normal breath sounds. No wheezing.   Chest:      Chest wall: No tenderness.   Abdominal:      General: There is no distension.      Palpations: There is no mass.      Tenderness: There is no abdominal tenderness. There is no guarding or rebound.      Hernia: No hernia is present.     Musculoskeletal:         General: No swelling or deformity.      Cervical back: Normal range of motion.      Right lower leg: No edema.      Left lower leg: No edema.     Skin:     Findings: No erythema or rash.     Neurological:      General: No focal deficit present.      Mental Status: He is alert.      Cranial Nerves: No cranial nerve deficit.      Coordination: Coordination normal.     Psychiatric:         Thought Content: Thought content normal.

## 2025-06-04 NOTE — ASSESSMENT & PLAN NOTE
Monitor lipids  Orders:    Comprehensive metabolic panel; Future    Lipid Panel with Direct LDL reflex; Future

## 2025-06-04 NOTE — TELEPHONE ENCOUNTER
Called and left a voicemail message for patient that an order for an US kidney and bladder was placed in his chart and this can be scheduled by contacting central scheduling at [phone]. Advised office will call with the US results and advised patient to call back with any questions.

## 2025-06-04 NOTE — ASSESSMENT & PLAN NOTE
Controlled at this time  Orders:    Comprehensive metabolic panel; Future    CBC and differential; Future

## 2025-06-06 ENCOUNTER — HOSPITAL ENCOUNTER (OUTPATIENT)
Dept: ULTRASOUND IMAGING | Facility: HOSPITAL | Age: 85
Discharge: HOME | End: 2025-06-06
Attending: PHYSICIAN ASSISTANT
Payer: COMMERCIAL

## 2025-06-06 DIAGNOSIS — C64.1 RENAL CANCER, RIGHT (HCC): ICD-10-CM

## 2025-06-06 PROCEDURE — 76775 US EXAM ABDO BACK WALL LIM: CPT

## 2025-06-13 ENCOUNTER — RESULTS FOLLOW-UP (OUTPATIENT)
Dept: UROLOGY | Facility: CLINIC | Age: 85
End: 2025-06-13

## 2025-06-13 DIAGNOSIS — N28.1 BILATERAL RENAL CYSTS: Primary | ICD-10-CM

## 2025-06-13 NOTE — TELEPHONE ENCOUNTER
----- Message from Mirella Pichardo PA-C sent at 6/13/2025 12:39 PM EDT -----  US looks great- no evidence of recurrence after his prior partial nephrectomy for rcc (2017)  other kidney cysts are stable and not suspicious.     next US 2 yrs again ok with me  ----- Message -----  From: Interface, Radiology Results In  Sent: 6/13/2025  11:11 AM EDT  To: Mirella Pichardo PA-C

## 2025-06-13 NOTE — TELEPHONE ENCOUNTER
Contacted patient and made him aware of US kidney and bladder results and LANDRY Vu's note. Advised patient he can have next renal US completed 2 years from now and a script was placed in his chart.

## 2025-07-07 DIAGNOSIS — E11.9 TYPE 2 DIABETES MELLITUS WITHOUT COMPLICATION, WITHOUT LONG-TERM CURRENT USE OF INSULIN (HCC): ICD-10-CM

## 2025-07-07 DIAGNOSIS — I48.20 CHRONIC ATRIAL FIBRILLATION (HCC): ICD-10-CM

## 2025-07-09 RX ORDER — APIXABAN 5 MG/1
5 TABLET, FILM COATED ORAL 2 TIMES DAILY
Qty: 180 TABLET | Refills: 1 | Status: SHIPPED | OUTPATIENT
Start: 2025-07-09

## 2025-07-09 RX ORDER — BLOOD SUGAR DIAGNOSTIC
STRIP MISCELLANEOUS 2 TIMES DAILY
Qty: 100 STRIP | Refills: 1 | Status: SHIPPED | OUTPATIENT
Start: 2025-07-09

## 2025-07-10 DIAGNOSIS — I10 ESSENTIAL HYPERTENSION: ICD-10-CM

## 2025-07-10 RX ORDER — LOSARTAN POTASSIUM AND HYDROCHLOROTHIAZIDE 25; 100 MG/1; MG/1
1 TABLET ORAL DAILY
Qty: 90 TABLET | Refills: 1 | Status: SHIPPED | OUTPATIENT
Start: 2025-07-10

## 2025-07-29 ENCOUNTER — TELEPHONE (OUTPATIENT)
Age: 85
End: 2025-07-29

## 2025-08-06 ENCOUNTER — OFFICE VISIT (OUTPATIENT)
Dept: CARDIOLOGY CLINIC | Facility: CLINIC | Age: 85
End: 2025-08-06
Payer: COMMERCIAL

## 2025-08-06 VITALS
WEIGHT: 213 LBS | HEART RATE: 67 BPM | HEIGHT: 67 IN | OXYGEN SATURATION: 96 % | SYSTOLIC BLOOD PRESSURE: 120 MMHG | BODY MASS INDEX: 33.43 KG/M2 | DIASTOLIC BLOOD PRESSURE: 62 MMHG

## 2025-08-06 DIAGNOSIS — I10 ESSENTIAL HYPERTENSION: ICD-10-CM

## 2025-08-06 DIAGNOSIS — I48.20 CHRONIC ATRIAL FIBRILLATION (HCC): Primary | ICD-10-CM

## 2025-08-06 DIAGNOSIS — I71.21 ANEURYSM OF ASCENDING AORTA WITHOUT RUPTURE (HCC): ICD-10-CM

## 2025-08-06 PROCEDURE — 99214 OFFICE O/P EST MOD 30 MIN: CPT | Performed by: INTERNAL MEDICINE

## 2025-08-20 DIAGNOSIS — I48.20 CHRONIC ATRIAL FIBRILLATION (HCC): ICD-10-CM

## 2025-08-20 DIAGNOSIS — I10 ESSENTIAL HYPERTENSION: ICD-10-CM

## 2025-08-20 DIAGNOSIS — E11.42 TYPE 2 DIABETES MELLITUS WITH DIABETIC POLYNEUROPATHY, WITHOUT LONG-TERM CURRENT USE OF INSULIN (HCC): ICD-10-CM

## 2025-08-20 DIAGNOSIS — R60.0 EDEMA OF BOTH LEGS: ICD-10-CM

## 2025-08-21 DIAGNOSIS — E11.9 TYPE 2 DIABETES MELLITUS WITHOUT COMPLICATION, WITHOUT LONG-TERM CURRENT USE OF INSULIN (HCC): ICD-10-CM

## 2025-08-21 RX ORDER — FUROSEMIDE 20 MG/1
20 TABLET ORAL AS NEEDED
Qty: 90 TABLET | Refills: 1 | Status: SHIPPED | OUTPATIENT
Start: 2025-08-21

## 2025-08-22 RX ORDER — EMPAGLIFLOZIN 10 MG/1
10 TABLET, FILM COATED ORAL EVERY MORNING
Qty: 90 TABLET | Refills: 1 | Status: SHIPPED | OUTPATIENT
Start: 2025-08-22

## 2025-08-22 RX ORDER — LANCETS
EACH MISCELLANEOUS 2 TIMES DAILY
Qty: 100 EACH | Refills: 1 | Status: SHIPPED | OUTPATIENT
Start: 2025-08-22

## (undated) DEVICE — TRAY FOLEY 16FR URIMETER SURESTEP

## (undated) DEVICE — ENDOPATH XCEL BLADELESS TROCARS WITH STABILITY SLEEVES: Brand: ENDOPATH XCEL

## (undated) DEVICE — SUT ETHILON 3-0 FS-1 18 IN 663G

## (undated) DEVICE — SPONGE LAP 18 X 4 IN

## (undated) DEVICE — HEM-O-LOK CLIP CARTRIDGE LARGE DA VINCI SI/XI

## (undated) DEVICE — SUT VLOC 90 3-0 V-20 9IN VLOCM0644

## (undated) DEVICE — TIP COVER ACCESSORY

## (undated) DEVICE — DRAPE SHEET X-LG

## (undated) DEVICE — ENDOPOUCH RETRIEVER SPECIMEN RETRIEVAL BAGS: Brand: ENDOPOUCH RETRIEVER

## (undated) DEVICE — INTENDED FOR TISSUE SEPARATION, AND OTHER PROCEDURES THAT REQUIRE A SHARP SURGICAL BLADE TO PUNCTURE OR CUT.: Brand: BARD-PARKER SAFETY BLADES SIZE 15, STERILE

## (undated) DEVICE — GLOVE SRG BIOGEL 8

## (undated) DEVICE — SUT VICRYL 0 CT-1 27 IN J260H

## (undated) DEVICE — FLOSEAL MATRIX IS INDICATED IN SURGICAL PROCEDURES (OTHER THAN IN OPHTHALMIC) AS AN ADJUNCT TO HEMOSTASIS WHEN CONTROL OF BLEEDING BY LIGATURE OR CONVENTIONALPROCEDURES IS INEFFECTIVE OR IMPRACTICAL.: Brand: FLOSEAL HEMOSTATIC MATRIX

## (undated) DEVICE — JP PERF DRN SIL FLT 10MM FULL: Brand: CARDINAL HEALTH

## (undated) DEVICE — HEAVY DUTY TABLE COVER: Brand: CONVERTORS

## (undated) DEVICE — SUT VICRYL 0 REEL 54 IN J287G

## (undated) DEVICE — SUT SILK 0 30 IN A306H

## (undated) DEVICE — TROCAR PORT ACCESS 12 X120MM W/BLDLS OPTICAL TIP AIRSEAL

## (undated) DEVICE — ARM DRAPE

## (undated) DEVICE — AIRSEAL TUBE SMOKE EVAC LUMENX3 FILTERED

## (undated) DEVICE — SUT STRATAFIX SPIRAL 1 CT-1 24 X 24CM SXPL2B400

## (undated) DEVICE — 3M™ IOBAN™ 2 ANTIMICROBIAL INCISE DRAPE 6650EZ: Brand: IOBAN™ 2

## (undated) DEVICE — GAUZE SPONGES,USP TYPE VII GAUZE, 12 PLY: Brand: CURITY

## (undated) DEVICE — IRRIG ENDO FLO TUBING

## (undated) DEVICE — JACKSON-PRATT 100CC BULB RESERVOIR: Brand: CARDINAL HEALTH

## (undated) DEVICE — SUT MONOCRYL 4-0 PS-2 27 IN Y426H

## (undated) DEVICE — ADHESIVE SKN CLSR HISTOACRYL FLEX 0.5ML LF

## (undated) DEVICE — SUT PDS II 0 CT-1 27 IN Z340H

## (undated) DEVICE — SURGICEL 4 X 8

## (undated) DEVICE — CHLORAPREP HI-LITE 26ML ORANGE

## (undated) DEVICE — CANNULA SEAL

## (undated) DEVICE — INTENDED FOR TISSUE SEPARATION, AND OTHER PROCEDURES THAT REQUIRE A SHARP SURGICAL BLADE TO PUNCTURE OR CUT.: Brand: BARD-PARKER SAFETY BLADES SIZE 11, STERILE

## (undated) DEVICE — DRAPE FLUID WARMER (BIRD BATH)

## (undated) DEVICE — ENDOPATH PNEUMONEEDLE INSUFFLATION NEEDLES WITH LUER LOCK CONNECTORS 120MM: Brand: ENDOPATH

## (undated) DEVICE — COLUMN DRAPE

## (undated) DEVICE — 3000CC GUARDIAN II: Brand: GUARDIAN

## (undated) DEVICE — LUBRICANT INST ELECTROLUBE ANTISTK WO PAD

## (undated) DEVICE — STERILE MAJOR GENERAL PACK: Brand: CARDINAL HEALTH